# Patient Record
Sex: FEMALE | Race: WHITE | NOT HISPANIC OR LATINO | Employment: OTHER | URBAN - METROPOLITAN AREA
[De-identification: names, ages, dates, MRNs, and addresses within clinical notes are randomized per-mention and may not be internally consistent; named-entity substitution may affect disease eponyms.]

---

## 2017-02-02 DIAGNOSIS — J30.9 ALLERGIC RHINITIS, UNSPECIFIED ALLERGIC RHINITIS TRIGGER, UNSPECIFIED RHINITIS SEASONALITY: ICD-10-CM

## 2017-02-02 RX ORDER — FLUTICASONE PROPIONATE 50 MCG
SPRAY, SUSPENSION (ML) NASAL
Qty: 16 ML | Refills: 0 | Status: SHIPPED | OUTPATIENT
Start: 2017-02-02 | End: 2017-02-16 | Stop reason: SDUPTHER

## 2017-02-06 ENCOUNTER — OFFICE VISIT (OUTPATIENT)
Dept: INTERNAL MEDICINE | Facility: CLINIC | Age: 57
End: 2017-02-06

## 2017-02-06 VITALS
SYSTOLIC BLOOD PRESSURE: 120 MMHG | TEMPERATURE: 97.3 F | HEIGHT: 66 IN | WEIGHT: 150.8 LBS | DIASTOLIC BLOOD PRESSURE: 76 MMHG | BODY MASS INDEX: 24.23 KG/M2

## 2017-02-06 DIAGNOSIS — J30.9 ALLERGIC RHINITIS, UNSPECIFIED ALLERGIC RHINITIS TRIGGER, UNSPECIFIED RHINITIS SEASONALITY: ICD-10-CM

## 2017-02-06 DIAGNOSIS — F41.8 DEPRESSION WITH ANXIETY: Primary | Chronic | ICD-10-CM

## 2017-02-06 PROCEDURE — 99213 OFFICE O/P EST LOW 20 MIN: CPT | Performed by: FAMILY MEDICINE

## 2017-02-06 RX ORDER — MONTELUKAST SODIUM 10 MG/1
TABLET ORAL
Qty: 30 TABLET | Refills: 2 | Status: SHIPPED | OUTPATIENT
Start: 2017-02-06 | End: 2017-02-16 | Stop reason: SDUPTHER

## 2017-02-06 NOTE — PROGRESS NOTES
Subjective   Alisa Ames is a 56 y.o. female.     History of Present Illness   Here to discuss release to work. Last seen 10/14/16 for recheck mood. Was seen 7/1/16 for 2wk recheck thyroid, post thyroidectomy. Was seen 12/11/15 as a new patient. Just moved here from [place]. Had labs 1/11/16 with normal CBC (Hg 15.6 in smoker), CMP, TSH and B12. Had vit D 18 and was advised 5000 IU qd. Last TSH 7/1/16 was 0.488 with free T4 1.33 on synthroid 100. Lab 9/6/16 demo TSH 2.014 and free T4 1.3 on synthroid 88.     1. CV- syncope, undetermined cause. Pt saw Dr Evans 6/10/16. Had MRI/ MRA and EEG. Had 50 % stenosis of right internal carotid, others normal; normal MRA brain; EEG normal. B12 and A1C recheck were normal. Pt saw cardio with neg w/u including echo and Event Monitor. Discussed mood related issues.   2.ENDO- nontoxic diffuse goiter, post total thyroidectomy. Pt had a h/o right thyroidectomy in past. Had diffuse, enlarging goiter noted on neck MRI and then U/S. Was treated with thyroidectomy 4/23/16 due to size. Is at goal on synthroid 88 with last TSH done 9/6/16.  3. GI- IBS. Pt diagnosed 9/2016. Sister and daughter both has this as well. Pt’s issues were mild and she was advised daily fiber and did well.  4. ORTHO- fibromyalgia and DDD with h/o C spine surgery- initially on flexeril bid. Was seeing Pain Management in [place] and was on oxycodone. Was given tizanadine and gabapentin here. Did not respond to tramadol. Saw [facility] here 4/19/16 and was changed from gabapentin to lyrica. Was referred to PT at visit 12/13/16.      5. PSYCH- depression with anxiety- pt with longterm issues. Has had increased symptoms recently and reported a h/o being stalked but her  reported that this was a delusion. Pt has a h/o of diagnosis of paranoid Schizophrenia. Pt was on Cymbalta and Zyprexa at her last visit and seeing psych at [facility] for counseling. Was having 30 lb weight gain. Zyprexa was changed to abilify and pt did well. However,  at her last visit she had gotten confused and was taking the Zyprexa and abilify instead of Cymbalta and abilify. Was getting dizzy. Correction made and pt was given written instruction. Today she reports she wants to go back to work. However, she is having dizziness again. She does not need to work. 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    6. RESP- today pt reports she has been having allergies and has been using a nasal steroid. When she did this, it made her eye burn and since then she has had a little more sinus pressure. Feels it is still allergy.    The following portions of the patient's history were reviewed and updated as appropriate: current medications, past family history, past medical history, past social history, past surgical history and problem list.    Review of Systems   HENT: Positive for postnasal drip, rhinorrhea and sinus pressure.    Cardiovascular: Negative for chest pain.   Gastrointestinal: Negative for abdominal distention and abdominal pain.   Skin: Negative for color change.   Neurological: Negative for tremors, speech difficulty and headaches.   Psychiatric/Behavioral: Positive for sleep disturbance. Negative for agitation and confusion.   All other systems reviewed and are negative.        Current Outpatient Prescriptions:   •  ARIPiprazole (ABILIFY) 5 MG tablet, Take 1 tab po qhs, Disp: 90 tablet, Rfl: 1  •  DULoxetine (CYMBALTA) 60 MG capsule, Take 1 capsule by mouth Daily., Disp: 90 capsule, Rfl: 1  •  fluticasone (FLONASE) 50 MCG/ACT nasal spray, SHAKE LIQUID AND USE 1 SPRAY IN EACH NOSTRIL EVERY DAY AS NEEDED FOR ALLERGIES, Disp: 16 mL, Rfl: 0  •  levothyroxine (SYNTHROID) 88 MCG tablet, Take 1 tab po qd fasting and wait 1hr for food or other meds, Disp: 30 tablet, Rfl: 0  •  meloxicam (MOBIC) 7.5 MG tablet, Take 7.5 mg by mouth 2 (two) times a day., Disp: , Rfl:   •  montelukast (SINGULAIR) 10 MG  "tablet, Take 1 tab po qd prn allergies, Disp: 30 tablet, Rfl: 2  •  tiZANidine (ZANAFLEX) 4 MG tablet, TAKE 1 TABLET BY MOUTH TWICE A DAY, Disp: 60 tablet, Rfl: 0  •  traMADol (ULTRAM) 50 MG tablet, Take 1-2 tablets by mouth every 6 (six) hours as needed. For pain, Disp: , Rfl:     Objective     Visit Vitals   • /76   • Temp 97.3 °F (36.3 °C)   • Ht 66\" (167.6 cm)   • Wt 150 lb 12.8 oz (68.4 kg)   • BMI 24.34 kg/m2       Physical Exam   Constitutional: She is oriented to person, place, and time. She appears well-developed and well-nourished.   HENT:   Right Ear: Tympanic membrane and ear canal normal.   Left Ear: Tympanic membrane and ear canal normal.   Mouth/Throat: Oropharynx is clear and moist.   Eyes: Conjunctivae and EOM are normal. Pupils are equal, round, and reactive to light.   Neck: No thyromegaly present.   Cardiovascular: Normal rate and regular rhythm.    Pulmonary/Chest: Effort normal and breath sounds normal.   Neurological: She is alert and oriented to person, place, and time.   Skin: Skin is warm and dry.   Psychiatric: She has a normal mood and affect.   Vitals reviewed.      Assessment/Plan   Alisa was seen today for follow-up.    Diagnoses and all orders for this visit:    Depression with anxiety    Allergic rhinitis, unspecified allergic rhinitis trigger, unspecified rhinitis seasonality  -     montelukast (SINGULAIR) 10 MG tablet; Take 1 tab po qd prn allergies      1. 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   2. RESP- allergies. Will use singulair. Discussed how to use a nasal steroid and not over shoot the target as she does not want it to go anywhere except in her nose. Can also add an OTC antihistamine. Advised to avoid decongestants. Handout provided.  3. RECHECK- 2mo routine         "

## 2017-02-06 NOTE — PATIENT INSTRUCTIONS
"1. 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   2. RESP- allergies. Will use singulair. Discussed how to use a nasal steroid and not over shoot the target as she does not want it to go anywhere except in her nose. Can also add an OTC antihistamine. Advised to avoid decongestants. Handout provided.  3. RECHECK- 2mo routine    Patient education today that allergies are a reaction to an \"allergen\" (something the body is perceiving as a germ). The immune system is being triggered to try to \"kill\" the germ.  Allergies can affect the sinuses (hay fever), lungs (asthma) or skin (dermatitis / eczema). As there is no benefit in this reaction, the goal is to stop it. This can be accomplished using:    - an antihistamine (claritin/ loratadine, zyrtec/ cetirazine, allegra/ fexofenadine)  - a leukotriene blocker (singulair/ montelukast)  - a steroid. As steroids have multiple side effects, they are usually used topically (creams on the skin, nasal sprays for the sinuses or inhalers for the lungs).     Any combination of antihistamine, leukotriene blocker and steroid can be used. It is not helpful to use multiple of the same (ie- only one antihistamine at a time).  "

## 2017-02-13 ENCOUNTER — TELEPHONE (OUTPATIENT)
Dept: INTERNAL MEDICINE | Facility: CLINIC | Age: 57
End: 2017-02-13

## 2017-02-13 NOTE — TELEPHONE ENCOUNTER
I called pt to see what she was talking about regarding her infection as she was just seen in the office and did not mention anything. I looked for her  referral for physical therapy but did not see anything since October. New referral submitted to  today. Pt phone states that the [employer] call cannot be completed. There's no VM set up.

## 2017-02-13 NOTE — TELEPHONE ENCOUNTER
----- Message from Mohini Carrillo sent at 2/10/2017 10:54 AM EST -----  TALIB COSTA ABOUT HER INFECTION AND ALSO ABOUT [phone]

## 2017-02-16 ENCOUNTER — OFFICE VISIT (OUTPATIENT)
Dept: INTERNAL MEDICINE | Facility: CLINIC | Age: 57
End: 2017-02-16

## 2017-02-16 VITALS — WEIGHT: 151.4 LBS | BODY MASS INDEX: 24.33 KG/M2 | HEIGHT: 66 IN | TEMPERATURE: 97.8 F

## 2017-02-16 DIAGNOSIS — J30.9 ALLERGIC RHINITIS, UNSPECIFIED ALLERGIC RHINITIS TRIGGER, UNSPECIFIED RHINITIS SEASONALITY: ICD-10-CM

## 2017-02-16 DIAGNOSIS — J32.9 OTHER SINUSITIS, UNSPECIFIED CHRONICITY: Primary | ICD-10-CM

## 2017-02-16 PROCEDURE — 99213 OFFICE O/P EST LOW 20 MIN: CPT | Performed by: FAMILY MEDICINE

## 2017-02-16 RX ORDER — PREDNISONE 1 MG/1
TABLET ORAL
Qty: 18 TABLET | Refills: 0 | Status: SHIPPED | OUTPATIENT
Start: 2017-02-16 | End: 2017-04-13

## 2017-02-16 RX ORDER — FLUTICASONE PROPIONATE 50 MCG
1 SPRAY, SUSPENSION (ML) NASAL DAILY
Qty: 16 ML | Refills: 5 | Status: SHIPPED | OUTPATIENT
Start: 2017-02-16 | End: 2018-12-27

## 2017-02-16 RX ORDER — MONTELUKAST SODIUM 10 MG/1
TABLET ORAL
Qty: 30 TABLET | Refills: 2 | Status: SHIPPED | OUTPATIENT
Start: 2017-02-16 | End: 2017-09-06 | Stop reason: SDUPTHER

## 2017-02-16 NOTE — PROGRESS NOTES
Subjective   Alisa Ames is a 56 y.o. female.     History of Present Illness   Here today as a work in for cough, congestion and runny nose. Last seen 2/6/17 for routine. Was seen 12/2016 with allergies, started on flonase and singulair. Was given biaxin to add prn. Was seen 12/11/15 as a new patient.     RESP- today pt reports she is using the allergy meds and not getting relief. She did end up taking the abx back in December but feels like it did not finish the job. Is having frontal pressure and HA. Has head congestion, especially on the right. Phlegm is still clear.     The following portions of the patient's history were reviewed and updated as appropriate: current medications, past family history, past medical history, past social history, past surgical history and problem list.    Review of Systems   HENT: Positive for congestion, rhinorrhea and sinus pressure.    Respiratory: Positive for cough.    Cardiovascular: Negative for chest pain.   Gastrointestinal: Negative for abdominal distention and abdominal pain.   Skin: Negative for color change.   Neurological: Positive for headaches. Negative for tremors and speech difficulty.   Psychiatric/Behavioral: Negative for agitation and confusion.   All other systems reviewed and are negative.        Current Outpatient Prescriptions:   •  ARIPiprazole (ABILIFY) 5 MG tablet, Take 1 tab po qhs, Disp: 90 tablet, Rfl: 1  •  clarithromycin XL (BIAXIN XL) 500 MG 24 hr tablet, Take 2 tablets by mouth Daily., Disp: 14 tablet, Rfl: 0  •  DULoxetine (CYMBALTA) 60 MG capsule, Take 1 capsule by mouth Daily., Disp: 90 capsule, Rfl: 1  •  fluticasone (FLONASE) 50 MCG/ACT nasal spray, 1 spray into each nostril Daily., Disp: 16 mL, Rfl: 5  •  levothyroxine (SYNTHROID) 88 MCG tablet, Take 1 tab po qd fasting and wait 1hr for food or other meds, Disp: 30 tablet, Rfl: 0  •  meloxicam (MOBIC) 7.5 MG tablet, Take 7.5 mg by mouth 2 (two) times a day., Disp: , Rfl:   •  montelukast  "(SINGULAIR) 10 MG tablet, Take 1 tab po qd prn allergies, Disp: 30 tablet, Rfl: 2  •  predniSONE (DELTASONE) 5 MG tablet, Take 3 tabs po qd x3 days, then 2 tabs po qd x3 days, then 1 tab po qd x3 days, Disp: 18 tablet, Rfl: 0  •  tiZANidine (ZANAFLEX) 4 MG tablet, TAKE 1 TABLET BY MOUTH TWICE A DAY, Disp: 60 tablet, Rfl: 0  •  traMADol (ULTRAM) 50 MG tablet, Take 1-2 tablets by mouth every 6 (six) hours as needed. For pain, Disp: , Rfl:     Objective     Visit Vitals   • Temp 97.8 °F (36.6 °C)   • Ht 66\" (167.6 cm)   • Wt 151 lb 6.4 oz (68.7 kg)   • BMI 24.44 kg/m2       Physical Exam   Constitutional: She is oriented to person, place, and time. She appears well-developed and well-nourished.   HENT:   Right Ear: Tympanic membrane and ear canal normal.   Left Ear: Tympanic membrane and ear canal normal.   Mouth/Throat: Oropharynx is clear and moist.   Eyes: Conjunctivae and EOM are normal. Pupils are equal, round, and reactive to light.   Neck: No thyromegaly present.   Cardiovascular: Normal rate and regular rhythm.    Pulmonary/Chest: Effort normal and breath sounds normal.   Neurological: She is alert and oriented to person, place, and time.   Skin: Skin is warm and dry.   Psychiatric: She has a normal mood and affect.   Vitals reviewed.      Assessment/Plan   Alisa was seen today for illness.    Diagnoses and all orders for this visit:    Other sinusitis, unspecified chronicity  -     clarithromycin XL (BIAXIN XL) 500 MG 24 hr tablet; Take 2 tablets by mouth Daily.  -     predniSONE (DELTASONE) 5 MG tablet; Take 3 tabs po qd x3 days, then 2 tabs po qd x3 days, then 1 tab po qd x3 days    Allergic rhinitis, unspecified allergic rhinitis trigger, unspecified rhinitis seasonality  -     montelukast (SINGULAIR) 10 MG tablet; Take 1 tab po qd prn allergies  -     fluticasone (FLONASE) 50 MCG/ACT nasal spray; 1 spray into each nostril Daily.      1. RESP- allergies with sinus infection. Will treat with prednisone and " clarithromycin. Discussed staying on the allergy med routinely for now.  2. RECHECK- prn

## 2017-02-16 NOTE — PATIENT INSTRUCTIONS
"1. RESP- allergies with sinus infection. Will treat with prednisone and clarithromycin. Discussed staying on the allergy med routinely for now.  2. RECHECK- prn    Patient education today that allergies are a reaction to an \"allergen\" (something the body is perceiving as a germ). The immune system is being triggered to try to \"kill\" the germ.  Allergies can affect the sinuses (hay fever), lungs (asthma) or skin (dermatitis / eczema). As there is no benefit in this reaction, the goal is to stop it. This can be accomplished using:    - an antihistamine (claritin/ loratadine, zyrtec/ cetirazine, allegra/ fexofenadine)  - a leukotriene blocker (singulair/ montelukast)  - a steroid. As steroids have multiple side effects, they are usually used topically (creams on the skin, nasal sprays for the sinuses or inhalers for the lungs).     Any combination of antihistamine, leukotriene blocker and steroid can be used. It is not helpful to use multiple of the same (ie- only one antihistamine at a time).  "

## 2017-02-25 ENCOUNTER — APPOINTMENT (OUTPATIENT)
Dept: CT IMAGING | Facility: HOSPITAL | Age: 57
End: 2017-02-25

## 2017-02-25 ENCOUNTER — HOSPITAL ENCOUNTER (EMERGENCY)
Facility: HOSPITAL | Age: 57
Discharge: HOME | End: 2017-02-25
Attending: EMERGENCY MEDICINE | Admitting: EMERGENCY MEDICINE

## 2017-02-25 VITALS
SYSTOLIC BLOOD PRESSURE: 170 MMHG | DIASTOLIC BLOOD PRESSURE: 80 MMHG | BODY MASS INDEX: 24.59 KG/M2 | HEART RATE: 64 BPM | TEMPERATURE: 98.5 F | WEIGHT: 153 LBS | HEIGHT: 66 IN | OXYGEN SATURATION: 98 % | RESPIRATION RATE: 16 BRPM

## 2017-02-25 DIAGNOSIS — R55 NEAR SYNCOPE: Primary | ICD-10-CM
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
## 2017-02-25 DIAGNOSIS — S20.20XA MULTIPLE CONTUSIONS OF TRUNK, INITIAL ENCOUNTER: ICD-10-CM

## 2017-02-25 LAB
ANION GAP SERPL CALCULATED.3IONS-SCNC: 5 MMOL/L (ref 3–11)
BASOPHILS # BLD AUTO: 0.02 10*3/MM3 (ref 0–0.2)
BASOPHILS NFR BLD AUTO: 0.2 % (ref 0–1)
BUN BLD-MCNC: 9 MG/DL (ref 9–23)
BUN/CREAT SERPL: 11.3 (ref 7–25)
CALCIUM SPEC-SCNC: 9.4 MG/DL (ref 8.7–10.4)
CHLORIDE SERPL-SCNC: 102 MMOL/L (ref 99–109)
CO2 SERPL-SCNC: 33 MMOL/L (ref 20–31)
CREAT BLD-MCNC: 0.8 MG/DL (ref 0.6–1.3)
DEPRECATED RDW RBC AUTO: 47.4 FL (ref 37–54)
EOSINOPHIL # BLD AUTO: 0.12 10*3/MM3 (ref 0.1–0.3)
EOSINOPHIL NFR BLD AUTO: 1.4 % (ref 0–3)
ERYTHROCYTE [DISTWIDTH] IN BLOOD BY AUTOMATED COUNT: 13.6 % (ref 11.3–14.5)
GFR SERPL CREATININE-BSD FRML MDRD: 74 ML/MIN/1.73
GLUCOSE BLD-MCNC: 85 MG/DL (ref 70–100)
HCT VFR BLD AUTO: 37.7 % (ref 34.5–44)
HGB BLD-MCNC: 12.6 G/DL (ref 11.5–15.5)
IMM GRANULOCYTES # BLD: 0.01 10*3/MM3 (ref 0–0.03)
IMM GRANULOCYTES NFR BLD: 0.1 % (ref 0–0.6)
LYMPHOCYTES # BLD AUTO: 3.13 10*3/MM3 (ref 0.6–4.8)
LYMPHOCYTES NFR BLD AUTO: 37.8 % (ref 24–44)
MCH RBC QN AUTO: 31.7 PG (ref 27–31)
MCHC RBC AUTO-ENTMCNC: 33.4 G/DL (ref 32–36)
MCV RBC AUTO: 95 FL (ref 80–99)
MONOCYTES # BLD AUTO: 0.83 10*3/MM3 (ref 0–1)
MONOCYTES NFR BLD AUTO: 10 % (ref 0–12)
NEUTROPHILS # BLD AUTO: 4.17 10*3/MM3 (ref 1.5–8.3)
NEUTROPHILS NFR BLD AUTO: 50.5 % (ref 41–71)
PLATELET # BLD AUTO: 301 10*3/MM3 (ref 150–450)
PMV BLD AUTO: 9.6 FL (ref 6–12)
POTASSIUM BLD-SCNC: 3.1 MMOL/L (ref 3.5–5.5)
RBC # BLD AUTO: 3.97 10*6/MM3 (ref 3.89–5.14)
SODIUM BLD-SCNC: 140 MMOL/L (ref 132–146)
WBC NRBC COR # BLD: 8.28 10*3/MM3 (ref 3.5–10.8)

## 2017-02-25 PROCEDURE — 99284 EMERGENCY DEPT VISIT MOD MDM: CPT

## 2017-02-25 PROCEDURE — 96375 TX/PRO/DX INJ NEW DRUG ADDON: CPT

## 2017-02-25 PROCEDURE — 85025 COMPLETE CBC W/AUTO DIFF WBC: CPT | Performed by: EMERGENCY MEDICINE

## 2017-02-25 PROCEDURE — 80048 BASIC METABOLIC PNL TOTAL CA: CPT | Performed by: EMERGENCY MEDICINE

## 2017-02-25 PROCEDURE — 25010000002 HYDROMORPHONE PER 4 MG: Performed by: EMERGENCY MEDICINE

## 2017-02-25 PROCEDURE — 96374 THER/PROPH/DIAG INJ IV PUSH: CPT

## 2017-02-25 PROCEDURE — 71250 CT THORAX DX C-: CPT

## 2017-02-25 PROCEDURE — 93005 ELECTROCARDIOGRAM TRACING: CPT | Performed by: EMERGENCY MEDICINE

## 2017-02-25 PROCEDURE — 25010000002 KETOROLAC TROMETHAMINE PER 15 MG: Performed by: EMERGENCY MEDICINE

## 2017-02-25 RX ORDER — SODIUM CHLORIDE 0.9 % (FLUSH) 0.9 %
10 SYRINGE (ML) INJECTION AS NEEDED
Status: DISCONTINUED | OUTPATIENT
Start: 2017-02-25 | End: 2017-02-25 | Stop reason: HOSPADM

## 2017-02-25 RX ORDER — OXYCODONE AND ACETAMINOPHEN 7.5; 325 MG/1; MG/1
1 TABLET ORAL EVERY 6 HOURS PRN
Qty: 10 TABLET | Refills: 0 | Status: SHIPPED | OUTPATIENT
Start: 2017-02-25 | End: 2017-05-16

## 2017-02-25 RX ORDER — KETOROLAC TROMETHAMINE 15 MG/ML
15 INJECTION, SOLUTION INTRAMUSCULAR; INTRAVENOUS ONCE
Status: COMPLETED | OUTPATIENT
Start: 2017-02-25 | End: 2017-02-25

## 2017-02-25 RX ORDER — HYDROCODONE BITARTRATE AND ACETAMINOPHEN 5; 325 MG/1; MG/1
1 TABLET ORAL EVERY 6 HOURS PRN
COMMUNITY
End: 2017-05-16 | Stop reason: SDUPTHER

## 2017-02-25 RX ADMIN — KETOROLAC TROMETHAMINE 15 MG: 15 INJECTION, SOLUTION INTRAMUSCULAR; INTRAVENOUS at 08:13

## 2017-02-25 RX ADMIN — HYDROMORPHONE HYDROCHLORIDE 0.5 MG: 1 INJECTION, SOLUTION INTRAMUSCULAR; INTRAVENOUS; SUBCUTANEOUS at 08:19

## 2017-03-01 ENCOUNTER — OFFICE VISIT (OUTPATIENT)
Dept: INTERNAL MEDICINE | Facility: CLINIC | Age: 57
End: 2017-03-01

## 2017-03-01 VITALS
TEMPERATURE: 97.1 F | WEIGHT: 151.2 LBS | BODY MASS INDEX: 24.3 KG/M2 | SYSTOLIC BLOOD PRESSURE: 130 MMHG | HEIGHT: 66 IN | DIASTOLIC BLOOD PRESSURE: 78 MMHG

## 2017-03-01 DIAGNOSIS — J32.9 OTHER SINUSITIS, UNSPECIFIED CHRONICITY: ICD-10-CM

## 2017-03-01 PROCEDURE — 99213 OFFICE O/P EST LOW 20 MIN: CPT | Performed by: FAMILY MEDICINE

## 2017-03-01 NOTE — PROGRESS NOTES
Subjective   Alisa Ames is a 56 y.o. female.     History of Present Illness   Here for hospital recheck. Seen in ED 2/25/17 for fall; note reviewed. Pt on norco and was going to be out 10 days early due to extra use. Was given 2 days meds in ER. Lab demonomral CBC and CMP. EKG demo NSR with old septal Q waves, nonspecific T changes, ? atrial enlargement (compared to 8/23/16). CT chest neg other than emphysema changes.    ORTHO- today pt reports she had one of her dizzy spells and feel into a doorframe. No LOC but she cannot remember the incident.     RESP- allergies. Today pt reports no vertigo. However, she is not improving and would like to see an allergist. Is still not smoking.    The following portions of the patient's history were reviewed and updated as appropriate: current medications, past family history, past medical history, past social history, past surgical history and problem list.    Review of Systems   Cardiovascular: Negative for chest pain.   Gastrointestinal: Negative for abdominal distention and abdominal pain.   Musculoskeletal:        Rib pain   Skin: Negative for color change.   Neurological: Negative for tremors, speech difficulty and headaches.   Psychiatric/Behavioral: Negative for agitation and confusion.   All other systems reviewed and are negative.        Current Outpatient Prescriptions:   •  ARIPiprazole (ABILIFY) 5 MG tablet, Take 1 tab po qhs, Disp: 90 tablet, Rfl: 1  •  DULoxetine (CYMBALTA) 60 MG capsule, Take 1 capsule by mouth Daily., Disp: 90 capsule, Rfl: 1  •  fluticasone (FLONASE) 50 MCG/ACT nasal spray, 1 spray into each nostril Daily., Disp: 16 mL, Rfl: 5  •  HYDROcodone-acetaminophen (NORCO) 5-325 MG per tablet, Take 1 tablet by mouth Every 6 (Six) Hours As Needed., Disp: , Rfl:   •  levothyroxine (SYNTHROID) 88 MCG tablet, Take 1 tab po qd fasting and wait 1hr for food or other meds, Disp: 30 tablet, Rfl: 0  •  meloxicam (MOBIC) 7.5 MG tablet, Take 7.5 mg by mouth 2  "(two) times a day., Disp: , Rfl:   •  montelukast (SINGULAIR) 10 MG tablet, Take 1 tab po qd prn allergies, Disp: 30 tablet, Rfl: 2  •  oxyCODONE-acetaminophen (PERCOCET) 7.5-325 MG per tablet, Take 1 tablet by mouth Every 6 (Six) Hours As Needed for severe pain (7-10)., Disp: 10 tablet, Rfl: 0  •  predniSONE (DELTASONE) 5 MG tablet, Take 3 tabs po qd x3 days, then 2 tabs po qd x3 days, then 1 tab po qd x3 days, Disp: 18 tablet, Rfl: 0  •  tiZANidine (ZANAFLEX) 4 MG tablet, TAKE 1 TABLET BY MOUTH TWICE A DAY, Disp: 60 tablet, Rfl: 0  •  traMADol (ULTRAM) 50 MG tablet, Take 1-2 tablets by mouth every 6 (six) hours as needed. For pain, Disp: , Rfl:     Objective     Visit Vitals   • /78   • Temp 97.1 °F (36.2 °C)   • Ht 66\" (167.6 cm)   • Wt 151 lb 3.2 oz (68.6 kg)   • BMI 24.4 kg/m2       Physical Exam   Constitutional: She is oriented to person, place, and time. She appears well-developed and well-nourished.   HENT:   Right Ear: Tympanic membrane and ear canal normal.   Left Ear: Tympanic membrane and ear canal normal.   Mouth/Throat: Oropharynx is clear and moist.   Eyes: Conjunctivae and EOM are normal. Pupils are equal, round, and reactive to light.   Neck: No thyromegaly present.   Cardiovascular: Normal rate and regular rhythm.    Pulmonary/Chest: Effort normal and breath sounds normal.   Neurological: She is alert and oriented to person, place, and time.   Skin: Skin is warm and dry.   Bruising along her right torso with no lung changes. Bruise already resolving with yellowing   Psychiatric: She has a normal mood and affect.   Vitals reviewed.      Assessment/Plan   Alisa was seen today for follow-up.    Diagnoses and all orders for this visit:    Other sinusitis, unspecified chronicity  -     Ambulatory Referral to Allergy        1. ORTHO- arthritis- discussed with pt today that her pain med and muscle relaxer can be contributing to her falls. To make an appointment with pain management as she may " benefit from an extended release pain med and an alternate version of the muscle relaxer.  2. RESP- allergies- will refer to the allergist.  3. RECHECK- prn

## 2017-03-01 NOTE — PATIENT INSTRUCTIONS
1. ORTHO- arthritis- discussed with pt today that her pain med and muscle relaxer can be contributing to her falls. To make an appointment with pain management as she may benefit from an extended release pain med and an alternate version of the muscle relaxer.  2. RESP- allergies- will refer to the allergist.  3. RECHECK- prn

## 2017-04-13 ENCOUNTER — OFFICE VISIT (OUTPATIENT)
Dept: INTERNAL MEDICINE | Facility: CLINIC | Age: 57
End: 2017-04-13

## 2017-04-13 VITALS
HEIGHT: 66 IN | DIASTOLIC BLOOD PRESSURE: 84 MMHG | TEMPERATURE: 97.2 F | BODY MASS INDEX: 24.59 KG/M2 | SYSTOLIC BLOOD PRESSURE: 124 MMHG | WEIGHT: 153 LBS

## 2017-04-13 DIAGNOSIS — J30.9 ALLERGIC RHINITIS, UNSPECIFIED ALLERGIC RHINITIS TRIGGER, UNSPECIFIED RHINITIS SEASONALITY: ICD-10-CM

## 2017-04-13 DIAGNOSIS — F41.8 DEPRESSION WITH ANXIETY: Primary | Chronic | ICD-10-CM

## 2017-04-13 PROCEDURE — 99214 OFFICE O/P EST MOD 30 MIN: CPT | Performed by: FAMILY MEDICINE

## 2017-04-13 NOTE — PATIENT INSTRUCTIONS
1. PSYCH- depression with anxiety- discussed that she likely has multiple chemicals out of balance and needs treatment that covers multiple areas. It is not clear if she is on the cymbalta which treats serotonin and norepinephrine. In addition, she is only getting partial response to the abilify (zyprexa). As she is currently off meds. We will do a trial with trinellix. Will start low and gradually increase dose if indicated. Pt given samples for 5mg for the first week and then she is to increase to 10mg (once daily) with Rx sent in for the 10mg. Discussed that this does not interfere with allergy testing with no need to hold this.  2. RESP- allergies- pt is given a copy of her meds with notes on which meds to hold for allergy testing. Discussed that she CANNOT hold her thyroid medicine. Is advised to hold all her allergy meds and the mobic for the allergy testing. Keep all other meds.  3. RECHECK- 1mo
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
## 2017-05-16 ENCOUNTER — OFFICE VISIT (OUTPATIENT)
Dept: INTERNAL MEDICINE | Facility: CLINIC | Age: 57
End: 2017-05-16

## 2017-05-16 VITALS
SYSTOLIC BLOOD PRESSURE: 112 MMHG | TEMPERATURE: 97.4 F | HEIGHT: 66 IN | BODY MASS INDEX: 24.14 KG/M2 | WEIGHT: 150.2 LBS | DIASTOLIC BLOOD PRESSURE: 70 MMHG

## 2017-05-16 DIAGNOSIS — F41.8 DEPRESSION WITH ANXIETY: Primary | Chronic | ICD-10-CM

## 2017-05-16 DIAGNOSIS — K58.0 IRRITABLE BOWEL SYNDROME WITH DIARRHEA: ICD-10-CM

## 2017-05-16 DIAGNOSIS — M51.36 DDD (DEGENERATIVE DISC DISEASE), LUMBAR: Chronic | ICD-10-CM

## 2017-05-16 PROCEDURE — 99214 OFFICE O/P EST MOD 30 MIN: CPT | Performed by: FAMILY MEDICINE

## 2017-05-16 RX ORDER — DIPHENOXYLATE HYDROCHLORIDE AND ATROPINE SULFATE 2.5; .025 MG/1; MG/1
1 TABLET ORAL 4 TIMES DAILY PRN
Qty: 120 TABLET | Refills: 0 | Status: SHIPPED | OUTPATIENT
Start: 2017-05-16 | End: 2019-06-11 | Stop reason: SDUPTHER

## 2017-05-16 RX ORDER — HYDROCODONE BITARTRATE AND ACETAMINOPHEN 5; 325 MG/1; MG/1
TABLET ORAL
Qty: 90 TABLET | Refills: 0 | Status: SHIPPED | OUTPATIENT
Start: 2017-05-16 | End: 2017-10-02

## 2017-05-19 ENCOUNTER — TELEPHONE (OUTPATIENT)
Dept: INTERNAL MEDICINE | Facility: CLINIC | Age: 57
End: 2017-05-19

## 2017-05-19 DIAGNOSIS — F41.8 DEPRESSION WITH ANXIETY: Chronic | ICD-10-CM

## 2017-05-31 DIAGNOSIS — M25.111 FISTULA OF RIGHT SHOULDER: Primary | ICD-10-CM

## 2017-06-21 ENCOUNTER — TELEPHONE (OUTPATIENT)
Dept: INTERNAL MEDICINE | Facility: CLINIC | Age: 57
End: 2017-06-21

## 2017-06-21 DIAGNOSIS — F41.8 DEPRESSION WITH ANXIETY: Chronic | ICD-10-CM

## 2017-06-21 DIAGNOSIS — E03.9 HYPOTHYROIDISM (ACQUIRED): ICD-10-CM

## 2017-06-21 RX ORDER — LEVOTHYROXINE SODIUM 88 UG/1
TABLET ORAL
Qty: 90 TABLET | Refills: 0 | Status: SHIPPED | OUTPATIENT
Start: 2017-06-21 | End: 2017-09-06 | Stop reason: SDUPTHER

## 2017-06-21 NOTE — TELEPHONE ENCOUNTER
1.SYNTHROID 88MCG  2. TRINTELLIX 10MG CALL INTO [facility] ON [facility]

## 2017-07-07 DIAGNOSIS — M50.30 DDD (DEGENERATIVE DISC DISEASE), CERVICAL: Primary | ICD-10-CM

## 2017-07-14 ENCOUNTER — HOSPITAL ENCOUNTER (OUTPATIENT)
Dept: MRI IMAGING | Facility: HOSPITAL | Age: 57
Discharge: HOME | End: 2017-07-14
Attending: FAMILY MEDICINE | Admitting: FAMILY MEDICINE

## 2017-07-14 PROCEDURE — 72156 MRI NECK SPINE W/O & W/DYE: CPT

## 2017-07-14 PROCEDURE — 0 GADOBENATE DIMEGLUMINE 529 MG/ML SOLUTION: Performed by: FAMILY MEDICINE

## 2017-07-14 PROCEDURE — A9577 INJ MULTIHANCE: HCPCS | Performed by: FAMILY MEDICINE

## 2017-07-14 RX ADMIN — GADOBENATE DIMEGLUMINE 13 ML: 529 INJECTION, SOLUTION INTRAVENOUS at 14:30

## 2017-07-17 ENCOUNTER — OFFICE VISIT (OUTPATIENT)
Dept: INTERNAL MEDICINE | Facility: CLINIC | Age: 57
End: 2017-07-17

## 2017-07-17 VITALS
BODY MASS INDEX: 23.01 KG/M2 | DIASTOLIC BLOOD PRESSURE: 72 MMHG | WEIGHT: 143.2 LBS | SYSTOLIC BLOOD PRESSURE: 116 MMHG | TEMPERATURE: 97.4 F | HEIGHT: 66 IN

## 2017-07-17 DIAGNOSIS — F41.8 DEPRESSION WITH ANXIETY: Primary | Chronic | ICD-10-CM

## 2017-07-17 PROCEDURE — 99213 OFFICE O/P EST LOW 20 MIN: CPT | Performed by: FAMILY MEDICINE

## 2017-07-17 NOTE — PATIENT INSTRUCTIONS
1. PSYCH- depression with anxiety- mood close to goal. Will increase the trintellix to 20mg and recheck in 3mo to ensure she reaches goal. Discussed that if she feels over medicated she can cut the dose in half. However, I expect that with the continued stress with returning to work, that she will need the 20mg.   2. RECHECK- 3mo fasting routine

## 2017-07-17 NOTE — PROGRESS NOTES
Subjective   Alisa Ames is a 57 y.o. female.     History of Present Illness   Here for 2mo recheck mood. Last seen 5/16/17. Was seen 3/1/17 as a recheck from ED. Seen in ED 2/25/17 for fall. Last routine visit 2/6/17. Was seen 12/2016 with allergies, started on flonase and singulair. Was given biaxin to add prn. Was seen 12/11/15 as a new patient. Had labs 1/11/16 with normal CBC (Hg 15.6 in smoker), CMP, TSH and B12. Had vit D 18 and was advised 5000 IU qd. Last TSH 7/1/16 was 0.488 with free T4 1.33 on synthroid 100. Lab 9/6/16 demo TSH 2.014 and free T4 1.3 on synthroid 88.     1. GENERAL- syncope, undetermined cause. Pt saw Dr Evans 6/10/16. Had MRI/ MRA and EEG. Had 50 % stenosis of right internal carotid, others normal; normal MRA brain; EEG normal. B12 and A1C recheck were normal. Pt saw cardio with neg w/u including echo and Event Monitor. Discussed mood related issues. Pt was seen in ER 2/25/17 with fall and head trauma. Lab demo nomral CBC and CMP. EKG demo NSR with old septal Q waves, nonspecific T changes, ? atrial enlargement (compared to 8/23/16). CT chest neg other than emphysema changes.  2. ENDO- nontoxic diffuse goiter, post total thyroidectomy. Pt had a h/o right thyroidectomy in past. Had diffuse, enlarging goiter noted on neck MRI and then U/S. Was treated with thyroidectomy 4/23/16 due to size. Was at goal on synthroid 88 with last TSH done 9/6/16.  3. RESP-allergies. Pt on OTC and nasal steroid with addition of singulair 2/2017. Today she reports she got her allergy testing. Was advised that her nose is sensitive to climate not so much allergens with no shots indicated. Started [place] honey and this helping alot.   4. GI- IBS. Pt diagnosed 9/2016. Sister and daughter both have this as well. Pt’s issues were mild and she was advised daily fiber and did well. Today she reports she needs a RF on lomotil. Last filled 8/2016 for #120.    5.ORTHO- fibromyalgia and DDD with h/o C spine surgery-  initially on flexeril bid. Was seeing Pain Management in [place] and was on oxycodone. Was given tizanadine and gabapentin here. Did not respond to tramadol. Saw [facility] here 4/19/16 and was changed from gabapentin to lyrica. Was referred to PT at visit 12/13/16. Was referred to new [facility] at visit 5/16/17 as she had taken extra meds after a fall and was discharged. Was using tizanidine and tramadol in the interim. Was referred to Dr He. Updated MRI neck ordered; no changes. Today pt reports she has recheck with Dr He pending.  Is using an herbal tea and had not been working and was doing well. However, her  had an MI and she is going to have to go back to work. 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      6.PSYCH- depression with anxiety- pt with long term issues. Has had increased symptoms recently and reported a h/o being stalked but her  reported that this was a delusion. Pt has a h/o of diagnosis of paranoid Schizophrenia. Pt was on Cymbalta and Zyprexa but had 30 lb weight gain and was changed to abilify and Cymbalta. However,at her last visit, it was unclear if she was actually taking the Cymbalta. In addition, she had held the abilify as she thought she was supposed to do this for allergy testing. Had full relapse with increased delusions. Was changed to trintellix only. It cost $50 but was working so was continued on same. Today she reports she has been using [employer] to address her anxiety and this has helped. Has not had a lot of anxiety but has had some. Otherwise she feels she is doing well. Her  had an MI and she coped well.     The following portions of the patient's history were reviewed and updated as appropriate: current medications, past family history, past medical history, past social history, past surgical history and problem list.    Review of Systems   Cardiovascular: Negative for chest pain.   Gastrointestinal: Negative for abdominal distention and abdominal pain.   Skin:  "Negative for color change.   Neurological: Negative for tremors, speech difficulty and headaches.   Psychiatric/Behavioral: Negative for agitation and confusion.   All other systems reviewed and are negative.        Current Outpatient Prescriptions:   •  diphenoxylate-atropine (LOMOTIL) 2.5-0.025 MG per tablet, Take 1 tablet by mouth 4 (Four) Times a Day As Needed for Diarrhea., Disp: 120 tablet, Rfl: 0  •  fluticasone (FLONASE) 50 MCG/ACT nasal spray, 1 spray into each nostril Daily., Disp: 16 mL, Rfl: 5  •  HYDROcodone-acetaminophen (NORCO) 5-325 MG per tablet, Take 1 tab po tid, Disp: 90 tablet, Rfl: 0  •  levothyroxine (SYNTHROID) 88 MCG tablet, Take 1 tab po qd fasting and wait 1hr for food or other meds, Disp: 90 tablet, Rfl: 0  •  montelukast (SINGULAIR) 10 MG tablet, Take 1 tab po qd prn allergies, Disp: 30 tablet, Rfl: 2  •  tiZANidine (ZANAFLEX) 4 MG tablet, TAKE 1 TABLET BY MOUTH TWICE A DAY, Disp: 60 tablet, Rfl: 0  •  Vortioxetine HBr (TRINTELLIX) 20 MG tablet, Take 20 mg by mouth Daily., Disp: 90 tablet, Rfl: 0    Objective     /72  Temp 97.4 °F (36.3 °C)  Ht 66\" (167.6 cm)  Wt 143 lb 3.2 oz (65 kg)  BMI 23.11 kg/m2    Physical Exam   Constitutional: She is oriented to person, place, and time. She appears well-developed and well-nourished.   HENT:   Right Ear: Tympanic membrane and ear canal normal.   Left Ear: Tympanic membrane and ear canal normal.   Mouth/Throat: Oropharynx is clear and moist.   Eyes: Conjunctivae and EOM are normal. Pupils are equal, round, and reactive to light.   Neck: No thyromegaly present.   Cardiovascular: Normal rate and regular rhythm.    Pulmonary/Chest: Effort normal and breath sounds normal.   Neurological: She is alert and oriented to person, place, and time.   Skin: Skin is warm and dry.   Psychiatric: She has a normal mood and affect.   Vitals reviewed.      Assessment/Plan   Alisa was seen today for follow-up.    Diagnoses and all orders for this " visit:    Depression with anxiety  -     Vortioxetine HBr (TRINTELLIX) 20 MG tablet; Take 20 mg by mouth Daily.      1. PSYCH- depression with anxiety- mood close to goal. Will increase the trintellix to 20mg and recheck in 3mo to ensure she reaches goal. Discussed that if she feels over medicated she can cut the dose in half. However, I expect that with the continued stress with returning to work, that she will need the 20mg.   2. RECHECK- 3mo fasting routine

## 2017-08-03 ENCOUNTER — TELEPHONE (OUTPATIENT)
Dept: INTERNAL MEDICINE | Facility: CLINIC | Age: 57
End: 2017-08-03

## 2017-08-29 ENCOUNTER — OFFICE VISIT (OUTPATIENT)
Dept: INTERNAL MEDICINE | Facility: CLINIC | Age: 57
End: 2017-08-29

## 2017-08-29 VITALS
BODY MASS INDEX: 23.14 KG/M2 | DIASTOLIC BLOOD PRESSURE: 76 MMHG | SYSTOLIC BLOOD PRESSURE: 114 MMHG | TEMPERATURE: 97.8 F | HEIGHT: 66 IN | WEIGHT: 144 LBS

## 2017-08-29 DIAGNOSIS — H61.23 BILATERAL IMPACTED CERUMEN: Primary | ICD-10-CM

## 2017-08-29 PROCEDURE — 99213 OFFICE O/P EST LOW 20 MIN: CPT | Performed by: FAMILY MEDICINE

## 2017-08-29 RX ORDER — OXCARBAZEPINE 150 MG/1
TABLET, FILM COATED ORAL
COMMUNITY
Start: 2017-06-07 | End: 2017-10-02

## 2017-08-29 NOTE — PATIENT INSTRUCTIONS
1. ENT- cerumen impaction, bilat. Will irrigate today. Pt advised that this relates to the allergies. To use warm sweet oil or olive oil to bathe her ears every few days to keep the wax flowing.   2. RECHECK- CPE

## 2017-08-29 NOTE — PROGRESS NOTES
Subjective   Alisa Ames is a 57 y.o. female.     History of Present Illness   Here for ear c/o and to discuss cardio referral. Last seen 7/17/17 for 2mo recheck mood. Was seen 3/1/17 as a recheck from ED 2/25/17 for fall. Last routine visit 2/6/17. Was seen 12/2016 with allergies, started on flonase and singulair. Was given biaxin to add prn. Was seen 12/11/15 as a new patient. Had labs 1/11/16 with normal CBC (Hg 15.6 in smoker), CMP, TSH and B12. Had vit D 18 and was advised 5000 IU qd. Last TSH 7/1/16 was 0.488 with free T4 1.33 on synthroid 100. Lab 9/6/16 demo TSH 2.014 and free T4 1.3 on synthroid 88.     1.GENERAL- syncope, undetermined cause. Pt saw Dr Evans 6/10/16. Had MRI/ MRA and EEG. Had 50 % stenosis of right internal carotid, others normal; normal MRA brain; EEG normal. B12 and A1C recheck were normal. Pt saw cardio with neg w/u including echo and Event Monitor. Discussed mood related issues. Pt was seen in ER 2/25/17 with fall and head trauma. Lab demo nomral CBC and CMP. EKG demo NSR with old septal Q waves, nonspecific T changes, ? atrial enlargement (compared to 8/23/16). CT chest neg other than emphysema changes.Today pt reports she forgot that she already saw the cardio.    2. RESP-allergies. Pt on OTC and nasal steroid with addition of singulair 2/2017. Allergy testing demo sensitivity to climate not so much allergens with no shots indicated. Started [place] honey and this helping alot. Today pt reports she went for a hearing tested and was advised her ears were impacted.    3.ENDO- nontoxic diffuse goiter, post total thyroidectomy. Pt had a h/o right thyroidectomy in past. Had diffuse, enlarging goiter noted on neck MRI and then U/S. Was treated with thyroidectomy 4/23/16 due to size. Was at goal on synthroid 88 with last TSH done 9/6/16.  4. GI- IBS. Pt diagnosed 9/2016. Sister and daughter both have this as well. Pt’s issues were mild and she was advised daily fiber and did well. Today she  reports she needs a RF on lomotil. Last filled 8/2016 for #120.  5.ORTHO- fibromyalgia and DDD with h/o C spine surgery- initially on flexeril bid. Was seeing Pain Management in [place] and was on oxycodone. Was given tizanadine and gabapentin here. Did not respond to tramadol. Saw [facility] here 4/19/16 and was changed from gabapentin to lyrica. Was referred to PT at visit 12/13/16. Was referred to new [facility] at visit 5/16/17 as she had taken extra meds after a fall and was discharged. Was using tizanidine and tramadol in the interim. Was referred to Dr He. Updated MRI neck ordered; no changes. 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   6.PSYCH- depression with anxiety- pt with long term issues. Has had increased symptoms recently and reported a h/o being stalked but her  reported that this was a delusion. Pt has a h/o of diagnosis of paranoid Schizophrenia. Pt was on Cymbalta and Zyprexa but had 30 lb weight gain and was changed to abilify and Cymbalta. However,at her last visit, it was unclear if she was actually taking the Cymbalta. In addition, she had held the abilify as she thought she was supposed to do this for allergy testing. Had full relapse with increased delusions. Was changed to trintellix only. At last discussion pt was doing well with this and prayer.     The following portions of the patient's history were reviewed and updated as appropriate: current medications, past family history, past medical history, past social history, past surgical history and problem list.    Review of Systems   Cardiovascular: Negative for chest pain.   Gastrointestinal: Negative for abdominal distention and abdominal pain.   Skin: Negative for color change.   Neurological: Negative for tremors, speech difficulty and headaches.   Psychiatric/Behavioral: Negative for agitation and confusion.   All other systems reviewed and are negative.        Current Outpatient Prescriptions:   •  diphenoxylate-atropine (LOMOTIL) 2.5-0.025 MG  "per tablet, Take 1 tablet by mouth 4 (Four) Times a Day As Needed for Diarrhea., Disp: 120 tablet, Rfl: 0  •  fluticasone (FLONASE) 50 MCG/ACT nasal spray, 1 spray into each nostril Daily., Disp: 16 mL, Rfl: 5  •  HYDROcodone-acetaminophen (NORCO) 5-325 MG per tablet, Take 1 tab po tid, Disp: 90 tablet, Rfl: 0  •  levothyroxine (SYNTHROID) 88 MCG tablet, Take 1 tab po qd fasting and wait 1hr for food or other meds, Disp: 90 tablet, Rfl: 0  •  montelukast (SINGULAIR) 10 MG tablet, Take 1 tab po qd prn allergies, Disp: 30 tablet, Rfl: 2  •  OXcarbazepine (TRILEPTAL) 150 MG tablet, , Disp: , Rfl:   •  tiZANidine (ZANAFLEX) 4 MG tablet, TAKE 1 TABLET BY MOUTH TWICE A DAY, Disp: 60 tablet, Rfl: 0  •  Vortioxetine HBr (TRINTELLIX) 20 MG tablet, Take 20 mg by mouth Daily., Disp: 90 tablet, Rfl: 0    Objective     /76  Temp 97.8 °F (36.6 °C)  Ht 66\" (167.6 cm)  Wt 144 lb (65.3 kg)  BMI 23.24 kg/m2    Physical Exam   Constitutional: She is oriented to person, place, and time. She appears well-developed and well-nourished.   HENT:   Right Ear: Tympanic membrane and ear canal normal.   Left Ear: Tympanic membrane and ear canal normal.   Mouth/Throat: Oropharynx is clear and moist.   Cerumen impaction   Eyes: Conjunctivae and EOM are normal. Pupils are equal, round, and reactive to light.   Neck: No thyromegaly present.   Cardiovascular: Normal rate and regular rhythm.    Pulmonary/Chest: Effort normal and breath sounds normal.   Neurological: She is alert and oriented to person, place, and time.   Skin: Skin is warm and dry.   Psychiatric: She has a normal mood and affect.   Vitals reviewed.      Assessment/Plan   Alisa was seen today for follow-up.    Diagnoses and all orders for this visit:    Bilateral impacted cerumen      1. ENT- cerumen impaction, bilat. Will irrigate today. Pt advised that this relates to the allergies. To use warm sweet oil or olive oil to bathe her ears every few days to keep the wax " flowing.   2. RECHECK- CPE

## 2017-09-06 ENCOUNTER — OFFICE VISIT (OUTPATIENT)
Dept: INTERNAL MEDICINE | Facility: CLINIC | Age: 57
End: 2017-09-06

## 2017-09-06 VITALS
TEMPERATURE: 97 F | WEIGHT: 144 LBS | BODY MASS INDEX: 23.14 KG/M2 | DIASTOLIC BLOOD PRESSURE: 86 MMHG | HEIGHT: 66 IN | SYSTOLIC BLOOD PRESSURE: 170 MMHG

## 2017-09-06 DIAGNOSIS — J30.9 ALLERGIC RHINITIS, UNSPECIFIED ALLERGIC RHINITIS TRIGGER, UNSPECIFIED RHINITIS SEASONALITY: ICD-10-CM

## 2017-09-06 DIAGNOSIS — R42 DIZZINESS: Primary | ICD-10-CM

## 2017-09-06 DIAGNOSIS — E03.9 HYPOTHYROIDISM (ACQUIRED): ICD-10-CM

## 2017-09-06 DIAGNOSIS — F41.8 DEPRESSION WITH ANXIETY: Chronic | ICD-10-CM

## 2017-09-06 DIAGNOSIS — G43.909 MIGRAINE SYNDROME: ICD-10-CM

## 2017-09-06 PROCEDURE — 99214 OFFICE O/P EST MOD 30 MIN: CPT | Performed by: FAMILY MEDICINE

## 2017-09-06 RX ORDER — MONTELUKAST SODIUM 10 MG/1
TABLET ORAL
Qty: 90 TABLET | Refills: 1 | Status: SHIPPED | OUTPATIENT
Start: 2017-09-06 | End: 2018-03-29 | Stop reason: SDUPTHER

## 2017-09-06 RX ORDER — RIZATRIPTAN BENZOATE 10 MG/1
TABLET ORAL
Qty: 9 TABLET | Refills: 0 | Status: SHIPPED | OUTPATIENT
Start: 2017-09-06 | End: 2017-09-25 | Stop reason: SDUPTHER

## 2017-09-06 RX ORDER — LEVOTHYROXINE SODIUM 88 UG/1
TABLET ORAL
Qty: 90 TABLET | Refills: 1 | Status: SHIPPED | OUTPATIENT
Start: 2017-09-06 | End: 2018-03-29 | Stop reason: SDUPTHER

## 2017-09-06 NOTE — PROGRESS NOTES
Subjective   Alisa Ames is a 57 y.o. female.     History of Present Illness   Here today as a work in for HA and dizziness. Last seen 8/29/17 for cerumen removal. Pt was seen 7/17/17 for 2mo recheck mood. Was seen 3/1/17 as a recheck from ED 2/25/17 for fall. Last routine visit 2/6/17. Was seen 12/2016 with allergies, started on flonase and singulair. Was given biaxin to add prn. Was seen 12/11/15 as a new patient. Had labs 1/11/16 with normal CBC (Hg 15.6 in smoker), CMP, TSH and B12. Had vit D 18 and was advised 5000 IU qd. Last TSH 7/1/16 was 0.488 with free T4 1.33 on synthroid 100. Lab 9/6/16 demo TSH 2.014 and free T4 1.3 on synthroid 88.     Today pt reports she had a HA a couple of mos ago. Resolved with ASA but then a week later she got another. After that she started getting a HA qd. Thought it related to her neck so she returned to PT. Did not improve and then she got ringing in her ears. Went for a hearing test and was found to have the cerumen impaction. Got that cleared here a week ago. Has not been back for the hearing test yet. Then a week ago she got light headed and then dizzy and fell over. Was at PT and had BP check nd was 176/88 supine with recheck 158/86 seated. She has been irregular with her thyroid meds. On discussion, she has a h/o migraine and was treated with maxalt in past.    1.GENERAL- syncope, undetermined cause. Pt saw Dr Evans 6/10/16. Had MRI/ MRA and EEG. Had 50 % stenosis of right internal carotid, others normal; normal MRA brain; EEG normal. B12 and A1C recheck were normal. Pt saw cardio with neg w/u including echo and Event Monitor. Discussed mood related issues. Pt was seen in ER 2/25/17 with fall and head trauma. Lab demo nomral CBC and CMP. EKG demo NSR with old septal Q waves, nonspecific T changes, ? atrial enlargement (compared to 8/23/16). CT chest neg other than emphysema changes.   2. RESP-allergies. Pt on OTC and nasal steroid with addition of singulair 2/2017.  Allergy testing demo sensitivity to climate not so much allergens with no shots indicated. Started [place] honey and this helping alot. Had cerumen disimpaction 8/22/17.  3.ENDO- nontoxic diffuse goiter, post total thyroidectomy. Pt had a h/o right thyroidectomy in past. Had diffuse, enlarging goiter noted on neck MRI and then U/S. Was treated with thyroidectomy 4/23/16 due to size. Was at goal on synthroid 88 with last TSH done 9/6/16.  4. GI- IBS. Pt diagnosed 9/2016. Sister and daughter both have this as well. Pt’s issues were mild and she was advised daily fiber and did well. Today she reports she needs a RF on lomotil. Last filled 8/2016 for #120.  5.ORTHO- fibromyalgia and DDD with h/o C spine surgery- initially on flexeril bid. Was seeing Pain Management in [place] and was on oxycodone. Was given tizanadine and gabapentin here. Did not respond to tramadol. Saw PM here 4/19/16 and was changed from gabapentin to lyrica. Was referred to PT at visit 12/13/16. Was referred to new PM at visit 5/16/17 as she had taken extra meds after a fall and was discharged. Was using tizanidine and tramadol in the interim. Was referred to Dr He. Updated MRI neck ordered; no changes. 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   6.PSYCH- depression with anxiety- pt with long term issues. Has had increased symptoms recently and reported a h/o being stalked but her  reported that this was a delusion. Pt has a h/o of diagnosis of paranoid Schizophrenia. Pt was on Cymbalta and Zyprexa but had 30 lb weight gain and was changed to abilify and Cymbalta. However,at her last visit, it was unclear if she was actually taking the Cymbalta. In addition, she had held the abilify as she thought she was supposed to do this for allergy testing. Had full relapse with increased delusions. Was changed to trintellix only. At last discussion pt was doing well with this and prayer.    The following portions of the patient's history were reviewed and  "updated as appropriate: current medications, past family history, past medical history, past social history, past surgical history and problem list.    Review of Systems   Cardiovascular: Negative for chest pain.   Gastrointestinal: Negative for abdominal distention and abdominal pain.   Skin: Negative for color change.   Neurological: Positive for light-headedness and headaches. Negative for tremors and speech difficulty.   Psychiatric/Behavioral: Negative for agitation and confusion.   All other systems reviewed and are negative.        Current Outpatient Prescriptions:   •  diphenoxylate-atropine (LOMOTIL) 2.5-0.025 MG per tablet, Take 1 tablet by mouth 4 (Four) Times a Day As Needed for Diarrhea., Disp: 120 tablet, Rfl: 0  •  fluticasone (FLONASE) 50 MCG/ACT nasal spray, 1 spray into each nostril Daily., Disp: 16 mL, Rfl: 5  •  HYDROcodone-acetaminophen (NORCO) 5-325 MG per tablet, Take 1 tab po tid, Disp: 90 tablet, Rfl: 0  •  levothyroxine (SYNTHROID) 88 MCG tablet, Take 1 tab po qd fasting and wait 1hr for food or other meds, Disp: 90 tablet, Rfl: 1  •  montelukast (SINGULAIR) 10 MG tablet, Take 1 tab po qd prn allergies, Disp: 90 tablet, Rfl: 1  •  OXcarbazepine (TRILEPTAL) 150 MG tablet, , Disp: , Rfl:   •  rizatriptan (MAXALT) 10 MG tablet, Take 1 tab po prn migraine. May repeat in 2 hours if needed, max 3 in 24 hr, Disp: 9 tablet, Rfl: 0  •  tiZANidine (ZANAFLEX) 4 MG tablet, TAKE 1 TABLET BY MOUTH TWICE A DAY, Disp: 60 tablet, Rfl: 0  •  Vortioxetine HBr (TRINTELLIX) 20 MG tablet, Take 20 mg by mouth Daily., Disp: 90 tablet, Rfl: 1    Objective     /86  Temp 97 °F (36.1 °C)  Ht 66\" (167.6 cm)  Wt 144 lb (65.3 kg)  BMI 23.24 kg/m2    Physical Exam   Constitutional: She is oriented to person, place, and time. She appears well-developed and well-nourished.   HENT:   Right Ear: Tympanic membrane and ear canal normal.   Left Ear: Tympanic membrane and ear canal normal.   Mouth/Throat: Oropharynx is " clear and moist.   Eyes: Conjunctivae and EOM are normal. Pupils are equal, round, and reactive to light.   Neck: No thyromegaly present.   Cardiovascular: Normal rate and regular rhythm.    Pulmonary/Chest: Effort normal and breath sounds normal.   Neurological: She is alert and oriented to person, place, and time.   Skin: Skin is warm and dry.   Psychiatric: She has a normal mood and affect.   Vitals reviewed.      Assessment/Plan   Alisa was seen today for headache.    Diagnoses and all orders for this visit:    Dizziness    Migraine syndrome  -     rizatriptan (MAXALT) 10 MG tablet; Take 1 tab po prn migraine. May repeat in 2 hours if needed, max 3 in 24 hr    Hypothyroidism (acquired)  -     levothyroxine (SYNTHROID) 88 MCG tablet; Take 1 tab po qd fasting and wait 1hr for food or other meds    Allergic rhinitis, unspecified allergic rhinitis trigger, unspecified rhinitis seasonality  -     montelukast (SINGULAIR) 10 MG tablet; Take 1 tab po qd prn allergies    Depression with anxiety  -     Vortioxetine HBr (TRINTELLIX) 20 MG tablet; Take 20 mg by mouth Daily.      1. DIZZINESS- discussed that she likely had a mixture of vertigo (inner ear) and fluctuating BP. She does not have hypertension. She is advised to restart her allergy meds for now. Avoid too much aspirin and use ibu instead. To get the hearing test.   2. NEURO- migraine. Discussed that the headache is migraine, likely related to the weather. Trigger handout provided. Will do trial with maxalt  3. RECHECK- 2wk

## 2017-09-06 NOTE — PATIENT INSTRUCTIONS
1. DIZZINESS- discussed that she likely had a mixture of vertigo (inner ear) and fluctuating BP. She does not have hypertension. She is advised to restart her allergy meds for now. Avoid too much aspirin and use ibu instead. To get the hearing test.   2. NEURO- migraine. Discussed that the headache is migraine, likely related to the weather. Trigger handout provided. Will do trial with maxalt  3. RECHECK- 2wk

## 2017-09-08 ENCOUNTER — TELEPHONE (OUTPATIENT)
Dept: INTERNAL MEDICINE | Facility: CLINIC | Age: 57
End: 2017-09-08

## 2017-09-08 NOTE — TELEPHONE ENCOUNTER
Pt is calling to advise she is not happy with Dr. He. She was not happy with bedside manner, she said he had no adequate staff. She was there for 3 and half hours and got upset which triggered her IBS and then she got light headed and dizziness. Pt would like a referral to another pain management.

## 2017-09-08 NOTE — TELEPHONE ENCOUNTER
Per Dr. Gama okching to refer to a different pain management facility. However, the patient has been discharged from 2 already and it will all depend on who will take the  insurance. Let me know if she needs to place a new order. Thanks!

## 2017-09-11 NOTE — TELEPHONE ENCOUNTER
If patient has been discharged from 2 previously, this would be three. Per our policy, she is not eligible for any more pain referrals

## 2017-09-25 ENCOUNTER — TELEPHONE (OUTPATIENT)
Dept: INTERNAL MEDICINE | Facility: CLINIC | Age: 57
End: 2017-09-25

## 2017-09-25 DIAGNOSIS — G43.909 MIGRAINE SYNDROME: ICD-10-CM

## 2017-09-25 RX ORDER — RIZATRIPTAN BENZOATE 10 MG/1
TABLET ORAL
Qty: 9 TABLET | Refills: 0 | Status: SHIPPED | OUTPATIENT
Start: 2017-09-25 | End: 2017-09-25 | Stop reason: SDUPTHER

## 2017-09-25 RX ORDER — RIZATRIPTAN BENZOATE 10 MG/1
TABLET ORAL
Qty: 21 TABLET | Refills: 2 | Status: SHIPPED | OUTPATIENT
Start: 2017-09-25 | End: 2017-10-02 | Stop reason: SDUPTHER

## 2017-09-25 NOTE — TELEPHONE ENCOUNTER
Cory she is out of [facility]. She wants the ones that melt in her mouth. [facility] [place]. Also [facility]. She has had over 9 migraines last week. And has one now.

## 2017-09-26 ENCOUNTER — TELEPHONE (OUTPATIENT)
Dept: INTERNAL MEDICINE | Facility: CLINIC | Age: 57
End: 2017-09-26

## 2017-09-26 NOTE — TELEPHONE ENCOUNTER
Patient called she is out of her med and wanted to know if the quanity for her headache med could be more than nine pills a month. Please give patient a call

## 2017-09-27 NOTE — TELEPHONE ENCOUNTER
Pt needs a PA for quantity increase but insurance will likely not cover the increase she is asking for. I'll run this by Dr. Gama to see if she has any suggestions but will start the PA now.

## 2017-10-02 ENCOUNTER — OFFICE VISIT (OUTPATIENT)
Dept: INTERNAL MEDICINE | Facility: CLINIC | Age: 57
End: 2017-10-02

## 2017-10-02 VITALS
HEIGHT: 66 IN | BODY MASS INDEX: 22.73 KG/M2 | DIASTOLIC BLOOD PRESSURE: 74 MMHG | TEMPERATURE: 97.5 F | WEIGHT: 141.4 LBS | SYSTOLIC BLOOD PRESSURE: 126 MMHG

## 2017-10-02 DIAGNOSIS — M50.30 DDD (DEGENERATIVE DISC DISEASE), CERVICAL: Primary | ICD-10-CM

## 2017-10-02 DIAGNOSIS — G43.909 MIGRAINE SYNDROME: ICD-10-CM

## 2017-10-02 PROCEDURE — 99214 OFFICE O/P EST MOD 30 MIN: CPT | Performed by: FAMILY MEDICINE

## 2017-10-02 RX ORDER — RIZATRIPTAN BENZOATE 10 MG/1
TABLET ORAL
Qty: 36 TABLET | Refills: 1 | Status: SHIPPED | OUTPATIENT
Start: 2017-10-02 | End: 2018-03-15 | Stop reason: SDUPTHER

## 2017-10-02 RX ORDER — TIZANIDINE 4 MG/1
4 TABLET ORAL EVERY 8 HOURS PRN
Qty: 180 TABLET | Refills: 1 | Status: SHIPPED | OUTPATIENT
Start: 2017-10-02 | End: 2018-05-31 | Stop reason: SDUPTHER

## 2017-10-02 RX ORDER — TOPIRAMATE 50 MG/1
1 CAPSULE, EXTENDED RELEASE ORAL DAILY
Qty: 30 CAPSULE | Refills: 0 | Status: SHIPPED | OUTPATIENT
Start: 2017-10-02 | End: 2017-10-30 | Stop reason: SDUPTHER

## 2017-10-02 RX ORDER — RIZATRIPTAN BENZOATE 10 MG/1
TABLET ORAL
Qty: 36 TABLET | Refills: 1 | Status: SHIPPED | OUTPATIENT
Start: 2017-10-02 | End: 2017-10-02 | Stop reason: SDUPTHER

## 2017-10-02 RX ORDER — TOPIRAMATE 50 MG/1
1 CAPSULE, EXTENDED RELEASE ORAL DAILY
Qty: 30 CAPSULE | Refills: 0 | Status: SHIPPED | OUTPATIENT
Start: 2017-10-02 | End: 2017-10-02 | Stop reason: SDUPTHER

## 2017-10-02 NOTE — PROGRESS NOTES
Subjective   Alisa Ames is a 57 y.o. female.     History of Present Illness   Here for recheck migraine. Last seen 9/6/17 as a work in for HA and dizziness. Last seen 8/29/17 for cerumen removal. Pt was seen 7/17/17 for 2mo recheck mood. Was seen 3/1/17 as a recheck from ED 2/25/17 for fall. Last routine visit 2/6/17. Was seen 12/2016 with allergies, started on flonase and singulair. Was given biaxin to add prn. Was seen 12/11/15 as a new patient. Had labs 1/11/16 with normal CBC (Hg 15.6 in smoker), CMP, TSH and B12. Had vit D 18 and was advised 5000 IU qd. Last TSH 7/1/16 was 0.488 with free T4 1.33 on synthroid 100. Lab 9/6/16 demo TSH 2.014 and free T4 1.3 on synthroid 88.     1.NEURO- migraine. At last visit pt reported a HA several mos earlier and then daily HA for a week. Was also having tinnits with hearing test pending (needed cerumen disimpaction first). Pt has hx migraine and was diagnosed with same. Was given maxalt as she had done well with this in the past. 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 Is going to PT. She did use the maxalt and did well with it but has used the entire Rx.      2.ORTHO- fibromyalgia and DDD with h/o C spine surgery- initially on flexeril bid. Was seeing Pain Management in [place] and was on oxycodone. Was given tizanadine and gabapentin here. Did not respond to tramadol. Saw PM here 4/19/16 and was changed from gabapentin to lyrica. Was referred to PT at visit 12/13/16. Was referred to new PM at visit 5/16/17 as she had taken extra meds after a fall and was discharged. Was using tizanidine and tramadol in the interim. Was referred to Dr Vicente (5/11/17) and Dr He (5/24/17); kept the appt with Dr Vicente. Updated MRI neck ordered; no changes. 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 Was discharged from PM 9/8/17 due to repeated cold/ contaminated urines. Today pt reports that she felt she was treated very  badly by Dr He. She has not taken any extra meds, was actually cutting down on use. States her urine should not have been cold and then she got upset and got diarrhea and was not able to do an additional sample. Has gone back on zanaflex and is tolerating this well and would like to try to just take this for now. Is off the norco with no w/d.     3.GENERAL- syncope, undetermined cause. Pt saw Dr Evans 6/10/16. Had MRI/ MRA and EEG. Had 50 % stenosis of right internal carotid, others normal; normal MRA brain; EEG normal. B12 and A1C recheck were normal. Pt saw cardio with neg w/u including echo and Event Monitor. Discussed mood related issues. Pt was seen in ER 2/25/17 with fall and head trauma. Lab demo nomral CBC and CMP. EKG demo NSR with old septal Q waves, nonspecific T changes, ? atrial enlargement (compared to 8/23/16). CT chest neg other than emphysema changes.   4. RESP-allergies. Pt on OTC and nasal steroid with addition of singulair 2/2017. Allergy testing demo sensitivity to climate not so much allergens with no shots indicated. Started [place] honey and this helping alot. Had cerumen disimpaction 8/22/17.  5.ENDO- nontoxic diffuse goiter, post total thyroidectomy. Pt had a h/o right thyroidectomy in past. Had diffuse, enlarging goiter noted on neck MRI and then U/S. Was treated with thyroidectomy 4/23/16 due to size. Was at goal on synthroid 88 with last TSH done 9/6/16.  6. GI- IBS. Pt diagnosed 9/2016. Sister and daughter both have this as well. Pt’s issues were mild and she was advised daily fiber and did well. Today she reports she needs a RF on lomotil. Last filled 8/2016 for #120.  7.PSYCH- depression with anxiety- pt with long term issues. Has had increased symptoms recently and reported a h/o being stalked but her  reported that this was a delusion. Pt has a h/o of diagnosis of paranoid Schizophrenia. Pt was on Cymbalta and Zyprexa but had 30 lb weight gain and was changed to abilify and  Cymbalta. However,at her last visit, it was unclear if she was actually taking the Cymbalta. In addition, she had held the abilify as she thought she was supposed to do this for allergy testing. Had full relapse with increased delusions. Was changed to trintellix only. At last discussion pt was doing well with this and prayer.    The following portions of the patient's history were reviewed and updated as appropriate: current medications, past family history, past medical history, past social history, past surgical history and problem list.    Review of Systems   HENT: Positive for tinnitus.    Cardiovascular: Negative for chest pain.   Gastrointestinal: Negative for abdominal distention and abdominal pain.   Skin: Negative for color change.   Neurological: Positive for headaches. Negative for tremors and speech difficulty.   Psychiatric/Behavioral: Negative for agitation and confusion.   All other systems reviewed and are negative.        Current Outpatient Prescriptions:   •  diphenoxylate-atropine (LOMOTIL) 2.5-0.025 MG per tablet, Take 1 tablet by mouth 4 (Four) Times a Day As Needed for Diarrhea., Disp: 120 tablet, Rfl: 0  •  fluticasone (FLONASE) 50 MCG/ACT nasal spray, 1 spray into each nostril Daily., Disp: 16 mL, Rfl: 5  •  levothyroxine (SYNTHROID) 88 MCG tablet, Take 1 tab po qd fasting and wait 1hr for food or other meds, Disp: 90 tablet, Rfl: 1  •  montelukast (SINGULAIR) 10 MG tablet, Take 1 tab po qd prn allergies, Disp: 90 tablet, Rfl: 1  •  rizatriptan (MAXALT) 10 MG tablet, Take 1 tab po prn migraine. May repeat in 2 hours if needed, max 3 in 24 hr, Disp: 36 tablet, Rfl: 1  •  tiZANidine (ZANAFLEX) 4 MG tablet, Take 1 tablet by mouth Every 8 (Eight) Hours As Needed for Muscle Spasms., Disp: 180 tablet, Rfl: 1  •  Topiramate ER (TROKENDI XR) 50 MG capsule sustained-release 24 hr, Take 1 tablet by mouth Daily., Disp: 30 capsule, Rfl: 0  •  Vortioxetine HBr (TRINTELLIX) 20 MG tablet, Take 20 mg by  "mouth Daily., Disp: 90 tablet, Rfl: 1    Objective     /74  Temp 97.5 °F (36.4 °C)  Ht 66\" (167.6 cm)  Wt 141 lb 6.4 oz (64.1 kg)  BMI 22.82 kg/m2    Physical Exam   Constitutional: She is oriented to person, place, and time. She appears well-developed and well-nourished.   HENT:   Right Ear: Tympanic membrane and ear canal normal.   Left Ear: Tympanic membrane and ear canal normal.   Mouth/Throat: Oropharynx is clear and moist.   Eyes: Conjunctivae and EOM are normal. Pupils are equal, round, and reactive to light.   Neck: No thyromegaly present.   Cardiovascular: Normal rate and regular rhythm.    Pulmonary/Chest: Effort normal and breath sounds normal.   Neurological: She is alert and oriented to person, place, and time.   Skin: Skin is warm and dry.   Psychiatric: She has a normal mood and affect.   Vitals reviewed.      Assessment/Plan   Alisa was seen today for headache.    Diagnoses and all orders for this visit:    DDD (degenerative disc disease), cervical  -     tiZANidine (ZANAFLEX) 4 MG tablet; Take 1 tablet by mouth Every 8 (Eight) Hours As Needed for Muscle Spasms.    Migraine syndrome  -     Discontinue: Topiramate ER (TROKENDI XR) 50 MG capsule sustained-release 24 hr; Take 1 tablet by mouth Daily.  -     Discontinue: rizatriptan (MAXALT) 10 MG tablet; Take 1 tab po prn migraine. May repeat in 2 hours if needed, max 3 in 24 hr  -     Topiramate ER (TROKENDI XR) 50 MG capsule sustained-release 24 hr; Take 1 tablet by mouth Daily.  -     rizatriptan (MAXALT) 10 MG tablet; Take 1 tab po prn migraine. May repeat in 2 hours if needed, max 3 in 24 hr    1. ORTHO- arthritis with chronic neck pain. Will remain off the narcotics for now and will use zanaflex with Rx here. If pt has major relapse, we can do a bridge Rx and refer her back to pain management then. Discussed that this will be the last referral we can make to PM.  2. NEURO- migriane- will continue the maxalt. Discussed that she cannot " take this daily without risking rebound headaches. Discussed preventive medication. Will do trial with trokendi. Will start with 25mg (samples) and increase to 50mg (Rx) at 1wk. Pt can still add maxalt as needed. Pt brings FMLA forms today but these will not be filled out until next visit as we cannot yet predict her migraine pattern.  3. RECHECK- 1mo

## 2017-10-02 NOTE — PATIENT INSTRUCTIONS
1. ORTHO- arthritis with chronic neck pain. Will remain off the narcotics for now and will use zanaflex with Rx here. If pt has major relapse, we can do a bridge Rx and refer her back to pain management then. Discussed that this will be the last referral we can make to PM.  2. NEURO- migriane- will continue the maxalt. Discussed that she cannot take this daily without risking rebound headaches. Discussed preventive medication. Will do trial with trokendi. Will start with 25mg (samples) and increase to 50mg (Rx) at 1wk. Pt can still add maxalt as needed. Pt brings FMLA forms today but these will not be filled out until next visit as we cannot yet predict her migraine pattern.  3. RECHECK- 1mo

## 2017-10-03 ENCOUNTER — TELEPHONE (OUTPATIENT)
Dept: INTERNAL MEDICINE | Facility: CLINIC | Age: 57
End: 2017-10-03

## 2017-10-03 NOTE — TELEPHONE ENCOUNTER
I spoke with pt and advised her that per Dr. Gama, the BP should not be causing there ringing in her ears. The patient states that she will discuss this more at her pending appointment in 1 month. Pt expressed understanding and will call earlier if she thinks she needs to be seen sooner.

## 2017-10-03 NOTE — TELEPHONE ENCOUNTER
Pt was seen yesterday and forgot to ask if the BP could cause the ringing in the ears. She said they mentioned something about BP. She said if it can can she give her something for her BP. Pt said they are ringing pretty bad and disrupting her work.Please call pt and advise.

## 2017-10-04 ENCOUNTER — TELEPHONE (OUTPATIENT)
Dept: INTERNAL MEDICINE | Facility: CLINIC | Age: 57
End: 2017-10-04

## 2017-10-05 NOTE — TELEPHONE ENCOUNTER
PA has been rec'd from [employer]. I'll call the patient when I receive any information back on this.

## 2017-10-23 ENCOUNTER — TELEPHONE (OUTPATIENT)
Dept: INTERNAL MEDICINE | Facility: CLINIC | Age: 57
End: 2017-10-23

## 2017-10-23 NOTE — TELEPHONE ENCOUNTER
Pt did not get norco filled and it has  and wants to no if the script can be re written. Pt said that she still has the written script that she can show that it has not been filled. Pt though the script was good for a yea. She wants to no if this can happen when she comes in for her apt on the 30 or can she get it before then. Please call back at [phone] before 3pm it is the pt  phone

## 2017-10-24 NOTE — TELEPHONE ENCOUNTER
SANDRA asking pt to call back regarding rx. Per Dr. Gama, she will not give the patient Norco and has discussed this with her in detail. Pt has referral placed to see Pain Management as she was discharged from her previous PM

## 2017-10-25 ENCOUNTER — TELEPHONE (OUTPATIENT)
Dept: INTERNAL MEDICINE | Facility: CLINIC | Age: 57
End: 2017-10-25

## 2017-10-25 NOTE — TELEPHONE ENCOUNTER
PATIENT CALLED WANTING TO KNOW IF YOU COULD PRESCRIBE HER NORCO? PLEASE GIVE PATIENT A CALL [phone]
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
## 2017-10-30 ENCOUNTER — OFFICE VISIT (OUTPATIENT)
Dept: INTERNAL MEDICINE | Facility: CLINIC | Age: 57
End: 2017-10-30

## 2017-10-30 VITALS
HEIGHT: 66 IN | RESPIRATION RATE: 18 BRPM | SYSTOLIC BLOOD PRESSURE: 122 MMHG | OXYGEN SATURATION: 99 % | DIASTOLIC BLOOD PRESSURE: 76 MMHG | BODY MASS INDEX: 22.82 KG/M2 | WEIGHT: 142 LBS

## 2017-10-30 DIAGNOSIS — G43.909 MIGRAINE SYNDROME: ICD-10-CM

## 2017-10-30 DIAGNOSIS — M50.30 DDD (DEGENERATIVE DISC DISEASE), CERVICAL: Primary | ICD-10-CM

## 2017-10-30 PROCEDURE — 99213 OFFICE O/P EST LOW 20 MIN: CPT | Performed by: FAMILY MEDICINE

## 2017-10-30 RX ORDER — HYDROCODONE BITARTRATE AND ACETAMINOPHEN 5; 325 MG/1; MG/1
1 TABLET ORAL EVERY 6 HOURS PRN
Qty: 90 TABLET | Refills: 0 | Status: SHIPPED | OUTPATIENT
Start: 2017-10-30 | End: 2017-10-30

## 2017-10-30 RX ORDER — TRAMADOL HYDROCHLORIDE 50 MG/1
100 TABLET ORAL EVERY 8 HOURS PRN
Qty: 180 TABLET | Refills: 0 | Status: SHIPPED | OUTPATIENT
Start: 2017-10-30 | End: 2017-12-21 | Stop reason: SDUPTHER

## 2017-10-30 RX ORDER — TOPIRAMATE 50 MG/1
1 CAPSULE, EXTENDED RELEASE ORAL DAILY
Qty: 90 CAPSULE | Refills: 1 | Status: SHIPPED | OUTPATIENT
Start: 2017-10-30 | End: 2018-01-18

## 2017-10-30 NOTE — PROGRESS NOTES
Subjective   Alisa Ames is a 57 y.o. female.     History of Present Illness   Here for 1mo recheck migraine. Last seen 10/2/17 for same. Was seen 9/6/17 as a work in for HA and dizziness. Pt was seen 7/17/17 for 2mo recheck mood. Was seen 3/1/17 as a recheck from ED 2/25/17 for fall. Last routine visit 2/6/17. Was seen 12/2016 with allergies, started on flonase and singulair. Was given biaxin to add prn. Was seen 12/11/15 as a new patient. Had labs 1/11/16 with normal CBC (Hg 15.6 in smoker), CMP, TSH and B12. Had vit D 18 and was advised 5000 IU qd. Last TSH 7/1/16 was 0.488 with free T4 1.33 on synthroid 100. Lab 9/6/16 demo TSH 2.014 and free T4 1.3 on synthroid 88.     1.NEURO- migraine. Pt with h/o issues, diagnosis confirmed again 9/6/17 when seen for HA and dizziness for several mos that eventually resulted in daily HA. Was given maxalt as she had done well with this in the past. 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 Today she reports she did well with the trokendi. Did have to take a couple of the maxalt and it did work well. Stopped working 3wk ago and is feeling better.      2.ORTHO- fibromyalgia and DDD with h/o C spine surgery- initially on flexeril bid. Was seeing Pain Management in [place] and was on oxycodone. Was given tizanadine and gabapentin here. Did not respond to tramadol. Saw PM here 4/19/16 and was changed from gabapentin to lyrica. Was referred to PT at visit 12/13/16. Was referred to new PM at visit 5/16/17 as she had taken extra meds after a fall and was discharged. Was using tizanidine and tramadol in the interim. Was referred to Dr Vicente (5/11/17) and Dr He (5/24/17); kept the appt with Dr Vicente. Updated MRI neck ordered; no changes. 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 Was discharged from PM 9/8/17 due to repeated cold/ contaminated urines and  manic behavior. At her visit 10/2/17 pt reported that she felt she had been treated very badly by Dr He. Was off the norco with no w/d and preferred to try to go without narcotics. Was given zanaflex. Discussed that if she did not do well, I could see her and give her a single Rx norco with a final referral to PM but no additional meds or referrals after that. Today pt reports her neck and arm hurt really bad when the weather changed.        The following portions of the patient's history were reviewed and updated as appropriate: allergies, past family history, past medical history, past social history, past surgical history and problem list.    Review of Systems   Cardiovascular: Negative for chest pain.   Gastrointestinal: Negative for abdominal distention and abdominal pain.   Skin: Negative for color change.   Neurological: Negative for tremors, speech difficulty and headaches.   Psychiatric/Behavioral: Negative for agitation and confusion.   All other systems reviewed and are negative.        Current Outpatient Prescriptions:   •  diphenoxylate-atropine (LOMOTIL) 2.5-0.025 MG per tablet, Take 1 tablet by mouth 4 (Four) Times a Day As Needed for Diarrhea., Disp: 120 tablet, Rfl: 0  •  fluticasone (FLONASE) 50 MCG/ACT nasal spray, 1 spray into each nostril Daily., Disp: 16 mL, Rfl: 5  •  HYDROcodone-acetaminophen (NORCO) 5-325 MG per tablet, Take 1 tablet by mouth Every 6 (Six) Hours As Needed for Severe Pain ., Disp: 90 tablet, Rfl: 0  •  levothyroxine (SYNTHROID) 88 MCG tablet, Take 1 tab po qd fasting and wait 1hr for food or other meds, Disp: 90 tablet, Rfl: 1  •  montelukast (SINGULAIR) 10 MG tablet, Take 1 tab po qd prn allergies, Disp: 90 tablet, Rfl: 1  •  rizatriptan (MAXALT) 10 MG tablet, Take 1 tab po prn migraine. May repeat in 2 hours if needed, max 3 in 24 hr, Disp: 36 tablet, Rfl: 1  •  tiZANidine (ZANAFLEX) 4 MG tablet, Take 1 tablet by mouth Every 8 (Eight) Hours As Needed for Muscle  "Spasms., Disp: 180 tablet, Rfl: 1  •  Topiramate ER (TROKENDI XR) 50 MG capsule sustained-release 24 hr, Take 1 tablet by mouth Daily., Disp: 90 capsule, Rfl: 1  •  Vortioxetine HBr (TRINTELLIX) 20 MG tablet, Take 20 mg by mouth Daily., Disp: 90 tablet, Rfl: 1    Objective     /76  Resp 18  Ht 66\" (167.6 cm)  Wt 142 lb (64.4 kg)  SpO2 99%  BMI 22.92 kg/m2    Physical Exam   Constitutional: She is oriented to person, place, and time. She appears well-developed and well-nourished.   HENT:   Right Ear: Tympanic membrane and ear canal normal.   Left Ear: Tympanic membrane and ear canal normal.   Mouth/Throat: Oropharynx is clear and moist.   Eyes: Conjunctivae and EOM are normal. Pupils are equal, round, and reactive to light.   Neck: No thyromegaly present.   Cardiovascular: Normal rate and regular rhythm.    Pulmonary/Chest: Effort normal and breath sounds normal.   Neurological: She is alert and oriented to person, place, and time.   Skin: Skin is warm and dry.   Psychiatric: She has a normal mood and affect.   Vitals reviewed.      Assessment/Plan   Alisa was seen today for ddd.    Diagnoses and all orders for this visit:    DDD (degenerative disc disease), cervical  -     HYDROcodone-acetaminophen (NORCO) 5-325 MG per tablet; Take 1 tablet by mouth Every 6 (Six) Hours As Needed for Severe Pain .  -     Ambulatory Referral to Hospital Pain Management Department    Migraine syndrome  -     Topiramate ER (TROKENDI XR) 50 MG capsule sustained-release 24 hr; Take 1 tablet by mouth Daily.        1. ORTHO- DDD- will write for a single bridging Rx for hydrocodone and refer to pain management. Discussed in detail with the patient the importance of following the laws as outlined by the state, especially as this is the last time I can refer her to Pain Management.  2. NEURO- migraine- improved on trokendi. Will continue this with RF x6mo now.  3. RECHECK- 3mo routine    ADDEND- after discussion, pt decides she does " not want to go to Pain Management yet as her pain is not as bad today. She returns the hydrocodone and will be given Rx for tramadol instead. Is advised to try 1-2 up to 3x/ day. Will do recheck in 1mo with contract then.

## 2017-10-30 NOTE — PATIENT INSTRUCTIONS
1. ORTHO- DDD- will write for a single bridging Rx for hydrocodone and refer to pain management. Discussed in detail with the patient the importance of following the laws as outlined by the state, especially as this is the last time I can refer her to Pain Management.  2. NEURO- migraine- improved on trokendi. Will continue this with RF x6mo now.  3. RECHECK- 3mo routine    ADDEND- after discussion, pt decides she does not want to go to Pain Management yet as her pain is not as bad today. She returns the hydrocodone and will be given Rx for tramadol instead. Is advised to try 1-2 up to 3x/ day. Will do recheck in 1mo with contract then.

## 2017-11-08 DIAGNOSIS — F41.8 DEPRESSION WITH ANXIETY: Chronic | ICD-10-CM

## 2017-11-08 RX ORDER — ARIPIPRAZOLE 5 MG/1
TABLET ORAL
Qty: 90 TABLET | Refills: 1 | Status: SHIPPED | OUTPATIENT
Start: 2017-11-08 | End: 2017-11-28 | Stop reason: SDUPTHER

## 2017-11-09 ENCOUNTER — TELEPHONE (OUTPATIENT)
Dept: INTERNAL MEDICINE | Facility: CLINIC | Age: 57
End: 2017-11-09

## 2017-11-09 NOTE — TELEPHONE ENCOUNTER
PATIENT IS NEEDING A PA FOR TOPIRAMATE ER AND WAS TOLD THAT SHE WILL NEED A PA EVERY MONTH FOR THE PRESCRIPTION. PATIENT HAS FIVE PILLS LEFT AND SHE LEFT THE NUMBER FOR YOU TO CALL FOR THE PA [phone]

## 2017-11-13 ENCOUNTER — TELEPHONE (OUTPATIENT)
Dept: INTERNAL MEDICINE | Facility: CLINIC | Age: 57
End: 2017-11-13

## 2017-11-13 NOTE — TELEPHONE ENCOUNTER
Pt advised PA is pending that was submitted today with different paperwork. We will wait to see what the outcome is before scheduling any additional appointments.

## 2017-11-13 NOTE — TELEPHONE ENCOUNTER
Pa submitted once on 10/04/17 and I did not hear anything from the insurance. PA submitted again with different paperwork. Will await answer and let pt know ASAP.

## 2017-11-13 NOTE — TELEPHONE ENCOUNTER
PATIENT IS WANTING TO GET IN THIS WEEK OR NEXT WEEK TO DISCUSS CHANGING HER MIGRAINE MEDICINE. PLEASE GIVE PATIENT A CALL [phone]

## 2017-11-28 ENCOUNTER — OFFICE VISIT (OUTPATIENT)
Dept: INTERNAL MEDICINE | Facility: CLINIC | Age: 57
End: 2017-11-28

## 2017-11-28 VITALS
WEIGHT: 137.6 LBS | SYSTOLIC BLOOD PRESSURE: 122 MMHG | DIASTOLIC BLOOD PRESSURE: 74 MMHG | BODY MASS INDEX: 22.11 KG/M2 | HEIGHT: 66 IN | TEMPERATURE: 97.9 F

## 2017-11-28 DIAGNOSIS — M19.90 ARTHRITIS: Primary | ICD-10-CM

## 2017-11-28 DIAGNOSIS — F41.8 DEPRESSION WITH ANXIETY: Chronic | ICD-10-CM

## 2017-11-28 PROCEDURE — 99213 OFFICE O/P EST LOW 20 MIN: CPT | Performed by: FAMILY MEDICINE

## 2017-11-28 RX ORDER — ARIPIPRAZOLE 5 MG/1
5 TABLET ORAL DAILY
Qty: 90 TABLET | Refills: 0 | Status: SHIPPED | OUTPATIENT
Start: 2017-11-28 | End: 2017-12-21 | Stop reason: SDUPTHER

## 2017-11-28 RX ORDER — ARIPIPRAZOLE 10 MG/1
10 TABLET ORAL DAILY
Qty: 90 TABLET | Refills: 0 | Status: SHIPPED | OUTPATIENT
Start: 2017-11-28 | End: 2017-11-28 | Stop reason: SDUPTHER

## 2017-11-28 NOTE — PATIENT INSTRUCTIONS
1. ORTHO- arthritis- will continue the tramadol. No RF today as she is not using as much. OK to increase the dose as needed (as per instructions on bottle) as the weather turns cold. Will determine RF at next visit.   2. PSYCH- depression with anxiety- discussed that without the narcotic boosting her dopamine, she needs a higher dose of the dopamine specific medicine which is the abilify. Will restart the abilify at 5mg. No change in trintellix.  3. RECHECK- 1mo mood, arthritis

## 2017-11-28 NOTE — PROGRESS NOTES
Subjective   Alisa Ames is a 57 y.o. female.     History of Present Illness   Here for 1mo recheck arthritis. Last seen 10/30/17 for 1mo recheck migraine. Was seen 9/6/17 as a work in for HA and dizziness. Pt was seen 7/17/17 for 2mo recheck mood. Was seen 3/1/17 as a recheck from ED 2/25/17 for fall. Last routine visit 2/6/17. Was seen 12/2016 with allergies, started on flonase and singulair. Was given biaxin to add prn. Was seen 12/11/15 as a new patient. Had labs 1/11/16 with normal CBC (Hg 15.6 in smoker), CMP, TSH and B12. Had vit D 18 and was advised 5000 IU qd. Last TSH 7/1/16 was 0.488 with free T4 1.33 on synthroid 100. Lab 9/6/16 demo TSH 2.014 and free T4 1.3 on synthroid 88.     1.ORTHO- fibromyalgia and DDD with h/o C spine surgery- initially on flexeril bid. Was seeing Pain Management in [place] and was on oxycodone. Was given tizanadine and gabapentin here. Did not respond to tramadol. Saw PM here 4/19/16 and was changed from gabapentin to lyrica. Was referred to PT at visit 12/13/16. Was referred to new PM at visit 5/16/17 as she had taken extra meds after a fall and was discharged. Was using tizanidine and tramadol in the interim. Was referred to Dr Vicente (5/11/17) and Dr He (5/24/17); kept the appt with Dr Vicente. Updated MRI neck ordered; no changes. 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 At her visit 10/2/17 pt reported that she felt she had been treated very badly by Dr He. Was off the norco with no w/d and preferred to try to go without narcotics. Was given zanaflex. Discussed that if she did not do well, I could see her and give her a single Rx norco with a final referral to PM but no additional meds or referrals after that. At her last visit she initally asked for Rx and referrel to PM but then decided that her neck was not that bad and opted to try tramadol. Today pt reports that she feels  that she is doing fine currently. Feels that the milder weather has helped. Is not sure how she will do once the weather gets very cold. Is only needing 1 tramadol qd now.    2. 7.PSYCH- depression with anxiety- pt with long term issues. Has had increased symptoms recently and reported a h/o being stalked but her  reported that this was a delusion. Pt has a h/o of diagnosis of paranoid Schizophrenia. Pt was on Cymbalta and Zyprexa but had 30 lb weight gain and was changed to abilify and Cymbalta. However,at her last visit, it was unclear if she was actually taking the Cymbalta. In addition, she had held the abilify as she thought she was supposed to do this for allergy testing. Had full relapse with increased delusions. Was changed to trintellix only. At last discussion pt was doing well with this and prayer. Today pt reports that for the past month she has been more irritable and is concerned that this may be from being off the narcotics. Is still approaching her mood from a spiritual standpoint. On further discussion, she has not been taking the abilify, just the trintellix.    3. NEURO- migraine. Pt with h/o issues, diagnosis confirmed again 9/6/17 when seen for HA and dizziness for several mos that eventually resulted in daily HA. Was given maxalt as she had done well with this in the past. 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  Did well with this combo. Stopped working and felt better in general.   4.GENERAL- syncope, undetermined cause. Pt saw Dr Evans 6/10/16. Had MRI/ MRA and EEG. Had 50 % stenosis of right internal carotid, others normal; normal MRA brain; EEG normal. B12 and A1C recheck were normal. Pt saw cardio with neg w/u including echo and Event Monitor. Discussed mood related issues. Pt was seen in ER 2/25/17 with fall and head trauma. Lab demo nomral CBC and  CMP. EKG demo NSR with old septal Q waves, nonspecific T changes, ? atrial enlargement (compared to 8/23/16). CT chest neg other than emphysema changes.   5. RESP-allergies. Pt on OTC and nasal steroid with addition of singulair 2/2017. Allergy testing demo sensitivity to climate not so much allergens with no shots indicated. Started [place] honey and this helping alot. Had cerumen disimpaction 8/22/17.  6.ENDO- nontoxic diffuse goiter, post total thyroidectomy. Pt had a h/o right thyroidectomy in past. Had diffuse, enlarging goiter noted on neck MRI and then U/S. Was treated with thyroidectomy 4/23/16 due to size. Was at goal on synthroid 88 with last TSH done 9/6/16.  7. GI- IBS. Pt diagnosed 9/2016. Sister and daughter both have this as well. Pt’s issues were mild and she was advised daily fiber and did well. Today she reports she needs a RF on lomotil. Last filled 8/2016 for #120.    The following portions of the patient's history were reviewed and updated as appropriate: current medications, past family history, past medical history, past social history, past surgical history and problem list.    Review of Systems   Cardiovascular: Negative for chest pain.   Gastrointestinal: Negative for abdominal distention and abdominal pain.   Skin: Negative for color change.   Neurological: Negative for tremors, speech difficulty and headaches.   Psychiatric/Behavioral: Negative for agitation and confusion.   All other systems reviewed and are negative.        Current Outpatient Prescriptions:   •  ARIPiprazole (ABILIFY) 5 MG tablet, Take 1 tablet by mouth Daily., Disp: 90 tablet, Rfl: 0  •  diphenoxylate-atropine (LOMOTIL) 2.5-0.025 MG per tablet, Take 1 tablet by mouth 4 (Four) Times a Day As Needed for Diarrhea., Disp: 120 tablet, Rfl: 0  •  fluticasone (FLONASE) 50 MCG/ACT nasal spray, 1 spray into each nostril Daily., Disp: 16 mL, Rfl: 5  •  levothyroxine (SYNTHROID) 88 MCG tablet, Take 1 tab po qd fasting and wait 1hr for  "food or other meds, Disp: 90 tablet, Rfl: 1  •  montelukast (SINGULAIR) 10 MG tablet, Take 1 tab po qd prn allergies, Disp: 90 tablet, Rfl: 1  •  rizatriptan (MAXALT) 10 MG tablet, Take 1 tab po prn migraine. May repeat in 2 hours if needed, max 3 in 24 hr, Disp: 36 tablet, Rfl: 1  •  tiZANidine (ZANAFLEX) 4 MG tablet, Take 1 tablet by mouth Every 8 (Eight) Hours As Needed for Muscle Spasms., Disp: 180 tablet, Rfl: 1  •  Topiramate ER (TROKENDI XR) 50 MG capsule sustained-release 24 hr, Take 1 tablet by mouth Daily., Disp: 90 capsule, Rfl: 1  •  traMADol (ULTRAM) 50 MG tablet, Take 2 tablets by mouth Every 8 (Eight) Hours As Needed for Moderate Pain ., Disp: 180 tablet, Rfl: 0  •  Vortioxetine HBr (TRINTELLIX) 20 MG tablet, Take 20 mg by mouth Daily., Disp: 90 tablet, Rfl: 1    Objective     /74  Temp 97.9 °F (36.6 °C)  Ht 66\" (167.6 cm)  Wt 137 lb 9.6 oz (62.4 kg)  BMI 22.21 kg/m2    Physical Exam   Constitutional: She is oriented to person, place, and time. She appears well-developed and well-nourished.   HENT:   Right Ear: Tympanic membrane and ear canal normal.   Left Ear: Tympanic membrane and ear canal normal.   Mouth/Throat: Oropharynx is clear and moist.   Eyes: Conjunctivae and EOM are normal. Pupils are equal, round, and reactive to light.   Neck: No thyromegaly present.   Cardiovascular: Normal rate and regular rhythm.    Pulmonary/Chest: Effort normal and breath sounds normal.   Neurological: She is alert and oriented to person, place, and time.   Skin: Skin is warm and dry.   Psychiatric: She has a normal mood and affect.   Vitals reviewed.      Assessment/Plan   Alisa was seen today for follow-up.    Diagnoses and all orders for this visit:    Arthritis    Depression with anxiety  -     Discontinue: ARIPiprazole (ABILIFY) 10 MG tablet; Take 1 tablet by mouth Daily.  -     ARIPiprazole (ABILIFY) 5 MG tablet; Take 1 tablet by mouth Daily.    1. ORTHO- arthritis- will continue the tramadol. No " RF today as she is not using as much. OK to increase the dose as needed (as per instructions on bottle) as the weather turns cold. Will determine RF at next visit.   2. PSYCH- depression with anxiety- discussed that without the narcotic boosting her dopamine, she needs a higher dose of the dopamine specific medicine which is the abilify. Will restart the abilify at 5mg. No change in trintellix.  3. RECHECK- 1mo mood, arthritis

## 2017-12-21 ENCOUNTER — OFFICE VISIT (OUTPATIENT)
Dept: INTERNAL MEDICINE | Facility: CLINIC | Age: 57
End: 2017-12-21

## 2017-12-21 VITALS
WEIGHT: 140.8 LBS | HEIGHT: 66 IN | BODY MASS INDEX: 22.63 KG/M2 | TEMPERATURE: 97.5 F | SYSTOLIC BLOOD PRESSURE: 118 MMHG | DIASTOLIC BLOOD PRESSURE: 76 MMHG

## 2017-12-21 DIAGNOSIS — F41.8 DEPRESSION WITH ANXIETY: Chronic | ICD-10-CM

## 2017-12-21 DIAGNOSIS — M19.90 ARTHRITIS: Primary | ICD-10-CM

## 2017-12-21 PROCEDURE — 99213 OFFICE O/P EST LOW 20 MIN: CPT | Performed by: FAMILY MEDICINE

## 2017-12-21 RX ORDER — TRAMADOL HYDROCHLORIDE 50 MG/1
100 TABLET ORAL EVERY 8 HOURS PRN
Qty: 180 TABLET | Refills: 0 | Status: SHIPPED | OUTPATIENT
Start: 2017-12-21 | End: 2018-04-04 | Stop reason: SDUPTHER

## 2017-12-21 RX ORDER — DULOXETIN HYDROCHLORIDE 60 MG/1
CAPSULE, DELAYED RELEASE ORAL
COMMUNITY
Start: 2017-12-01 | End: 2017-12-21

## 2017-12-21 RX ORDER — ARIPIPRAZOLE 5 MG/1
5 TABLET ORAL DAILY
Qty: 90 TABLET | Refills: 0 | Status: SHIPPED | OUTPATIENT
Start: 2017-12-21 | End: 2018-06-20 | Stop reason: SDUPTHER

## 2017-12-21 NOTE — PATIENT INSTRUCTIONS
1. ORTHO- arthritis- advised to continue stretches but she likely had a nerve pinch and this should improve just with time. Still ok to increased the tramadol up to 3x/day in future if needed.  2. PSYCH- depression with anxiety- mood improved back on trintellix and abilify.  3. RECHECK- 3mo routine

## 2017-12-21 NOTE — PROGRESS NOTES
"Subjective   Alisa Ames is a 57 y.o. female.     History of Present Illness   Here for 1mo recheck arthritis and mood. Last seen 11/28/17 for same. Was seen 10/30/17 for 1mo recheck migraine. Was seen 3/1/17 as a recheck from ED 2/25/17 for fall. Last routine visit 2/6/17. Was seen 12/2016 with allergies, started on flonase and singulair. Was given biaxin to add prn. Was seen 12/11/15 as a new patient. Had labs 1/11/16 with normal CBC (Hg 15.6 in smoker), CMP, TSH and B12. Had vit D 18 and was advised 5000 IU qd. Last TSH 7/1/16 was 0.488 with free T4 1.33 on synthroid 100. Lab 9/6/16 demo TSH 2.014 and free T4 1.3 on synthroid 88.     1.ORTHO- fibromyalgia and DDD with h/o C spine surgery- initially on flexeril bid. Was seeing Pain Management in [place] and was on oxycodone. Was given tizanadine and gabapentin here. Saw PM here 4/19/16 and was changed from gabapentin to lyrica. Pt was discharged from initial PM as she took extra meds after a fall. Was referred to 2nd PM at 5/16/17. Used tizanidine and tramadol in the interim. Was referred to Dr Vicente (5/11/17) and Dr He (5/24/17); kept the appt with Dr He. Updated MRI neck demo no changes. Pt was discharged from 2nd PM 9/8/17 due to repeated cold/ contaminated urines and manic behavior. At her visit 10/2/17 pt reported that she felt she had been treated very badly by Dr He. Was off the norco with no w/d and preferred to try to go without narcotics. Was given zanaflex and tramadol. At 1mo she was doing well. Felt the milder weather was helping and she was only needing tramadol 1qd. Was advised she could increase the dose prn as the weather got colder. Today she reports she has been doing PT and woke up 10 days ago with a \"kink\" in her neck. Massage only helped a little. Is still taking zanaflex and tramadol hs. Had 5 days of migraine when neck bad but better now. Maxalt still worked.     2. PSYCH- depression with anxiety- pt with long term issues. Has " had increased symptoms recently and reported a h/o being stalked but her  reported that this was a delusion. Pt has a h/o of diagnosis of paranoid Schizophrenia. Pt was on Cymbalta and Zyprexa but had 30 lb weight gain and was changed to abilify and Cymbalta. Did well initially but eventually relapsed with delusions, etc. Was changed to abilify and trintellix. Has done best with this combo but has repeatedly stopped the abilify. At her last visit she was off of it again and feeling very irritable. Was reminded that she needed to remain on both meds. Today she reports she is now on abilify and trintellix and is feeling much better.     3. NEURO- migraine. Pt with h/o issues, diagnosis confirmed again 9/6/17 when seen for HA and dizziness for several mos that eventually resulted in daily HA. Was given maxalt as she had done well with this in the past. 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  Did well with this combo. Stopped working and felt better in general.   4.GENERAL- syncope, undetermined cause. Pt saw Dr Evans 6/10/16. Had MRI/ MRA and EEG. Had 50 % stenosis of right internal carotid, others normal; normal MRA brain; EEG normal. B12 and A1C recheck were normal. Pt saw cardio with neg w/u including echo and Event Monitor. Discussed mood related issues. Pt was seen in ER 2/25/17 with fall and head trauma. Lab demo nomral CBC and CMP. EKG demo NSR with old septal Q waves, nonspecific T changes, ? atrial enlargement (compared to 8/23/16). CT chest neg other than emphysema changes.   5. RESP-allergies. Pt on OTC and nasal steroid with addition of singulair 2/2017. Allergy testing demo sensitivity to climate not so much allergens with no shots indicated. Started [place] honey and this helping alot. Had cerumen disimpaction 8/22/17.  6.ENDO- nontoxic diffuse goiter, post total  thyroidectomy. Pt had a h/o right thyroidectomy in past. Had diffuse, enlarging goiter noted on neck MRI and then U/S. Was treated with thyroidectomy 4/23/16 due to size. Was at goal on synthroid 88 with last TSH done 9/6/16.  7. GI- IBS. Pt diagnosed 9/2016. Sister and daughter both have this as well. Pt’s issues were mild and she was advised daily fiber and did well. Today she reports she needs a RF on lomotil. Last filled 8/2016 for #120.    The following portions of the patient's history were reviewed and updated as appropriate: current medications, past family history, past medical history, past social history, past surgical history and problem list.    Review of Systems   Cardiovascular: Negative for chest pain.   Gastrointestinal: Negative for abdominal distention and abdominal pain.   Skin: Negative for color change.   Neurological: Negative for tremors, speech difficulty and headaches.   Psychiatric/Behavioral: Negative for agitation and confusion.   All other systems reviewed and are negative.        Current Outpatient Prescriptions:   •  ARIPiprazole (ABILIFY) 5 MG tablet, Take 1 tablet by mouth Daily., Disp: 90 tablet, Rfl: 0  •  diphenoxylate-atropine (LOMOTIL) 2.5-0.025 MG per tablet, Take 1 tablet by mouth 4 (Four) Times a Day As Needed for Diarrhea., Disp: 120 tablet, Rfl: 0  •  fluticasone (FLONASE) 50 MCG/ACT nasal spray, 1 spray into each nostril Daily., Disp: 16 mL, Rfl: 5  •  levothyroxine (SYNTHROID) 88 MCG tablet, Take 1 tab po qd fasting and wait 1hr for food or other meds, Disp: 90 tablet, Rfl: 1  •  montelukast (SINGULAIR) 10 MG tablet, Take 1 tab po qd prn allergies, Disp: 90 tablet, Rfl: 1  •  rizatriptan (MAXALT) 10 MG tablet, Take 1 tab po prn migraine. May repeat in 2 hours if needed, max 3 in 24 hr, Disp: 36 tablet, Rfl: 1  •  tiZANidine (ZANAFLEX) 4 MG tablet, Take 1 tablet by mouth Every 8 (Eight) Hours As Needed for Muscle Spasms., Disp: 180 tablet, Rfl: 1  •  Topiramate ER  "(TROKENDI XR) 50 MG capsule sustained-release 24 hr, Take 1 tablet by mouth Daily., Disp: 90 capsule, Rfl: 1  •  traMADol (ULTRAM) 50 MG tablet, Take 2 tablets by mouth Every 8 (Eight) Hours As Needed for Moderate Pain ., Disp: 180 tablet, Rfl: 0  •  Vortioxetine HBr (TRINTELLIX) 20 MG tablet, Take 20 mg by mouth Daily., Disp: 90 tablet, Rfl: 1    Objective     /76  Temp 97.5 °F (36.4 °C)  Ht 167.6 cm (66\")  Wt 63.9 kg (140 lb 12.8 oz)  BMI 22.73 kg/m2    Physical Exam   Constitutional: She is oriented to person, place, and time. She appears well-developed and well-nourished.   HENT:   Right Ear: Tympanic membrane and ear canal normal.   Left Ear: Tympanic membrane and ear canal normal.   Mouth/Throat: Oropharynx is clear and moist.   Eyes: Conjunctivae and EOM are normal. Pupils are equal, round, and reactive to light.   Neck: No thyromegaly present.   Cardiovascular: Normal rate and regular rhythm.    Pulmonary/Chest: Effort normal and breath sounds normal.   Neurological: She is alert and oriented to person, place, and time.   Skin: Skin is warm and dry.   Psychiatric: She has a normal mood and affect.   Vitals reviewed.      Assessment/Plan   Alisa was seen today for follow-up.    Diagnoses and all orders for this visit:    Arthritis  -     traMADol (ULTRAM) 50 MG tablet; Take 2 tablets by mouth Every 8 (Eight) Hours As Needed for Moderate Pain .    Depression with anxiety  -     Vortioxetine HBr (TRINTELLIX) 20 MG tablet; Take 20 mg by mouth Daily.  -     ARIPiprazole (ABILIFY) 5 MG tablet; Take 1 tablet by mouth Daily.    1. ORTHO- arthritis- advised to continue stretches but she likely had a nerve pinch and this should improve just with time. Still ok to increased the tramadol up to 3x/day in future if needed.  2. PSYCH- depression with anxiety- mood improved back on trintellix and abilify.  3. RECHECK- 3mo routine         "

## 2018-01-18 ENCOUNTER — OFFICE VISIT (OUTPATIENT)
Dept: INTERNAL MEDICINE | Facility: CLINIC | Age: 58
End: 2018-01-18

## 2018-01-18 VITALS
DIASTOLIC BLOOD PRESSURE: 76 MMHG | TEMPERATURE: 98 F | SYSTOLIC BLOOD PRESSURE: 144 MMHG | HEIGHT: 66 IN | BODY MASS INDEX: 22.31 KG/M2 | WEIGHT: 138.8 LBS

## 2018-01-18 DIAGNOSIS — F48.8 PSYCHOGENIC SYNCOPE: ICD-10-CM

## 2018-01-18 DIAGNOSIS — G43.909 MIGRAINE SYNDROME: Primary | ICD-10-CM

## 2018-01-18 DIAGNOSIS — E03.9 HYPOTHYROIDISM (ACQUIRED): ICD-10-CM

## 2018-01-18 LAB
25(OH)D3 SERPL-MCNC: 11.8 NG/ML
ALBUMIN SERPL-MCNC: 4.4 G/DL (ref 3.2–4.8)
ALBUMIN/GLOB SERPL: 1.5 G/DL (ref 1.5–2.5)
ALP SERPL-CCNC: 125 U/L (ref 25–100)
ALT SERPL W P-5'-P-CCNC: 20 U/L (ref 7–40)
ANION GAP SERPL CALCULATED.3IONS-SCNC: 7 MMOL/L (ref 3–11)
ARTICHOKE IGE QN: 160 MG/DL (ref 0–130)
AST SERPL-CCNC: 24 U/L (ref 0–33)
BASOPHILS # BLD AUTO: 0.05 10*3/MM3 (ref 0–0.2)
BASOPHILS NFR BLD AUTO: 0.8 % (ref 0–1)
BILIRUB SERPL-MCNC: 0.2 MG/DL (ref 0.3–1.2)
BUN BLD-MCNC: 16 MG/DL (ref 9–23)
BUN/CREAT SERPL: 13.3 (ref 7–25)
CALCIUM SPEC-SCNC: 9.3 MG/DL (ref 8.7–10.4)
CHLORIDE SERPL-SCNC: 109 MMOL/L (ref 99–109)
CHOLEST SERPL-MCNC: 243 MG/DL (ref 0–200)
CO2 SERPL-SCNC: 25 MMOL/L (ref 20–31)
CREAT BLD-MCNC: 1.2 MG/DL (ref 0.6–1.3)
DEPRECATED RDW RBC AUTO: 47.2 FL (ref 37–54)
EOSINOPHIL # BLD AUTO: 0.07 10*3/MM3 (ref 0–0.3)
EOSINOPHIL NFR BLD AUTO: 1.2 % (ref 0–3)
ERYTHROCYTE [DISTWIDTH] IN BLOOD BY AUTOMATED COUNT: 13.1 % (ref 11.3–14.5)
GFR SERPL CREATININE-BSD FRML MDRD: 46 ML/MIN/1.73
GLOBULIN UR ELPH-MCNC: 3 GM/DL
GLUCOSE BLD-MCNC: 83 MG/DL (ref 70–100)
HCT VFR BLD AUTO: 40.1 % (ref 34.5–44)
HDLC SERPL-MCNC: 83 MG/DL (ref 40–60)
HGB BLD-MCNC: 13 G/DL (ref 11.5–15.5)
IMM GRANULOCYTES # BLD: 0 10*3/MM3 (ref 0–0.03)
IMM GRANULOCYTES NFR BLD: 0 % (ref 0–0.6)
LYMPHOCYTES # BLD AUTO: 1.88 10*3/MM3 (ref 0.6–4.8)
LYMPHOCYTES NFR BLD AUTO: 31.6 % (ref 24–44)
MCH RBC QN AUTO: 31.9 PG (ref 27–31)
MCHC RBC AUTO-ENTMCNC: 32.4 G/DL (ref 32–36)
MCV RBC AUTO: 98.3 FL (ref 80–99)
MONOCYTES # BLD AUTO: 0.54 10*3/MM3 (ref 0–1)
MONOCYTES NFR BLD AUTO: 9.1 % (ref 0–12)
NEUTROPHILS # BLD AUTO: 3.4 10*3/MM3 (ref 1.5–8.3)
NEUTROPHILS NFR BLD AUTO: 57.3 % (ref 41–71)
PLATELET # BLD AUTO: 324 10*3/MM3 (ref 150–450)
PMV BLD AUTO: 10.6 FL (ref 6–12)
POTASSIUM BLD-SCNC: 4 MMOL/L (ref 3.5–5.5)
PROT SERPL-MCNC: 7.4 G/DL (ref 5.7–8.2)
RBC # BLD AUTO: 4.08 10*6/MM3 (ref 3.89–5.14)
SODIUM BLD-SCNC: 141 MMOL/L (ref 132–146)
TRIGL SERPL-MCNC: 156 MG/DL (ref 0–150)
TSH SERPL DL<=0.05 MIU/L-ACNC: 0.05 MIU/ML (ref 0.35–5.35)
VIT B12 BLD-MCNC: 405 PG/ML (ref 211–911)
WBC NRBC COR # BLD: 5.94 10*3/MM3 (ref 3.5–10.8)

## 2018-01-18 PROCEDURE — 80053 COMPREHEN METABOLIC PANEL: CPT | Performed by: FAMILY MEDICINE

## 2018-01-18 PROCEDURE — 82607 VITAMIN B-12: CPT | Performed by: FAMILY MEDICINE

## 2018-01-18 PROCEDURE — 80061 LIPID PANEL: CPT | Performed by: FAMILY MEDICINE

## 2018-01-18 PROCEDURE — 82306 VITAMIN D 25 HYDROXY: CPT | Performed by: FAMILY MEDICINE

## 2018-01-18 PROCEDURE — 99214 OFFICE O/P EST MOD 30 MIN: CPT | Performed by: FAMILY MEDICINE

## 2018-01-18 PROCEDURE — 84443 ASSAY THYROID STIM HORMONE: CPT | Performed by: FAMILY MEDICINE

## 2018-01-18 PROCEDURE — 85025 COMPLETE CBC W/AUTO DIFF WBC: CPT | Performed by: FAMILY MEDICINE

## 2018-01-18 RX ORDER — TOPIRAMATE 100 MG/1
1 CAPSULE, EXTENDED RELEASE ORAL DAILY
Qty: 30 CAPSULE | Refills: 0 | Status: SHIPPED | OUTPATIENT
Start: 2018-01-18 | End: 2018-02-15

## 2018-01-18 NOTE — PROGRESS NOTES
Subjective   Alisa Ames is a 57 y.o. female.     History of Present Illness   Here for sugar concerns. Last seen 12/21/17 for 1mo recheck arthritis and mood. Was seen 10/30/17 for 1mo recheck migraine. Was seen 3/1/17 as a recheck from ED 2/25/17 for fall. Last routine visit 2/6/17. Was seen 12/2016 with allergies, started on flonase and singulair. Was given biaxin to add prn. Was seen 12/11/15 as a new patient. Lab 2/25/17 demo normal CBC and CMP (glu 85). Had labs 1/11/16 with normal CBC (Hg 15.6 in smoker), CMP, TSH and B12. Had vit D 18 and was advised 5000 IU qd. Last TSH 7/1/16 was 0.488 with free T4 1.33 on synthroid 100. Lab 9/6/16 demo TSH 2.014 and free T4 1.3 on synthroid 88.     1.ORTHO- fibromyalgia and DDD with h/o C spine surgery- initially on flexeril bid. Was seeing Pain Management in [place] and was on oxycodone. Was given tizanadine and gabapentin here. Saw PM here 4/19/16 and was changed from gabapentin to lyrica. Pt was discharged from initial PM as she took extra meds after a fall. Was referred to 2nd PM at 5/16/17. Used tizanidine and tramadol in the interim. Was referred to Dr Vicente (5/11/17) and Dr He (5/24/17); kept the appt with Dr He. Updated MRI neck demo no changes. Pt was discharged from 2nd PM 9/8/17 due to repeated cold/ contaminated urines and manic behavior. Pt has been off norco since 10/2/17 and done well enough with zanaflex and tramadol (both hs), especially with the4 warmer weather. Was doing PT and got a kink in her neck/ migraine. Did well with maxalt   2. PSYCH- depression with anxiety- pt with long term issues. Has had increased symptoms recently and reported a h/o being stalked but her  reported that this was a delusion. Pt has a h/o of diagnosis of paranoid Schizophrenia. Pt was on Cymbalta and Zyprexa but had 30 lb weight gain and was changed to abilify and Cymbalta. Did well initially but eventually relapsed with delusions, etc. Was changed to abilify  and trintellix. Has done best with this combo but has repeatedly stopped the abilify. At her last visit she was off of it again and feeling very irritable. Was reminded that she needed to remain on both meds. Today she reports she is now on abilify and trintellix and is feeling much better.  3. NEURO- migraine. Pt with h/o issues, diagnosis confirmed again 9/6/17. Has done well with trokendi and prn maxalt.   4.GENERAL- syncope, undetermined cause. Pt saw Dr Evans 6/10/16. Had MRI/ MRA and EEG. Had 50 % stenosis of right internal carotid, others normal; normal MRA brain; EEG normal. B12 and A1C recheck were normal. Pt saw cardio with neg w/u including echo and Event Monitor. Discussed mood related issues. Pt was seen in ER 2/25/17 with fall and head trauma. Lab demo nomral CBC and CMP. EKG demo NSR with old septal Q waves, nonspecific T changes, ? atrial enlargement (compared to 8/23/16). CT chest neg other than emphysema changes.   5. RESP-allergies. Pt on OTC and nasal steroid with addition of singulair 2/2017. Allergy testing demo sensitivity to climate not so much allergens with no shots indicated. Started [place] honey and this helping alot. Had cerumen disimpaction 8/22/17.  6. GI- IBS. Pt diagnosed 9/2016. Sister and daughter both have this as well. Pt’s issues were mild and she was advised daily fiber and did well. Today she reports she needs a RF on lomotil. Last filled 8/2016 for #120.    7. ENDO- nontoxic diffuse goiter, post total thyroidectomy. Pt had a h/o right thyroidectomy in past. Had diffuse, enlarging goiter noted on neck MRI and then U/S. Was treated with left lobe thyroidectomy 4/23/16 due to size. Was at goal on synthroid 88 with last TSH done 9/6/16. Here today due to sugar concerns. Last glu 2/25/17 was 85. Today pt reports that 3mo ago she got light headed and fell out of her chair x1. Then the week of Christmas she flew to [place] and sedated and then was weaving when she got up to walk out of the  plane. This occurred a couple more times while in [place]. [place], got blurry vision in left eye followed by stabbing pain. No post ictal symptoms or loss of continence. Had BP up to 201/ 89. BP has been 120's/ 70's here for the last several visits. Has not missed any meds. No neck pain but has had arm pain.     The following portions of the patient's history were reviewed and updated as appropriate: current medications, past family history, past medical history, past social history, past surgical history and problem list.    Review of Systems   Cardiovascular: Negative for chest pain.   Gastrointestinal: Negative for abdominal distention and abdominal pain.   Skin: Negative for color change.   Neurological: Negative for tremors, speech difficulty and headaches.   Psychiatric/Behavioral: Negative for agitation and confusion.   All other systems reviewed and are negative.        Current Outpatient Prescriptions:   •  ARIPiprazole (ABILIFY) 5 MG tablet, Take 1 tablet by mouth Daily., Disp: 90 tablet, Rfl: 0  •  diphenoxylate-atropine (LOMOTIL) 2.5-0.025 MG per tablet, Take 1 tablet by mouth 4 (Four) Times a Day As Needed for Diarrhea., Disp: 120 tablet, Rfl: 0  •  fluticasone (FLONASE) 50 MCG/ACT nasal spray, 1 spray into each nostril Daily., Disp: 16 mL, Rfl: 5  •  levothyroxine (SYNTHROID) 88 MCG tablet, Take 1 tab po qd fasting and wait 1hr for food or other meds, Disp: 90 tablet, Rfl: 1  •  montelukast (SINGULAIR) 10 MG tablet, Take 1 tab po qd prn allergies, Disp: 90 tablet, Rfl: 1  •  rizatriptan (MAXALT) 10 MG tablet, Take 1 tab po prn migraine. May repeat in 2 hours if needed, max 3 in 24 hr, Disp: 36 tablet, Rfl: 1  •  tiZANidine (ZANAFLEX) 4 MG tablet, Take 1 tablet by mouth Every 8 (Eight) Hours As Needed for Muscle Spasms., Disp: 180 tablet, Rfl: 1  •  Topiramate  MG capsule sustained-release 24 hr, Take 1 tablet by mouth Daily., Disp: 30 capsule, Rfl: 0  •  traMADol (ULTRAM) 50 MG tablet, Take 2  "tablets by mouth Every 8 (Eight) Hours As Needed for Moderate Pain ., Disp: 180 tablet, Rfl: 0  •  Vortioxetine HBr (TRINTELLIX) 20 MG tablet, Take 20 mg by mouth Daily., Disp: 90 tablet, Rfl: 1    Objective     /76  Temp 98 °F (36.7 °C)  Ht 167.6 cm (66\")  Wt 63 kg (138 lb 12.8 oz)  BMI 22.4 kg/m2    Physical Exam   Constitutional: She is oriented to person, place, and time. She appears well-developed and well-nourished.   HENT:   Right Ear: Tympanic membrane and ear canal normal.   Left Ear: Tympanic membrane and ear canal normal.   Mouth/Throat: Oropharynx is clear and moist.   Eyes: Conjunctivae and EOM are normal. Pupils are equal, round, and reactive to light.   Neck: No thyromegaly present.   Cardiovascular: Normal rate and regular rhythm.    Pulmonary/Chest: Effort normal and breath sounds normal.   Neurological: She is alert and oriented to person, place, and time.   Skin: Skin is warm and dry.   Psychiatric: She has a normal mood and affect.   Vitals reviewed.      Assessment/Plan   Alisa was seen today for syncope.    Diagnoses and all orders for this visit:    Migraine syndrome  -     CBC & Differential  -     Comprehensive Metabolic Panel  -     Lipid Panel  -     Vitamin B12  -     Vitamin D 25 Hydroxy  -     Topiramate  MG capsule sustained-release 24 hr; Take 1 tablet by mouth Daily.  -     CBC Auto Differential    Hypothyroidism (acquired)  -     TSH    Psychogenic syncope      1. NEURO- migraine- discussed complex migraine. Will increase the topamax from 50 to 100. Will check her annual labs, including her thyroid with pt on synthroid 88.  2. RECHECK- 1mo migraine/ syncope       "

## 2018-01-18 NOTE — PATIENT INSTRUCTIONS
1. NEURO- migraine- discussed complex migraine. Will increase the topamax from 50 to 100. Will check her annual labs, including her thyroid with pt on synthroid 88.  2. RECHECK- 1mo migraine/ syncope

## 2018-01-19 LAB — T4 FREE SERPL-MCNC: 1.64 NG/DL (ref 0.89–1.76)

## 2018-01-19 PROCEDURE — 84439 ASSAY OF FREE THYROXINE: CPT | Performed by: FAMILY MEDICINE

## 2018-01-19 PROCEDURE — 36415 COLL VENOUS BLD VENIPUNCTURE: CPT | Performed by: FAMILY MEDICINE

## 2018-02-15 ENCOUNTER — OFFICE VISIT (OUTPATIENT)
Dept: INTERNAL MEDICINE | Facility: CLINIC | Age: 58
End: 2018-02-15

## 2018-02-15 ENCOUNTER — TELEPHONE (OUTPATIENT)
Dept: INTERNAL MEDICINE | Facility: CLINIC | Age: 58
End: 2018-02-15

## 2018-02-15 VITALS
TEMPERATURE: 97.5 F | BODY MASS INDEX: 22.63 KG/M2 | SYSTOLIC BLOOD PRESSURE: 126 MMHG | HEIGHT: 66 IN | WEIGHT: 140.8 LBS | DIASTOLIC BLOOD PRESSURE: 78 MMHG

## 2018-02-15 DIAGNOSIS — G43.909 MIGRAINE SYNDROME: ICD-10-CM

## 2018-02-15 DIAGNOSIS — G43.909 MIGRAINE SYNDROME: Primary | ICD-10-CM

## 2018-02-15 DIAGNOSIS — E78.00 PURE HYPERCHOLESTEROLEMIA: ICD-10-CM

## 2018-02-15 PROCEDURE — 99213 OFFICE O/P EST LOW 20 MIN: CPT | Performed by: FAMILY MEDICINE

## 2018-02-15 RX ORDER — ATORVASTATIN CALCIUM 10 MG/1
10 TABLET, FILM COATED ORAL DAILY
Qty: 30 TABLET | Refills: 0 | Status: SHIPPED | OUTPATIENT
Start: 2018-02-15 | End: 2018-03-15 | Stop reason: SDUPTHER

## 2018-02-15 NOTE — TELEPHONE ENCOUNTER
Pt wants to know if [facility] [place] She said she will just pay copay because she doesn't want to drive all the way back.

## 2018-02-15 NOTE — PATIENT INSTRUCTIONS
1. NEURO- complex migraine- back at goal on trokendi 200mg. RF x6mo today.   2. CV- hyperlipid- education re the pathophysiology provided. Pros and cons of lipitor discussed. Will do trial with 10mg. Pt to cut in half if needed for side effects (ie- muscle aches).  3. RECHECK- 1mo fasting recheck lipid, discuss mood

## 2018-02-15 NOTE — PROGRESS NOTES
Subjective   Alisa Ames is a 57 y.o. female.     History of Present Illness   Here for 1mo recheck migraine and discuss cholesterol. Last seen 1/18/18. Was seen 12/21/17 for 1mo recheck arthritis and mood. Was seen 10/30/17 for 1mo recheck migraine. Was seen 3/1/17 as a recheck from ED 2/25/17 for fall. Last routine visit 2/6/17. Was seen 12/2016 with allergies, started on flonase and singulair. Was given biaxin to add prn. Was seen 12/11/15 as a new patient. Lab 1/18/17 demo normal CBC, CMP and B12. Vit D was 11.8; pt advised 5k. Thyroid was at goal with suppressed TSH of 0.05 but normal T4 of 88 on synthroid 88. Lipid high with , tg 156, HDL 83, .      1.NEURO- migraine. Pt with h/o issues, diagnosis confirmed again 9/6/17. Has done well with trokendi and prn maxalt. Required a dose increase on trokendi from 50 to 100mg at last visit due to break thru complex migraine. Today pt reports no S/E. Did not get improvement. Increased to 175 and this did well.     2. CV- hyperlipid- new issue found on lab 1/18/18 with , . Today she reports she has been borderline in the past. Eats a low cholesterol diet already.    3.ENDO- nontoxic diffuse goiter, post total thyroidectomy. Pt had a h/o right thyroidectomy in past. Had diffuse, enlarging goiter noted on neck MRI and then U/S. Was treated with left lobe thyroidectomy 4/23/16 due to size. Has been at goal on synthroid 88 with last lab 1/18/18 demo suppressed TSH but free T4 at goal. Lab 2/25/17 and 9/6/16 also at goal.  3.ORTHO- fibromyalgia and DDD with h/o C spine surgery- initially on flexeril bid. Was seeing Pain Management in [place] and was on oxycodone. Was given tizanadine and gabapentin here. Saw PM here 4/19/16 and was changed from gabapentin to lyrica. Pt was discharged from initial PM as she took extra meds after a fall. Was referred to 2nd PM at 5/16/17. Used tizanidine and tramadol in the interim. Was referred to Dr Vicente (5/11/17)  and Dr He (5/24/17); kept the appt with Dr He. Updated MRI neck demo no changes. Pt was discharged from 2nd PM 9/8/17 due to repeated cold/ contaminated urines and manic behavior. Pt has been off norco since 10/2/17 and done well enough with zanaflex and tramadol (both hs), especially with the4 warmer weather. Was doing PT and got a kink in her neck/ migraine. Did well with maxalt   4. PSYCH- depression with anxiety- pt with long term issues. Had increased issue 2016-17. Pt having delusions of being stalked with h/o same with previous diagnosis of paranoid Schizophrenia. Pt was on Cymbalta and Zyprexa but had 30 lb weight gain and was changed to abilify and Cymbalta. Did well initially but eventually relapsed with delusions, etc. Was changed to abilify and trintellix. Has done well other than periodically stopping her meds. Doing well at discussion 1/18/18 on meds.    5.GENERAL- syncope, likely psychogenic. Pt saw Dr Evans 6/10/16. Had MRI/ MRA and EEG. Had 50 % stenosis of right internal carotid, others normal; normal MRA brain; EEG normal. B12 and A1C recheck were normal. Pt saw cardio with neg w/u including echo and Event Monitor. Discussed mood related issues. Pt was seen in ER 2/25/17 with fall and head trauma. Lab demo nomral CBC and CMP. EKG demo NSR with old septal Q waves, nonspecific T changes, ? atrial enlargement (compared to 8/23/16). CT chest neg other than emphysema changes.   5. RESP-allergies. Pt on OTC and nasal steroid with addition of singulair 2/2017. Allergy testing demo sensitivity to climate not so much allergens with no shots indicated. Started [place] honey and this helping alot. Had cerumen disimpaction 8/22/17.  6. GI- IBS. Pt diagnosed 9/2016. Sister and daughter both have this as well. Pt’s has taken lomotil prn. Had Rx 8/2016 for #120 with next RF done 1/18/18..    The following portions of the patient's history were reviewed and updated as appropriate: current medications, past  "family history, past medical history, past social history, past surgical history and problem list.    Review of Systems   Cardiovascular: Negative for chest pain.   Gastrointestinal: Negative for abdominal distention and abdominal pain.   Skin: Negative for color change.   Neurological: Negative for tremors, speech difficulty and headaches.   Psychiatric/Behavioral: Negative for agitation and confusion.   All other systems reviewed and are negative.        Current Outpatient Prescriptions:   •  ARIPiprazole (ABILIFY) 5 MG tablet, Take 1 tablet by mouth Daily., Disp: 90 tablet, Rfl: 0  •  atorvastatin (LIPITOR) 10 MG tablet, Take 1 tablet by mouth Daily., Disp: 30 tablet, Rfl: 0  •  diphenoxylate-atropine (LOMOTIL) 2.5-0.025 MG per tablet, Take 1 tablet by mouth 4 (Four) Times a Day As Needed for Diarrhea., Disp: 120 tablet, Rfl: 0  •  fluticasone (FLONASE) 50 MCG/ACT nasal spray, 1 spray into each nostril Daily., Disp: 16 mL, Rfl: 5  •  levothyroxine (SYNTHROID) 88 MCG tablet, Take 1 tab po qd fasting and wait 1hr for food or other meds, Disp: 90 tablet, Rfl: 1  •  montelukast (SINGULAIR) 10 MG tablet, Take 1 tab po qd prn allergies, Disp: 90 tablet, Rfl: 1  •  rizatriptan (MAXALT) 10 MG tablet, Take 1 tab po prn migraine. May repeat in 2 hours if needed, max 3 in 24 hr, Disp: 36 tablet, Rfl: 1  •  tiZANidine (ZANAFLEX) 4 MG tablet, Take 1 tablet by mouth Every 8 (Eight) Hours As Needed for Muscle Spasms., Disp: 180 tablet, Rfl: 1  •  Topiramate  MG capsule sustained-release 24 hr, Take 1 tablet by mouth Daily., Disp: 30 capsule, Rfl: 5  •  traMADol (ULTRAM) 50 MG tablet, Take 2 tablets by mouth Every 8 (Eight) Hours As Needed for Moderate Pain ., Disp: 180 tablet, Rfl: 0  •  Vortioxetine HBr (TRINTELLIX) 20 MG tablet, Take 20 mg by mouth Daily., Disp: 90 tablet, Rfl: 1    Objective     /78  Temp 97.5 °F (36.4 °C)  Ht 167.6 cm (66\")  Wt 63.9 kg (140 lb 12.8 oz)  BMI 22.73 kg/m2    Physical Exam "   Constitutional: She is oriented to person, place, and time. She appears well-developed and well-nourished.   HENT:   Right Ear: Tympanic membrane and ear canal normal.   Left Ear: Tympanic membrane and ear canal normal.   Mouth/Throat: Oropharynx is clear and moist.   Eyes: Conjunctivae and EOM are normal. Pupils are equal, round, and reactive to light.   Neck: No thyromegaly present.   Cardiovascular: Normal rate and regular rhythm.    Pulmonary/Chest: Effort normal and breath sounds normal.   Neurological: She is alert and oriented to person, place, and time.   Skin: Skin is warm and dry.   Psychiatric: She has a normal mood and affect.   Vitals reviewed.      Assessment/Plan   Alisa was seen today for follow-up.    Diagnoses and all orders for this visit:    Migraine syndrome  -     Topiramate  MG capsule sustained-release 24 hr; Take 1 tablet by mouth Daily.    Pure hypercholesterolemia  -     atorvastatin (LIPITOR) 10 MG tablet; Take 1 tablet by mouth Daily.      1. NEURO- complex migraine- back at goal on trokendi 200mg. RF x6mo today.   2. CV- hyperlipid- education re the pathophysiology provided. Pros and cons of lipitor discussed. Will do trial with 10mg. Pt to cut in half if needed for side effects (ie- muscle aches).  3. RECHECK- 1mo fasting recheck lipid, discuss mood

## 2018-03-15 ENCOUNTER — OFFICE VISIT (OUTPATIENT)
Dept: INTERNAL MEDICINE | Facility: CLINIC | Age: 58
End: 2018-03-15

## 2018-03-15 VITALS
SYSTOLIC BLOOD PRESSURE: 106 MMHG | WEIGHT: 144.4 LBS | TEMPERATURE: 97.5 F | HEIGHT: 66 IN | BODY MASS INDEX: 23.21 KG/M2 | DIASTOLIC BLOOD PRESSURE: 76 MMHG

## 2018-03-15 DIAGNOSIS — F41.8 DEPRESSION WITH ANXIETY: Chronic | ICD-10-CM

## 2018-03-15 DIAGNOSIS — E78.00 PURE HYPERCHOLESTEROLEMIA: Primary | ICD-10-CM

## 2018-03-15 DIAGNOSIS — G43.909 MIGRAINE SYNDROME: ICD-10-CM

## 2018-03-15 DIAGNOSIS — R30.0 DYSURIA: ICD-10-CM

## 2018-03-15 LAB
BILIRUB BLD-MCNC: NEGATIVE MG/DL
CHOLEST SERPL-MCNC: 211 MG/DL
CLARITY, POC: CLEAR
COLOR UR: ABNORMAL
EXPIRATION DATE: NORMAL
GLUCOSE UR STRIP-MCNC: NEGATIVE MG/DL
HDLC SERPL-MCNC: 96 MG/DL
KETONES UR QL: NEGATIVE
LDLC SERPL CALC-MCNC: 91 MG/DL
LEUKOCYTE EST, POC: ABNORMAL
Lab: NORMAL
NITRITE UR-MCNC: NEGATIVE MG/ML
PH UR: 6 [PH] (ref 5–8)
PROT UR STRIP-MCNC: NEGATIVE MG/DL
RBC # UR STRIP: NEGATIVE /UL
SP GR UR: 1.02 (ref 1–1.03)
TRIGL SERPL-MCNC: 119 MG/DL
UROBILINOGEN UR QL: NORMAL

## 2018-03-15 PROCEDURE — 80061 LIPID PANEL: CPT | Performed by: FAMILY MEDICINE

## 2018-03-15 PROCEDURE — 81003 URINALYSIS AUTO W/O SCOPE: CPT | Performed by: FAMILY MEDICINE

## 2018-03-15 PROCEDURE — 99214 OFFICE O/P EST MOD 30 MIN: CPT | Performed by: FAMILY MEDICINE

## 2018-03-15 RX ORDER — ATORVASTATIN CALCIUM 10 MG/1
10 TABLET, FILM COATED ORAL DAILY
Qty: 90 TABLET | Refills: 3 | Status: SHIPPED | OUTPATIENT
Start: 2018-03-15 | End: 2018-08-01 | Stop reason: SDUPTHER

## 2018-03-15 RX ORDER — RIZATRIPTAN BENZOATE 10 MG/1
TABLET ORAL
Qty: 9 TABLET | Refills: 1 | Status: SHIPPED | OUTPATIENT
Start: 2018-03-15 | End: 2018-07-06 | Stop reason: SDUPTHER

## 2018-03-15 NOTE — PATIENT INSTRUCTIONS
1. CV- hyperlipid- lipid today in house with pt on lipitor. , tg 119, HDL 96, LDL 91. Will RF lipitor x1yr now.  2. PSYCH- depression with anxiety- mood stable with pt getting her best response on abilify and trintellix since in [place]. Advised to take 5000 IU of vit D in a gel cap.   3. NEURO- will print Rx for maxalt for pt to use [facility] to fill in until her next RF is due.  4. - UA demo 25 LE only. Will not treat yet and pt will push fluids. Will recheck in 2wk at her routine  5. RECHECK- keep routine

## 2018-03-15 NOTE — PROGRESS NOTES
Subjective   Alisa Ames is a 57 y.o. female.     History of Present Illness   Here for 1mo recheck mood and cholesterol. Last seen 2/15/18 for migraine and discuss cholesterol. Last seen 1/18/18. Was seen 12/21/17 for 1mo recheck arthritis and mood. Was seen 10/30/17 for 1mo recheck migraine. Was seen 3/1/17 as a recheck from ED 2/25/17 for fall. Last routine visit 2/6/17. Was seen 12/2016 with allergies, started on flonase and singulair. Was given biaxin to add prn. Was seen 12/11/15 as a new patient. Lab 1/18/18 demo normal CBC, CMP and B12. Vit D was 11.8; pt advised 5k. Thyroid was at goal with suppressed TSH of 0.05 but normal T4 of 88 on synthroid 88. Lipid high with , tg 156, HDL 83, .      1.CV- hyperlipid- new issue found on lab 1/18/18 with , . Pt was seen and started on Lipitor. Today she reports no S/E. Has had a bowl of cereal only today.     2. PSYCH- depression with anxiety- pt with long term issues. Had increased issue 2016-17. Pt having delusions of being stalked with h/o same with previous diagnosis of paranoid Schizophrenia. Pt was on Cymbalta and Zyprexa but had 30 lb weight gain and was changed to abilify and Cymbalta. Did well initially but eventually relapsed with delusions, etc. Was changed to abilify and trintellix. Has done well other than periodically stopping her meds. Doing well at discussion 1/18/18 on meds. Today pt reports she has been routine with her meds and is feeling a little better. States she feels like she did better when living in [facility]. Cannot remember the name of the medicine she was on there. Review of records shows zyprexa and paxil. She has had much more stress in the past 6mo and feels this may be the issue.    3. NEURO- migraine. Pt with h/o issues, diagnosis confirmed again 9/6/17. Has done well with trokendi and prn maxalt. Required a dose increase on trokendi to 200mg and did well. Today pt reports that she ran out of the mail in  maxalt as she used a lot before we increased her trokendi dose.     4. - today pt reports she is having strong odor and more frequent urination.    5.ENDO- nontoxic diffuse goiter, post total thyroidectomy. Pt had a h/o right thyroidectomy in past. Had diffuse, enlarging goiter noted on neck MRI and then U/S. Was treated with left lobe thyroidectomy 4/23/16 due to size. Has been at goal on synthroid 88 with last lab 1/18/18 demo suppressed TSH but free T4 at goal. Lab 2/25/17 and 9/6/16 also at goal.  6.ORTHO- fibromyalgia and DDD with h/o C spine surgery- initially on flexeril bid. Was seeing Pain Management in [place] and was on oxycodone. Was given tizanadine and gabapentin here. Saw PM here 4/19/16 and was changed from gabapentin to lyrica. Pt was discharged from initial PM as she took extra meds after a fall. Was referred to 2nd PM at 5/16/17. Used tizanidine and tramadol in the interim. Was referred to Dr Vicente (5/11/17) and Dr He (5/24/17); kept the appt with Dr He. Updated MRI neck demo no changes. Pt was discharged from 2nd PM 9/8/17 due to repeated cold/ contaminated urines and manic behavior. Pt has been off norco since 10/2/17 and done well enough with zanaflex and tramadol (both hs), especially with the4 warmer weather. Was doing PT and got a kink in her neck/ migraine. 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 Pt saw Dr Evans 6/10/16. Had MRI/ MRA and EEG. Had 50 % stenosis of right internal carotid, others normal; normal MRA brain; EEG normal. B12 and A1C recheck were normal. Pt saw cardio with neg w/u including echo and Event Monitor. Discussed mood related issues. Pt was seen in ER 2/25/17 with fall and head trauma. Lab demo nomral CBC and CMP. EKG demo NSR with old septal Q waves, nonspecific T changes, ? atrial enlargement (compared to 8/23/16). CT chest neg other than emphysema changes.   8. RESP-allergies. Pt on OTC and nasal steroid with addition of singulair  2/2017. Allergy testing demo sensitivity to climate not so much allergens with no shots indicated. Started [place] honey and this helping alot. Had cerumen disimpaction 8/22/17.  9. GI- IBS. Pt diagnosed 9/2016. Sister and daughter both have this as well. Pt’s has taken lomotil prn. Had Rx 8/2016 for #120 with next RF done 1/18/18..    The following portions of the patient's history were reviewed and updated as appropriate: current medications, past family history, past medical history, past social history, past surgical history and problem list.    Review of Systems   Cardiovascular: Negative for chest pain.   Gastrointestinal: Negative for abdominal distention and abdominal pain.   Skin: Negative for color change.   Neurological: Negative for tremors, speech difficulty and headaches.   Psychiatric/Behavioral: Negative for agitation and confusion.   All other systems reviewed and are negative.        Current Outpatient Prescriptions:   •  ARIPiprazole (ABILIFY) 5 MG tablet, Take 1 tablet by mouth Daily., Disp: 90 tablet, Rfl: 0  •  atorvastatin (LIPITOR) 10 MG tablet, Take 1 tablet by mouth Daily., Disp: 90 tablet, Rfl: 3  •  diphenoxylate-atropine (LOMOTIL) 2.5-0.025 MG per tablet, Take 1 tablet by mouth 4 (Four) Times a Day As Needed for Diarrhea., Disp: 120 tablet, Rfl: 0  •  fluticasone (FLONASE) 50 MCG/ACT nasal spray, 1 spray into each nostril Daily., Disp: 16 mL, Rfl: 5  •  levothyroxine (SYNTHROID) 88 MCG tablet, Take 1 tab po qd fasting and wait 1hr for food or other meds, Disp: 90 tablet, Rfl: 1  •  montelukast (SINGULAIR) 10 MG tablet, Take 1 tab po qd prn allergies, Disp: 90 tablet, Rfl: 1  •  rizatriptan (MAXALT) 10 MG tablet, Take 1 tab po prn migraine. May repeat in 2 hours if needed, max 3 in 24 hr, Disp: 9 tablet, Rfl: 1  •  tiZANidine (ZANAFLEX) 4 MG tablet, Take 1 tablet by mouth Every 8 (Eight) Hours As Needed for Muscle Spasms., Disp: 180 tablet, Rfl: 1  •  Topiramate  MG capsule  "sustained-release 24 hr, Take 1 tablet by mouth Daily., Disp: 30 capsule, Rfl: 0  •  traMADol (ULTRAM) 50 MG tablet, Take 2 tablets by mouth Every 8 (Eight) Hours As Needed for Moderate Pain ., Disp: 180 tablet, Rfl: 0  •  Vortioxetine HBr (TRINTELLIX) 20 MG tablet, Take 20 mg by mouth Daily., Disp: 90 tablet, Rfl: 1    Objective     /76   Temp 97.5 °F (36.4 °C)   Ht 167.6 cm (66\")   Wt 65.5 kg (144 lb 6.4 oz)   BMI 23.31 kg/m²     Physical Exam   Constitutional: She is oriented to person, place, and time. She appears well-developed and well-nourished.   HENT:   Right Ear: Tympanic membrane and ear canal normal.   Left Ear: Tympanic membrane and ear canal normal.   Mouth/Throat: Oropharynx is clear and moist.   Eyes: Conjunctivae and EOM are normal. Pupils are equal, round, and reactive to light.   Neck: No thyromegaly present.   Cardiovascular: Normal rate and regular rhythm.    Pulmonary/Chest: Effort normal and breath sounds normal.   Neurological: She is alert and oriented to person, place, and time.   Skin: Skin is warm and dry.   Psychiatric: She has a normal mood and affect.   Vitals reviewed.      Assessment/Plan   Alisa was seen today for follow-up.    Diagnoses and all orders for this visit:    Pure hypercholesterolemia  -     POC Lipid Panel  -     atorvastatin (LIPITOR) 10 MG tablet; Take 1 tablet by mouth Daily.    Depression with anxiety    Migraine syndrome  -     rizatriptan (MAXALT) 10 MG tablet; Take 1 tab po prn migraine. May repeat in 2 hours if needed, max 3 in 24 hr    Dysuria  -     POC Urinalysis Dipstick, Automated      1. CV- hyperlipid- lipid today in house with pt on lipitor. , tg 119, HDL 96, LDL 91. Will RF lipitor x1yr now.  2. PSYCH- depression with anxiety- mood stable with pt getting her best response on abilify and trintellix since in [place]. Advised to take 5000 IU of vit D in a gel cap.   3. NEURO- will print Rx for maxalt for pt to use [facility] to fill in until her " next RF is due.  4. - UA demo 25 LE only. Will not treat yet and pt will push fluids. Will recheck in 2wk at her routine  5. RECHECK- keep routine

## 2018-03-29 DIAGNOSIS — E03.9 HYPOTHYROIDISM (ACQUIRED): ICD-10-CM

## 2018-03-29 DIAGNOSIS — J30.9 ALLERGIC RHINITIS: ICD-10-CM

## 2018-03-29 RX ORDER — LEVOTHYROXINE SODIUM 88 UG/1
TABLET ORAL
Qty: 90 TABLET | Refills: 1 | Status: SHIPPED | OUTPATIENT
Start: 2018-03-29 | End: 2018-08-01 | Stop reason: SDUPTHER

## 2018-03-29 RX ORDER — MONTELUKAST SODIUM 10 MG/1
TABLET ORAL
Qty: 90 TABLET | Refills: 1 | Status: SHIPPED | OUTPATIENT
Start: 2018-03-29 | End: 2018-09-02 | Stop reason: SDUPTHER

## 2018-03-29 RX ORDER — DULOXETIN HYDROCHLORIDE 60 MG/1
CAPSULE, DELAYED RELEASE ORAL
Qty: 90 CAPSULE | Refills: 1 | Status: SHIPPED | OUTPATIENT
Start: 2018-03-29 | End: 2018-08-01 | Stop reason: SDUPTHER

## 2018-04-04 DIAGNOSIS — M19.90 ARTHRITIS: ICD-10-CM

## 2018-04-04 RX ORDER — TRAMADOL HYDROCHLORIDE 50 MG/1
100 TABLET ORAL EVERY 8 HOURS PRN
Qty: 180 TABLET | Refills: 0 | Status: SHIPPED | OUTPATIENT
Start: 2018-04-04 | End: 2018-04-24 | Stop reason: SDUPTHER

## 2018-04-04 NOTE — TELEPHONE ENCOUNTER
Pt called asking for refill of tramadol. Per her Faisal report, this was last filled 10/30/17. Please advise.

## 2018-04-11 ENCOUNTER — TELEPHONE (OUTPATIENT)
Dept: INTERNAL MEDICINE | Facility: CLINIC | Age: 58
End: 2018-04-11

## 2018-04-11 NOTE — TELEPHONE ENCOUNTER
Pt needs a refill on tramdol. Pt wants to no if she has to come pick it up or can you order it to the pharmacy? Pt also said that she is having hip issues and she can hardly walk, sit and more. Pt said that she is going to need a referral for her hip. Pt has an apt set up for may 11.

## 2018-04-12 ENCOUNTER — TELEPHONE (OUTPATIENT)
Dept: INTERNAL MEDICINE | Facility: CLINIC | Age: 58
End: 2018-04-12

## 2018-04-12 DIAGNOSIS — M25.559 ARTHRALGIA OF HIP, UNSPECIFIED LATERALITY: Primary | ICD-10-CM

## 2018-04-12 DIAGNOSIS — G43.909 MIGRAINE SYNDROME: ICD-10-CM

## 2018-04-12 NOTE — TELEPHONE ENCOUNTER
Tramadol has already been sent to [facility].     Please place referral as pt is  and has to have approval via PCP. Thank you!

## 2018-04-12 NOTE — TELEPHONE ENCOUNTER
Pt needs a rx for Trokendi sent to [facility] in [place]. She said [facility] sent a request but she is out and needs some sent to local pharmacy.

## 2018-04-23 ENCOUNTER — TELEPHONE (OUTPATIENT)
Dept: INTERNAL MEDICINE | Facility: CLINIC | Age: 58
End: 2018-04-23

## 2018-04-23 NOTE — TELEPHONE ENCOUNTER
PT SAID SHE WAS INTERESTED IN BEING SEEN, LET ME KNOW SOME TIMES AND I CAN CALL HER BACK FOR YOU. SHE WAS ALSO WONDERING IF SHE COULD GET A REFILL OF TRAMADOL.

## 2018-04-23 NOTE — TELEPHONE ENCOUNTER
Pt seen in ER over the weekend at [facility]. Asking for additional pain medications but this is not something that we can do over the phone. Records requested from [facility]. And pt scheduled for tomorrow.

## 2018-04-24 ENCOUNTER — OFFICE VISIT (OUTPATIENT)
Dept: INTERNAL MEDICINE | Facility: CLINIC | Age: 58
End: 2018-04-24

## 2018-04-24 ENCOUNTER — TELEPHONE (OUTPATIENT)
Dept: INTERNAL MEDICINE | Facility: CLINIC | Age: 58
End: 2018-04-24

## 2018-04-24 VITALS
SYSTOLIC BLOOD PRESSURE: 114 MMHG | BODY MASS INDEX: 23.11 KG/M2 | WEIGHT: 143.8 LBS | HEIGHT: 66 IN | TEMPERATURE: 97.6 F | DIASTOLIC BLOOD PRESSURE: 76 MMHG

## 2018-04-24 DIAGNOSIS — M19.90 ARTHRITIS: ICD-10-CM

## 2018-04-24 DIAGNOSIS — S49.92XD INJURY OF LEFT UPPER ARM, SUBSEQUENT ENCOUNTER: Primary | ICD-10-CM

## 2018-04-24 PROCEDURE — 99213 OFFICE O/P EST LOW 20 MIN: CPT | Performed by: FAMILY MEDICINE

## 2018-04-24 RX ORDER — DICLOFENAC SODIUM 75 MG/1
TABLET, DELAYED RELEASE ORAL
COMMUNITY
Start: 2018-04-20 | End: 2018-04-24 | Stop reason: SDUPTHER

## 2018-04-24 RX ORDER — TRAMADOL HYDROCHLORIDE 50 MG/1
100 TABLET ORAL EVERY 8 HOURS PRN
Qty: 180 TABLET | Refills: 0 | Status: SHIPPED | OUTPATIENT
Start: 2018-04-24 | End: 2018-12-27

## 2018-04-24 NOTE — PATIENT INSTRUCTIONS
1. ORTHO- left arm injury- will RF diclofenac and tramadol. Advised to use an elbow sleeve for swelling and pain. Will refer to PT. FMLA for 10 days for light duty.  2. RECHECK- 10 days

## 2018-04-24 NOTE — PROGRESS NOTES
Subjective   Alisa Ames is a 58 y.o. female.     History of Present Illness   Here for ER recheck. Last seen 3/15/18. ER note from [facility] from 4/20/18 reviewed. Left shoulder and elbow xray neg.    ORTHO- fibromyalgia and DDD with h/o C spine surgery- initially on flexeril bid. Was seeing Pain Management in [place] and was on oxycodone. Was given tizanadine and gabapentin here. Saw PM here 4/19/16 and was changed from gabapentin to lyrica. Pt was discharged from initial PM as she took extra meds after a fall. Was referred to 2nd PM at 5/16/17. Used tizanidine and tramadol in the interim. Was referred to Dr Vicente (5/11/17) and Dr He (5/24/17); kept the appt with Dr He. Updated MRI neck demo no changes. Pt was discharged from 2nd PM 9/8/17 due to repeated cold/ contaminated urines and manic behavior. Pt has been off norco since 10/2/17 and done well enough with zanaflex and tramadol (both hs), especially with the warmer weather. Did well with PT for her neck. Today pt reports she was skating. Was turning around near a wall and her skate hit the wall and she fell and hit her left arm and tailbone. Went to ER. Xrays were neg; pt was diagnosed with elbow contusion and given diclofenac bid. She is taking the diclofenac and she is gradually getting increased ROM of her elbow. Has breakthru pain between the doses of diclofenac. Is out of tramadol. She is working at [employer] and needs FMLA    The following portions of the patient's history were reviewed and updated as appropriate: allergies, past family history, past medical history, past social history, past surgical history and problem list.    Review of Systems   Cardiovascular: Negative for chest pain.   Gastrointestinal: Negative for abdominal distention and abdominal pain.   Musculoskeletal:        Left arm pain   Skin: Negative for color change.   Neurological: Negative for tremors, speech difficulty and headaches.   Psychiatric/Behavioral: Negative  "for agitation and confusion.   All other systems reviewed and are negative.        Current Outpatient Prescriptions:   •  ARIPiprazole (ABILIFY) 5 MG tablet, Take 1 tablet by mouth Daily., Disp: 90 tablet, Rfl: 0  •  atorvastatin (LIPITOR) 10 MG tablet, Take 1 tablet by mouth Daily., Disp: 90 tablet, Rfl: 3  •  diclofenac (VOLTAREN) 50 MG EC tablet, Take 1 tablet by mouth 3 (Three) Times a Day., Disp: 90 tablet, Rfl: 0  •  diphenoxylate-atropine (LOMOTIL) 2.5-0.025 MG per tablet, Take 1 tablet by mouth 4 (Four) Times a Day As Needed for Diarrhea., Disp: 120 tablet, Rfl: 0  •  DULoxetine (CYMBALTA) 60 MG capsule, TAKE 1 CAPSULE DAILY, Disp: 90 capsule, Rfl: 1  •  fluticasone (FLONASE) 50 MCG/ACT nasal spray, 1 spray into each nostril Daily., Disp: 16 mL, Rfl: 5  •  levothyroxine (SYNTHROID, LEVOTHROID) 88 MCG tablet, TAKE 1 TABLET DAILY. FASTING AND WAIT 1 HOUR FOR FOOD OR OTHER MEDICATIONS, Disp: 90 tablet, Rfl: 1  •  montelukast (SINGULAIR) 10 MG tablet, TAKE 1 TABLET DAILY AS NEEDED FOR ALLERGIES, Disp: 90 tablet, Rfl: 1  •  rizatriptan (MAXALT) 10 MG tablet, Take 1 tab po prn migraine. May repeat in 2 hours if needed, max 3 in 24 hr, Disp: 9 tablet, Rfl: 1  •  tiZANidine (ZANAFLEX) 4 MG tablet, Take 1 tablet by mouth Every 8 (Eight) Hours As Needed for Muscle Spasms., Disp: 180 tablet, Rfl: 1  •  Topiramate  MG capsule sustained-release 24 hr, Take 1 tablet by mouth Daily., Disp: 30 capsule, Rfl: 0  •  traMADol (ULTRAM) 50 MG tablet, Take 2 tablets by mouth Every 8 (Eight) Hours As Needed for Moderate Pain ., Disp: 180 tablet, Rfl: 0  •  Vortioxetine HBr (TRINTELLIX) 20 MG tablet, Take 20 mg by mouth Daily., Disp: 90 tablet, Rfl: 1    Objective     /76   Temp 97.6 °F (36.4 °C)   Ht 167.6 cm (66\")   Wt 65.2 kg (143 lb 12.8 oz)   BMI 23.21 kg/m²     Physical Exam   Constitutional: She is oriented to person, place, and time. She appears well-developed and well-nourished.   HENT:   Right Ear: Tympanic " membrane and ear canal normal.   Left Ear: Tympanic membrane and ear canal normal.   Mouth/Throat: Oropharynx is clear and moist.   Eyes: Conjunctivae and EOM are normal. Pupils are equal, round, and reactive to light.   Neck: No thyromegaly present.   Cardiovascular: Normal rate and regular rhythm.    Pulmonary/Chest: Effort normal and breath sounds normal.   Neurological: She is alert and oriented to person, place, and time.   Skin: Skin is warm and dry.   Psychiatric: She has a normal mood and affect.   Vitals reviewed.      Assessment/Plan   Alisa was seen today for follow-up.    Diagnoses and all orders for this visit:    Injury of left upper arm, subsequent encounter  -     diclofenac (VOLTAREN) 50 MG EC tablet; Take 1 tablet by mouth 3 (Three) Times a Day.  -     Ambulatory Referral to Physical Therapy Evaluate and treat    Arthritis  -     traMADol (ULTRAM) 50 MG tablet; Take 2 tablets by mouth Every 8 (Eight) Hours As Needed for Moderate Pain .        1. ORTHO- left arm injury- will RF diclofenac and tramadol. Advised to use an elbow sleeve for swelling and pain. Will refer to PT. FMLA for 10 days for light duty.  2. RECHECK- 10 days

## 2018-04-26 ENCOUNTER — OFFICE VISIT (OUTPATIENT)
Dept: ORTHOPEDIC SURGERY | Facility: CLINIC | Age: 58
End: 2018-04-26

## 2018-04-26 VITALS
HEART RATE: 71 BPM | DIASTOLIC BLOOD PRESSURE: 64 MMHG | WEIGHT: 139.77 LBS | BODY MASS INDEX: 22.46 KG/M2 | HEIGHT: 66 IN | SYSTOLIC BLOOD PRESSURE: 121 MMHG

## 2018-04-26 DIAGNOSIS — M25.552 LEFT HIP PAIN: ICD-10-CM

## 2018-04-26 DIAGNOSIS — M16.12 PRIMARY OSTEOARTHRITIS OF LEFT HIP: ICD-10-CM

## 2018-04-26 DIAGNOSIS — S40.022A CONTUSION OF LEFT UPPER EXTREMITY, INITIAL ENCOUNTER: ICD-10-CM

## 2018-04-26 DIAGNOSIS — M70.62 TROCHANTERIC BURSITIS OF LEFT HIP: Primary | ICD-10-CM

## 2018-04-26 PROCEDURE — 99204 OFFICE O/P NEW MOD 45 MIN: CPT | Performed by: ORTHOPAEDIC SURGERY

## 2018-04-26 PROCEDURE — 20610 DRAIN/INJ JOINT/BURSA W/O US: CPT | Performed by: ORTHOPAEDIC SURGERY

## 2018-04-26 RX ORDER — TRIAMCINOLONE ACETONIDE 40 MG/ML
80 INJECTION, SUSPENSION INTRA-ARTICULAR; INTRAMUSCULAR
Status: COMPLETED | OUTPATIENT
Start: 2018-04-26 | End: 2018-04-26

## 2018-04-26 RX ORDER — LIDOCAINE HYDROCHLORIDE 10 MG/ML
3 INJECTION, SOLUTION INFILTRATION; PERINEURAL
Status: COMPLETED | OUTPATIENT
Start: 2018-04-26 | End: 2018-04-26

## 2018-04-26 RX ORDER — BUPIVACAINE HYDROCHLORIDE 2.5 MG/ML
3 INJECTION, SOLUTION INFILTRATION; PERINEURAL
Status: COMPLETED | OUTPATIENT
Start: 2018-04-26 | End: 2018-04-26

## 2018-04-26 RX ADMIN — BUPIVACAINE HYDROCHLORIDE 3 ML: 2.5 INJECTION, SOLUTION INFILTRATION; PERINEURAL at 11:27

## 2018-04-26 RX ADMIN — LIDOCAINE HYDROCHLORIDE 3 ML: 10 INJECTION, SOLUTION INFILTRATION; PERINEURAL at 11:27

## 2018-04-26 RX ADMIN — TRIAMCINOLONE ACETONIDE 80 MG: 40 INJECTION, SUSPENSION INTRA-ARTICULAR; INTRAMUSCULAR at 11:27

## 2018-04-26 NOTE — PROGRESS NOTES
Orthopaedic Clinic Note: Hip New Patient    Chief Complaint   Patient presents with   • Left Hip - Pain   • Left Elbow - Pain     DOI: 04/20/18        HPI    Alisa Ames is a 58 y.o. female who presents withLeft hip and left elbow pain status post fall on 4/20/18.  Patient has new onset left arm pain after the fall localized the elbow and shoulder region.  Her hip pain has been ongoing for a couple of years.  She localizes her hip pain primarily to the lateral aspect of the hip over the trochanteric bursa region.  She rates her overall pain a 7/10 on the pain scale.  Bending, sitting, rising from seated position increase her pain.  Sitting and resting improve her pain.  Previous treatments have included use of a chiropractor.  No other interventions have been tried to date.  She does take diclofenac on a daily basis for inflammation.  She is here today to discuss treatment options for her ongoing left hip and arm pain.       Past Medical History:   Diagnosis Date   • COPD (chronic obstructive pulmonary disease)    • Fibromyalgia    • Malignant neoplasm    • Syncope    • Thyroid nodule       Past Surgical History:   Procedure Laterality Date   • APPENDECTOMY     • CERVICAL SPINE SURGERY      Fibromyalgia and DDD with h/o C spine surgery- initially on flexeril bid.   • NECK SURGERY     • NOSE SURGERY      r/t Skin CA   • SKIN CANCER EXCISION     • THYROID LOBECTOMY Right     On discussion, pt reported a h/o right thyroid lobectomy for goiter.U/S demo surgical absence of right lobe and diffuse nodularity of the left lobe with a dominant nodule in the lower pole of 1.8 x 3.5 cm.   • TOTAL THYROIDECTOMY        Family History   Problem Relation Age of Onset   • Other Mother      malignant neoplasm   • Coronary artery disease Mother      MI (60's)   • Heart attack Mother    • Other Other      malignant neoplasm   • Valvular heart disease Father      Social History     Social History   • Marital status:      Spouse  name: N/A   • Number of children: N/A   • Years of education: N/A     Occupational History   • [employer]      Social History Main Topics   • Smoking status: Current Every Day Smoker     Packs/day: 0.50     Years: 40.00     Types: Cigarettes, Electronic Cigarette   • Smokeless tobacco: Never Used   • Alcohol use 0.6 oz/week     1 Cans of beer per week      Comment: 1 drink per week   • Drug use: No   • Sexual activity: Yes     Partners: Male     Other Topics Concern   • Not on file     Social History Narrative    Caffeine:  2-4 per day      Current Outpatient Prescriptions on File Prior to Visit   Medication Sig Dispense Refill   • diclofenac (VOLTAREN) 50 MG EC tablet Take 1 tablet by mouth 3 (Three) Times a Day. 90 tablet 0   • DULoxetine (CYMBALTA) 60 MG capsule TAKE 1 CAPSULE DAILY 90 capsule 1   • levothyroxine (SYNTHROID, LEVOTHROID) 88 MCG tablet TAKE 1 TABLET DAILY. FASTING AND WAIT 1 HOUR FOR FOOD OR OTHER MEDICATIONS 90 tablet 1   • traMADol (ULTRAM) 50 MG tablet Take 2 tablets by mouth Every 8 (Eight) Hours As Needed for Moderate Pain . 180 tablet 0   • Vortioxetine HBr (TRINTELLIX) 20 MG tablet Take 20 mg by mouth Daily. 90 tablet 1   • ARIPiprazole (ABILIFY) 5 MG tablet Take 1 tablet by mouth Daily. 90 tablet 0   • atorvastatin (LIPITOR) 10 MG tablet Take 1 tablet by mouth Daily. 90 tablet 3   • diphenoxylate-atropine (LOMOTIL) 2.5-0.025 MG per tablet Take 1 tablet by mouth 4 (Four) Times a Day As Needed for Diarrhea. 120 tablet 0   • fluticasone (FLONASE) 50 MCG/ACT nasal spray 1 spray into each nostril Daily. 16 mL 5   • montelukast (SINGULAIR) 10 MG tablet TAKE 1 TABLET DAILY AS NEEDED FOR ALLERGIES 90 tablet 1   • rizatriptan (MAXALT) 10 MG tablet Take 1 tab po prn migraine. May repeat in 2 hours if needed, max 3 in 24 hr 9 tablet 1   • tiZANidine (ZANAFLEX) 4 MG tablet Take 1 tablet by mouth Every 8 (Eight) Hours As Needed for Muscle Spasms. 180 tablet 1   • Topiramate  MG capsule  "sustained-release 24 hr Take 1 tablet by mouth Daily. 30 capsule 0     No current facility-administered medications on file prior to visit.       No Known Allergies     Review of Systems   Constitutional: Negative.    HENT: Positive for hearing loss.    Eyes: Negative.    Respiratory: Negative.    Cardiovascular: Negative.    Gastrointestinal: Negative.    Endocrine: Negative.    Genitourinary: Negative.    Musculoskeletal: Positive for arthralgias (Left hip pain).   Skin: Negative.    Allergic/Immunologic: Negative.    Neurological: Positive for numbness and headaches.   Hematological: Negative.    Psychiatric/Behavioral: Positive for sleep disturbance.        The following portions of the patient's history were reviewed and updated as appropriate: allergies, current medications, past family history, past medical history, past social history, past surgical history and problem list.    Physical Exam  Blood pressure 121/64, pulse 71, height 168 cm (66.14\"), weight 63.4 kg (139 lb 12.4 oz), not currently breastfeeding.    Body mass index is 22.46 kg/m².    GENERAL APPEARANCE: awake, alert & oriented x 3, in no acute distress and well developed, well nourished  PSYCH: normal affect  LUNGS:  breathing nonlabored  EYES: sclera anicteric  CARDIOVASCULAR: palpable dorsalis pedis, palpable posterior tibial bilaterally. Capillary refill less than 2 seconds  EXTREMITIES: no clubbing, cyanosis  GAIT:  Trendelenberg           Right Hip Exam:  RANGE OF MOTION:   FLEXION CONTRACTURE: None   FLEXION: 110 degrees   INTERNAL ROTATION: 10 degrees at 90 degrees of flexion   EXTERNAL ROTATION: 40 degrees at 90 degrees of flexion    PAIN WITH HIP MOTION: no  PAIN WITH LOGROLL: no  STINCHFIELD TEST: negative    KNEE EXAM: full knee ROM (0-130), stable to varus/valgus stress at terminal extension and 30 degrees     STRENGTH:  5/5 hip adduction, abduction, flexion. 5/5 strength knee flexion, extension. 5/5 strength ankle dorsiflexion and " plantarflexion.     GREATER TROCHANTER BURSAL PAIN:  no     REFLEXES:   PATELLAR 2+/4   ACHILLES 2+/4    CLONUS: negative  STRAIGHT LEG TEST:   negative    SENSATION TO LIGHT TOUCH:  DEEP PERONEAL/SUPERFICIAL PERONEAL/SURAL/SAPHENOUS/TIBIAL:  intact    EDEMA:   no  ERYTHEMA:  no  WOUNDS/INCISIONS: none, no overlying skin problems.      Left Hip Exam:   RANGE OF MOTION:   FLEXION CONTRACTURE: None   FLEXION: 110 degrees   INTERNAL ROTATION: Recurrent injections  neutral at 90 degrees of flexion   EXTERNAL ROTATION: 40 degrees at 90 degrees of flexion    PAIN WITH HIP MOTION: no  PAIN WITH LOGROLL: no  STINCHFIELD TEST: negative    KNEE EXAM: full knee ROM (0-130), stable to varus/valgus stress at terminal extension and 30 degrees     STRENGTH:  5/5 hip adduction, abduction, flexion. 5/5 strength knee flexion, extension. 5/5 strength ankle dorsiflexion and plantarflexion.     GREATER TROCHANTER BURSAL PAIN:  no     REFLEXES:   PATELLAR 2+/4   ACHILLES 2+/4    CLONUS: negative  STRAIGHT LEG TEST:   negative    SENSATION TO LIGHT TOUCH:  DEEP PERONEAL/SUPERFICIAL PERONEAL/SURAL/SAPHENOUS/TIBIAL:  intact    EDEMA:   no  ERYTHEMA:  no  WOUNDS/INCISIONS: none, no overlying skin problems.    ---------------------------  Left upper extremity.  Patient has full shoulder, elbow range of motion compared to contralateral side without limitations.  4+/5 rotator cuff strength.  Full elbow pronation supination.  5/5  strength.  She is focally tender palpation about the olecranon process with overlying skin contusion.  She does have some mild tenderness to palpation about the rotator cuff musculature.Intact EPL, FPL, EDC, FDP, FDS, interosseous, wrist flexion, wrist extension, biceps, triceps, deltoid. Sensation intact light touch to median, radial, ulnar, and axillary nerves. 2+ palpable radial pulse.        ------------------------------------------------------------------    LEG LENGTHS:   equal  _____________________________________________________  _____________________________________________________    RADIOGRAPHIC FINDINGS:   Indication:  left hip pain    Comparison: No prior xrays are available for comparison    AP pelvis, hip 2 views: Right: mild joint space narrowing, there are marginal osteophytes visualized at the femoral head-neck junction and the margins of the acetabulum; Left: mild to moderate joint space narrowing, there are marginal osteophytes visualized at the femoral head-neck junction and the margins of the acetabulum    Assessment/Plan:   Diagnosis Plan   1. Trochanteric bursitis of left hip  Ambulatory Referral to Physical Therapy   2. Left hip pain  XR Hip With or Without Pelvis 1 View Left   3. Primary osteoarthritis of left hip  Ambulatory Referral to Physical Therapy   4. Contusion of left upper extremity, initial encounter       I discussed with patient that she does have evidence of left hip arthritic changes as indicated on her x-rays as well as decreased hip range of motion compared to the contralateral side.  However, her hip range of motion appears to be minimally painful today.  Majority of her pain is localized laterally the trochanteric bursal region.  I recommended a cortisone injection today into the trochanteric bursa as well as referral to physical therapy for hip strengthening.  In regards to her left upper extremity pain, I believe this is simply soft tissue contusion and strain from the fall.  I recommended symptomatic treatment with oral anti-inflammatories.  She is agreeable to this plan.  If her symptoms fail to be adequately improved with these interventions, we may discuss total hip arthroplasty in the future.    Procedure Note:  I discussed with the patient the potential benefits of performing a therapeutic injection of the left hip trochanteric bursa as well as potential risks including but not limited to infection, swelling, pain, bleeding, bruising,  nerve/vessel damage, skin color changes, transient elevation in blood glucose levels, and fat atrophy. After informed consent and after the area was prepped with alcohol, ethyl chloride was used to numb the skin. Via the direct lateral approach, 3cc of 1% lidocaine, 3cc of 0.25% marcaine and 2 cc of 40mg/ml of Kenalog were injected into the left hip trochanteric bursa. The patient tolerated the procedure well. There were no complications. A sterile dressing was placed over the injection site.      Allen Madera MD  04/26/18  11:33 AM

## 2018-04-26 NOTE — PROGRESS NOTES
Procedure   Large Joint Arthrocentesis  Date/Time: 4/26/2018 11:27 AM  Consent given by: patient  Site marked: site marked  Timeout: Immediately prior to procedure a time out was called to verify the correct patient, procedure, equipment, support staff and site/side marked as required   Supporting Documentation  Indications: pain   Procedure Details  Location: hip - L greater trochanteric bursa  Preparation: Patient was prepped and draped in the usual sterile fashion  Needle size: 22 G  Approach: lateral  Medications administered: 3 mL lidocaine 1 %; 80 mg triamcinolone acetonide 40 MG/ML; 3 mL bupivacaine 0.25 %  Patient tolerance: patient tolerated the procedure well with no immediate complications

## 2018-05-09 ENCOUNTER — OFFICE VISIT (OUTPATIENT)
Dept: INTERNAL MEDICINE | Facility: CLINIC | Age: 58
End: 2018-05-09

## 2018-05-09 VITALS
DIASTOLIC BLOOD PRESSURE: 76 MMHG | HEIGHT: 66 IN | BODY MASS INDEX: 22.6 KG/M2 | SYSTOLIC BLOOD PRESSURE: 114 MMHG | TEMPERATURE: 97.2 F | WEIGHT: 140.6 LBS

## 2018-05-09 DIAGNOSIS — N39.0 URINARY TRACT INFECTION WITHOUT HEMATURIA, SITE UNSPECIFIED: Primary | ICD-10-CM

## 2018-05-09 DIAGNOSIS — S50.12XD CONTUSION OF ELBOW AND FOREARM, LEFT, SUBSEQUENT ENCOUNTER: ICD-10-CM

## 2018-05-09 PROCEDURE — 99213 OFFICE O/P EST LOW 20 MIN: CPT | Performed by: FAMILY MEDICINE

## 2018-05-09 RX ORDER — NITROFURANTOIN 25; 75 MG/1; MG/1
100 CAPSULE ORAL EVERY 12 HOURS SCHEDULED
Qty: 14 CAPSULE | Refills: 0 | Status: SHIPPED | OUTPATIENT
Start: 2018-05-09 | End: 2019-01-08 | Stop reason: HOSPADM

## 2018-05-09 NOTE — PROGRESS NOTES
Subjective   Alisa Ames is a 58 y.o. female.     History of Present Illness   Here for 10 day recheck shoulder. Pt was last seen 4/24/18 for ER recheck. Was seen 3/15/18 for routine.      ORTHO- fibromyalgia and DDD with h/o C spine surgery- initially on flexeril bid. Was seeing Pain Management in [place] and was on oxycodone. Has been discharged from PM x3 in [place] with no additional referrals. Has had pain c/o neck pain and improved with PT. Has since been treated with tramadol and flexeril. Pt was then seen for ER recheck after a fall while skating. Pt hit her left arm and tailbone. Xrays at were neg; pt was diagnosed with elbow contusion and given diclofenac bid. When seen here pt was gradually improving. Was getting some breakthrough pain between doses of diclofenac but was also out of tramadol. Was restarted on this and given FMLA for light duty at [employer]. Pt saw ortho 4/26/18 for left hip pain and was advised she had underlying arthritis with acute bursitis; was treated with a hip injection. Today pt reports the shot in her hip worked well. Will also be going to PT. This was not related to the fall; has been a long term issue and she had called here to get a referral separately from her neck issues. No recent neck issues. Her shoulder is still responding to the diclofenac but does still have breakthru pain at her elbow. This did not respond to the tramadol. No hand c/o.      - today pt reports she had symptoms previously and had UA 3/15/18. Had 25 LE only. Is still having some urgency but the odor is better. Has been drinking cranberry juice.    The following portions of the patient's history were reviewed and updated as appropriate: current medications, past family history, past medical history, past social history, past surgical history and problem list.    Review of Systems   Cardiovascular: Negative for chest pain.   Gastrointestinal: Negative for abdominal distention and abdominal pain.   Skin: Negative  for color change.   Neurological: Negative for tremors, speech difficulty and headaches.   Psychiatric/Behavioral: Negative for agitation and confusion.   All other systems reviewed and are negative.        Current Outpatient Prescriptions:   •  ARIPiprazole (ABILIFY) 5 MG tablet, Take 1 tablet by mouth Daily., Disp: 90 tablet, Rfl: 0  •  atorvastatin (LIPITOR) 10 MG tablet, Take 1 tablet by mouth Daily., Disp: 90 tablet, Rfl: 3  •  diclofenac (VOLTAREN) 50 MG EC tablet, Take 1 tablet by mouth 3 (Three) Times a Day., Disp: 90 tablet, Rfl: 0  •  diphenoxylate-atropine (LOMOTIL) 2.5-0.025 MG per tablet, Take 1 tablet by mouth 4 (Four) Times a Day As Needed for Diarrhea., Disp: 120 tablet, Rfl: 0  •  DULoxetine (CYMBALTA) 60 MG capsule, TAKE 1 CAPSULE DAILY, Disp: 90 capsule, Rfl: 1  •  fluticasone (FLONASE) 50 MCG/ACT nasal spray, 1 spray into each nostril Daily., Disp: 16 mL, Rfl: 5  •  levothyroxine (SYNTHROID, LEVOTHROID) 88 MCG tablet, TAKE 1 TABLET DAILY. FASTING AND WAIT 1 HOUR FOR FOOD OR OTHER MEDICATIONS, Disp: 90 tablet, Rfl: 1  •  montelukast (SINGULAIR) 10 MG tablet, TAKE 1 TABLET DAILY AS NEEDED FOR ALLERGIES, Disp: 90 tablet, Rfl: 1  •  nitrofurantoin, macrocrystal-monohydrate, (MACROBID) 100 MG capsule, Take 1 capsule by mouth Every 12 (Twelve) Hours., Disp: 14 capsule, Rfl: 0  •  rizatriptan (MAXALT) 10 MG tablet, Take 1 tab po prn migraine. May repeat in 2 hours if needed, max 3 in 24 hr, Disp: 9 tablet, Rfl: 1  •  tiZANidine (ZANAFLEX) 4 MG tablet, Take 1 tablet by mouth Every 8 (Eight) Hours As Needed for Muscle Spasms., Disp: 180 tablet, Rfl: 1  •  Topiramate  MG capsule sustained-release 24 hr, Take 1 tablet by mouth Daily., Disp: 30 capsule, Rfl: 0  •  traMADol (ULTRAM) 50 MG tablet, Take 2 tablets by mouth Every 8 (Eight) Hours As Needed for Moderate Pain ., Disp: 180 tablet, Rfl: 0  •  Vortioxetine HBr (TRINTELLIX) 20 MG tablet, Take 20 mg by mouth Daily., Disp: 90 tablet, Rfl:  "1    Objective     /76   Temp 97.2 °F (36.2 °C)   Ht 167.6 cm (66\")   Wt 63.8 kg (140 lb 9.6 oz)   BMI 22.69 kg/m²     Physical Exam   Constitutional: She is oriented to person, place, and time. She appears well-developed and well-nourished.   HENT:   Right Ear: Tympanic membrane and ear canal normal.   Left Ear: Tympanic membrane and ear canal normal.   Mouth/Throat: Oropharynx is clear and moist.   Eyes: Conjunctivae and EOM are normal. Pupils are equal, round, and reactive to light.   Neck: No thyromegaly present.   Cardiovascular: Normal rate and regular rhythm.    Pulmonary/Chest: Effort normal and breath sounds normal.   Musculoskeletal:   No wrist swelling, neg Tinels and Phallens. Pain is focal at left elbow.   Neurological: She is alert and oriented to person, place, and time.   Skin: Skin is warm and dry.   Psychiatric: She has a normal mood and affect.   Vitals reviewed.      Assessment/Plan   Alisa was seen today for follow-up.    Diagnoses and all orders for this visit:    Urinary tract infection without hematuria, site unspecified  -     diclofenac (VOLTAREN) 50 MG EC tablet; Take 1 tablet by mouth 3 (Three) Times a Day.    Contusion of elbow and forearm, left, subsequent encounter  -     nitrofurantoin, macrocrystal-monohydrate, (MACROBID) 100 MG capsule; Take 1 capsule by mouth Every 12 (Twelve) Hours.        1. ORTHO- contusion left arm. Likely has a bone bruise. Will continue the diclofenac 3x/day until pain resolved. RF today  2. - UTI- will treat with 7 days macrobid.  3. RECHECK- routine in 2mo       "

## 2018-05-09 NOTE — PATIENT INSTRUCTIONS
1. ORTHO- contusion left arm. Likely has a bone bruise. Will continue the diclofenac 3x/day until pain resolved. RF today  2. - UTI- will treat with 7 days macrobid.  3. RECHECK- routine 2mo

## 2018-05-31 DIAGNOSIS — M50.30 DDD (DEGENERATIVE DISC DISEASE), CERVICAL: ICD-10-CM

## 2018-05-31 RX ORDER — TIZANIDINE 4 MG/1
4 TABLET ORAL EVERY 8 HOURS PRN
Qty: 180 TABLET | Refills: 1 | Status: SHIPPED | OUTPATIENT
Start: 2018-05-31 | End: 2018-08-01 | Stop reason: SDUPTHER

## 2018-06-13 ENCOUNTER — TELEPHONE (OUTPATIENT)
Dept: INTERNAL MEDICINE | Facility: CLINIC | Age: 58
End: 2018-06-13

## 2018-06-13 NOTE — TELEPHONE ENCOUNTER
Pt wants to no if she can get release for work. 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 Pt said that it would not be stressful.

## 2018-06-20 DIAGNOSIS — F41.8 DEPRESSION WITH ANXIETY: Chronic | ICD-10-CM

## 2018-06-20 RX ORDER — ARIPIPRAZOLE 5 MG/1
TABLET ORAL
Qty: 90 TABLET | Refills: 1 | Status: SHIPPED | OUTPATIENT
Start: 2018-06-20 | End: 2018-08-01 | Stop reason: SDUPTHER

## 2018-06-27 ENCOUNTER — TELEPHONE (OUTPATIENT)
Dept: INTERNAL MEDICINE | Facility: CLINIC | Age: 58
End: 2018-06-27

## 2018-06-27 DIAGNOSIS — M19.90 ARTHRITIS: Primary | ICD-10-CM

## 2018-06-27 NOTE — TELEPHONE ENCOUNTER
Due to insurance, pt needs a new physical therapy referral placed. Stephanie said that you signed a piece of paper for the referral last week but they will not accept it that way. Please advise.

## 2018-07-06 ENCOUNTER — TELEPHONE (OUTPATIENT)
Dept: INTERNAL MEDICINE | Facility: CLINIC | Age: 58
End: 2018-07-06

## 2018-07-06 DIAGNOSIS — G43.909 MIGRAINE SYNDROME: ICD-10-CM

## 2018-07-06 RX ORDER — RIZATRIPTAN BENZOATE 10 MG/1
TABLET ORAL
Qty: 27 TABLET | Refills: 1 | Status: SHIPPED | OUTPATIENT
Start: 2018-07-06 | End: 2019-03-13 | Stop reason: SDUPTHER

## 2018-07-11 ENCOUNTER — TELEPHONE (OUTPATIENT)
Dept: INTERNAL MEDICINE | Facility: CLINIC | Age: 58
End: 2018-07-11

## 2018-07-11 NOTE — TELEPHONE ENCOUNTER
Pt wants you to fax her letter that she can work to her boss. Pt said that you already have the fax number

## 2018-07-11 NOTE — TELEPHONE ENCOUNTER
BRYM advising pt that I have faxed this both on 6/26/18 and on 7/9/18. I told her that I would fax this again but after that I'm not sure what to do as nothing has been working getting the letter through. Faxed again to [phone]

## 2018-07-26 ENCOUNTER — OFFICE VISIT (OUTPATIENT)
Dept: ORTHOPEDIC SURGERY | Facility: CLINIC | Age: 58
End: 2018-07-26

## 2018-07-26 VITALS — HEIGHT: 66 IN | OXYGEN SATURATION: 97 % | HEART RATE: 90 BPM | BODY MASS INDEX: 22 KG/M2 | WEIGHT: 136.91 LBS

## 2018-07-26 DIAGNOSIS — M16.12 PRIMARY OSTEOARTHRITIS OF LEFT HIP: Primary | ICD-10-CM

## 2018-07-26 DIAGNOSIS — M70.62 TROCHANTERIC BURSITIS OF LEFT HIP: ICD-10-CM

## 2018-07-26 PROCEDURE — 99213 OFFICE O/P EST LOW 20 MIN: CPT | Performed by: ORTHOPAEDIC SURGERY

## 2018-07-26 RX ORDER — MELOXICAM 15 MG/1
TABLET ORAL
Qty: 60 TABLET | Refills: 0 | Status: SHIPPED | OUTPATIENT
Start: 2018-07-26 | End: 2018-11-16 | Stop reason: SDUPTHER

## 2018-07-26 NOTE — PROGRESS NOTES
Orthopaedic Clinic Note: Hip Established Patient    Chief Complaint   Patient presents with   • Follow-up     3 month follow up -- Trochanteric bursitis of left hip        HPI    It has been 3  month(s) since Ms. Ames's last visit. She returns to clinic today for follow-up of left hip pain.  She received a trochanteric bursa injection last visit which she states provided excellent relief up until about 4 weeks ago.  She recently went horseback riding which really exacerbated her left hip.  For the 2-3 days after running or she had significant pain and difficulty ambulating.  Her pain has gradually improved since then however.  She now rates her pain a 4/10 on the pain scale.  She is continuing to complain of pain localized to the groin as well as lateral trochanter.  She is undergoing physical therapy.  She is not taking anti-inflammatory.  Overall she is doing better.  She is ambulate with no assistive device today.    Past Medical History:   Diagnosis Date   • COPD (chronic obstructive pulmonary disease) (CMS/HCC)    • Fibromyalgia    • Malignant neoplasm (CMS/HCC)    • Syncope    • Thyroid nodule       Past Surgical History:   Procedure Laterality Date   • APPENDECTOMY     • CERVICAL SPINE SURGERY      Fibromyalgia and DDD with h/o C spine surgery- initially on flexeril bid.   • NECK SURGERY     • NOSE SURGERY      r/t Skin CA   • SKIN CANCER EXCISION     • THYROID LOBECTOMY Right     On discussion, pt reported a h/o right thyroid lobectomy for goiter.U/S demo surgical absence of right lobe and diffuse nodularity of the left lobe with a dominant nodule in the lower pole of 1.8 x 3.5 cm.   • TOTAL THYROIDECTOMY        Family History   Problem Relation Age of Onset   • Other Mother         malignant neoplasm   • Coronary artery disease Mother         MI (60's)   • Heart attack Mother    • Other Other         malignant neoplasm   • Valvular heart disease Father      Social History     Social History   • Marital  status:      Spouse name: N/A   • Number of children: N/A   • Years of education: N/A     Occupational History   • [employer]      Social History Main Topics   • Smoking status: Current Every Day Smoker     Packs/day: 0.50     Years: 40.00     Types: Cigarettes, Electronic Cigarette   • Smokeless tobacco: Never Used   • Alcohol use 0.6 oz/week     1 Cans of beer per week      Comment: 1 drink per week   • Drug use: No   • Sexual activity: Yes     Partners: Male     Other Topics Concern   • Not on file     Social History Narrative    Caffeine:  2-4 per day      Current Outpatient Prescriptions on File Prior to Visit   Medication Sig Dispense Refill   • ARIPiprazole (ABILIFY) 5 MG tablet TAKE 1 TABLET AT BEDTIME 90 tablet 1   • atorvastatin (LIPITOR) 10 MG tablet Take 1 tablet by mouth Daily. 90 tablet 3   • diphenoxylate-atropine (LOMOTIL) 2.5-0.025 MG per tablet Take 1 tablet by mouth 4 (Four) Times a Day As Needed for Diarrhea. 120 tablet 0   • DULoxetine (CYMBALTA) 60 MG capsule TAKE 1 CAPSULE DAILY 90 capsule 1   • fluticasone (FLONASE) 50 MCG/ACT nasal spray 1 spray into each nostril Daily. 16 mL 5   • levothyroxine (SYNTHROID, LEVOTHROID) 88 MCG tablet TAKE 1 TABLET DAILY. FASTING AND WAIT 1 HOUR FOR FOOD OR OTHER MEDICATIONS 90 tablet 1   • montelukast (SINGULAIR) 10 MG tablet TAKE 1 TABLET DAILY AS NEEDED FOR ALLERGIES 90 tablet 1   • nitrofurantoin, macrocrystal-monohydrate, (MACROBID) 100 MG capsule Take 1 capsule by mouth Every 12 (Twelve) Hours. 14 capsule 0   • rizatriptan (MAXALT) 10 MG tablet Take 1 tab po prn migraine. May repeat in 2 hours if needed, max 3 in 24 hr 27 tablet 1   • tiZANidine (ZANAFLEX) 4 MG tablet Take 1 tablet by mouth Every 8 (Eight) Hours As Needed for Muscle Spasms. 180 tablet 1   • Topiramate  MG capsule sustained-release 24 hr Take 1 tablet by mouth Daily. 30 capsule 0   • traMADol (ULTRAM) 50 MG tablet Take 2 tablets by mouth Every 8 (Eight) Hours As Needed for  "Moderate Pain . 180 tablet 0   • Vortioxetine HBr (TRINTELLIX) 20 MG tablet Take 20 mg by mouth Daily. 90 tablet 1   • [DISCONTINUED] diclofenac (VOLTAREN) 50 MG EC tablet Take 1 tablet by mouth 3 (Three) Times a Day. 90 tablet 0     No current facility-administered medications on file prior to visit.       No Known Allergies     Review of Systems   Constitutional: Negative.    HENT: Negative.    Eyes: Negative.    Respiratory: Negative.    Cardiovascular: Negative.    Gastrointestinal: Negative.    Endocrine: Negative.    Genitourinary: Negative.    Musculoskeletal: Positive for arthralgias.   Skin: Negative.    Allergic/Immunologic: Negative.    Neurological: Negative.    Hematological: Negative.    Psychiatric/Behavioral: Negative.         Physical Exam  Pulse 90, height 167.6 cm (65.98\"), weight 62.1 kg (136 lb 14.5 oz), SpO2 97 %.     Body mass index is 22.11 kg/m².    GENERAL APPEARANCE: awake, alert, oriented, in no acute distress  LUNGS:  breathing nonlabored  EXTREMITIES: no clubbing, cyanosis  PERIPHERAL PULSES: palpable dorsalis pedis and posterior tibial pulses bilaterally.    GAIT:  Normal            Hip Exam:  Left    RANGE OF MOTION:  EXTENSION/FLEXION:  normal (0-110 degrees)  IR (at 90 degrees of flexion):  10  ER (at 90 degrees of flexion):  40  PAIN WITH HIP MOTION:  yes, Slight pain in groin and lateral trochanter  PAIN WITH LOGROLL:  no     STINCHFIELD TEST: positive    STRENGTH:  ABDUCTOR:  4/5  ADDUCTOR:  5/5  HIP FLEXION:  5/5    GREATER TROCHANTER BURSAL PAIN:  yes    SENSATION TO LIGHT TOUCH:  DEEP PERONEAL/SUPERFICIAL PERONEAL/SURAL/SAPHENOUS/TIBIAL:   intact    EDEMA:  no  ERYTHEMA:  no  WOUNDS/INCISIONS:  no  _________________________________________________________________  _________________________________________________________________    RADIOGRAPHIC FINDINGS:   No new imaging today    Assessment/Plan:   Diagnosis Plan   1. Primary osteoarthritis of left hip  meloxicam (MOBIC) 15 MG " tablet   2. Trochanteric bursitis of left hip  meloxicam (MOBIC) 15 MG tablet     Patient continues to have symptoms consistent with hip osteoarthritis.  She is not interested in surgical intervention at this time.  I do not recommend a repeat cortisone injection trochanteric bursa at this time as he can weaken abductor tendons.  I'll prescribe her oral anti-inflammatory which she is to take scheduled for the next 3 weeks.  I will see her back on an as-needed basis.    Allen Madera MD  07/26/18  11:08 AM

## 2018-08-01 ENCOUNTER — OFFICE VISIT (OUTPATIENT)
Dept: INTERNAL MEDICINE | Facility: CLINIC | Age: 58
End: 2018-08-01

## 2018-08-01 VITALS
HEIGHT: 66 IN | HEART RATE: 72 BPM | TEMPERATURE: 97.1 F | BODY MASS INDEX: 22.4 KG/M2 | WEIGHT: 139.38 LBS | DIASTOLIC BLOOD PRESSURE: 72 MMHG | RESPIRATION RATE: 20 BRPM | OXYGEN SATURATION: 99 % | SYSTOLIC BLOOD PRESSURE: 140 MMHG

## 2018-08-01 DIAGNOSIS — M19.90 ARTHRITIS: ICD-10-CM

## 2018-08-01 DIAGNOSIS — M50.30 DDD (DEGENERATIVE DISC DISEASE), CERVICAL: ICD-10-CM

## 2018-08-01 DIAGNOSIS — F41.8 DEPRESSION WITH ANXIETY: Chronic | ICD-10-CM

## 2018-08-01 DIAGNOSIS — E78.00 PURE HYPERCHOLESTEROLEMIA: ICD-10-CM

## 2018-08-01 DIAGNOSIS — G43.909 MIGRAINE SYNDROME: Primary | ICD-10-CM

## 2018-08-01 DIAGNOSIS — E03.9 HYPOTHYROIDISM (ACQUIRED): ICD-10-CM

## 2018-08-01 PROCEDURE — 99214 OFFICE O/P EST MOD 30 MIN: CPT | Performed by: FAMILY MEDICINE

## 2018-08-01 RX ORDER — ARIPIPRAZOLE 5 MG/1
5 TABLET ORAL
Qty: 90 TABLET | Refills: 1 | Status: SHIPPED | OUTPATIENT
Start: 2018-08-01 | End: 2019-03-13 | Stop reason: SDUPTHER

## 2018-08-01 RX ORDER — LEVOTHYROXINE SODIUM 88 UG/1
TABLET ORAL
Qty: 90 TABLET | Refills: 1 | Status: SHIPPED | OUTPATIENT
Start: 2018-08-01 | End: 2019-03-13 | Stop reason: SDUPTHER

## 2018-08-01 RX ORDER — DULOXETIN HYDROCHLORIDE 60 MG/1
60 CAPSULE, DELAYED RELEASE ORAL DAILY
Qty: 90 CAPSULE | Refills: 1 | Status: SHIPPED | OUTPATIENT
Start: 2018-08-01 | End: 2019-02-11 | Stop reason: SDUPTHER

## 2018-08-01 RX ORDER — TIZANIDINE 4 MG/1
4 TABLET ORAL EVERY 8 HOURS PRN
Qty: 180 TABLET | Refills: 1 | Status: SHIPPED | OUTPATIENT
Start: 2018-08-01 | End: 2019-01-16 | Stop reason: SDUPTHER

## 2018-08-01 RX ORDER — ATORVASTATIN CALCIUM 10 MG/1
10 TABLET, FILM COATED ORAL DAILY
Qty: 90 TABLET | Refills: 1 | Status: SHIPPED | OUTPATIENT
Start: 2018-08-01 | End: 2019-03-13 | Stop reason: SDUPTHER

## 2018-08-01 NOTE — PATIENT INSTRUCTIONS
1. NEURO- migraine- well controlled. RF trokendi and will RF maxalt when needed.  2. PSYCH- depression with anxiety- at goal. RF abilify and trintellix  3. ORTHO- arthritis- doing well on mobic with zanaflex with occasional addition of tramadol. RF today except for duloxetine. Pt will check her bottles to see if she is taking this or not and call to update her med list.  4. ENDO- hypothyroid- clinically at goal. RF meds today x6mo  5. RECHECK- 6mo CPE and routine with fasting labs

## 2018-08-01 NOTE — PROGRESS NOTES
Subjective   Alisa Ames is a 58 y.o. female.     History of Present Illness   Here for routine. Last seen 5/9/18 for shoulder recheck and UTI, treated with macrobid. Pt was seen 4/24/18 for ER recheck. Was seen 3/15/18 for routine.  Lab 3/15/18 demo lipid at goal on Lipitor with , tg 119, HDL 96, LDL 91. Lab 1/18/18 demo normal CBC, CMP and B12. Vit D was 11.8; pt advised 5k. Thyroid was at goal with suppressed TSH of 0.05 but normal T4 of 88 on synthroid 88. Lipid high with , tg 156, HDL 83, .      1.CV- hyperlipid- new issue found on lab 1/18/18. Pt was started on Lipitor and did well with lipid at goal on 3/15/18.      2. PSYCH- depression with anxiety- pt with long term issues. Had increased issue 2016-17. Pt having delusions of being stalked with h/o same with previous diagnosis of paranoid Schizophrenia. Pt was on Cymbalta and Zyprexa but gained 30 lb and was changed to abilify and Cymbalta. Did well initially but eventually relapsed with delusions, etc. Was changed to abilify and trintellix. Has done well other than periodically stopping her meds. Today she reports she is taking both meds currently. Feels her mood is at goal. 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 Handles it well when we had the wide spread power outage.     3. NEURO- migraine. Pt with h/o issues, diagnosis confirmed again 9/6/17. Has done well with trokendi 200 and prn maxalt. Today pt reports she is doing well on the trokendi with only rare HA; these do still respond to maxalt.      4. ORTHO- fibromyalgia and DDD with h/o C spine surgery- initially on flexeril bid. Was seeing Pain Management in [place] and was on oxycodone. Has been discharged from [facility] in [place] with no additional referrals. Has had intermittent neck pain and improved with PT and prn tramadol and flexeril, Rx’ed here. Pt was then seen for ER recheck after a fall while skating with left shoulder pain. Xrays at ER  were neg; pt was diagnosed with elbow contusion and given diclofenac bid. Was gradually improving but was still needing diclofenac. 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 Pt saw ortho 4/26/18 for left hip pain and was advised she had underlying arthritis with acute bursitis; was treated with good results with a hip injection. Today pt reports the shot only lasted 1.5 mo with next not available for another 1.5 mo so she was given mobic. She is doing well re her hip with this. She is taking zanaflex (not flexeril) but stopped the tramadol as ineffective. Will add it back if she gets bad pain. Is not sure if she is still taking the cymbalta but thinks she is.    5. ENDO- nontoxic diffuse goiter, post total thyroidectomy. Pt had a h/o right thyroidectomy in past. Had diffuse, enlarging goiter noted on neck MRI and then U/S. Was treated with left lobe thyroidectomy 4/23/16 due to size. Has been at goal on synthroid 88 with last lab 1/18/18 demo suppressed TSH but free T4 at goal. Lab 1/18/18 demo free T4 at goal, TSH slightly suppressed. Today pt reports she is still taking the synthroid correctly. Feels at goal.     6.GENERAL- syncope, likely psychogenic. Pt saw Dr Evans 6/10/16. Had MRI/ MRA and EEG. Had 50 % stenosis of right internal carotid, others normal; normal MRA brain; EEG normal. B12 and A1C recheck were normal. Pt saw cardio with neg w/u including echo and Event Monitor. Discussed mood related issues. Pt was seen in ER 2/25/17 with fall and head trauma. Lab demo nomral CBC and CMP. EKG demo NSR with old septal Q waves, nonspecific T changes, ? atrial enlargement (compared to 8/23/16). CT chest neg other than emphysema changes.   7. RESP-allergies. Pt on OTC and nasal steroid with addition of singulair 2/2017. Allergy testing demo sensitivity to climate not so much allergens with no shots indicated. Started [place] honey and this helping alot. Had bam  disimpaction 8/22/17.  8. GI- IBS. Pt diagnosed 9/2016. Sister and daughter both have this as well. Pt’s has taken lomotil prn. Had Rx 8/2016 for #120 with next RF done 1/18/18..    The following portions of the patient's history were reviewed and updated as appropriate: current medications, past family history, past medical history, past social history, past surgical history and problem list.    Review of Systems   Cardiovascular: Negative for chest pain.   Gastrointestinal: Negative for abdominal distention and abdominal pain.   Skin: Negative for color change.   Neurological: Negative for tremors, speech difficulty and headaches.   Psychiatric/Behavioral: Negative for agitation and confusion.   All other systems reviewed and are negative.        Current Outpatient Prescriptions:   •  ARIPiprazole (ABILIFY) 5 MG tablet, Take 1 tablet by mouth every night at bedtime., Disp: 90 tablet, Rfl: 1  •  atorvastatin (LIPITOR) 10 MG tablet, Take 1 tablet by mouth Daily., Disp: 90 tablet, Rfl: 1  •  diphenoxylate-atropine (LOMOTIL) 2.5-0.025 MG per tablet, Take 1 tablet by mouth 4 (Four) Times a Day As Needed for Diarrhea., Disp: 120 tablet, Rfl: 0  •  DULoxetine (CYMBALTA) 60 MG capsule, Take 1 capsule by mouth Daily., Disp: 90 capsule, Rfl: 1  •  fluticasone (FLONASE) 50 MCG/ACT nasal spray, 1 spray into each nostril Daily., Disp: 16 mL, Rfl: 5  •  levothyroxine (SYNTHROID, LEVOTHROID) 88 MCG tablet, TAKE 1 TABLET DAILY. FASTING AND WAIT 1 HOUR FOR FOOD OR OTHER MEDICATIONS, Disp: 90 tablet, Rfl: 1  •  meloxicam (MOBIC) 15 MG tablet, 1 PO Daily with food., Disp: 60 tablet, Rfl: 0  •  montelukast (SINGULAIR) 10 MG tablet, TAKE 1 TABLET DAILY AS NEEDED FOR ALLERGIES, Disp: 90 tablet, Rfl: 1  •  nitrofurantoin, macrocrystal-monohydrate, (MACROBID) 100 MG capsule, Take 1 capsule by mouth Every 12 (Twelve) Hours., Disp: 14 capsule, Rfl: 0  •  rizatriptan (MAXALT) 10 MG tablet, Take 1 tab po prn migraine. May repeat in 2 hours  "if needed, max 3 in 24 hr, Disp: 27 tablet, Rfl: 1  •  tiZANidine (ZANAFLEX) 4 MG tablet, Take 1 tablet by mouth Every 8 (Eight) Hours As Needed for Muscle Spasms., Disp: 180 tablet, Rfl: 1  •  Topiramate  MG capsule sustained-release 24 hr, Take 1 tablet by mouth Daily., Disp: 90 capsule, Rfl: 1  •  traMADol (ULTRAM) 50 MG tablet, Take 2 tablets by mouth Every 8 (Eight) Hours As Needed for Moderate Pain ., Disp: 180 tablet, Rfl: 0  •  Vortioxetine HBr (TRINTELLIX) 20 MG tablet, Take 20 mg by mouth Daily., Disp: 90 tablet, Rfl: 1    Objective     /72 (BP Location: Left arm, Patient Position: Sitting, Cuff Size: Adult)   Pulse 72   Temp 97.1 °F (36.2 °C) (Temporal Artery )   Resp 20   Ht 167.6 cm (66\")   Wt 63.2 kg (139 lb 6 oz)   SpO2 99%   BMI 22.50 kg/m²     Physical Exam   Constitutional: She is oriented to person, place, and time. She appears well-developed and well-nourished.   HENT:   Right Ear: Tympanic membrane and ear canal normal.   Left Ear: Tympanic membrane and ear canal normal.   Mouth/Throat: Oropharynx is clear and moist.   Eyes: Pupils are equal, round, and reactive to light. Conjunctivae and EOM are normal.   Neck:   Thyroid surgically absent- no current evidence of lumps   Cardiovascular: Normal rate and regular rhythm.    Pulmonary/Chest: Effort normal and breath sounds normal.   Neurological: She is alert and oriented to person, place, and time.   Skin: Skin is warm and dry.   Psychiatric: She has a normal mood and affect.   Vitals reviewed.      Assessment/Plan   Alisa was seen today for follow-up.    Diagnoses and all orders for this visit:    Migraine syndrome  -     Topiramate  MG capsule sustained-release 24 hr; Take 1 tablet by mouth Daily.    Hypothyroidism (acquired)  -     levothyroxine (SYNTHROID, LEVOTHROID) 88 MCG tablet; TAKE 1 TABLET DAILY. FASTING AND WAIT 1 HOUR FOR FOOD OR OTHER MEDICATIONS    Arthritis  -     DULoxetine (CYMBALTA) 60 MG capsule; Take 1 " capsule by mouth Daily.    Depression with anxiety  -     ARIPiprazole (ABILIFY) 5 MG tablet; Take 1 tablet by mouth every night at bedtime.  -     Vortioxetine HBr (TRINTELLIX) 20 MG tablet; Take 20 mg by mouth Daily.    Pure hypercholesterolemia  -     atorvastatin (LIPITOR) 10 MG tablet; Take 1 tablet by mouth Daily.    DDD (degenerative disc disease), cervical  -     tiZANidine (ZANAFLEX) 4 MG tablet; Take 1 tablet by mouth Every 8 (Eight) Hours As Needed for Muscle Spasms.        1. NEURO- migraine- well controlled. RF trokendi and will RF maxalt when needed.  2. PSYCH- depression with anxiety- at goal. RF abilify and trintellix  3. ORTHO- arthritis- doing well on mobic with zanaflex with occasional addition of tramadol. RF today except for duloxetine. Pt will check her bottles to see if she is taking this or not and call to update her med list.  4. ENDO- hypothyroid- clinically at goal. RF meds today x6mo  5. RECHECK- 6mo CPE and routine with fasting labs

## 2018-09-02 DIAGNOSIS — J30.9 ALLERGIC RHINITIS: ICD-10-CM

## 2018-09-04 RX ORDER — MONTELUKAST SODIUM 10 MG/1
TABLET ORAL
Qty: 90 TABLET | Refills: 1 | Status: SHIPPED | OUTPATIENT
Start: 2018-09-04 | End: 2018-12-27

## 2018-10-15 DIAGNOSIS — Z12.39 SCREENING FOR BREAST CANCER: Primary | ICD-10-CM

## 2018-11-16 ENCOUNTER — OFFICE VISIT (OUTPATIENT)
Dept: ORTHOPEDIC SURGERY | Facility: CLINIC | Age: 58
End: 2018-11-16

## 2018-11-16 VITALS — BODY MASS INDEX: 23.38 KG/M2 | HEIGHT: 66 IN | WEIGHT: 145.5 LBS | HEART RATE: 79 BPM | OXYGEN SATURATION: 97 %

## 2018-11-16 DIAGNOSIS — M70.62 TROCHANTERIC BURSITIS OF LEFT HIP: ICD-10-CM

## 2018-11-16 DIAGNOSIS — M16.12 PRIMARY OSTEOARTHRITIS OF LEFT HIP: Primary | ICD-10-CM

## 2018-11-16 DIAGNOSIS — Z72.0 TOBACCO ABUSE: ICD-10-CM

## 2018-11-16 PROCEDURE — 99406 BEHAV CHNG SMOKING 3-10 MIN: CPT | Performed by: ORTHOPAEDIC SURGERY

## 2018-11-16 PROCEDURE — 99214 OFFICE O/P EST MOD 30 MIN: CPT | Performed by: ORTHOPAEDIC SURGERY

## 2018-11-16 PROCEDURE — 20610 DRAIN/INJ JOINT/BURSA W/O US: CPT | Performed by: ORTHOPAEDIC SURGERY

## 2018-11-16 RX ORDER — LIDOCAINE HYDROCHLORIDE 10 MG/ML
3 INJECTION, SOLUTION INFILTRATION; PERINEURAL
Status: COMPLETED | OUTPATIENT
Start: 2018-11-16 | End: 2018-11-16

## 2018-11-16 RX ORDER — BUPIVACAINE HYDROCHLORIDE 2.5 MG/ML
3 INJECTION, SOLUTION INFILTRATION; PERINEURAL
Status: COMPLETED | OUTPATIENT
Start: 2018-11-16 | End: 2018-11-16

## 2018-11-16 RX ORDER — MELOXICAM 15 MG/1
TABLET ORAL
Qty: 60 TABLET | Refills: 0 | Status: SHIPPED | OUTPATIENT
Start: 2018-11-16 | End: 2018-12-27

## 2018-11-16 RX ORDER — TRIAMCINOLONE ACETONIDE 40 MG/ML
80 INJECTION, SUSPENSION INTRA-ARTICULAR; INTRAMUSCULAR
Status: COMPLETED | OUTPATIENT
Start: 2018-11-16 | End: 2018-11-16

## 2018-11-16 RX ADMIN — TRIAMCINOLONE ACETONIDE 80 MG: 40 INJECTION, SUSPENSION INTRA-ARTICULAR; INTRAMUSCULAR at 07:51

## 2018-11-16 RX ADMIN — LIDOCAINE HYDROCHLORIDE 3 ML: 10 INJECTION, SOLUTION INFILTRATION; PERINEURAL at 07:51

## 2018-11-16 RX ADMIN — BUPIVACAINE HYDROCHLORIDE 3 ML: 2.5 INJECTION, SOLUTION INFILTRATION; PERINEURAL at 07:51

## 2018-11-16 NOTE — PROGRESS NOTES
Procedure   Large Joint Arthrocentesis: L greater trochanteric bursa  Date/Time: 11/16/2018 7:51 AM  Consent given by: patient  Site marked: site marked  Timeout: Immediately prior to procedure a time out was called to verify the correct patient, procedure, equipment, support staff and site/side marked as required   Supporting Documentation  Indications: pain   Procedure Details  Location: hip - L greater trochanteric bursa  Preparation: Patient was prepped and draped in the usual sterile fashion  Needle size: 22 G  Approach: anterolateral  Medications administered: 3 mL bupivacaine 0.25 %; 3 mL lidocaine 1 %; 80 mg triamcinolone acetonide 40 MG/ML  Patient tolerance: patient tolerated the procedure well with no immediate complications

## 2018-11-16 NOTE — PROGRESS NOTES
Orthopaedic Clinic Note: Hip Established Patient    Chief Complaint   Patient presents with   • Follow-up     3 months - Primary osteoarthritis of left hip and Trochanteric bursitis of left hip         HPI    It has been 3  month(s) since Ms. Ames's last visit. She returns to clinic today for follow-up of left hip osteoarthritis and trochanteric bursitis.  It is been approximately 6 months since her last visit.  She returns to clinic today with worsening hip pain.  She rates as 7/10 on the pain scale.  She is having pain localized to the groin and is radiating to the lateral trochanter.  She is ambulating with no assistive device.  She denies fevers chills or constitutional symptoms.  Overall she is doing worse.    Past Medical History:   Diagnosis Date   • COPD (chronic obstructive pulmonary disease) (CMS/HCC)    • Fibromyalgia    • Malignant neoplasm (CMS/HCC)    • Syncope    • Thyroid nodule       Past Surgical History:   Procedure Laterality Date   • APPENDECTOMY     • CERVICAL SPINE SURGERY      Fibromyalgia and DDD with h/o C spine surgery- initially on flexeril bid.   • NECK SURGERY     • NOSE SURGERY      r/t Skin CA   • SKIN CANCER EXCISION     • THYROID LOBECTOMY Right     On discussion, pt reported a h/o right thyroid lobectomy for goiter.U/S demo surgical absence of right lobe and diffuse nodularity of the left lobe with a dominant nodule in the lower pole of 1.8 x 3.5 cm.   • TOTAL THYROIDECTOMY        Family History   Problem Relation Age of Onset   • Other Mother         malignant neoplasm   • Coronary artery disease Mother         MI (60's)   • Heart attack Mother    • Other Other         malignant neoplasm   • Valvular heart disease Father      Social History     Socioeconomic History   • Marital status:      Spouse name: Not on file   • Number of children: Not on file   • Years of education: Not on file   • Highest education level: Not on file   Social Needs   • Financial resource strain:  Not on file   • Food insecurity - worry: Not on file   • Food insecurity - inability: Not on file   • Transportation needs - medical: Not on file   • Transportation needs - non-medical: Not on file   Occupational History   • Occupation: [employer]   Tobacco Use   • Smoking status: Current Every Day Smoker     Packs/day: 0.50     Years: 40.00     Pack years: 20.00     Types: Cigarettes, Electronic Cigarette   • Smokeless tobacco: Never Used   Substance and Sexual Activity   • Alcohol use: Yes     Alcohol/week: 0.6 oz     Types: 1 Cans of beer per week     Comment: 1 drink per week   • Drug use: No   • Sexual activity: Yes     Partners: Male   Other Topics Concern   • Not on file   Social History Narrative    Caffeine:  2-4 per day      Current Outpatient Medications on File Prior to Visit   Medication Sig Dispense Refill   • ARIPiprazole (ABILIFY) 5 MG tablet Take 1 tablet by mouth every night at bedtime. 90 tablet 1   • atorvastatin (LIPITOR) 10 MG tablet Take 1 tablet by mouth Daily. 90 tablet 1   • diphenoxylate-atropine (LOMOTIL) 2.5-0.025 MG per tablet Take 1 tablet by mouth 4 (Four) Times a Day As Needed for Diarrhea. 120 tablet 0   • DULoxetine (CYMBALTA) 60 MG capsule Take 1 capsule by mouth Daily. 90 capsule 1   • fluticasone (FLONASE) 50 MCG/ACT nasal spray 1 spray into each nostril Daily. 16 mL 5   • levothyroxine (SYNTHROID, LEVOTHROID) 88 MCG tablet TAKE 1 TABLET DAILY. FASTING AND WAIT 1 HOUR FOR FOOD OR OTHER MEDICATIONS 90 tablet 1   • montelukast (SINGULAIR) 10 MG tablet TAKE 1 TABLET DAILY AS NEEDED FOR ALLERGIES 90 tablet 1   • nitrofurantoin, macrocrystal-monohydrate, (MACROBID) 100 MG capsule Take 1 capsule by mouth Every 12 (Twelve) Hours. 14 capsule 0   • rizatriptan (MAXALT) 10 MG tablet Take 1 tab po prn migraine. May repeat in 2 hours if needed, max 3 in 24 hr 27 tablet 1   • tiZANidine (ZANAFLEX) 4 MG tablet Take 1 tablet by mouth Every 8 (Eight) Hours As Needed for Muscle Spasms. 180  "tablet 1   • Topiramate  MG capsule sustained-release 24 hr Take 1 tablet by mouth Daily. 90 capsule 1   • traMADol (ULTRAM) 50 MG tablet Take 2 tablets by mouth Every 8 (Eight) Hours As Needed for Moderate Pain . 180 tablet 0   • Vortioxetine HBr (TRINTELLIX) 20 MG tablet Take 20 mg by mouth Daily. 90 tablet 1   • [DISCONTINUED] meloxicam (MOBIC) 15 MG tablet 1 PO Daily with food. 60 tablet 0     No current facility-administered medications on file prior to visit.       No Known Allergies     Review of Systems   Constitutional: Negative.    HENT: Positive for sneezing.    Eyes: Positive for itching and visual disturbance.   Respiratory: Negative.    Cardiovascular: Negative.    Gastrointestinal: Negative.    Endocrine: Negative.    Genitourinary: Positive for vaginal discharge.   Musculoskeletal: Positive for arthralgias.   Skin: Negative.    Allergic/Immunologic: Negative.    Neurological: Negative.    Hematological: Negative.    Psychiatric/Behavioral: Positive for sleep disturbance.        Physical Exam  Pulse 79, height 167.6 cm (65.98\"), weight 66 kg (145 lb 8.1 oz), SpO2 97 %, not currently breastfeeding.    Body mass index is 23.5 kg/m².    GENERAL APPEARANCE: awake, alert, oriented, in no acute distress  LUNGS:  breathing nonlabored  EXTREMITIES: no clubbing, cyanosis  PERIPHERAL PULSES: palpable dorsalis pedis and posterior tibial pulses bilaterally.    GAIT:  Antalgic            Hip Exam:  Left    RANGE OF MOTION:  EXTENSION/FLEXION:  normal (0-110 degrees)  IR (at 90 degrees of flexion):  neutral  ER (at 90 degrees of flexion):  35  PAIN WITH HIP MOTION:  yes, Localized to groin  PAIN WITH LOGROLL:  no     STINCHFIELD TEST: positive    STRENGTH:  ABDUCTOR:  5/5  ADDUCTOR:  5/5  HIP FLEXION:  5/5    GREATER TROCHANTER BURSAL PAIN:  yes    SENSATION TO LIGHT TOUCH:  DEEP PERONEAL/SUPERFICIAL PERONEAL/SURAL/SAPHENOUS/TIBIAL:   intact    EDEMA:  no  ERYTHEMA:  no  WOUNDS/INCISIONS:  " no  _________________________________________________________________  _________________________________________________________________    RADIOGRAPHIC FINDINGS:   Indication: left hip pain    Comparison: Todays xrays were compared to previous xrays from 4/26/18     AP pelvis: Right: moderate joint space narrowing,  there are marginal osteophytes visualized at the femoral head-neck junction and the margins of the acetabulum;Left: moderate to severe joint space narrowing,  there are marginal osteophytes visualized at the femoral head-neck junction and the margins of the acetabulum.  There has been progression of joint space narrowing compared to prior radiographs.      Assessment/Plan:   Diagnosis Plan   1. Primary osteoarthritis of left hip  XR Hip With or Without Pelvis 1 View Left    Large Joint Arthrocentesis: L greater trochanteric bursa    bupivacaine (MARCAINE) 0.25 % injection 3 mL    lidocaine (XYLOCAINE) 1 % injection 3 mL    triamcinolone acetonide (KENALOG-40) injection 80 mg    meloxicam (MOBIC) 15 MG tablet   2. Trochanteric bursitis of left hip  meloxicam (MOBIC) 15 MG tablet   3. Tobacco abuse       The patient has failed conservative treatment measures and is a candidate for total joint arthroplasty.  I discussed the total joint surgical process as well as the recovery and rehabilitation time frame.  The patient asked several questions regarding the total joint arthroplasty surgery, which were answered accordingly.  Ultimately, the patient declines surgical intervention at this time and wishes to continue with conservative treatment measures.  She states she is unable take off work at this time due to job restrictions.  We will proceed with trochanteric bursa injection today as well as a prescription for meloxicam for pain control.  She'll follow up as needed.    Also discussed the importance of tobacco cessation before being a candidate for surgical intervention.  I explained the nicotine decrease  his wound healing and increases the risk of complications including infection, implant failure, and wound healing issues.  She expressed understanding.I spent approximately 5-10 minutes counseling the patient regarding the adverse effects of tobacco use.  Included in this counseling session were treatment options for cessation including quitting cold turkey, medication, nicotine step-down programs, and smoking cessation programs.  Furthermore, I explained to the patient the importance of abstaining from nicotine usage as nicotine is known to increase the risk of complications associated with surgery including but not limited to infection, decreased wound healing, slowed soft tissue healing, and increased surgery associated pain.  As a result of this counseling session, the patient will weigh the options and determine how to proceed with achieving tobacco cessation in preparation for surgery.    Procedure Note:  I discussed with the patient the potential benefits of performing a therapeutic injection of the left hip trochanteric bursa as well as potential risks including but not limited to infection, swelling, pain, bleeding, bruising, nerve/vessel damage, skin color changes, transient elevation in blood glucose levels, and fat atrophy. After informed consent and after the area was prepped with alcohol, ethyl chloride was used to numb the skin. Via the direct lateral approach, 3cc of 1% lidocaine, 3cc of 0.25% marcaine and 2 cc of 40mg/ml of Kenalog were injected into the left hip trochanteric bursa. The patient tolerated the procedure well. There were no complications. A sterile dressing was placed over the injection site.    Allen Madera MD  11/16/18  7:56 AM

## 2018-11-29 ENCOUNTER — OFFICE VISIT (OUTPATIENT)
Dept: ORTHOPEDIC SURGERY | Facility: CLINIC | Age: 58
End: 2018-11-29

## 2018-11-29 VITALS — WEIGHT: 149.25 LBS | HEART RATE: 83 BPM | HEIGHT: 66 IN | BODY MASS INDEX: 23.99 KG/M2 | OXYGEN SATURATION: 97 %

## 2018-11-29 DIAGNOSIS — M16.12 PRIMARY OSTEOARTHRITIS OF LEFT HIP: Primary | ICD-10-CM

## 2018-11-29 PROCEDURE — 99213 OFFICE O/P EST LOW 20 MIN: CPT | Performed by: ORTHOPAEDIC SURGERY

## 2018-11-29 RX ORDER — MELOXICAM 7.5 MG/1
15 TABLET ORAL ONCE
Status: CANCELLED | OUTPATIENT
Start: 2018-11-29 | End: 2018-11-29

## 2018-11-29 RX ORDER — ACETAMINOPHEN 325 MG/1
1000 TABLET ORAL ONCE
Status: CANCELLED | OUTPATIENT
Start: 2018-11-29 | End: 2018-11-29

## 2018-11-29 RX ORDER — OXYCODONE HCL 10 MG/1
10 TABLET, FILM COATED, EXTENDED RELEASE ORAL ONCE
Status: CANCELLED | OUTPATIENT
Start: 2018-11-29 | End: 2018-11-29

## 2018-11-29 NOTE — PROGRESS NOTES
Orthopaedic Clinic Note: Hip Established Patient    Chief Complaint   Patient presents with   • Follow-up     2 weeks- Primary osteoarthritis of left hip and Trochanteric bursitis of left hip         HPI    It has been 2  week(s) since Ms. Ames's last visit. She returns to clinic today for follow-up of left hip pain.  She received cortisone injection trochanteric bursa at her last visit.  Injection only provided some slight relief.  She is continuing to have primarily pain in the groin which she rates a 4/10 on the pain scale.  She is having significant difficulties in daily activities.  She is continuing take anti-inflammatories and doing home exercises but these are providing minimal improvement in her symptoms.  She is here today to discuss further intervention.    Past Medical History:   Diagnosis Date   • COPD (chronic obstructive pulmonary disease) (CMS/HCC)    • Fibromyalgia    • Malignant neoplasm (CMS/HCC)    • Syncope    • Thyroid nodule       Past Surgical History:   Procedure Laterality Date   • APPENDECTOMY     • CERVICAL SPINE SURGERY      Fibromyalgia and DDD with h/o C spine surgery- initially on flexeril bid.   • NECK SURGERY     • NOSE SURGERY      r/t Skin CA   • SKIN CANCER EXCISION     • THYROID LOBECTOMY Right     On discussion, pt reported a h/o right thyroid lobectomy for goiter.U/S demo surgical absence of right lobe and diffuse nodularity of the left lobe with a dominant nodule in the lower pole of 1.8 x 3.5 cm.   • TOTAL THYROIDECTOMY        Family History   Problem Relation Age of Onset   • Other Mother         malignant neoplasm   • Coronary artery disease Mother         MI (60's)   • Heart attack Mother    • Other Other         malignant neoplasm   • Valvular heart disease Father      Social History     Socioeconomic History   • Marital status:      Spouse name: Not on file   • Number of children: Not on file   • Years of education: Not on file   • Highest education level: Not  on file   Social Needs   • Financial resource strain: Not on file   • Food insecurity - worry: Not on file   • Food insecurity - inability: Not on file   • Transportation needs - medical: Not on file   • Transportation needs - non-medical: Not on file   Occupational History   • Occupation: [employer]   Tobacco Use   • Smoking status: Current Some Day Smoker     Packs/day: 0.50     Years: 40.00     Pack years: 20.00     Types: Electronic Cigarette, Cigarettes   • Smokeless tobacco: Never Used   Substance and Sexual Activity   • Alcohol use: Yes     Alcohol/week: 0.6 oz     Types: 1 Cans of beer per week     Comment: 1 drink per month   • Drug use: No   • Sexual activity: Yes     Partners: Male   Other Topics Concern   • Not on file   Social History Narrative    Caffeine:  2-4 per day      Current Outpatient Medications on File Prior to Visit   Medication Sig Dispense Refill   • ARIPiprazole (ABILIFY) 5 MG tablet Take 1 tablet by mouth every night at bedtime. 90 tablet 1   • atorvastatin (LIPITOR) 10 MG tablet Take 1 tablet by mouth Daily. 90 tablet 1   • diphenoxylate-atropine (LOMOTIL) 2.5-0.025 MG per tablet Take 1 tablet by mouth 4 (Four) Times a Day As Needed for Diarrhea. 120 tablet 0   • DULoxetine (CYMBALTA) 60 MG capsule Take 1 capsule by mouth Daily. 90 capsule 1   • fluticasone (FLONASE) 50 MCG/ACT nasal spray 1 spray into each nostril Daily. 16 mL 5   • levothyroxine (SYNTHROID, LEVOTHROID) 88 MCG tablet TAKE 1 TABLET DAILY. FASTING AND WAIT 1 HOUR FOR FOOD OR OTHER MEDICATIONS 90 tablet 1   • meloxicam (MOBIC) 15 MG tablet 1 PO Daily with food. 60 tablet 0   • montelukast (SINGULAIR) 10 MG tablet TAKE 1 TABLET DAILY AS NEEDED FOR ALLERGIES 90 tablet 1   • nitrofurantoin, macrocrystal-monohydrate, (MACROBID) 100 MG capsule Take 1 capsule by mouth Every 12 (Twelve) Hours. 14 capsule 0   • rizatriptan (MAXALT) 10 MG tablet Take 1 tab po prn migraine. May repeat in 2 hours if needed, max 3 in 24 hr 27 tablet  "1   • tiZANidine (ZANAFLEX) 4 MG tablet Take 1 tablet by mouth Every 8 (Eight) Hours As Needed for Muscle Spasms. 180 tablet 1   • Topiramate  MG capsule sustained-release 24 hr Take 1 tablet by mouth Daily. 90 capsule 1   • traMADol (ULTRAM) 50 MG tablet Take 2 tablets by mouth Every 8 (Eight) Hours As Needed for Moderate Pain . 180 tablet 0   • Vortioxetine HBr (TRINTELLIX) 20 MG tablet Take 20 mg by mouth Daily. 90 tablet 1     No current facility-administered medications on file prior to visit.       No Known Allergies     Review of Systems   Constitutional: Negative.    Eyes: Negative.    Respiratory: Negative.    Cardiovascular: Negative.    Gastrointestinal: Positive for abdominal pain and diarrhea (IBS).   Endocrine: Negative.    Genitourinary: Positive for vaginal discharge.   Musculoskeletal: Positive for arthralgias, back pain (Lower) and gait problem.   Skin: Negative.    Allergic/Immunologic: Negative.    Neurological: Positive for headaches.   Hematological: Negative.    Psychiatric/Behavioral: Negative.         Physical Exam  Pulse 83, height 167.6 cm (65.98\"), weight 67.7 kg (149 lb 4 oz), SpO2 97 %, not currently breastfeeding.    Body mass index is 24.1 kg/m².    GENERAL APPEARANCE: awake, alert, oriented, in no acute distress  LUNGS:  breathing nonlabored  EXTREMITIES: no clubbing, cyanosis  PERIPHERAL PULSES: palpable dorsalis pedis and posterior tibial pulses bilaterally.    GAIT:  Antalgic            Hip Exam:  Left     RANGE OF MOTION:  EXTENSION/FLEXION:  normal (0-110 degrees)  IR (at 90 degrees of flexion):  neutral  ER (at 90 degrees of flexion):  35  PAIN WITH HIP MOTION:  yes, Localized to groin  PAIN WITH LOGROLL:  no      STINCHFIELD TEST: positive     STRENGTH:  ABDUCTOR:    5/5  ADDUCTOR:  5/5  HIP FLEXION:  5/5     GREATER TROCHANTER BURSAL PAIN:  yes    SENSATION TO LIGHT TOUCH:  DEEP PERONEAL/SUPERFICIAL PERONEAL/SURAL/SAPHENOUS/TIBIAL:   intact    EDEMA:  no  ERYTHEMA:  " no  WOUNDS/INCISIONS:  no  _________________________________________________________________  _________________________________________________________________    RADIOGRAPHIC FINDINGS:   No new imaging today.  Prior imaging demonstrates moderate to severe hip arthritis left hip with articular osteophytes localized the femoral head neck junction      Assessment/Plan:   Diagnosis Plan   1. Primary osteoarthritis of left hip  Case Request    Pregnancy, Urine - Urine, Clean Catch    CBC and Differential    Basic metabolic panel    Protime-INR    APTT    Hemoglobin A1c    Urinalysis With Culture If Indicated - Urine, Clean Catch    ECG 12 Lead    Nicotine & Metabolite, Quant    Tranexamic Acid 1,000 mg in sodium chloride 0.9 % 100 mL    Tranexamic Acid 1,000 mg in sodium chloride 0.9 % 100 mL    ceFAZolin (ANCEF) 2 g in sodium chloride 0.9 % 100 mL IVPB    acetaminophen (TYLENOL) tablet 975 mg    meloxicam (MOBIC) tablet 15 mg    mupirocin (BACTROBAN) 2 % nasal ointment 1 application    oxyCODONE (oxyCONTIN) 12 hr tablet 10 mg    Case Request     The patient has clinical and radiographic evidence of severe left hip joint degeneration. Conservative measures have been tried for 3 months or longer, but have failed to adequately treat or improve the patient's symptoms. Pain is restricting the patient's daily activities as well as quality of life. The recommendation at this time is to proceed with a left total hip arthroplasty with the goal to improve patient function and pain. The risks, benefits, potential complications, and alternatives were discussed with the patient in detail. Risks included but were not limited to bleeding, infection, anesthesia risks, damage to neurovascular structures, osteolysis, aseptic loosening, instability, dislocation, pain, continued pain, iatrogenic fracture, leg length discrepancy, possible need for future surgery including the potential for amputation, blood clots, myocardial infarction,  stroke, and death. Lali-operative blood management and the potential for blood transfusion were discussed with risks and options clearly outlined. Specific details of the surgical procedure, hospitalization, recovery, rehabilitation, and long-term precautions were also presented. Pre-operative teaching was provided. Implant/prosthesis selection was outlined, and the many options available were explained; the final choice will be made at the time of the procedure to match the anatomy and condition of the bone, ligaments, tendons, and muscles. Given this instruction, the patient elected to proceed with the left total hip arthroplasty. The patient will be seen by pre-admission testing for pre-operative optimization and risk assessment and will be scheduled for surgery once this is completed.    The patient is considered standard risk for DVT based on patient risk factors and will be placed on aspirin postoperatively for DVT prophylaxis.    Allen Madera MD  11/29/18  10:08 AM

## 2018-12-27 ENCOUNTER — APPOINTMENT (OUTPATIENT)
Dept: PREADMISSION TESTING | Facility: HOSPITAL | Age: 58
End: 2018-12-27

## 2018-12-27 VITALS — HEIGHT: 65 IN | WEIGHT: 148.15 LBS | BODY MASS INDEX: 24.68 KG/M2

## 2018-12-27 DIAGNOSIS — M16.12 PRIMARY OSTEOARTHRITIS OF LEFT HIP: ICD-10-CM

## 2018-12-27 LAB
ANION GAP SERPL CALCULATED.3IONS-SCNC: 7 MMOL/L (ref 3–11)
APTT PPP: 29.2 SECONDS (ref 24–31)
BASOPHILS # BLD AUTO: 0.04 10*3/MM3 (ref 0–0.2)
BASOPHILS NFR BLD AUTO: 0.9 % (ref 0–1)
BILIRUB UR QL STRIP: NEGATIVE
BUN BLD-MCNC: 13 MG/DL (ref 9–23)
BUN/CREAT SERPL: 13.3 (ref 7–25)
CALCIUM SPEC-SCNC: 9.1 MG/DL (ref 8.7–10.4)
CHLORIDE SERPL-SCNC: 107 MMOL/L (ref 99–109)
CLARITY UR: CLEAR
CO2 SERPL-SCNC: 26 MMOL/L (ref 20–31)
COLOR UR: YELLOW
CREAT BLD-MCNC: 0.98 MG/DL (ref 0.6–1.3)
DEPRECATED RDW RBC AUTO: 48.1 FL (ref 37–54)
EOSINOPHIL # BLD AUTO: 0.06 10*3/MM3 (ref 0–0.3)
EOSINOPHIL NFR BLD AUTO: 1.3 % (ref 0–3)
ERYTHROCYTE [DISTWIDTH] IN BLOOD BY AUTOMATED COUNT: 13.3 % (ref 11.3–14.5)
GFR SERPL CREATININE-BSD FRML MDRD: 58 ML/MIN/1.73
GLUCOSE BLD-MCNC: 92 MG/DL (ref 70–100)
GLUCOSE UR STRIP-MCNC: NEGATIVE MG/DL
HBA1C MFR BLD: 6.1 % (ref 4.8–5.6)
HCT VFR BLD AUTO: 39.3 % (ref 34.5–44)
HGB BLD-MCNC: 12.9 G/DL (ref 11.5–15.5)
HGB UR QL STRIP.AUTO: NEGATIVE
IMM GRANULOCYTES # BLD AUTO: 0 10*3/MM3 (ref 0–0.03)
IMM GRANULOCYTES NFR BLD AUTO: 0 % (ref 0–0.6)
INR PPP: 0.93 (ref 0.91–1.09)
KETONES UR QL STRIP: NEGATIVE
LEUKOCYTE ESTERASE UR QL STRIP.AUTO: NEGATIVE
LYMPHOCYTES # BLD AUTO: 1.63 10*3/MM3 (ref 0.6–4.8)
LYMPHOCYTES NFR BLD AUTO: 36.4 % (ref 24–44)
MCH RBC QN AUTO: 32.4 PG (ref 27–31)
MCHC RBC AUTO-ENTMCNC: 32.8 G/DL (ref 32–36)
MCV RBC AUTO: 98.7 FL (ref 80–99)
MONOCYTES # BLD AUTO: 0.32 10*3/MM3 (ref 0–1)
MONOCYTES NFR BLD AUTO: 7.1 % (ref 0–12)
NEUTROPHILS # BLD AUTO: 2.43 10*3/MM3 (ref 1.5–8.3)
NEUTROPHILS NFR BLD AUTO: 54.3 % (ref 41–71)
NITRITE UR QL STRIP: NEGATIVE
PH UR STRIP.AUTO: 8 [PH] (ref 5–8)
PLATELET # BLD AUTO: 306 10*3/MM3 (ref 150–450)
PMV BLD AUTO: 9.8 FL (ref 6–12)
POTASSIUM BLD-SCNC: 3.8 MMOL/L (ref 3.5–5.5)
PROT UR QL STRIP: NEGATIVE
PROTHROMBIN TIME: 9.8 SECONDS (ref 9.6–11.5)
RBC # BLD AUTO: 3.98 10*6/MM3 (ref 3.89–5.14)
SODIUM BLD-SCNC: 140 MMOL/L (ref 132–146)
SP GR UR STRIP: 1.01 (ref 1–1.03)
UROBILINOGEN UR QL STRIP: NORMAL
WBC NRBC COR # BLD: 4.48 10*3/MM3 (ref 3.5–10.8)

## 2018-12-27 PROCEDURE — 85025 COMPLETE CBC W/AUTO DIFF WBC: CPT | Performed by: ORTHOPAEDIC SURGERY

## 2018-12-27 PROCEDURE — 85730 THROMBOPLASTIN TIME PARTIAL: CPT | Performed by: ORTHOPAEDIC SURGERY

## 2018-12-27 PROCEDURE — 93005 ELECTROCARDIOGRAM TRACING: CPT

## 2018-12-27 PROCEDURE — 83036 HEMOGLOBIN GLYCOSYLATED A1C: CPT | Performed by: ORTHOPAEDIC SURGERY

## 2018-12-27 PROCEDURE — 85610 PROTHROMBIN TIME: CPT | Performed by: ORTHOPAEDIC SURGERY

## 2018-12-27 PROCEDURE — G0480 DRUG TEST DEF 1-7 CLASSES: HCPCS | Performed by: ORTHOPAEDIC SURGERY

## 2018-12-27 PROCEDURE — 80048 BASIC METABOLIC PNL TOTAL CA: CPT | Performed by: ORTHOPAEDIC SURGERY

## 2018-12-27 PROCEDURE — 93010 ELECTROCARDIOGRAM REPORT: CPT | Performed by: INTERNAL MEDICINE

## 2018-12-27 PROCEDURE — 81003 URINALYSIS AUTO W/O SCOPE: CPT | Performed by: ORTHOPAEDIC SURGERY

## 2018-12-27 PROCEDURE — 36415 COLL VENOUS BLD VENIPUNCTURE: CPT

## 2018-12-27 RX ORDER — MONTELUKAST SODIUM 10 MG/1
10 TABLET ORAL NIGHTLY
COMMUNITY
End: 2019-03-01 | Stop reason: SDUPTHER

## 2018-12-27 ASSESSMENT — HOOS JR
HOOS JR SCORE: 58.93
HOOS JR SCORE: 10

## 2018-12-27 NOTE — DISCHARGE INSTRUCTIONS
The following information and instructions were given:    NPO after MN except sips of water with routine prescribed medication (except blood thinner, diabetes, or weight reducing medication) unless otherwise instructed by your physician.  Do not eat, drink, smoke or chew gum after midnight the night before surgery. This also includes no mints.    DO NOT shave for two days before your procedure.  Do not wear makeup.      DO NOT wear fingernail polish (gel/regular) and/or acrylic/artificial nails on the day of surgery.   If a patient had recent manicure and would rather not remove polish or artificial nails, then the minimum requirement is that the polish/artificial nails must be removed from the middle finger on each hand.      If patient is having surgery/procedure on an upper extremity, then the patient was instructed that fingernail polish/artificial fingernails must be removed for surgery.  NO EXCEPTIONS.      If patient is having surgery/procedure on a lower extremity, then the patient was instructed that toenail polish on both extremities must be removed for surgery.  NO EXCEPTIONS.    Remove all jewelry (advised to go to jeweler if unable to remove).  Jewelry, especially rings, can no longer be taped for surgery.    Leave anything you consider valuable at home.    Leave your suitcase in the car until after your surgery.    Bring the following with you (if applicable)       -picture ID and insurance cards       -Co-pay/deductible required by insurance       -Medications in the original bottles (not a list) including all over-the-counter  medications if not brought to PAT       -Copy of advance directive, living will or power of  documents if not  brought to [facility]       -CPAP or BIPAP mask and tubing (do not bring machine)       -Skin prep instructions sheet       -PAT Pass    Education booklet, brochure, handout or binder given to patient.    Pain Control After Surgery handout given to  patient.    Respirex use (handout given to patient) and pneumonia prevention.    Signs and Symptoms of infection discussed.    DVT Prevention education given.  Stressing the importance of ambulation.    Patient to apply Chlorhexadine wipes to surgical area (as instructed) the night before procedure and the AM of procedure.    When applicable for ERAS patients (colon, orthropedic), patients were instructed to drink 20 ounces of Gatorade or G2 for diabetics (or until full) the morning of surgery.  The Gatorade or G2 must be consumed at least 3 hours before surgery start time.  No RED Gatorade or G2.  Appropriated ERAS handout given to patient during PAT visit.        Verified with patient that they received a prescription for Bactroban and Chlorhexidine shower from the office.  Reinforced with them to fill the prescription if they haven't already.  Verbal and written instructions given regarding proper use of the Bactroban and Chlorhexidine to patient and/or famlily during PAT visit. Patient/family also instructed to complete checklist and return it to Pre-op on the day of surgery.  Patient and/or family verbalized understanding.

## 2018-12-27 NOTE — PAT
PATIENT PROVIDED ERAS INSTRUCTIONS-GATORADE VERBALIZED UNDERSTANDING.    PATIENT GIVEN INSTRUCTIONS ON HIBICLENS AND BACTROBAN TO BEGIN USING 1/2/19-1/7/19

## 2019-01-02 LAB
COTININE UR-MCNC: NORMAL NG/ML
NICOTINE SERPL-MCNC: NORMAL NG/ML

## 2019-01-07 ENCOUNTER — APPOINTMENT (OUTPATIENT)
Dept: GENERAL RADIOLOGY | Facility: HOSPITAL | Age: 59
End: 2019-01-07

## 2019-01-07 ENCOUNTER — ANESTHESIA EVENT (OUTPATIENT)
Dept: PERIOP | Facility: HOSPITAL | Age: 59
End: 2019-01-07

## 2019-01-07 ENCOUNTER — ANESTHESIA (OUTPATIENT)
Dept: PERIOP | Facility: HOSPITAL | Age: 59
End: 2019-01-07

## 2019-01-07 ENCOUNTER — HOSPITAL ENCOUNTER (INPATIENT)
Facility: HOSPITAL | Age: 59
LOS: 1 days | Discharge: HOME | End: 2019-01-08
Attending: ORTHOPAEDIC SURGERY | Admitting: ORTHOPAEDIC SURGERY

## 2019-01-07 DIAGNOSIS — Z74.09 IMPAIRED FUNCTIONAL MOBILITY, BALANCE, GAIT, AND ENDURANCE: Primary | ICD-10-CM

## 2019-01-07 DIAGNOSIS — Z96.642 STATUS POST TOTAL REPLACEMENT OF LEFT HIP: ICD-10-CM

## 2019-01-07 DIAGNOSIS — Z74.09 IMPAIRED MOBILITY AND ADLS: ICD-10-CM

## 2019-01-07 DIAGNOSIS — M16.12 PRIMARY OSTEOARTHRITIS OF LEFT HIP: ICD-10-CM

## 2019-01-07 DIAGNOSIS — Z78.9 IMPAIRED MOBILITY AND ADLS: ICD-10-CM

## 2019-01-07 PROBLEM — R73.03 PREDIABETES: Status: ACTIVE | Noted: 2019-01-07

## 2019-01-07 LAB
GLUCOSE BLDC GLUCOMTR-MCNC: 111 MG/DL (ref 70–130)
GLUCOSE BLDC GLUCOMTR-MCNC: 116 MG/DL (ref 70–130)
GLUCOSE BLDC GLUCOMTR-MCNC: 141 MG/DL (ref 70–130)

## 2019-01-07 PROCEDURE — C1776 JOINT DEVICE (IMPLANTABLE): HCPCS | Performed by: ORTHOPAEDIC SURGERY

## 2019-01-07 PROCEDURE — 25010000002 ROPIVACAINE PER 1 MG: Performed by: ORTHOPAEDIC SURGERY

## 2019-01-07 PROCEDURE — 63710000001 INSULIN LISPRO (HUMAN) PER 5 UNITS: Performed by: NURSE PRACTITIONER

## 2019-01-07 PROCEDURE — 25010000002 KETOROLAC TROMETHAMINE PER 15 MG: Performed by: ORTHOPAEDIC SURGERY

## 2019-01-07 PROCEDURE — 25010000003 CEFAZOLIN IN DEXTROSE 2-4 GM/100ML-% SOLUTION: Performed by: ORTHOPAEDIC SURGERY

## 2019-01-07 PROCEDURE — 97116 GAIT TRAINING THERAPY: CPT

## 2019-01-07 PROCEDURE — 97161 PT EVAL LOW COMPLEX 20 MIN: CPT

## 2019-01-07 PROCEDURE — 72170 X-RAY EXAM OF PELVIS: CPT

## 2019-01-07 PROCEDURE — 0SRB04Z REPLACEMENT OF LEFT HIP JOINT WITH CERAMIC ON POLYETHYLENE SYNTHETIC SUBSTITUTE, OPEN APPROACH: ICD-10-PCS | Performed by: ORTHOPAEDIC SURGERY

## 2019-01-07 PROCEDURE — 25010000002 HYDROMORPHONE PER 4 MG: Performed by: NURSE ANESTHETIST, CERTIFIED REGISTERED

## 2019-01-07 PROCEDURE — 25010000002 FENTANYL CITRATE (PF) 100 MCG/2ML SOLUTION: Performed by: NURSE ANESTHETIST, CERTIFIED REGISTERED

## 2019-01-07 PROCEDURE — 82962 GLUCOSE BLOOD TEST: CPT

## 2019-01-07 PROCEDURE — 27130 TOTAL HIP ARTHROPLASTY: CPT | Performed by: PHYSICIAN ASSISTANT

## 2019-01-07 PROCEDURE — 25010000002 PHENYLEPHRINE PER 1 ML: Performed by: NURSE ANESTHETIST, CERTIFIED REGISTERED

## 2019-01-07 PROCEDURE — 25010000002 HYDROMORPHONE PER 4 MG: Performed by: ORTHOPAEDIC SURGERY

## 2019-01-07 PROCEDURE — 25010000002 PROPOFOL 1000 MG/ML EMULSION: Performed by: NURSE ANESTHETIST, CERTIFIED REGISTERED

## 2019-01-07 PROCEDURE — 27130 TOTAL HIP ARTHROPLASTY: CPT | Performed by: ORTHOPAEDIC SURGERY

## 2019-01-07 PROCEDURE — 25010000002 MORPHINE PER 10 MG: Performed by: ORTHOPAEDIC SURGERY

## 2019-01-07 DEVICE — HD FEM/HIP BIOLOX/DELTA V40 CERAM 36MM PLS2.5: Type: IMPLANTABLE DEVICE | Site: HIP | Status: FUNCTIONAL

## 2019-01-07 DEVICE — INSRT TRIDENT X3 0D 36MM SZE: Type: IMPLANTABLE DEVICE | Site: HIP | Status: FUNCTIONAL

## 2019-01-07 DEVICE — SCRW HEX LP TRIDENT2 6.5X30MM: Type: IMPLANTABLE DEVICE | Site: HIP | Status: FUNCTIONAL

## 2019-01-07 DEVICE — STEM FEM ACCOLADE2 V40 127D 35X111X45 SZ6: Type: IMPLANTABLE DEVICE | Site: HIP | Status: FUNCTIONAL

## 2019-01-07 DEVICE — SHLL TRIDENT2 TRITANIUM C/HL 52MM: Type: IMPLANTABLE DEVICE | Site: HIP | Status: FUNCTIONAL

## 2019-01-07 DEVICE — SCRW HEX LP TRIDENT2 6.5X40MM: Type: IMPLANTABLE DEVICE | Site: HIP | Status: FUNCTIONAL

## 2019-01-07 DEVICE — TOTL HIP HI DEMAND STRYKER: Type: IMPLANTABLE DEVICE | Site: HIP | Status: FUNCTIONAL

## 2019-01-07 RX ORDER — HYDROMORPHONE HYDROCHLORIDE 1 MG/ML
0.5 INJECTION, SOLUTION INTRAMUSCULAR; INTRAVENOUS; SUBCUTANEOUS
Status: DISCONTINUED | OUTPATIENT
Start: 2019-01-07 | End: 2019-01-08 | Stop reason: HOSPADM

## 2019-01-07 RX ORDER — DOCUSATE SODIUM 100 MG/1
100 CAPSULE, LIQUID FILLED ORAL 2 TIMES DAILY PRN
Status: DISCONTINUED | OUTPATIENT
Start: 2019-01-07 | End: 2019-01-08 | Stop reason: HOSPADM

## 2019-01-07 RX ORDER — CEFAZOLIN SODIUM 2 G/100ML
2 INJECTION, SOLUTION INTRAVENOUS ONCE
Status: COMPLETED | OUTPATIENT
Start: 2019-01-07 | End: 2019-01-07

## 2019-01-07 RX ORDER — ACETAMINOPHEN 500 MG
1000 TABLET ORAL ONCE
Status: COMPLETED | OUTPATIENT
Start: 2019-01-07 | End: 2019-01-07

## 2019-01-07 RX ORDER — LEVOTHYROXINE SODIUM 88 UG/1
88 TABLET ORAL NIGHTLY
Status: DISCONTINUED | OUTPATIENT
Start: 2019-01-07 | End: 2019-01-08 | Stop reason: HOSPADM

## 2019-01-07 RX ORDER — LABETALOL HYDROCHLORIDE 5 MG/ML
5 INJECTION, SOLUTION INTRAVENOUS
Status: DISCONTINUED | OUTPATIENT
Start: 2019-01-07 | End: 2019-01-07 | Stop reason: HOSPADM

## 2019-01-07 RX ORDER — MAGNESIUM HYDROXIDE 1200 MG/15ML
LIQUID ORAL AS NEEDED
Status: DISCONTINUED | OUTPATIENT
Start: 2019-01-07 | End: 2019-01-07 | Stop reason: HOSPADM

## 2019-01-07 RX ORDER — ACETAMINOPHEN 160 MG/5ML
650 SOLUTION ORAL EVERY 4 HOURS PRN
Status: DISCONTINUED | OUTPATIENT
Start: 2019-01-07 | End: 2019-01-08 | Stop reason: HOSPADM

## 2019-01-07 RX ORDER — ONDANSETRON 2 MG/ML
4 INJECTION INTRAMUSCULAR; INTRAVENOUS EVERY 6 HOURS PRN
Status: DISCONTINUED | OUTPATIENT
Start: 2019-01-07 | End: 2019-01-08 | Stop reason: HOSPADM

## 2019-01-07 RX ORDER — DEXTROSE MONOHYDRATE 25 G/50ML
25 INJECTION, SOLUTION INTRAVENOUS
Status: DISCONTINUED | OUTPATIENT
Start: 2019-01-07 | End: 2019-01-08 | Stop reason: HOSPADM

## 2019-01-07 RX ORDER — FAMOTIDINE 20 MG/1
20 TABLET, FILM COATED ORAL ONCE
Status: COMPLETED | OUTPATIENT
Start: 2019-01-07 | End: 2019-01-07

## 2019-01-07 RX ORDER — BISACODYL 5 MG/1
10 TABLET, DELAYED RELEASE ORAL DAILY PRN
Status: DISCONTINUED | OUTPATIENT
Start: 2019-01-07 | End: 2019-01-08 | Stop reason: HOSPADM

## 2019-01-07 RX ORDER — ACETAMINOPHEN 325 MG/1
650 TABLET ORAL EVERY 4 HOURS PRN
Status: DISCONTINUED | OUTPATIENT
Start: 2019-01-07 | End: 2019-01-08 | Stop reason: HOSPADM

## 2019-01-07 RX ORDER — HYDROMORPHONE HYDROCHLORIDE 1 MG/ML
0.5 INJECTION, SOLUTION INTRAMUSCULAR; INTRAVENOUS; SUBCUTANEOUS
Status: DISCONTINUED | OUTPATIENT
Start: 2019-01-07 | End: 2019-01-07 | Stop reason: HOSPADM

## 2019-01-07 RX ORDER — ARIPIPRAZOLE 10 MG/1
5 TABLET ORAL DAILY
Status: DISCONTINUED | OUTPATIENT
Start: 2019-01-08 | End: 2019-01-08 | Stop reason: HOSPADM

## 2019-01-07 RX ORDER — DULOXETIN HYDROCHLORIDE 60 MG/1
60 CAPSULE, DELAYED RELEASE ORAL DAILY
Status: DISCONTINUED | OUTPATIENT
Start: 2019-01-08 | End: 2019-01-08 | Stop reason: HOSPADM

## 2019-01-07 RX ORDER — LIDOCAINE HYDROCHLORIDE 10 MG/ML
1 INJECTION, SOLUTION EPIDURAL; INFILTRATION; INTRACAUDAL; PERINEURAL ONCE
Status: COMPLETED | OUTPATIENT
Start: 2019-01-07 | End: 2019-01-07

## 2019-01-07 RX ORDER — TIZANIDINE 4 MG/1
4 TABLET ORAL EVERY 8 HOURS PRN
Status: DISCONTINUED | OUTPATIENT
Start: 2019-01-07 | End: 2019-01-08 | Stop reason: HOSPADM

## 2019-01-07 RX ORDER — ONDANSETRON 4 MG/1
4 TABLET, FILM COATED ORAL EVERY 6 HOURS PRN
Status: DISCONTINUED | OUTPATIENT
Start: 2019-01-07 | End: 2019-01-08 | Stop reason: HOSPADM

## 2019-01-07 RX ORDER — NALOXONE HCL 0.4 MG/ML
0.1 VIAL (ML) INJECTION
Status: DISCONTINUED | OUTPATIENT
Start: 2019-01-07 | End: 2019-01-08 | Stop reason: HOSPADM

## 2019-01-07 RX ORDER — SODIUM CHLORIDE, SODIUM LACTATE, POTASSIUM CHLORIDE, CALCIUM CHLORIDE 600; 310; 30; 20 MG/100ML; MG/100ML; MG/100ML; MG/100ML
100 INJECTION, SOLUTION INTRAVENOUS CONTINUOUS
Status: DISCONTINUED | OUTPATIENT
Start: 2019-01-07 | End: 2019-01-08 | Stop reason: HOSPADM

## 2019-01-07 RX ORDER — NICOTINE POLACRILEX 4 MG
15 LOZENGE BUCCAL
Status: DISCONTINUED | OUTPATIENT
Start: 2019-01-07 | End: 2019-01-08 | Stop reason: HOSPADM

## 2019-01-07 RX ORDER — ONDANSETRON 2 MG/ML
4 INJECTION INTRAMUSCULAR; INTRAVENOUS ONCE AS NEEDED
Status: DISCONTINUED | OUTPATIENT
Start: 2019-01-07 | End: 2019-01-07 | Stop reason: HOSPADM

## 2019-01-07 RX ORDER — CEFAZOLIN SODIUM 2 G/100ML
2 INJECTION, SOLUTION INTRAVENOUS EVERY 8 HOURS
Status: COMPLETED | OUTPATIENT
Start: 2019-01-07 | End: 2019-01-08

## 2019-01-07 RX ORDER — BUPIVACAINE HYDROCHLORIDE 5 MG/ML
INJECTION, SOLUTION EPIDURAL; INTRACAUDAL
Status: COMPLETED | OUTPATIENT
Start: 2019-01-07 | End: 2019-01-07

## 2019-01-07 RX ORDER — ASPIRIN 325 MG
325 TABLET, DELAYED RELEASE (ENTERIC COATED) ORAL EVERY 12 HOURS SCHEDULED
Status: DISCONTINUED | OUTPATIENT
Start: 2019-01-08 | End: 2019-01-08 | Stop reason: HOSPADM

## 2019-01-07 RX ORDER — BISACODYL 10 MG
10 SUPPOSITORY, RECTAL RECTAL DAILY PRN
Status: DISCONTINUED | OUTPATIENT
Start: 2019-01-07 | End: 2019-01-08 | Stop reason: HOSPADM

## 2019-01-07 RX ORDER — PSEUDOEPHEDRINE HCL 30 MG
100 TABLET ORAL 2 TIMES DAILY PRN
Qty: 60 EACH | Refills: 0 | Status: SHIPPED | OUTPATIENT
Start: 2019-01-07 | End: 2020-09-01

## 2019-01-07 RX ORDER — MONTELUKAST SODIUM 10 MG/1
10 TABLET ORAL NIGHTLY
Status: DISCONTINUED | OUTPATIENT
Start: 2019-01-07 | End: 2019-01-08 | Stop reason: HOSPADM

## 2019-01-07 RX ORDER — MELOXICAM 7.5 MG/1
15 TABLET ORAL DAILY
Status: DISCONTINUED | OUTPATIENT
Start: 2019-01-07 | End: 2019-01-08 | Stop reason: HOSPADM

## 2019-01-07 RX ORDER — EPHEDRINE SULFATE 50 MG/ML
5 INJECTION, SOLUTION INTRAVENOUS ONCE AS NEEDED
Status: DISCONTINUED | OUTPATIENT
Start: 2019-01-07 | End: 2019-01-07 | Stop reason: HOSPADM

## 2019-01-07 RX ORDER — HYDROCODONE BITARTRATE AND ACETAMINOPHEN 7.5; 325 MG/1; MG/1
1 TABLET ORAL EVERY 4 HOURS PRN
Qty: 40 TABLET | Refills: 0 | Status: SHIPPED | OUTPATIENT
Start: 2019-01-07 | End: 2019-02-06

## 2019-01-07 RX ORDER — OXYCODONE HCL 10 MG/1
10 TABLET, FILM COATED, EXTENDED RELEASE ORAL ONCE
Status: COMPLETED | OUTPATIENT
Start: 2019-01-07 | End: 2019-01-07

## 2019-01-07 RX ORDER — MELOXICAM 7.5 MG/1
15 TABLET ORAL ONCE
Status: COMPLETED | OUTPATIENT
Start: 2019-01-07 | End: 2019-01-07

## 2019-01-07 RX ORDER — FENTANYL CITRATE 50 UG/ML
INJECTION, SOLUTION INTRAMUSCULAR; INTRAVENOUS AS NEEDED
Status: DISCONTINUED | OUTPATIENT
Start: 2019-01-07 | End: 2019-01-07 | Stop reason: SURG

## 2019-01-07 RX ORDER — ACETAMINOPHEN 650 MG/1
650 SUPPOSITORY RECTAL EVERY 4 HOURS PRN
Status: DISCONTINUED | OUTPATIENT
Start: 2019-01-07 | End: 2019-01-08 | Stop reason: HOSPADM

## 2019-01-07 RX ORDER — SODIUM CHLORIDE 9 MG/ML
100 INJECTION, SOLUTION INTRAVENOUS CONTINUOUS
Status: DISCONTINUED | OUTPATIENT
Start: 2019-01-07 | End: 2019-01-08 | Stop reason: HOSPADM

## 2019-01-07 RX ORDER — SODIUM CHLORIDE, SODIUM LACTATE, POTASSIUM CHLORIDE, CALCIUM CHLORIDE 600; 310; 30; 20 MG/100ML; MG/100ML; MG/100ML; MG/100ML
20 INJECTION, SOLUTION INTRAVENOUS CONTINUOUS
Status: DISCONTINUED | OUTPATIENT
Start: 2019-01-07 | End: 2019-01-08 | Stop reason: HOSPADM

## 2019-01-07 RX ORDER — HYDROCODONE BITARTRATE AND ACETAMINOPHEN 7.5; 325 MG/1; MG/1
1 TABLET ORAL EVERY 4 HOURS PRN
Status: DISCONTINUED | OUTPATIENT
Start: 2019-01-07 | End: 2019-01-08 | Stop reason: HOSPADM

## 2019-01-07 RX ORDER — MEPERIDINE HYDROCHLORIDE 25 MG/ML
12.5 INJECTION INTRAMUSCULAR; INTRAVENOUS; SUBCUTANEOUS
Status: DISCONTINUED | OUTPATIENT
Start: 2019-01-07 | End: 2019-01-07 | Stop reason: HOSPADM

## 2019-01-07 RX ORDER — ATORVASTATIN CALCIUM 10 MG/1
10 TABLET, FILM COATED ORAL DAILY
Status: DISCONTINUED | OUTPATIENT
Start: 2019-01-08 | End: 2019-01-08 | Stop reason: HOSPADM

## 2019-01-07 RX ORDER — LABETALOL HYDROCHLORIDE 5 MG/ML
10 INJECTION, SOLUTION INTRAVENOUS EVERY 4 HOURS PRN
Status: DISCONTINUED | OUTPATIENT
Start: 2019-01-07 | End: 2019-01-08 | Stop reason: HOSPADM

## 2019-01-07 RX ORDER — NALOXONE HCL 0.4 MG/ML
0.4 VIAL (ML) INJECTION AS NEEDED
Status: DISCONTINUED | OUTPATIENT
Start: 2019-01-07 | End: 2019-01-07 | Stop reason: HOSPADM

## 2019-01-07 RX ORDER — FENTANYL CITRATE 50 UG/ML
50 INJECTION, SOLUTION INTRAMUSCULAR; INTRAVENOUS
Status: DISCONTINUED | OUTPATIENT
Start: 2019-01-07 | End: 2019-01-07 | Stop reason: HOSPADM

## 2019-01-07 RX ADMIN — OXYCODONE HYDROCHLORIDE 10 MG: 10 TABLET, FILM COATED, EXTENDED RELEASE ORAL at 07:03

## 2019-01-07 RX ADMIN — ACETAMINOPHEN 1000 MG: 500 TABLET ORAL at 07:03

## 2019-01-07 RX ADMIN — SODIUM CHLORIDE 100 ML/HR: 9 INJECTION, SOLUTION INTRAVENOUS at 11:54

## 2019-01-07 RX ADMIN — MUPIROCIN 1 APPLICATION: 20 OINTMENT TOPICAL at 07:03

## 2019-01-07 RX ADMIN — DOCUSATE SODIUM 100 MG: 100 CAPSULE, LIQUID FILLED ORAL at 11:54

## 2019-01-07 RX ADMIN — TRANEXAMIC ACID 1000 MG: 100 INJECTION, SOLUTION INTRAVENOUS at 08:58

## 2019-01-07 RX ADMIN — HYDROMORPHONE HYDROCHLORIDE 0.5 MG: 1 INJECTION, SOLUTION INTRAMUSCULAR; INTRAVENOUS; SUBCUTANEOUS at 09:59

## 2019-01-07 RX ADMIN — CEFAZOLIN SODIUM 2 G: 2 INJECTION, SOLUTION INTRAVENOUS at 15:35

## 2019-01-07 RX ADMIN — EPHEDRINE SULFATE 10 MG: 50 INJECTION INTRAMUSCULAR; INTRAVENOUS; SUBCUTANEOUS at 07:59

## 2019-01-07 RX ADMIN — HYDROMORPHONE HYDROCHLORIDE 0.5 MG: 1 INJECTION, SOLUTION INTRAMUSCULAR; INTRAVENOUS; SUBCUTANEOUS at 10:05

## 2019-01-07 RX ADMIN — PHENYLEPHRINE HYDROCHLORIDE 80 MCG: 10 INJECTION INTRAVENOUS at 08:15

## 2019-01-07 RX ADMIN — SODIUM CHLORIDE, POTASSIUM CHLORIDE, SODIUM LACTATE AND CALCIUM CHLORIDE: 600; 310; 30; 20 INJECTION, SOLUTION INTRAVENOUS at 08:50

## 2019-01-07 RX ADMIN — HYDROMORPHONE HYDROCHLORIDE 0.5 MG: 1 INJECTION, SOLUTION INTRAMUSCULAR; INTRAVENOUS; SUBCUTANEOUS at 11:55

## 2019-01-07 RX ADMIN — FENTANYL CITRATE 100 MCG: 50 INJECTION, SOLUTION INTRAMUSCULAR; INTRAVENOUS at 07:31

## 2019-01-07 RX ADMIN — TIZANIDINE 4 MG: 4 TABLET ORAL at 22:57

## 2019-01-07 RX ADMIN — BUPIVACAINE HYDROCHLORIDE 2 ML: 5 INJECTION, SOLUTION EPIDURAL; INTRACAUDAL at 07:38

## 2019-01-07 RX ADMIN — SODIUM CHLORIDE, POTASSIUM CHLORIDE, SODIUM LACTATE AND CALCIUM CHLORIDE 20 ML/HR: 600; 310; 30; 20 INJECTION, SOLUTION INTRAVENOUS at 07:06

## 2019-01-07 RX ADMIN — PROPOFOL 75 MCG/KG/MIN: 10 INJECTION, EMULSION INTRAVENOUS at 07:39

## 2019-01-07 RX ADMIN — LEVOTHYROXINE SODIUM 88 MCG: 0.09 TABLET ORAL at 22:34

## 2019-01-07 RX ADMIN — FAMOTIDINE 20 MG: 20 TABLET, FILM COATED ORAL at 07:00

## 2019-01-07 RX ADMIN — TRANEXAMIC ACID 1000 MG: 100 INJECTION, SOLUTION INTRAVENOUS at 07:40

## 2019-01-07 RX ADMIN — HYDROCODONE BITARTRATE AND ACETAMINOPHEN 1 TABLET: 7.5; 325 TABLET ORAL at 18:12

## 2019-01-07 RX ADMIN — DOCUSATE SODIUM 100 MG: 100 CAPSULE, LIQUID FILLED ORAL at 22:34

## 2019-01-07 RX ADMIN — CEFAZOLIN SODIUM 2 G: 2 INJECTION, SOLUTION INTRAVENOUS at 07:27

## 2019-01-07 RX ADMIN — MELOXICAM 15 MG: 7.5 TABLET ORAL at 07:03

## 2019-01-07 RX ADMIN — MELOXICAM 15 MG: 7.5 TABLET ORAL at 11:54

## 2019-01-07 RX ADMIN — LIDOCAINE HYDROCHLORIDE 0.5 ML: 10 INJECTION, SOLUTION EPIDURAL; INFILTRATION; INTRACAUDAL; PERINEURAL at 07:06

## 2019-01-07 RX ADMIN — HYDROCODONE BITARTRATE AND ACETAMINOPHEN 1 TABLET: 7.5; 325 TABLET ORAL at 22:38

## 2019-01-07 RX ADMIN — EPHEDRINE SULFATE 10 MG: 50 INJECTION INTRAMUSCULAR; INTRAVENOUS; SUBCUTANEOUS at 08:12

## 2019-01-07 RX ADMIN — MONTELUKAST SODIUM 10 MG: 10 TABLET, FILM COATED ORAL at 22:34

## 2019-01-07 NOTE — PLAN OF CARE
Problem: Patient Care Overview  Goal: Plan of Care Review  Outcome: Ongoing (interventions implemented as appropriate)   01/07/19 6695   Plan of Care Review   Progress improving   OTHER   Outcome Summary Pt. A&Ox3, is sleepy from surgery. Vitals stable, moderate c/o pain controlled w/ PO pain meds. Pt. worked w/ PT today, up in chair. L hip dressing clean/dry/intact. Plans to d/c tomorrow.   Coping/Psychosocial   Plan of Care Reviewed With patient

## 2019-01-07 NOTE — OP NOTE
OPERATIVE REPORT     DATE OF PROCEDURE: 1/7/19     SURGEON: Allen Madera M.D.     ASSISTANT(S): Circulator: Huber Dooley RN  Scrub Person: Diaz Shepard  Vendor Representative: Tian Hagen  Nursing Assistant: Giovani Skinner  Assistant: Stefania Lundberg PA-C    Note-PA was utilized during the case to facilitate positioning the patient, exposure, retraction, placement of final components and definitive closure.      PREOPERATIVE DIAGNOSIS: Advanced degenerative joint disease of the left hip secondary to osteoarthritis     POSTOPERATIVE DIAGNOSIS: same     PROCEDURE: Left Total Hip Arthroplasty     SURGICAL DETAILS:     APPROACH: Posterior     ANESTHESIA: Spinal plus local periarticular block     PREOPERATIVE ANTIBIOTICS: Ancef 2 g IV     TRANEXAMIC ACID: IV     ESTIMATED BLOOD LOSS: 100 cc     SPECIMENS: None     IMPLANTS:   : Alfonso   Acetabular component: 52 mm Trident 2   Acetabular screws: 2   Acetabular liner: 0° X3   Femoral component: Accolade  degree size 6   Femoral head: 36+2.5 mm Biolox Ceramic     DRAINS: None     LOCAL INJECTION: 1 cc Toradol 30mg/ml, 4 cc duramorph 2mg/ml, 20 cc 0.5% ropivicaine, 20 cc 0.5% lidocaine with 1:200,000 epinephrine, 15 cc preservative free normal saline     MODIFIER(S): None     COMPLICATIONS: None apparent     INDICATIONS FOR PROCEDURE: This patient has a history of progressive left hip pain and arthritis. The hip pain is severe with activity and has progressed significantly. Non-operative treatment has been attempted, but has not improved or controlled symptoms during normal daily activities. Motion has become limited and rotation severely restricted. X-rays reveal moderate-to-severe eburnation of articular cartilage on the superior weight bearing surface of the hip with circumferential acetabular and femoral neck osteophytes consistent with advanced hip osteoarthritis. A total hip arthroplasty was recommended at this time. The  risks, benefits, alternatives, and potential complications of the arthroplasty surgery were discussed with the patient in detail to include but not limited to infection, bleeding, anesthesia risks, sciatic nerve palsy, instability/dislocation, limb length discrepancy, aseptic loosening, osteolysis, blood clots, continued pain, iatrogenic fracture, myocardial infarction, stroke, and death. Specific details of the procedure, hospitalization, recovery, rehabilitation, and long-term precautions were also provided. Pre-operative teaching was provided. Implant/prosthesis selection was outlined, and the many options available were explained; the final choice will be made at the time of the procedure to match the anatomy and condition of the bone, ligaments, tendons, and muscles. Understanding of all topics was conveyed to me by the patient, and consent was given to proceed with a left total hip arthroplasty. The patient completed preoperative medical optimization and risk assessment, joint arthroplasty education, and MRSA decolonization using a universal decolonization protocol. Perioperative blood management and the potential for blood transfusion were discussed with risks and options clearly outlined.     INTRAOPERATIVE FINDINGS: End-stage osteoarthritis left hip     PROCEDURE: The patient was identified in the preoperative holding area. The operative site was confirmed and marked. TAMIA hose and a sequential compression device were placed on the nonoperative leg. The risks, benefits, and alternatives to surgery were again confirmed with the patient and the patient wished to proceed. The patient was brought to the operating room and placed on the operating room table in the supine position. A huddle was performed with the patient and all vital surgical team members to confirm the correct operative site, procedure, anesthesia type, and operative plan with the patient. After anesthesia was performed, the patient was  positioned in the lateral decubitus position on the pegboard and secured with the operative side up. An axillary roll was placed in the axilla and all bony prominences and pressure points were checked and padded. A relative leg length assessment was carried out and markers were placed for intraoperative assessment. Intravenous antibiotic prophylaxis was given and confirmed with the anesthesia team.     The operative leg was prepped and draped in the usual sterile fashion. A surgical time out was performed immediately preceding the incision with all personnel in the operating room to again confirm patient identity, the correct operative site and extremity, correct radiographic studies, availability of appropriate surgical equipment and agreement on the planned procedure. A posterolateral approach to the hip was performed through an incision centered over the greater trochanter. The incision was carried through the subcutaneous tissue to the underlying fascia vee and gluteus lory fascia, which were incised and split posteriorly over the trochanter in the direction of the fibers. Hemostasis was obtained with electrocautery. The Charnley retractor was placed after carefully palpating the sciatic nerve which was protected throughout the case.     A standard posterior approach to the hip was performed by releasing the piriformis, short external rotators and posterior capsule and reflecting them posteriorly as a rectangular flap. The superior capsule was scarred down; it was released and excised. The labrum was split and the femoral head mobilized. The hip was then flexed, internally rotated and dislocated from the acetabulum without excessive force. Assessment of the femoral head revealed eburnation of the articular cartilage with complete loss of the weight bearing chondral surface. Osteophytes were present as well. Careful measurements were performed using the center of the femoral head and the lesser trochanter as  markers and a femoral neck cut was made according to the preoperative plan.     Attention was then turned to the acetabulum. Retractors were placed circumferentially for wide acetabular exposure. The labrum and osteophytes were debrided from the rim, and the medial wall was identified and the depth of the socket assessed by excising the pulvinar. Bleeders were controlled, especially the area of the obturator artery with the electrocautery. Acetabular reaming was then started with the hemispherical instrument matching the size of the excised femoral head. Sequential reaming of the acetabulum was then performed by increasing size in 2 mm increments.  Reaming was performed line to line. The reamers created an excellent hemispherical bed of bleeding cancellous bone. The cup was impacted into position, targeting 40-45 degrees of abduction and 20-25 degrees of anteversion, with an excellent press-fit. The press-fit was firm, stable, and apically seated. 2 screw(s) were used for additional support of the fixation. Further osteophyte debridement was done around the socket. All impinging soft tissue was removed from the edges of the socket. The polyethylene bearing/liner was then impacted into place and checked for stability.     Attention was then turned to the femur. The leg was positioned so access did not result in soft-tissue injury. The femoral preparation was started with a box osteotome. The medullary cavity of the femur was then entered and opened with hand reamers. Femoral stem broaches were then employed in an incremental fashion up to the final size, targeting 15-20 degrees of anteversion. The final broach was fully seated, had good rotational and axial stability, and was seated at the appropriate height in relation to the greater trochanter and the preoperative plan. Trial reduction was done. Excellent stability and range of motion was achieved without impingement at any position. The hip was stable in full  extension and external rotation as well as in flexion past 90 degrees, 20 degrees adduction and 60-70 degrees of internal rotation. Leg lengths were re-created within millimeters based on the markers and relative measurement. The hip was then dislocated and the trials removed. The wound was copiously irrigated, and the permanent femoral stem was then impacted down in approximately 15-20 degrees of anteversion. The press-fit was firm, and stable to axial and rotational force in all planes. The permanent femoral head was then impacted on the clean trunnion. The socket and wound were irrigated, suctioned, and inspected for debris. The final reduction was performed, and again leg length assessment and stability assessment of the hip were performed to confirm optimal component selection and stability in all planes without impingement when stressed to the extremes.     The wound was irrigated with dilute betadine solution as well as saline, and hemostasis obtained with electrocautery. A pain cocktail was injected into the pericapsular tissues. The posterior capsule, piriformis, and short external rotators were repaired utilizing #2 Ticron through drill holes in the greater trochanter. The sciatic nerve was palpated and found to be intact and the wound was irrigated. Instrument and sponge counts were completed and confirmed correct. The fascia vee and gluteal fascia were closed with interrupted #1 Vicryl suture and oversewn with #2 Stratafix. The deep subcutaneous tissue was closed with running #1 Stratafix suture and the superficial subcutaneous tissue with interrupted 2-0 Vicryl suture. A 3-0 monocryl running stitch was used to close skin followed by skin glue adhesive to seal the wound. A silver impregnated dressing was then placed, followed by TAMIA hose and a sequential compression device to the operative limb, followed by an abduction pillow. The patient was then returned to a supine position on the operating room  table. The patient was sufficiently recovered from anesthesia, transferred to a hospital bed and taken to the PACU in stable condition.     One gram (1000 mg) of intravenous tranexamic acid was administered prior to incision. A second one gram (1000 mg) intravenous dose was given prior to wound closure.     No apparent complications occurred during the procedure Instrument, sponge and needle counts were correct x 2.     The patient underwent risk stratification preoperatively and aspirin was chosen for DVT prophylaxis. Delay in starting chemical prophylaxis for 23 hours from surgical incision was over concerns for hematoma formation and wound related issues.     POST OPERATIVE PLAN:   Protected weight bearing as tolerated   Posterior hip precautions x 6 weeks   PT/OT for mobilization and medical equipment needs   23 hours perioperative antibiotic prophylaxis   Pain control with PO/IV meds   Keep silver dressing in place for 7 days post op. Change dressing only if saturated.   TAMIA hose and SCD's to bilateral lower extremities   Social work for discharge planning needs   Follow up in 3 weeks for post operative wound check with XR AP pelvis.

## 2019-01-07 NOTE — H&P
Patient Name: Alisa Ames  MRN: [id number]  : 1960  DOS: 2019    Attending: Allen Madera MD    Primary Care Provider: Tierney Gama MD      Chief complaint:  Left hip pain    Subjective   Patient is a 58 y.o. female presented for left total hip arthroplasty by Dr. Madera under spinal anesthesia. She tolerated surgery well and is admitted for further medical management. Her hip has been painful for about two and a half years. She denies use of assistive device for ambulation or recent falls.     When seen postop she is doing well. Her pain control is good. She denies nausea, shortness of breath or chest pain. No hx of DVT or PE.    ( Above is noted/ agree. Doing ok. Some pain, drowsy after IV pain medicine. Already walked with PT, 130 ft with RW and step through gait pattern. No n/vom/sob.) wy       Allergies:  No Known Allergies    Meds:  Medications Prior to Admission   Medication Sig Dispense Refill Last Dose   • ARIPiprazole (ABILIFY) 5 MG tablet Take 1 tablet by mouth every night at bedtime. 90 tablet 1 2019 at am   • atorvastatin (LIPITOR) 10 MG tablet Take 1 tablet by mouth Daily. 90 tablet 1 2019 at am   • diphenoxylate-atropine (LOMOTIL) 2.5-0.025 MG per tablet Take 1 tablet by mouth 4 (Four) Times a Day As Needed for Diarrhea. 120 tablet 0 2019 at am   • DULoxetine (CYMBALTA) 60 MG capsule Take 1 capsule by mouth Daily. 90 capsule 1 2019 at am   • levothyroxine (SYNTHROID, LEVOTHROID) 88 MCG tablet TAKE 1 TABLET DAILY. FASTING AND WAIT 1 HOUR FOR FOOD OR OTHER MEDICATIONS (Patient taking differently: Take 88 mcg by mouth Daily. TAKE 1 TABLET DAILY. FASTING AND WAIT 1 HOUR FOR FOOD OR OTHER MEDICATIONS) 90 tablet 1 2019 at pm   • montelukast (SINGULAIR) 10 MG tablet Take 10 mg by mouth Every Night.   2019 at am   • nitrofurantoin, macrocrystal-monohydrate, (MACROBID) 100 MG capsule Take 1 capsule by mouth Every 12 (Twelve) Hours. 14 capsule 0 2019 at  am   • rizatriptan (MAXALT) 10 MG tablet Take 1 tab po prn migraine. May repeat in 2 hours if needed, max 3 in 24 hr 27 tablet 1 Past Month at am   • tiZANidine (ZANAFLEX) 4 MG tablet Take 1 tablet by mouth Every 8 (Eight) Hours As Needed for Muscle Spasms. 180 tablet 1 1/7/2019 at am   • Topiramate ER (TROKENDI XR) 200 MG capsule sustained-release 24 hr Take  by mouth.   1/7/2019 at am   • Vortioxetine HBr (TRINTELLIX) 20 MG tablet Take 20 mg by mouth Daily. 90 tablet 1 1/7/2019 at am       History:   Past Medical History:   Diagnosis Date   • COPD (chronic obstructive pulmonary disease) (CMS/HCC)    • Fibromyalgia    • Hyperlipidemia    • Malignant neoplasm (CMS/HCC)    • Migraine    • Syncope    • Thyroid nodule    • Wears dentures     UPPER AND LOWER      Past Surgical History:   Procedure Laterality Date   • APPENDECTOMY     • CERVICAL SPINE SURGERY      Fibromyalgia and DDD with h/o C spine surgery- initially on flexeril bid.   • COLONOSCOPY  2015   • NECK SURGERY     • NOSE SURGERY      r/t Skin CA   • SKIN CANCER EXCISION     • THYROID LOBECTOMY Right     On discussion, pt reported a h/o right thyroid lobectomy for goiter.U/S demo surgical absence of right lobe and diffuse nodularity of the left lobe with a dominant nodule in the lower pole of 1.8 x 3.5 cm.   • TONSILLECTOMY     • TOTAL THYROIDECTOMY       Family History   Problem Relation Age of Onset   • Other Mother         malignant neoplasm   • Coronary artery disease Mother         MI (60's)   • Heart attack Mother    • Other Other         malignant neoplasm   • Valvular heart disease Father      Social History     Tobacco Use   • Smoking status: Former Smoker     Packs/day: 0.00     Years: 40.00     Pack years: 0.00     Types: Cigarettes   • Smokeless tobacco: Never Used   • Tobacco comment: QUIT SMOKING WITH E CIG-1 WEEK AGO    Substance Use Topics   • Alcohol use: Yes     Alcohol/week: 0.6 oz     Types: 1 Cans of beer per week     Comment: 1 drink  "per month   • Drug use: No   She is  with 1 child. 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    Review of Systems  All systems were reviewed and negative except for:  Gastrointestinal: positive for  diarrhea    Vital Signs  Visit Vitals  /66 (BP Location: Left arm, Patient Position: Lying)   Pulse 77   Temp 96.4 °F (35.8 °C) (Oral)   Resp 16   Ht 165.1 cm (65\")   Wt 67.1 kg (148 lb)   SpO2 91%   BMI 24.63 kg/m²       Physical Exam:    General Appearance:    Alert, cooperative, in no acute distress   Head:    Normocephalic, without obvious abnormality, atraumatic   Eyes:            Lids and lashes normal, conjunctivae and sclerae normal, no   icterus, no pallor, corneas clear    Ears:    Ears appear intact with no abnormalities noted   Neck:   No adenopathy, supple, trachea midline, no thyromegaly    Lungs:     Clear to auscultation,respirations regular, even and                   unlabored    Heart:    Regular rhythm and normal rate, normal S1 and S2, no            murmur, no gallop, no rub    Abdomen:     Normal bowel sounds, no masses, no organomegaly, soft        non-tender, non-distended, no guarding, no rebound                 tenderness   Genitalia:    Deferred   Extremities:   Left hip Aquacel CDI. Adductor pillow present   Pulses:   Pulses palpable and equal bilaterally   Skin:   No bleeding, bruising or rash   Neurologic:   Cranial nerves 2 - 12 grossly intact, sensation and movement BLE limited due to lingering effects of spinal block. Flexion and dorsiflexion intact bilateral feet.  ( f/u exam: Flexion and dorsiflexion intact bilateral feet.)wy        I reviewed the patient's new clinical results.     Results for NICOLÁS HURST (MRN [id number]) as of 1/7/2019 11:20   Ref. Range 12/27/2018 12:18   Glucose Latest Ref Range: 70 - 100 mg/dL 92   Sodium Latest Ref Range: 132 - 146 mmol/L 140   Potassium Latest Ref Range: 3.5 - 5.5 mmol/L 3.8   CO2 Latest Ref Range: 20.0 - 31.0 mmol/L 26.0   Chloride " Latest Ref Range: 99 - 109 mmol/L 107   Anion Gap Latest Ref Range: 3.0 - 11.0 mmol/L 7.0   Creatinine Latest Ref Range: 0.60 - 1.30 mg/dL 0.98   BUN Latest Ref Range: 9 - 23 mg/dL 13   BUN/Creatinine Ratio Latest Ref Range: 7.0 - 25.0  13.3   Calcium Latest Ref Range: 8.7 - 10.4 mg/dL 9.1   eGFR Non African Amer Latest Ref Range: >60 mL/min/1.73 58 (L)   Hemoglobin A1C Latest Ref Range: 4.80 - 5.60 % 6.10 (H)   Protime Latest Ref Range: 9.6 - 11.5 Seconds 9.8   INR Latest Ref Range: 0.91 - 1.09  0.93   aPTT Latest Ref Range: 24.0 - 31.0 seconds 29.2   WBC Latest Ref Range: 3.50 - 10.80 10*3/mm3 4.48   RBC Latest Ref Range: 3.89 - 5.14 10*6/mm3 3.98   Hemoglobin Latest Ref Range: 11.5 - 15.5 g/dL 12.9   Hematocrit Latest Ref Range: 34.5 - 44.0 % 39.3   RDW Latest Ref Range: 11.3 - 14.5 % 13.3   MCV Latest Ref Range: 80.0 - 99.0 fL 98.7   MCH Latest Ref Range: 27.0 - 31.0 pg 32.4 (H)   MCHC Latest Ref Range: 32.0 - 36.0 g/dL 32.8   MPV Latest Ref Range: 6.0 - 12.0 fL 9.8   Platelets Latest Ref Range: 150 - 450 10*3/mm3 306     Assessment and Plan:       Status post total replacement of left hip    Primary osteoarthritis of left hip    Hypothyroidism (acquired)    Depression with anxiety    Pure hypercholesterolemia    Prediabetes      Plan    1. PT/OT- early ambulation post op  2. Pain control-prns( I added Lortab)wy  3. IS-encourage  4. DVT proph- mechs/ASA  5. Bowel regimen  6. Resume home medications as appropriate  7. Monitor post-op labs  8. DC planning for home    HLD  - Continue home statin     Anxiety and depression  - continue home abilify and cymbalta  - may bring trokendi and trintellix from home    Hypothyroid  - Continue home synthroid    PreDM  - hgb A1c on 12/27/18 6.1  - Accuchecks ACHS with low dose SSI      GERSON Castro  01/07/19  11:22 AM     I have personally performed the evaluation on this patient. My history is consistent  with HPI obtained. My exam findings are listed above. I have  personally reviewed and discussed the above formulated treatment plan with pt , RN, and [email or web address]

## 2019-01-07 NOTE — ANESTHESIA PREPROCEDURE EVALUATION
Anesthesia Evaluation                  Airway   Mallampati: II  TM distance: >3 FB  Neck ROM: full  No difficulty expected  Dental      Pulmonary    (+) COPD,   Cardiovascular         Neuro/Psych  (+) headaches, syncope, psychiatric history Depression,     GI/Hepatic/Renal/Endo    (+)   hypothyroidism,     Musculoskeletal     Abdominal    Substance History      OB/GYN          Other   (+) arthritis                     Anesthesia Plan    ASA 2     spinal     intravenous induction   Anesthetic plan, all risks, benefits, and alternatives have been provided, discussed and informed consent has been obtained with: patient.    Plan discussed with CRNA.

## 2019-01-07 NOTE — H&P
[facility] Pre-OP H&P      Chief complaint:  Left Hip Pain    Subjective:  Patient is a 58 y.o.female presents with a history of clinical and radiographic evidence of severe left hip joint degeneration.  Here today for a left total hip arthroplasty.       Review of Systems:  General ROS: negative for fever, chills, weakness, dizziness, headache, fatigue, weight changes  Cardiovascular ROS: no chest pain or dyspnea on exertion  Respiratory ROS: no cough, shortness of breath, or wheezing  GI ROS: no abdominal pain/discomfort, nausea, vomiting or diarrhea   ROS: no dysuria, hematuria or complaints  Skin ROS: no itching, rash or open wounds.      Allergies: No Known Allergies  Latex: no known allergy  Contrast Dye:  no known allergy      Home Meds    Medications Prior to Admission   Medication Sig Dispense Refill Last Dose   • ARIPiprazole (ABILIFY) 5 MG tablet Take 1 tablet by mouth every night at bedtime. 90 tablet 1 1/7/2019 at am   • atorvastatin (LIPITOR) 10 MG tablet Take 1 tablet by mouth Daily. 90 tablet 1 Taking   • diphenoxylate-atropine (LOMOTIL) 2.5-0.025 MG per tablet Take 1 tablet by mouth 4 (Four) Times a Day As Needed for Diarrhea. 120 tablet 0 Taking   • DULoxetine (CYMBALTA) 60 MG capsule Take 1 capsule by mouth Daily. 90 capsule 1 Taking   • levothyroxine (SYNTHROID, LEVOTHROID) 88 MCG tablet TAKE 1 TABLET DAILY. FASTING AND WAIT 1 HOUR FOR FOOD OR OTHER MEDICATIONS (Patient taking differently: Take 88 mcg by mouth Daily. TAKE 1 TABLET DAILY. FASTING AND WAIT 1 HOUR FOR FOOD OR OTHER MEDICATIONS) 90 tablet 1 Taking   • montelukast (SINGULAIR) 10 MG tablet Take 10 mg by mouth Every Night.      • nitrofurantoin, macrocrystal-monohydrate, (MACROBID) 100 MG capsule Take 1 capsule by mouth Every 12 (Twelve) Hours. 14 capsule 0 Taking   • rizatriptan (MAXALT) 10 MG tablet Take 1 tab po prn migraine. May repeat in 2 hours if needed, max 3 in 24 hr 27 tablet 1 Taking   • tiZANidine (ZANAFLEX)  4 MG tablet Take 1 tablet by mouth Every 8 (Eight) Hours As Needed for Muscle Spasms. 180 tablet 1 Taking   • Topiramate ER (TROKENDI XR) 200 MG capsule sustained-release 24 hr Take  by mouth.      • Vortioxetine HBr (TRINTELLIX) 20 MG tablet Take 20 mg by mouth Daily. 90 tablet 1 Taking     PMH:   Past Medical History:   Diagnosis Date   • COPD (chronic obstructive pulmonary disease) (CMS/HCC)    • Fibromyalgia    • Hyperlipidemia    • Malignant neoplasm (CMS/HCC)    • Migraine    • Syncope    • Thyroid nodule    • Wears dentures     UPPER AND LOWER      PSH:    Past Surgical History:   Procedure Laterality Date   • APPENDECTOMY     • CERVICAL SPINE SURGERY      Fibromyalgia and DDD with h/o C spine surgery- initially on flexeril bid.   • COLONOSCOPY  2015   • NECK SURGERY     • NOSE SURGERY      r/t Skin CA   • SKIN CANCER EXCISION     • THYROID LOBECTOMY Right     On discussion, pt reported a h/o right thyroid lobectomy for goiter.U/S demo surgical absence of right lobe and diffuse nodularity of the left lobe with a dominant nodule in the lower pole of 1.8 x 3.5 cm.   • TONSILLECTOMY     • TOTAL THYROIDECTOMY       Immunization History: pneumonia=no        Flu=no       Tetanus=unknown     Social History:  Social History     Tobacco Use   • Smoking status: Former Smoker     Packs/day: 0.00     Years: 40.00     Pack years: 0.00     Types: Cigarettes   • Smokeless tobacco: Never Used   • Tobacco comment: QUIT SMOKING WITH E CIG-1 WEEK AGO    Substance Use Topics   • Alcohol use: Yes     Alcohol/week: 0.6 oz     Types: 1 Cans of beer per week     Comment: 1 drink per month         Physical Exam:    Vitals:   RF=918/77  HR=63   FpA1=168% room air   Temp=97.5 °F    General Appearance:    Alert, cooperative, no distress, appears stated age   Head:    Normocephalic, without obvious abnormality, atraumatic   Lungs:     Clear to auscultation bilaterally, respirations unlabored    Heart:   Regular rate and rhythm, S1 and S2  normal, no murmur, rub    or gallop    Abdomen:    Soft without tenderness, non-distended, normal bowel sounds, no mass palpated.   Breast Exam:    deferred   Genitalia:    deferred   Extremities:   Extremities normal, atraumatic, no cyanosis or edema   Skin:   Skin color, texture, turgor normal, no rashes or lesions   Neurologic:   Grossly intact     Results Review:  I have reviewed the patient's current lab results.     Cancer Staging:   Cancer Patient: __ yes  X no __unknown; If yes, clinical stage T:__ N:__M:__, stage group    Impression:  Primary osteoarthritis of left hip    Plan:  Left Total Hip Arthroplasty     MANAS Ba[place]a 1/7/2019 7:07 AM

## 2019-01-07 NOTE — PLAN OF CARE
Problem: Patient Care Overview  Goal: Plan of Care Review  Outcome: Ongoing (interventions implemented as appropriate)   01/07/19 3735   Plan of Care Review   Progress improving   OTHER   Outcome Summary Patient ambulated 130 feet with RW and step through gait pattern, limited by pain and lethargy. CGA for all OOB mobility. Plan is for d/c home with family and HHPT. Encouraged patient to ambulate again with nursing tonight. Needs RW/BSC. Will continue to progress mobility training, strengthening, and ROM as able. PADD score of 9.    Coping/Psychosocial   Plan of Care Reviewed With patient

## 2019-01-07 NOTE — THERAPY EVALUATION
Acute Care - Physical Therapy Initial Evaluation  [facility]     Patient Name: Alisa Ames  : 1960  MRN: [id number]  Today's Date: 2019   Onset of Illness/Injury or Date of Surgery: 19  Date of Referral to PT: 19  Referring Physician: MD Cassy      Admit Date: 2019    Visit Dx:     ICD-10-CM ICD-9-CM   1. Impaired functional mobility, balance, gait, and endurance Z74.09 V49.89   2. Primary osteoarthritis of left hip M16.12 715.15     Patient Active Problem List   Diagnosis   • DDD (degenerative disc disease), cervical   • Migraine syndrome   • Goiter   • Hypothyroidism (acquired)   • DDD (degenerative disc disease), lumbar   • Depression with anxiety   • Arthritis   • Syncope   • Allergic rhinitis   • Pure hypercholesterolemia   • Primary osteoarthritis of left hip   • Status post total replacement of left hip   • Prediabetes     Past Medical History:   Diagnosis Date   • COPD (chronic obstructive pulmonary disease) (CMS/HCC)    • Fibromyalgia    • Hyperlipidemia    • Malignant neoplasm (CMS/HCC)    • Migraine    • Syncope    • Thyroid nodule    • Wears dentures     UPPER AND LOWER      Past Surgical History:   Procedure Laterality Date   • APPENDECTOMY     • CERVICAL SPINE SURGERY      Fibromyalgia and DDD with h/o C spine surgery- initially on flexeril bid.   • COLONOSCOPY     • NECK SURGERY     • NOSE SURGERY      r/t Skin CA   • SKIN CANCER EXCISION     • THYROID LOBECTOMY Right     On discussion, pt reported a h/o right thyroid lobectomy for goiter.U/S demo surgical absence of right lobe and diffuse nodularity of the left lobe with a dominant nodule in the lower pole of 1.8 x 3.5 cm.   • TONSILLECTOMY     • TOTAL THYROIDECTOMY          PT ASSESSMENT (last 12 hours)      Physical Therapy Evaluation     Row Name 19 1315          PT Evaluation Time/Intention    Subjective Information  no complaints denies pain at rest  -LR     Document Type  evaluation  -LR     Mode of  Treatment  physical therapy;individual therapy  -LR     Patient Effort  excellent  -LR     Symptoms Noted During/After Treatment  increased pain  -LR     Comment  Increased pain with movement, denied pain once at rest again.   -LR     Row Name 01/07/19 1315          General Information    Patient Profile Reviewed?  yes  -LR     Onset of Illness/Injury or Date of Surgery  01/07/19  -LR     Referring Physician  MD Cassy  -LR     Patient Observations  alert;cooperative;agree to therapy  -LR     General Observations of Patient  Patient supine in bed upon arrival. IV, SCDs, hip abduction.   -LR     Prior Level of Function  min assist:;all household mobility;community mobility;gait;transfer;bed mobility;ADL's;home management;cooking;cleaning;shopping;using stairs;independent:;driving;work all mobility limited by pain  -LR     Equipment Currently Used at Home  none  -LR     Pertinent History of Current Functional Problem  Patient presents for surgical management of persistent and progressive L hip pain and dysfunction that has failed to improve with conservative management.   -LR     Existing Precautions/Restrictions  fall;left;hip, posterior  -LR     Limitations/Impairments  -- none  -LR     Equipment Issued to Patient  -- none  -LR     Equipment Ordered for Patient  -- none  -LR     Risks Reviewed  patient:;LOB;nausea/vomiting;dizziness;increased discomfort;lines disloged  -LR     Benefits Reviewed  patient:;improve function;increase independence;increase strength;increase balance;decrease pain;decrease risk of DVT;increase knowledge  -LR     Barriers to Rehab  previous functional deficit  -LR     Row Name 01/07/19 1318          Relationship/Environment    Primary Source of Support/Comfort  spouse  works during the afternoon  -LR     Lives With  spouse  -LR     Primary Roles/Responsibilities  wage earner, full time  -LR     Row Name 01/07/19 1310          Resource/Environmental Concerns    Current Living  Arrangements  home/apartment/condo  -LR     Resource/Environmental Concerns  none  -LR     Row Name 01/07/19 1315          Home Main Entrance    Number of Stairs, Main Entrance  three  -LR     Stair Railings, Main Entrance  railing on left side (ascending)  -LR     Row Name 01/07/19 1315          Cognitive Assessment/Intervention- PT/OT    Orientation Status (Cognition)  oriented x 4  -LR     Follows Commands (Cognition)  WFL;follows one step commands;over 90% accuracy;verbal cues/prompting required;repetition of directions required  -LR     Safety Deficit (Cognitive)  mild deficit;safety precautions awareness;safety precautions follow-through/compliance  -LR     Row Name 01/07/19 1315          Safety Issues, Functional Mobility    Safety Issues Affecting Function (Mobility)  safety precaution awareness;safety precautions follow-through/compliance;sequencing abilities  -LR     Row Name 01/07/19 1315          Mobility Assessment/Treatment    Extremity Weight-bearing Status  left lower extremity  -LR     Left Lower Extremity (Weight-bearing Status)  weight-bearing as tolerated (WBAT)  -LR     Row Name 01/07/19 1315          Bed Mobility Assessment/Treatment    Bed Mobility Assessment/Treatment  supine-sit  -LR     Supine-Sit [place] (Bed Mobility)  verbal cues;supervision  -LR     Bed Mobility, Safety Issues  decreased use of legs for bridging/pushing  -LR     Assistive Device (Bed Mobility)  head of bed elevated;bed rails  -LR     Comment (Bed Mobility)  Verbal cues to move LEs towards EOB and to push up from bed from behind to raise trunk into sitting and to scoot hips out to get feet on floor. Denied dizziness upon sitting up. Required increased time to perform.   -LR     Row Name 01/07/19 1315          Transfer Assessment/Treatment    Transfer Assessment/Treatment  sit-stand transfer;stand-sit transfer  -LR     Comment (Transfers)  Verbal cues to push up from bed to stand and to reach back for chair to lower  into sitting. Verbal cues to step L LE out before t/f for comfort.   -LR     Sit-Stand [place] (Transfers)  verbal cues;contact guard;1 person to manage equipment  -LR     Stand-Sit [place] (Transfers)  verbal cues;contact guard  -LR     Row Name 01/07/19 1315          Sit-Stand Transfer    Assistive Device (Sit-Stand Transfers)  walker, front-wheeled  -LR     Row Name 01/07/19 1315          Stand-Sit Transfer    Assistive Device (Stand-Sit Transfers)  walker, front-wheeled  -LR     Row Name 01/07/19 1315          Gait/Stairs Assessment/Training    21313 - Gait Training Minutes   6  -LR     [place] Level (Gait)  verbal cues;contact guard;2 person assist  -LR     Assistive Device (Gait)  walker, front-wheeled  -LR     Distance in Feet (Gait)  130  -LR     Pattern (Gait)  step-through  -LR     Deviations/Abnormal Patterns (Gait)  left sided deviations;antalgic;bilateral deviations;mejia decreased;gait speed decreased;stride length decreased;base of support, narrow  -LR     Bilateral Gait Deviations  forward flexed posture;heel strike decreased  -LR     Left Sided Gait Deviations  weight shift ability decreased  -LR     Comment (Gait/Stairs)  Patient ambulated with step through gait pattern at slow pace. Verbal cues for correct sequencing of steps, increased L LE weight bearing/stance phase, decreased UE weight bearing, and wider base.Slight improvement with cues for correction. Gait limited by pain.   -LR     Row Name 01/07/19 1315          General ROM    RT Lower Ext  Comment  -LR     LT Lower Ext  Comment  -LR     Row Name 01/07/19 1315          Right Lower Ext    RT Lower Extremity Comments  R LE AROM WFL  -LR     Row Name 01/07/19 1315          Left Lower Ext    LT Lower Extremity Comments  L hip AROM impaired 25%  -LR     Row Name 01/07/19 1315          MMT (Manual Muscle Testing)    Rt Lower Ext  Rt Hip WFL;Rt Knee WFL;Rt Ankle WFL  -LR     Lt Lower Ext  Lt Knee WFL;Lt Ankle WFL;Comments  -LR      Row Name 01/07/19 1315          MMT Left Lower Ext    Lt Lower Extremity Comments   L hip functionally 4-/5, independent with SLR, no knee buckling with gait.   -LR     Row Name 01/07/19 1315          Motor Assessment/Intervention    Additional Documentation  Therapeutic Exercise (Group);Therapeutic Exercise Interventions (Group)  -LR     Row Name 01/07/19 1315          Therapeutic Exercise    40634 - PT Therapeutic Exercise Minutes  2  -LR     Row Name 01/07/19 1315          Therapeutic Exercise    Lower Extremity (Therapeutic Exercise)  gluteal sets;quad sets, left  -LR     Lower Extremity Range of Motion (Therapeutic Exercise)  ankle dorsiflexion/plantar flexion, left  -LR     Exercise Type (Therapeutic Exercise)  AROM (active range of motion);isometric contraction, static;isotonic contraction, concentric  -LR     Position (Therapeutic Exercise)  supine  -LR     Sets/Reps (Therapeutic Exercise)  x10 reps each  -LR     Comment (Therapeutic Exercise)  cues for technique; able to actively DF bilaterally  -LR     Row Name 01/07/19 1315          Sensory Assessment/Intervention    Sensory General Assessment  no sensation deficits identified;other (see comments) denies numbness/tingling;light touch equal and intact  -LR     Row Name 01/07/19 1315          Vision Assessment/Intervention    Visual Impairment/Limitations  WFL  -LR     Row Name 01/07/19 1315          Pain Assessment    Additional Documentation  Pain Scale: Numbers Pre/Post-Treatment (Group)  -     Row Name 01/07/19 1315          Pain Scale: Numbers Pre/Post-Treatment    Pain Scale: Numbers, Pretreatment  0/10 - no pain  -LR     Pain Scale: Numbers, Post-Treatment  0/10 - no pain  -LR     Pain Location - Side  Left  -LR     Pain Location  hip  -LR     Pre/Post Treatment Pain Comment  denies pain at rest, reports 6/10 with movement.   -LR     Pain Intervention(s)  Repositioned;Ambulation/increased activity  -LR     Row Name             Wound 01/07/19  0818 Left hip incision    Wound - Properties Group Date first assessed: 01/07/19  -JA Time first assessed: 0818  -JA Side: Left  -JA Location: hip  -JA Type: incision  -JA    Row Name 01/07/19 1315          Coping    Observed Emotional State  accepting;cooperative  -LR     Row Name 01/07/19 1315          Plan of Care Review    Plan of Care Reviewed With  patient  -LR     Row Name 01/07/19 1315          Physical Therapy Clinical Impression    Date of Referral to PT  01/07/19  -LR     PT Diagnosis (PT Clinical Impression)  s/p elective L KAREN via posterior approach  -LR     Prognosis (PT Clinical Impression)  good  -LR     Patient/Family Goals Statement (PT Clinical Impression)  go home, decrease pain  -LR     Criteria for Skilled Interventions Met (PT Clinical Impression)  yes;treatment indicated  -LR     Rehab Potential (PT Clinical Summary)  good, to achieve stated therapy goals  -LR     Care Plan Review (PT)  evaluation/treatment results reviewed;care plan/treatment goals reviewed;risks/benefits reviewed;current/potential barriers reviewed;patient/other agree to care plan  -LR     Care Plan Review, Other Participant (PT Clinical Impression)  spouse  -LR     Row Name 01/07/19 1315          Physical Therapy Goals    Bed Mobility Goal Selection (PT)  bed mobility, PT goal 1  -LR     Transfer Goal Selection (PT)  transfer, PT goal 1  -LR     Gait Training Goal Selection (PT)  gait training, PT goal 1  -LR     Stairs Goal Selection (PT)  stairs, PT goal 1  -LR     Additional Documentation  Stairs Goal Selection (PT) (Row)  -LR     Row Name 01/07/19 1315          Bed Mobility Goal 1 (PT)    Activity/Assistive Device (Bed Mobility Goal 1, PT)  sit to supine/supine to sit  -LR     [place] Level/Cues Needed (Bed Mobility Goal 1, PT)  conditional independence  -LR     Time Frame (Bed Mobility Goal 1, PT)  long term goal (LTG);3 days  -LR     Progress/Outcomes (Bed Mobility Goal 1, PT)  goal ongoing  -LR     Row Name  01/07/19 1315          Transfer Goal 1 (PT)    Activity/Assistive Device (Transfer Goal 1, PT)  sit-to-stand/stand-to-sit;walker, rolling  -LR     [place] Level/Cues Needed (Transfer Goal 1, PT)  conditional independence  -LR     Time Frame (Transfer Goal 1, PT)  long term goal (LTG);3 days  -LR     Progress/Outcome (Transfer Goal 1, PT)  goal ongoing  -LR     Row Name 01/07/19 1315          Gait Training Goal 1 (PT)    Activity/Assistive Device (Gait Training Goal 1, PT)  gait (walking locomotion);walker, rolling  -LR     [place] Level (Gait Training Goal 1, PT)  conditional independence  -LR     Distance (Gait Goal 1, PT)  500 feet  -LR     Time Frame (Gait Training Goal 1, PT)  long term goal (LTG);3 days  -LR     Progress/Outcome (Gait Training Goal 1, PT)  goal ongoing  -LR     Row Name 01/07/19 1315          Stairs Goal 1 (PT)    Activity/Assistive Device (Stairs Goal 1, PT)  ascending stairs;descending stairs;using handrail, left;step-to-step;cane, straight  -LR     [place] Level/Cues Needed (Stairs Goal 1, PT)  contact guard assist  -LR     Number of Stairs (Stairs Goal 1, PT)  3  -LR     Time Frame (Stairs Goal 1, PT)  long term goal (LTG);3 days  -LR     Progress/Outcome (Stairs Goal 1, PT)  goal ongoing  -LR     Row Name 01/07/19 1315          Positioning and Restraints    Pre-Treatment Position  in bed  -LR     Post Treatment Position  chair  -LR     In Chair  notified nsg;reclined;sitting;call light within reach;encouraged to call for assist;exit alarm on;compression device;legs elevated;ABD pillow  -LR     Row Name 01/07/19 1315          Living Environment    Home Accessibility  stairs to enter home;not wheelchair accessible;tub/shower is not walk in  -LR       User Key  (r) = Recorded By, (t) = Taken By, (c) = Cosigned By    Initials Name Provider Type    LR Tarah Dior, PT Physical Therapist    Huber Whitt, RN Registered Nurse        Physical Therapy Education      Title: PT OT SLP Therapies (Done)     Topic: Physical Therapy (Done)     Point: Mobility training (Done)     Learning Progress Summary           Patient Acceptance, E,D, VU,NR by LR at 1/7/2019  1:50 PM    Comment:  Educated on posterior hip precautions, weight bearing status, correct supine to sit t/f technique, correct sit<->stand t/f technique, correct gait mechanics, and progression of POC.                   Point: Home exercise program (Done)     Learning Progress Summary           Patient Acceptance, E,D, VU,NR by LR at 1/7/2019  1:50 PM    Comment:  Educated on posterior hip precautions, weight bearing status, correct supine to sit t/f technique, correct sit<->stand t/f technique, correct gait mechanics, and progression of POC.                   Point: Body mechanics (Done)     Learning Progress Summary           Patient Acceptance, E,D, VU,NR by LR at 1/7/2019  1:50 PM    Comment:  Educated on posterior hip precautions, weight bearing status, correct supine to sit t/f technique, correct sit<->stand t/f technique, correct gait mechanics, and progression of POC.                   Point: Precautions (Done)     Learning Progress Summary           Patient Acceptance, E,D, VU,NR by LR at 1/7/2019  1:50 PM    Comment:  Educated on posterior hip precautions, weight bearing status, correct supine to sit t/f technique, correct sit<->stand t/f technique, correct gait mechanics, and progression of POC.                               User Key     Initials Effective Dates Name Provider Type Discipline     06/19/15 -  Tarah Dior, PT Physical Therapist PT              PT Recommendation and Plan  Anticipated Discharge Disposition (PT): home with assist, home with home health  Planned Therapy Interventions (PT Eval): balance training, bed mobility training, gait training, home exercise program, patient/family education, ROM (range of motion), stair training, strengthening, transfer training  Therapy Frequency (PT  Clinical Impression): 2 times/day  Outcome Summary/Treatment Plan (PT)  Anticipated Equipment Needs at Discharge (PT): front wheeled walker, bedside commode, standard cane  Anticipated Discharge Disposition (PT): home with assist, home with home health  Plan of Care Reviewed With: patient  Progress: improving  Outcome Summary: Patient ambulated 130 feet with RW and step through gait pattern, limited by pain and lethargy. CGA for all OOB mobility. Plan is for d/c home with family and HHPT. Encouraged patient to ambulate again with nursing tonight. Needs RW/BSC. Will continue to progress mobility training, strengthening, and ROM as able. PADD score of 9.   Outcome Measures     Row Name 01/07/19 1315             How much help from another person do you currently need...    Turning from your back to your side while in flat bed without using bedrails?  4  -LR      Moving from lying on back to sitting on the side of a flat bed without bedrails?  3  -LR      Moving to and from a bed to a chair (including a wheelchair)?  3  -LR      Standing up from a chair using your arms (e.g., wheelchair, bedside chair)?  3  -LR      Climbing 3-5 steps with a railing?  3  -LR      To walk in hospital room?  3  -LR      AM-PAC 6 Clicks Score  19  -LR         Functional Assessment    Outcome Measure Options  AM-PAC 6 Clicks Basic Mobility (PT)  -LR        User Key  (r) = Recorded By, (t) = Taken By, (c) = Cosigned By    Initials Name Provider Type    LR Tarah Dior, PT Physical Therapist         Time Calculation:   PT Charges     Row Name 01/07/19 1315             Time Calculation    Start Time  1315  -LR      PT Received On  01/07/19  -LR      PT Goal Re-Cert Due Date  01/17/19  -LR         Time Calculation- PT    Total Timed Code Minutes- PT  8 minute(s)  -LR         Timed Charges    28266 - PT Therapeutic Exercise Minutes  2  -LR      12731 - Gait Training Minutes   6  -LR        User Key  (r) = Recorded By, (t) = Taken By,  (c) = Cosigned By    Initials Name Provider Type    LR Tarah Dior, PT Physical Therapist        Therapy Suggested Charges     Code   Minutes Charges    15936 (CPT®) Hc Pt Neuromusc Re Education Ea 15 Min      38623 (CPT®) Hc Pt Ther Proc Ea 15 Min 2     24681 (CPT®) Hc Gait Training Ea 15 Min 6 1    80255 (CPT®) Hc Pt Therapeutic Act Ea 15 Min      55577 (CPT®) Hc Pt Manual Therapy Ea 15 Min      85778 (CPT®) Hc Pt Iontophoresis Ea 15 Min      79790 (CPT®) Hc Pt Elec Stim Ea-Per 15 Min      46729 (CPT®) Hc Pt Ultrasound Ea 15 Min      76452 (CPT®) Hc Pt Self Care/Mgmt/Train Ea 15 Min      27654 (CPT®) Hc Pt Prosthetic (S) Train Initial Encounter, Each 15 Min      83275 (CPT®) Hc Pt Orthotic(S)/Prosthetic(S) Encounter, Each 15 Min      08227 (CPT®) Hc Orthotic(S) Mgmt/Train Initial Encounter, Each 15min      Total  8 1        Therapy Charges for Today     Code Description Service Date Service Provider Modifiers Qty    57874658244 HC GAIT TRAINING EA 15 MIN 1/7/2019 Tarah Dior, PT GP 1    55873932291 HC PT THER SUPP EA 15 MIN 1/7/2019 Tarah Dior, PT GP 2    59127607902 HC PT EVAL LOW COMPLEXITY 3 1/7/2019 Tarah Dior, PT GP 1          PT G-Codes  Outcome Measure Options: AM-PAC 6 Clicks Basic Mobility (PT)  AM-PAC 6 Clicks Score: 19      Tarah Dior, PT  1/7/2019

## 2019-01-07 NOTE — ANESTHESIA POSTPROCEDURE EVALUATION
Patient: Alisa Ames    Procedure Summary     Date:  01/07/19 Room / Location:   [facility]  [facility]    Anesthesia Start:  0728 Anesthesia Stop:      Procedure:  LEFT TOTAL HIP ARTHROPLASTY (Left Hip) Diagnosis:       Primary osteoarthritis of left hip      (Primary osteoarthritis of left hip [M16.12])    Surgeon:  Allen Madera MD Provider:  Papi Mcgill MD    Anesthesia Type:  spinal ASA Status:  2          Anesthesia Type: spinal  Last vitals  /62  171/77 (01/07/19 0705)   Temp 97.3  97.5 °F (36.4 °C) (01/07/19 0705)   Pulse 75  68 (01/07/19 0705)   Resp 18  16 (01/07/19 0705)     SpO2 93  100 % (01/07/19 0705)     Post Anesthesia Care and Evaluation    Patient location during evaluation: PACU  Patient participation: complete - patient participated  Level of consciousness: awake and alert  Pain score: 0  Pain management: adequate  Airway patency: patent  Anesthetic complications: No anesthetic complications  PONV Status: none  Cardiovascular status: hemodynamically stable and acceptable  Respiratory status: nonlabored ventilation, acceptable and nasal cannula  Hydration status: acceptable

## 2019-01-07 NOTE — BRIEF OP NOTE
TOTAL HIP ARTHROPLASTY  Progress Note    Alisa Ames  1/7/2019    Pre-op Diagnosis:   Primary osteoarthritis of left hip [M16.12]       Post-Op Diagnosis Codes:     * Primary osteoarthritis of left hip [M16.12]    Procedure/CPT® Codes:  TX TOTAL HIP ARTHROPLASTY [12859]    Procedure(s):  LEFT TOTAL HIP ARTHROPLASTY    Surgeon(s):  Allen Madera MD    Anesthesia: Spinal    Staff:   Circulator: Huber Dooley RN  Scrub Person: Diaz Shepard  Vendor Representative: Tian Hagen  Nursing Assistant: Giovani Skinner  Assistant: Stefania Lundberg PA-C    Estimated Blood Loss: 100 mL    Urine Voided: * No values recorded between 1/7/2019  7:27 AM and 1/7/2019  9:32 AM *    Specimens:                None      Drains:      Findings: End-stage osteoarthritis left hip with full-thickness cartilage loss seen on femoral head    Complications: None apparent      Allen Madera MD     Date: 1/7/2019  Time: 9:32 AM

## 2019-01-07 NOTE — ANESTHESIA PROCEDURE NOTES
Spinal Block      Patient reassessed immediately prior to procedure    Patient location during procedure: OR  Indication:at surgeon's request  Performed By  CRNA: Crow Ramirez CRNA  Preanesthetic Checklist  Completed: patient identified, site marked, surgical consent, pre-op evaluation, timeout performed, IV checked, risks and benefits discussed and monitors and equipment checked  Spinal Block Prep:  Patient Position:sitting  Sterile Tech:cap, gloves, sterile barriers and mask  Prep:Chloraprep  Patient Monitoring:blood pressure monitoring, continuous pulse oximetry and EKG  Spinal Block Procedure  Approach:midline  Guidance:landmark technique and palpation technique  Location:L4-L5  Needle Type:Julien  Needle Gauge:25 G  Placement of Spinal needle event:cerebrospinal fluid aspirated  Paresthesia: no  Fluid Appearance:clear  Medications: bupivacaine (PF) (MARCAINE) 0.5 % injection Isobaric, 2 mL  Med Administered at 1/7/2019 7:38 AM   Post Assessment  Patient Tolerance:patient tolerated the procedure well with no apparent complications  Complications no  Additional Notes  Procedure:  Pt assisted to sitting position, with legs in position of comfort over side of bed.  Pt. instructed in optimal spine presentation, the spine was prepped/ Draped and the skin at insertion site was anesthetized with 1% Lidocaine 2 ml.  The spinal needle was then advanced until CSF flow was obtained and LA was injected:

## 2019-01-08 VITALS
SYSTOLIC BLOOD PRESSURE: 128 MMHG | DIASTOLIC BLOOD PRESSURE: 61 MMHG | HEART RATE: 71 BPM | HEIGHT: 65 IN | BODY MASS INDEX: 24.66 KG/M2 | OXYGEN SATURATION: 94 % | TEMPERATURE: 97.8 F | WEIGHT: 148 LBS | RESPIRATION RATE: 18 BRPM

## 2019-01-08 LAB
ANION GAP SERPL CALCULATED.3IONS-SCNC: 9 MMOL/L (ref 3–11)
BUN BLD-MCNC: 19 MG/DL (ref 9–23)
BUN/CREAT SERPL: 18.8 (ref 7–25)
CALCIUM SPEC-SCNC: 8.2 MG/DL (ref 8.7–10.4)
CHLORIDE SERPL-SCNC: 106 MMOL/L (ref 99–109)
CO2 SERPL-SCNC: 21 MMOL/L (ref 20–31)
CREAT BLD-MCNC: 1.01 MG/DL (ref 0.6–1.3)
DEPRECATED RDW RBC AUTO: 48.8 FL (ref 37–54)
ERYTHROCYTE [DISTWIDTH] IN BLOOD BY AUTOMATED COUNT: 13.5 % (ref 11.3–14.5)
GFR SERPL CREATININE-BSD FRML MDRD: 56 ML/MIN/1.73
GLUCOSE BLD-MCNC: 159 MG/DL (ref 70–100)
GLUCOSE BLDC GLUCOMTR-MCNC: 112 MG/DL (ref 70–130)
GLUCOSE BLDC GLUCOMTR-MCNC: 92 MG/DL (ref 70–130)
HCT VFR BLD AUTO: 33.3 % (ref 34.5–44)
HGB BLD-MCNC: 10.7 G/DL (ref 11.5–15.5)
MCH RBC QN AUTO: 32 PG (ref 27–31)
MCHC RBC AUTO-ENTMCNC: 32.1 G/DL (ref 32–36)
MCV RBC AUTO: 99.7 FL (ref 80–99)
PLATELET # BLD AUTO: 234 10*3/MM3 (ref 150–450)
PMV BLD AUTO: 10.4 FL (ref 6–12)
POTASSIUM BLD-SCNC: 3.5 MMOL/L (ref 3.5–5.5)
RBC # BLD AUTO: 3.34 10*6/MM3 (ref 3.89–5.14)
SODIUM BLD-SCNC: 136 MMOL/L (ref 132–146)
WBC NRBC COR # BLD: 7.59 10*3/MM3 (ref 3.5–10.8)

## 2019-01-08 PROCEDURE — 82962 GLUCOSE BLOOD TEST: CPT

## 2019-01-08 PROCEDURE — 25010000003 CEFAZOLIN IN DEXTROSE 2-4 GM/100ML-% SOLUTION: Performed by: ORTHOPAEDIC SURGERY

## 2019-01-08 PROCEDURE — 97116 GAIT TRAINING THERAPY: CPT

## 2019-01-08 PROCEDURE — 97110 THERAPEUTIC EXERCISES: CPT

## 2019-01-08 PROCEDURE — 99024 POSTOP FOLLOW-UP VISIT: CPT | Performed by: ORTHOPAEDIC SURGERY

## 2019-01-08 PROCEDURE — 97530 THERAPEUTIC ACTIVITIES: CPT

## 2019-01-08 PROCEDURE — 80048 BASIC METABOLIC PNL TOTAL CA: CPT | Performed by: ORTHOPAEDIC SURGERY

## 2019-01-08 PROCEDURE — 97165 OT EVAL LOW COMPLEX 30 MIN: CPT

## 2019-01-08 PROCEDURE — 85027 COMPLETE CBC AUTOMATED: CPT | Performed by: NURSE PRACTITIONER

## 2019-01-08 RX ADMIN — DOCUSATE SODIUM 100 MG: 100 CAPSULE, LIQUID FILLED ORAL at 04:29

## 2019-01-08 RX ADMIN — MELOXICAM 15 MG: 7.5 TABLET ORAL at 08:43

## 2019-01-08 RX ADMIN — HYDROCODONE BITARTRATE AND ACETAMINOPHEN 1 TABLET: 7.5; 325 TABLET ORAL at 04:29

## 2019-01-08 RX ADMIN — CEFAZOLIN SODIUM 2 G: 2 INJECTION, SOLUTION INTRAVENOUS at 00:15

## 2019-01-08 RX ADMIN — HYDROCODONE BITARTRATE AND ACETAMINOPHEN 1 TABLET: 7.5; 325 TABLET ORAL at 08:43

## 2019-01-08 RX ADMIN — ATORVASTATIN CALCIUM 10 MG: 10 TABLET, FILM COATED ORAL at 08:43

## 2019-01-08 RX ADMIN — ASPIRIN 325 MG: 325 TABLET, DELAYED RELEASE ORAL at 08:43

## 2019-01-08 RX ADMIN — HYDROCODONE BITARTRATE AND ACETAMINOPHEN 1 TABLET: 7.5; 325 TABLET ORAL at 12:39

## 2019-01-08 RX ADMIN — DULOXETINE HYDROCHLORIDE 60 MG: 60 CAPSULE, DELAYED RELEASE ORAL at 08:43

## 2019-01-08 NOTE — DISCHARGE PLACEMENT REQUEST
Kwaku Alisa SIDNEY (58 y.o. Female)     HARISH Fatima Case Manager, [phone]      [place] [address] [facility] [address]  Phone:  [phone]  Fax:   Date: 2019      Ambulatory Referral to Physical Therapy Evaluate and treat     Patient:  Alisa Ames MRN:  [id number]   [address] [place] [address] :  1960  SSN:    Phone: [phone] Sex:  F      INSURANCE PAYOR PLAN GROUP # SUBSCRIBER ID   Primary:    [id number]   [id number]      Referring Provider Information:  ANNABELLA BARLOW Phone: [phone] Fax:       Referral Information:   # Visits:  1 Referral Type: Therapy [AE1]   Urgency:  Routine Referral Reason: Specialty Services Required   Start Date: 2019 End Date:  To be determined by Insurer   Diagnosis: Impaired functional mobility, balance, gait, and endurance (Z74.09 [ICD-10-CM] V49.89 [ICD-9-CM])  Status post total replacement of left hip (Z96.642 [ICD-10-CM] V43.64 [ICD-9-CM])      Refer to Dept:   Refer to Provider:   Refer to Facility:  [facility]  Specialty needed: Evaluate and treat     This document serves as a request of services and does not constitute Insurance authorization or approval of services.  To determine eligibility, please contact the members Insurance carrier to verify and review coverage.     If you have medical questions regarding this request for services. Please contact [address] at [phone] between the hours of 8:00am - 5:00pm (Mon-Fri).       Verbal Order Mode: Per protocol: cosign required  Authorizing Provider: Annabella Barlow MD  Authorizing Provider's NPI: [id number]     Order Entered By: Akua Vicente RN 2019  9:48 AM                  Date of Birth Social Security Number Address Home Phone MRN    1960  [address] [place] [address] [phone] [id number]    [employer] Marital Status          None    "     Admission Date Admission Type Admitting Provider Attending Provider Department, Room/Bed    19 Urgent Allen Madera MD Luckett, Matthew R, MD [facility] 3H, S372/1    Discharge Date Discharge Disposition Discharge Destination                       Attending Provider:  Allen Madera MD    Allergies:  No Known Allergies    Isolation:  None   Infection:  None   Code Status:  CPR    Ht:  165.1 cm (65\")   Wt:  67.1 kg (148 lb)    Admission Cmt:  None   Principal Problem:  Status post total replacement of left hip [Z96.642]                 Active Insurance as of 2019     Primary Coverage     Payor Plan Insurance Group Employer/Plan Group    [facility]      Payor Plan Address Payor Plan Phone Number Payor Plan Fax Number Effective Dates    [address]   2018 - None Entered    [facility] [address]       Subscriber Name Subscriber Birth Date Member ID       ANTONIO AMES 1966 [id number]                 Emergency Contacts      (Rel.) Home Phone Work Phone Mobile Phone    Nic Ames (Spouse) [phone] -- [phone]            Insurance Information                [facility] Phone:     Subscriber: Antonio Ames Subscriber#: [id number]    Group#:  Precert#:              History & Physical      Darryl Mendosa MD at 2019 11:17 AM          Patient Name: Alisa Ames  MRN: [id number]  : 1960  DOS: 2019    Attending: Allen Madera MD    Primary Care Provider: Tierney Gama MD      Chief complaint:  Left hip pain    Subjective   Patient is a 58 y.o. female presented for left total hip arthroplasty by Dr. Madear under spinal anesthesia. She tolerated surgery well and is admitted for further medical management. Her hip has been painful for about two and a half years. She denies use of assistive device for ambulation or recent falls.     When seen postop she is doing well. Her pain control is good. " She denies nausea, shortness of breath or chest pain. No hx of DVT or PE.    ( Above is noted/ agree. Doing ok. Some pain, drowsy after IV pain medicine. Already walked with PT, 130 ft with RW and step through gait pattern. No n/vom/sob.) wy       Allergies:  No Known Allergies    Meds:  Medications Prior to Admission   Medication Sig Dispense Refill Last Dose   • ARIPiprazole (ABILIFY) 5 MG tablet Take 1 tablet by mouth every night at bedtime. 90 tablet 1 1/7/2019 at am   • atorvastatin (LIPITOR) 10 MG tablet Take 1 tablet by mouth Daily. 90 tablet 1 1/7/2019 at am   • diphenoxylate-atropine (LOMOTIL) 2.5-0.025 MG per tablet Take 1 tablet by mouth 4 (Four) Times a Day As Needed for Diarrhea. 120 tablet 0 1/7/2019 at am   • DULoxetine (CYMBALTA) 60 MG capsule Take 1 capsule by mouth Daily. 90 capsule 1 1/7/2019 at am   • levothyroxine (SYNTHROID, LEVOTHROID) 88 MCG tablet TAKE 1 TABLET DAILY. FASTING AND WAIT 1 HOUR FOR FOOD OR OTHER MEDICATIONS (Patient taking differently: Take 88 mcg by mouth Daily. TAKE 1 TABLET DAILY. FASTING AND WAIT 1 HOUR FOR FOOD OR OTHER MEDICATIONS) 90 tablet 1 1/6/2019 at pm   • montelukast (SINGULAIR) 10 MG tablet Take 10 mg by mouth Every Night.   1/7/2019 at am   • nitrofurantoin, macrocrystal-monohydrate, (MACROBID) 100 MG capsule Take 1 capsule by mouth Every 12 (Twelve) Hours. 14 capsule 0 1/7/2019 at am   • rizatriptan (MAXALT) 10 MG tablet Take 1 tab po prn migraine. May repeat in 2 hours if needed, max 3 in 24 hr 27 tablet 1 Past Month at am   • tiZANidine (ZANAFLEX) 4 MG tablet Take 1 tablet by mouth Every 8 (Eight) Hours As Needed for Muscle Spasms. 180 tablet 1 1/7/2019 at am   • Topiramate ER (TROKENDI XR) 200 MG capsule sustained-release 24 hr Take  by mouth.   1/7/2019 at am   • Vortioxetine HBr (TRINTELLIX) 20 MG tablet Take 20 mg by mouth Daily. 90 tablet 1 1/7/2019 at am       History:   Past Medical History:   Diagnosis Date   • COPD (chronic obstructive pulmonary  "disease) (CMS/HCC)    • Fibromyalgia    • Hyperlipidemia    • Malignant neoplasm (CMS/HCC)    • Migraine    • Syncope    • Thyroid nodule    • Wears dentures     UPPER AND LOWER      Past Surgical History:   Procedure Laterality Date   • APPENDECTOMY     • CERVICAL SPINE SURGERY      Fibromyalgia and DDD with h/o C spine surgery- initially on flexeril bid.   • COLONOSCOPY  2015   • NECK SURGERY     • NOSE SURGERY      r/t Skin CA   • SKIN CANCER EXCISION     • THYROID LOBECTOMY Right     On discussion, pt reported a h/o right thyroid lobectomy for goiter.U/S demo surgical absence of right lobe and diffuse nodularity of the left lobe with a dominant nodule in the lower pole of 1.8 x 3.5 cm.   • TONSILLECTOMY     • TOTAL THYROIDECTOMY       Family History   Problem Relation Age of Onset   • Other Mother         malignant neoplasm   • Coronary artery disease Mother         MI (60's)   • Heart attack Mother    • Other Other         malignant neoplasm   • Valvular heart disease Father      Social History     Tobacco Use   • Smoking status: Former Smoker     Packs/day: 0.00     Years: 40.00     Pack years: 0.00     Types: Cigarettes   • Smokeless tobacco: Never Used   • Tobacco comment: QUIT SMOKING WITH E CIG-1 WEEK AGO    Substance Use Topics   • Alcohol use: Yes     Alcohol/week: 0.6 oz     Types: 1 Cans of beer per week     Comment: 1 drink per month   • Drug use: No   She is  with 1 child. 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    Review of Systems  All systems were reviewed and negative except for:  Gastrointestinal: positive for  diarrhea    Vital Signs  Visit Vitals  /66 (BP Location: Left arm, Patient Position: Lying)   Pulse 77   Temp 96.4 °F (35.8 °C) (Oral)   Resp 16   Ht 165.1 cm (65\")   Wt 67.1 kg (148 lb)   SpO2 91%   BMI 24.63 kg/m²       Physical Exam:    General Appearance:    Alert, cooperative, in no acute distress   Head:    Normocephalic, without obvious abnormality, atraumatic   Eyes:  "           Lids and lashes normal, conjunctivae and sclerae normal, no   icterus, no pallor, corneas clear    Ears:    Ears appear intact with no abnormalities noted   Neck:   No adenopathy, supple, trachea midline, no thyromegaly    Lungs:     Clear to auscultation,respirations regular, even and                   unlabored    Heart:    Regular rhythm and normal rate, normal S1 and S2, no            murmur, no gallop, no rub    Abdomen:     Normal bowel sounds, no masses, no organomegaly, soft        non-tender, non-distended, no guarding, no rebound                 tenderness   Genitalia:    Deferred   Extremities:   Left hip Aquacel CDI. Adductor pillow present   Pulses:   Pulses palpable and equal bilaterally   Skin:   No bleeding, bruising or rash   Neurologic:   Cranial nerves 2 - 12 grossly intact, sensation and movement BLE limited due to lingering effects of spinal block. Flexion and dorsiflexion intact bilateral feet.  ( f/u exam: Flexion and dorsiflexion intact bilateral feet.)wy        I reviewed the patient's new clinical results.     Results for NICOLÁS HURST (MRN [id number]) as of 1/7/2019 11:20   Ref. Range 12/27/2018 12:18   Glucose Latest Ref Range: 70 - 100 mg/dL 92   Sodium Latest Ref Range: 132 - 146 mmol/L 140   Potassium Latest Ref Range: 3.5 - 5.5 mmol/L 3.8   CO2 Latest Ref Range: 20.0 - 31.0 mmol/L 26.0   Chloride Latest Ref Range: 99 - 109 mmol/L 107   Anion Gap Latest Ref Range: 3.0 - 11.0 mmol/L 7.0   Creatinine Latest Ref Range: 0.60 - 1.30 mg/dL 0.98   BUN Latest Ref Range: 9 - 23 mg/dL 13   BUN/Creatinine Ratio Latest Ref Range: 7.0 - 25.0  13.3   Calcium Latest Ref Range: 8.7 - 10.4 mg/dL 9.1   eGFR Non African Amer Latest Ref Range: >60 mL/min/1.73 58 (L)   Hemoglobin A1C Latest Ref Range: 4.80 - 5.60 % 6.10 (H)   Protime Latest Ref Range: 9.6 - 11.5 Seconds 9.8   INR Latest Ref Range: 0.91 - 1.09  0.93   aPTT Latest Ref Range: 24.0 - 31.0 seconds 29.2   WBC Latest Ref Range: 3.50 -  10.80 10*3/mm3 4.48   RBC Latest Ref Range: 3.89 - 5.14 10*6/mm3 3.98   Hemoglobin Latest Ref Range: 11.5 - 15.5 g/dL 12.9   Hematocrit Latest Ref Range: 34.5 - 44.0 % 39.3   RDW Latest Ref Range: 11.3 - 14.5 % 13.3   MCV Latest Ref Range: 80.0 - 99.0 fL 98.7   MCH Latest Ref Range: 27.0 - 31.0 pg 32.4 (H)   MCHC Latest Ref Range: 32.0 - 36.0 g/dL 32.8   MPV Latest Ref Range: 6.0 - 12.0 fL 9.8   Platelets Latest Ref Range: 150 - 450 10*3/mm3 306     Assessment and Plan:       Status post total replacement of left hip    Primary osteoarthritis of left hip    Hypothyroidism (acquired)    Depression with anxiety    Pure hypercholesterolemia    Prediabetes      Plan    1. PT/OT- early ambulation post op  2. Pain control-prns( I added Lortab)wy  3. IS-encourage  4. DVT proph- mechs/ASA  5. Bowel regimen  6. Resume home medications as appropriate  7. Monitor post-op labs  8. DC planning for home    HLD  - Continue home statin     Anxiety and depression  - continue home abilify and cymbalta  - may bring trokendi and trintellix from home    Hypothyroid  - Continue home synthroid    PreDM  - hgb A1c on 12/27/18 6.1  - Accuchecks ACHS with low dose SSI      GERSON Castro  01/07/19  11:22 AM     I have personally performed the evaluation on this patient. My history is consistent  with HPI obtained. My exam findings are listed above. I have personally reviewed and discussed the above formulated treatment plan with pt , RN, and .APRN.wy.      Electronically signed by Darryl Mendosa MD at 1/7/2019  4:35 PM     Collette Wren PA at 1/7/2019  6:52 AM     Attestation signed by Allen Madera MD at 1/7/2019  7:13 AM    Agree.  Proceed with left total hip arthroplasty.                  [facility] Pre-OP H&P      Chief complaint:  Left Hip Pain    Subjective:  Patient is a 58 y.o.female presents with a history of clinical and radiographic evidence of severe left hip joint degeneration.  Here today  for a left total hip arthroplasty.       Review of Systems:  General ROS: negative for fever, chills, weakness, dizziness, headache, fatigue, weight changes  Cardiovascular ROS: no chest pain or dyspnea on exertion  Respiratory ROS: no cough, shortness of breath, or wheezing  GI ROS: no abdominal pain/discomfort, nausea, vomiting or diarrhea   ROS: no dysuria, hematuria or complaints  Skin ROS: no itching, rash or open wounds.      Allergies: No Known Allergies  Latex: no known allergy  Contrast Dye:  no known allergy      Home Meds    Medications Prior to Admission   Medication Sig Dispense Refill Last Dose   • ARIPiprazole (ABILIFY) 5 MG tablet Take 1 tablet by mouth every night at bedtime. 90 tablet 1 1/7/2019 at am   • atorvastatin (LIPITOR) 10 MG tablet Take 1 tablet by mouth Daily. 90 tablet 1 Taking   • diphenoxylate-atropine (LOMOTIL) 2.5-0.025 MG per tablet Take 1 tablet by mouth 4 (Four) Times a Day As Needed for Diarrhea. 120 tablet 0 Taking   • DULoxetine (CYMBALTA) 60 MG capsule Take 1 capsule by mouth Daily. 90 capsule 1 Taking   • levothyroxine (SYNTHROID, LEVOTHROID) 88 MCG tablet TAKE 1 TABLET DAILY. FASTING AND WAIT 1 HOUR FOR FOOD OR OTHER MEDICATIONS (Patient taking differently: Take 88 mcg by mouth Daily. TAKE 1 TABLET DAILY. FASTING AND WAIT 1 HOUR FOR FOOD OR OTHER MEDICATIONS) 90 tablet 1 Taking   • montelukast (SINGULAIR) 10 MG tablet Take 10 mg by mouth Every Night.      • nitrofurantoin, macrocrystal-monohydrate, (MACROBID) 100 MG capsule Take 1 capsule by mouth Every 12 (Twelve) Hours. 14 capsule 0 Taking   • rizatriptan (MAXALT) 10 MG tablet Take 1 tab po prn migraine. May repeat in 2 hours if needed, max 3 in 24 hr 27 tablet 1 Taking   • tiZANidine (ZANAFLEX) 4 MG tablet Take 1 tablet by mouth Every 8 (Eight) Hours As Needed for Muscle Spasms. 180 tablet 1 Taking   • Topiramate ER (TROKENDI XR) 200 MG capsule sustained-release 24 hr Take  by mouth.      • Vortioxetine HBr (TRINTELLIX)  20 MG tablet Take 20 mg by mouth Daily. 90 tablet 1 Taking     PMH:   Past Medical History:   Diagnosis Date   • COPD (chronic obstructive pulmonary disease) (CMS/HCC)    • Fibromyalgia    • Hyperlipidemia    • Malignant neoplasm (CMS/HCC)    • Migraine    • Syncope    • Thyroid nodule    • Wears dentures     UPPER AND LOWER      PSH:    Past Surgical History:   Procedure Laterality Date   • APPENDECTOMY     • CERVICAL SPINE SURGERY      Fibromyalgia and DDD with h/o C spine surgery- initially on flexeril bid.   • COLONOSCOPY  2015   • NECK SURGERY     • NOSE SURGERY      r/t Skin CA   • SKIN CANCER EXCISION     • THYROID LOBECTOMY Right     On discussion, pt reported a h/o right thyroid lobectomy for goiter.U/S demo surgical absence of right lobe and diffuse nodularity of the left lobe with a dominant nodule in the lower pole of 1.8 x 3.5 cm.   • TONSILLECTOMY     • TOTAL THYROIDECTOMY       Immunization History: pneumonia=no        Flu=no       Tetanus=unknown     Social History:  Social History     Tobacco Use   • Smoking status: Former Smoker     Packs/day: 0.00     Years: 40.00     Pack years: 0.00     Types: Cigarettes   • Smokeless tobacco: Never Used   • Tobacco comment: QUIT SMOKING WITH E CIG-1 WEEK AGO    Substance Use Topics   • Alcohol use: Yes     Alcohol/week: 0.6 oz     Types: 1 Cans of beer per week     Comment: 1 drink per month         Physical Exam:    Vitals:   CA=605/77  HR=63   PhX7=718% room air   Temp=97.5 °F    General Appearance:    Alert, cooperative, no distress, appears stated age   Head:    Normocephalic, without obvious abnormality, atraumatic   Lungs:     Clear to auscultation bilaterally, respirations unlabored    Heart:   Regular rate and rhythm, S1 and S2 normal, no murmur, rub    or gallop    Abdomen:    Soft without tenderness, non-distended, normal bowel sounds, no mass palpated.   Breast Exam:    deferred   Genitalia:    deferred   Extremities:   Extremities normal,  atraumatic, no cyanosis or edema   Skin:   Skin color, texture, turgor normal, no rashes or lesions   Neurologic:   Grossly intact     Results Review:  I have reviewed the patient's current lab results.     Cancer Staging:   Cancer Patient: __ yes  X no __unknown; If yes, clinical stage T:__ N:__M:__, stage group    Impression:  Primary osteoarthritis of left hip    Plan:  Left Total Hip Arthroplasty     MANAS Bahena 2019 7:07 AM          Electronically signed by Allen Madera MD at 2019  7:13 AM          Physical Therapy Notes (most recent note)      Tarah Dior, PT at 2019  1:56 PM  Version 1 of 1         Acute Care - Physical Therapy Initial Evaluation  [facility]     Patient Name: Alisa Ames  : 1960  MRN: [id number]  Today's Date: 2019   Onset of Illness/Injury or Date of Surgery: 19  Date of Referral to PT: 19  Referring Physician: MD Cassy      Admit Date: 2019    Visit Dx:     ICD-10-CM ICD-9-CM   1. Impaired functional mobility, balance, gait, and endurance Z74.09 V49.89   2. Primary osteoarthritis of left hip M16.12 715.15     Patient Active Problem List   Diagnosis   • DDD (degenerative disc disease), cervical   • Migraine syndrome   • Goiter   • Hypothyroidism (acquired)   • DDD (degenerative disc disease), lumbar   • Depression with anxiety   • Arthritis   • Syncope   • Allergic rhinitis   • Pure hypercholesterolemia   • Primary osteoarthritis of left hip   • Status post total replacement of left hip   • Prediabetes     Past Medical History:   Diagnosis Date   • COPD (chronic obstructive pulmonary disease) (CMS/HCC)    • Fibromyalgia    • Hyperlipidemia    • Malignant neoplasm (CMS/HCC)    • Migraine    • Syncope    • Thyroid nodule    • Wears dentures     UPPER AND LOWER      Past Surgical History:   Procedure Laterality Date   • APPENDECTOMY     • CERVICAL SPINE SURGERY      Fibromyalgia and DDD with h/o C spine surgery- initially on  flexeril bid.   • COLONOSCOPY  2015   • NECK SURGERY     • NOSE SURGERY      r/t Skin CA   • SKIN CANCER EXCISION     • THYROID LOBECTOMY Right     On discussion, pt reported a h/o right thyroid lobectomy for goiter.U/S demo surgical absence of right lobe and diffuse nodularity of the left lobe with a dominant nodule in the lower pole of 1.8 x 3.5 cm.   • TONSILLECTOMY     • TOTAL THYROIDECTOMY          PT ASSESSMENT (last 12 hours)      Physical Therapy Evaluation     Row Name 01/07/19 1315          PT Evaluation Time/Intention    Subjective Information  no complaints denies pain at rest  -LR     Document Type  evaluation  -LR     Mode of Treatment  physical therapy;individual therapy  -LR     Patient Effort  excellent  -LR     Symptoms Noted During/After Treatment  increased pain  -LR     Comment  Increased pain with movement, denied pain once at rest again.   -LR     Row Name 01/07/19 1310          General Information    Patient Profile Reviewed?  yes  -LR     Onset of Illness/Injury or Date of Surgery  01/07/19  -LR     Referring Physician  MD Cassy  -LR     Patient Observations  alert;cooperative;agree to therapy  -LR     General Observations of Patient  Patient supine in bed upon arrival. IV, SCDs, hip abduction.   -LR     Prior Level of Function  min assist:;all household mobility;community mobility;gait;transfer;bed mobility;ADL's;home management;cooking;cleaning;shopping;using stairs;independent:;driving;work all mobility limited by pain  -LR     Equipment Currently Used at Home  none  -LR     Pertinent History of Current Functional Problem  Patient presents for surgical management of persistent and progressive L hip pain and dysfunction that has failed to improve with conservative management.   -LR     Existing Precautions/Restrictions  fall;left;hip, posterior  -LR     Limitations/Impairments  -- none  -LR     Equipment Issued to Patient  -- none  -LR     Equipment Ordered for Patient  -- none  -LR      Risks Reviewed  patient:;LOB;nausea/vomiting;dizziness;increased discomfort;lines disloged  -LR     Benefits Reviewed  patient:;improve function;increase independence;increase strength;increase balance;decrease pain;decrease risk of DVT;increase knowledge  -LR     Barriers to Rehab  previous functional deficit  -LR     Row Name 01/07/19 1315          Relationship/Environment    Primary Source of Support/Comfort  spouse  works during the afternoon  -LR     Lives With  spouse  -LR     Primary Roles/Responsibilities  wage earner, full time  -LR     Row Name 01/07/19 1315          Resource/Environmental Concerns    Current Living Arrangements  home/apartment/condo  -LR     Resource/Environmental Concerns  none  -LR     Row Name 01/07/19 1315          Home Main Entrance    Number of Stairs, Main Entrance  three  -LR     Stair Railings, Main Entrance  railing on left side (ascending)  -LR     Row Name 01/07/19 1315          Cognitive Assessment/Intervention- PT/OT    Orientation Status (Cognition)  oriented x 4  -LR     Follows Commands (Cognition)  WFL;follows one step commands;over 90% accuracy;verbal cues/prompting required;repetition of directions required  -LR     Safety Deficit (Cognitive)  mild deficit;safety precautions awareness;safety precautions follow-through/compliance  -LR     Row Name 01/07/19 1315          Safety Issues, Functional Mobility    Safety Issues Affecting Function (Mobility)  safety precaution awareness;safety precautions follow-through/compliance;sequencing abilities  -LR     Row Name 01/07/19 1315          Mobility Assessment/Treatment    Extremity Weight-bearing Status  left lower extremity  -LR     Left Lower Extremity (Weight-bearing Status)  weight-bearing as tolerated (WBAT)  -LR     Row Name 01/07/19 1315          Bed Mobility Assessment/Treatment    Bed Mobility Assessment/Treatment  supine-sit  -LR     Supine-Sit [place] (Bed Mobility)  verbal cues;supervision  -LR     Bed  Mobility, Safety Issues  decreased use of legs for bridging/pushing  -LR     Assistive Device (Bed Mobility)  head of bed elevated;bed rails  -LR     Comment (Bed Mobility)  Verbal cues to move LEs towards EOB and to push up from bed from behind to raise trunk into sitting and to scoot hips out to get feet on floor. Denied dizziness upon sitting up. Required increased time to perform.   -LR     Row Name 01/07/19 1315          Transfer Assessment/Treatment    Transfer Assessment/Treatment  sit-stand transfer;stand-sit transfer  -LR     Comment (Transfers)  Verbal cues to push up from bed to stand and to reach back for chair to lower into sitting. Verbal cues to step L LE out before t/f for comfort.   -LR     Sit-Stand [place] (Transfers)  verbal cues;contact guard;1 person to manage equipment  -LR     Stand-Sit [place] (Transfers)  verbal cues;contact guard  -LR     Row Name 01/07/19 1315          Sit-Stand Transfer    Assistive Device (Sit-Stand Transfers)  walker, front-wheeled  -LR     Row Name 01/07/19 1315          Stand-Sit Transfer    Assistive Device (Stand-Sit Transfers)  walker, front-wheeled  -LR     Row Name 01/07/19 1315          Gait/Stairs Assessment/Training    95475 - Gait Training Minutes   6  -LR     [place] Level (Gait)  verbal cues;contact guard;2 person assist  -LR     Assistive Device (Gait)  walker, front-wheeled  -LR     Distance in Feet (Gait)  130  -LR     Pattern (Gait)  step-through  -LR     Deviations/Abnormal Patterns (Gait)  left sided deviations;antalgic;bilateral deviations;mejia decreased;gait speed decreased;stride length decreased;base of support, narrow  -LR     Bilateral Gait Deviations  forward flexed posture;heel strike decreased  -LR     Left Sided Gait Deviations  weight shift ability decreased  -LR     Comment (Gait/Stairs)  Patient ambulated with step through gait pattern at slow pace. Verbal cues for correct sequencing of steps, increased L LE weight  bearing/stance phase, decreased UE weight bearing, and wider base.Slight improvement with cues for correction. Gait limited by pain.   -LR     Row Name 01/07/19 1315          General ROM    RT Lower Ext  Comment  -LR     LT Lower Ext  Comment  -LR     Row Name 01/07/19 1315          Right Lower Ext    RT Lower Extremity Comments  R LE AROM WFL  -LR     Row Name 01/07/19 1315          Left Lower Ext    LT Lower Extremity Comments  L hip AROM impaired 25%  -     Row Name 01/07/19 1315          MMT (Manual Muscle Testing)    Rt Lower Ext  Rt Hip WFL;Rt Knee WFL;Rt Ankle WFL  -LR     Lt Lower Ext  Lt Knee WFL;Lt Ankle WFL;Comments  -LR     Row Name 01/07/19 1315          MMT Left Lower Ext    Lt Lower Extremity Comments   L hip functionally 4-/5, independent with SLR, no knee buckling with gait.   -     Row Name 01/07/19 1315          Motor Assessment/Intervention    Additional Documentation  Therapeutic Exercise (Group);Therapeutic Exercise Interventions (Group)  -     Row Name 01/07/19 1315          Therapeutic Exercise    54649 - PT Therapeutic Exercise Minutes  2  -     Row Name 01/07/19 1315          Therapeutic Exercise    Lower Extremity (Therapeutic Exercise)  gluteal sets;quad sets, left  -LR     Lower Extremity Range of Motion (Therapeutic Exercise)  ankle dorsiflexion/plantar flexion, left  -LR     Exercise Type (Therapeutic Exercise)  AROM (active range of motion);isometric contraction, static;isotonic contraction, concentric  -LR     Position (Therapeutic Exercise)  supine  -LR     Sets/Reps (Therapeutic Exercise)  x10 reps each  -LR     Comment (Therapeutic Exercise)  cues for technique; able to actively DF bilaterally  -     Row Name 01/07/19 1315          Sensory Assessment/Intervention    Sensory General Assessment  no sensation deficits identified;other (see comments) denies numbness/tingling;light touch equal and intact  -LR     Row Name 01/07/19 1315          Vision Assessment/Intervention     Visual Impairment/Limitations  WFL  -LR     Row Name 01/07/19 1315          Pain Assessment    Additional Documentation  Pain Scale: Numbers Pre/Post-Treatment (Group)  -LR     Row Name 01/07/19 1315          Pain Scale: Numbers Pre/Post-Treatment    Pain Scale: Numbers, Pretreatment  0/10 - no pain  -LR     Pain Scale: Numbers, Post-Treatment  0/10 - no pain  -LR     Pain Location - Side  Left  -LR     Pain Location  hip  -LR     Pre/Post Treatment Pain Comment  denies pain at rest, reports 6/10 with movement.   -LR     Pain Intervention(s)  Repositioned;Ambulation/increased activity  -LR     Row Name             Wound 01/07/19 0818 Left hip incision    Wound - Properties Group Date first assessed: 01/07/19  -JA Time first assessed: 0818  -JA Side: Left  -JA Location: hip  -JA Type: incision  -JA    Row Name 01/07/19 1315          Coping    Observed Emotional State  accepting;cooperative  -LR     Row Name 01/07/19 1315          Plan of Care Review    Plan of Care Reviewed With  patient  -LR     Row Name 01/07/19 1315          Physical Therapy Clinical Impression    Date of Referral to PT  01/07/19  -LR     PT Diagnosis (PT Clinical Impression)  s/p elective L KAREN via posterior approach  -LR     Prognosis (PT Clinical Impression)  good  -LR     Patient/Family Goals Statement (PT Clinical Impression)  go home, decrease pain  -LR     Criteria for Skilled Interventions Met (PT Clinical Impression)  yes;treatment indicated  -LR     Rehab Potential (PT Clinical Summary)  good, to achieve stated therapy goals  -LR     Care Plan Review (PT)  evaluation/treatment results reviewed;care plan/treatment goals reviewed;risks/benefits reviewed;current/potential barriers reviewed;patient/other agree to care plan  -LR     Care Plan Review, Other Participant (PT Clinical Impression)  spouse  -LR     Row Name 01/07/19 1315          Physical Therapy Goals    Bed Mobility Goal Selection (PT)  bed mobility, PT goal 1  -LR      Transfer Goal Selection (PT)  transfer, PT goal 1  -LR     Gait Training Goal Selection (PT)  gait training, PT goal 1  -LR     Stairs Goal Selection (PT)  stairs, PT goal 1  -LR     Additional Documentation  Stairs Goal Selection (PT) (Row)  -LR     Row Name 01/07/19 1315          Bed Mobility Goal 1 (PT)    Activity/Assistive Device (Bed Mobility Goal 1, PT)  sit to supine/supine to sit  -LR     [place] Level/Cues Needed (Bed Mobility Goal 1, PT)  conditional independence  -LR     Time Frame (Bed Mobility Goal 1, PT)  long term goal (LTG);3 days  -LR     Progress/Outcomes (Bed Mobility Goal 1, PT)  goal ongoing  -LR     Row Name 01/07/19 1315          Transfer Goal 1 (PT)    Activity/Assistive Device (Transfer Goal 1, PT)  sit-to-stand/stand-to-sit;walker, rolling  -LR     [place] Level/Cues Needed (Transfer Goal 1, PT)  conditional independence  -LR     Time Frame (Transfer Goal 1, PT)  long term goal (LTG);3 days  -LR     Progress/Outcome (Transfer Goal 1, PT)  goal ongoing  -LR     Row Name 01/07/19 1315          Gait Training Goal 1 (PT)    Activity/Assistive Device (Gait Training Goal 1, PT)  gait (walking locomotion);walker, rolling  -LR     [place] Level (Gait Training Goal 1, PT)  conditional independence  -LR     Distance (Gait Goal 1, PT)  500 feet  -LR     Time Frame (Gait Training Goal 1, PT)  long term goal (LTG);3 days  -LR     Progress/Outcome (Gait Training Goal 1, PT)  goal ongoing  -LR     Row Name 01/07/19 1315          Stairs Goal 1 (PT)    Activity/Assistive Device (Stairs Goal 1, PT)  ascending stairs;descending stairs;using handrail, left;step-to-step;cane, straight  -LR     [place] Level/Cues Needed (Stairs Goal 1, PT)  contact guard assist  -LR     Number of Stairs (Stairs Goal 1, PT)  3  -LR     Time Frame (Stairs Goal 1, PT)  long term goal (LTG);3 days  -LR     Progress/Outcome (Stairs Goal 1, PT)  goal ongoing  -LR     Row Name 01/07/19 1315          Positioning and  Restraints    Pre-Treatment Position  in bed  -LR     Post Treatment Position  chair  -LR     In Chair  notified nsg;reclined;sitting;call light within reach;encouraged to call for assist;exit alarm on;compression device;legs elevated;ABD pillow  -LR     Row Name 01/07/19 1315          Living Environment    Home Accessibility  stairs to enter home;not wheelchair accessible;tub/shower is not walk in  -LR       User Key  (r) = Recorded By, (t) = Taken By, (c) = Cosigned By    Initials Name Provider Type    Tarah Bro, PT Physical Therapist    Huber Whitt RN Registered Nurse        Physical Therapy Education     Title: PT OT SLP Therapies (Done)     Topic: Physical Therapy (Done)     Point: Mobility training (Done)     Learning Progress Summary           Patient Acceptance, E,D, VU,NR by LR at 1/7/2019  1:50 PM    Comment:  Educated on posterior hip precautions, weight bearing status, correct supine to sit t/f technique, correct sit<->stand t/f technique, correct gait mechanics, and progression of POC.                   Point: Home exercise program (Done)     Learning Progress Summary           Patient Acceptance, E,D, VU,NR by LR at 1/7/2019  1:50 PM    Comment:  Educated on posterior hip precautions, weight bearing status, correct supine to sit t/f technique, correct sit<->stand t/f technique, correct gait mechanics, and progression of POC.                   Point: Body mechanics (Done)     Learning Progress Summary           Patient Acceptance, E,D, VU,NR by LR at 1/7/2019  1:50 PM    Comment:  Educated on posterior hip precautions, weight bearing status, correct supine to sit t/f technique, correct sit<->stand t/f technique, correct gait mechanics, and progression of POC.                   Point: Precautions (Done)     Learning Progress Summary           Patient Acceptance, E,D, VU,NR by LR at 1/7/2019  1:50 PM    Comment:  Educated on posterior hip precautions, weight bearing status,  correct supine to sit t/f technique, correct sit<->stand t/f technique, correct gait mechanics, and progression of POC.                               User Key     Initials Effective Dates Name Provider Type Discipline    LR 06/19/15 -  Tarah Dior, PT Physical Therapist PT              PT Recommendation and Plan  Anticipated Discharge Disposition (PT): home with assist, home with home health  Planned Therapy Interventions (PT Eval): balance training, bed mobility training, gait training, home exercise program, patient/family education, ROM (range of motion), stair training, strengthening, transfer training  Therapy Frequency (PT Clinical Impression): 2 times/day  Outcome Summary/Treatment Plan (PT)  Anticipated Equipment Needs at Discharge (PT): front wheeled walker, bedside commode, standard cane  Anticipated Discharge Disposition (PT): home with assist, home with home health  Plan of Care Reviewed With: patient  Progress: improving  Outcome Summary: Patient ambulated 130 feet with RW and step through gait pattern, limited by pain and lethargy. CGA for all OOB mobility. Plan is for d/c home with family and HHPT. Encouraged patient to ambulate again with nursing tonight. Needs RW/BSC. Will continue to progress mobility training, strengthening, and ROM as able. PADD score of 9.   Outcome Measures     Row Name 01/07/19 1315             How much help from another person do you currently need...    Turning from your back to your side while in flat bed without using bedrails?  4  -LR      Moving from lying on back to sitting on the side of a flat bed without bedrails?  3  -LR      Moving to and from a bed to a chair (including a wheelchair)?  3  -LR      Standing up from a chair using your arms (e.g., wheelchair, bedside chair)?  3  -LR      Climbing 3-5 steps with a railing?  3  -LR      To walk in hospital room?  3  -LR      AM-PAC 6 Clicks Score  19  -LR         Functional Assessment    Outcome Measure  Options  AM-PAC 6 Clicks Basic Mobility (PT)  -LR        User Key  (r) = Recorded By, (t) = Taken By, (c) = Cosigned By    Initials Name Provider Type    Tarah Bro, PT Physical Therapist         Time Calculation:   PT Charges     Row Name 01/07/19 1315             Time Calculation    Start Time  1315  -LR      PT Received On  01/07/19  -LR      PT Goal Re-Cert Due Date  01/17/19  -LR         Time Calculation- PT    Total Timed Code Minutes- PT  8 minute(s)  -LR         Timed Charges    14605 - PT Therapeutic Exercise Minutes  2  -LR      01046 - Gait Training Minutes   6  -LR        User Key  (r) = Recorded By, (t) = Taken By, (c) = Cosigned By    Initials Name Provider Type    Tarah Bro, PT Physical Therapist        Therapy Suggested Charges     Code   Minutes Charges    61700 (CPT®) Hc Pt Neuromusc Re Education Ea 15 Min      71286 (CPT®) Hc Pt Ther Proc Ea 15 Min 2     15217 (CPT®) Hc Gait Training Ea 15 Min 6 1    96625 (CPT®) Hc Pt Therapeutic Act Ea 15 Min      51892 (CPT®) Hc Pt Manual Therapy Ea 15 Min      61534 (CPT®) Hc Pt Iontophoresis Ea 15 Min      03680 (CPT®) Hc Pt Elec Stim Ea-Per 15 Min      06996 (CPT®) Hc Pt Ultrasound Ea 15 Min      16996 (CPT®) Hc Pt Self Care/Mgmt/Train Ea 15 Min      28003 (CPT®) Hc Pt Prosthetic (S) Train Initial Encounter, Each 15 Min      50467 (CPT®) Hc Pt Orthotic(S)/Prosthetic(S) Encounter, Each 15 Min      92375 (CPT®) Hc Orthotic(S) Mgmt/Train Initial Encounter, Each 15min      Total  8 1        Therapy Charges for Today     Code Description Service Date Service Provider Modifiers Qty    13224863482 HC GAIT TRAINING EA 15 MIN 1/7/2019 Tarah Dior, PT GP 1    14738313739 HC PT THER SUPP EA 15 MIN 1/7/2019 Tarah Dior, PT GP 2    63616178672 HC PT EVAL LOW COMPLEXITY 3 1/7/2019 Tarah Dior, PT GP 1          PT G-Codes  Outcome Measure Options: AM-PAC 6 Clicks Basic Mobility (PT)  AM-PAC 6 Clicks Score:  19      Tarah Dior, PT  1/7/2019         Electronically signed by Tarah Dior, PT at 1/7/2019  1:56 PM

## 2019-01-08 NOTE — PLAN OF CARE
Problem: Patient Care Overview  Goal: Plan of Care Review  Outcome: Outcome(s) achieved Date Met: 01/08/19 01/08/19 0989   Plan of Care Review   Progress improving   OTHER   Outcome Summary Pt increased gait distance to 350 feet with CGA and RW, limited by pain. Pt progressed to stair training x1 HR and cane to mimic home environment. Pt is discharging home today with HHPT, made good progress toward goals during inpatient stay   Coping/Psychosocial   Plan of Care Reviewed With patient;spouse       Problem: Hip Arthroplasty (Total, Partial) (Adult)  Goal: Signs and Symptoms of Listed Potential Problems Will be Absent, Minimized or Managed (Hip Arthroplasty)  Outcome: Ongoing (interventions implemented as appropriate)   01/08/19 0952   Goal/Outcome Evaluation   Problems Assessed (Hip Arthroplasty) functional deficit;pain   Problems Present (Hip Arthroplasty) functional deficit;pain

## 2019-01-08 NOTE — THERAPY DISCHARGE NOTE
Acute Care - Physical Therapy Treatment Note/Discharge  [facility]     Patient Name: Alisa Ames  : 1960  MRN: [id number]  Today's Date: 2019  Onset of Illness/Injury or Date of Surgery: 19  Date of Referral to PT: 19  Referring Physician: MD Cassy    Admit Date: 2019    Visit Dx:    ICD-10-CM ICD-9-CM   1. Impaired functional mobility, balance, gait, and endurance Z74.09 V49.89   2. Primary osteoarthritis of left hip M16.12 715.15   3. Status post total replacement of left hip Z96.642 V43.64     Patient Active Problem List   Diagnosis   • DDD (degenerative disc disease), cervical   • Migraine syndrome   • Goiter   • Hypothyroidism (acquired)   • DDD (degenerative disc disease), lumbar   • Depression with anxiety   • Arthritis   • Syncope   • Allergic rhinitis   • Pure hypercholesterolemia   • Primary osteoarthritis of left hip   • Status post total replacement of left hip   • Prediabetes       Physical Therapy Education     Title: PT OT SLP Therapies (Done)     Topic: Physical Therapy (Done)     Point: Mobility training (Done)     Learning Progress Summary           Patient Acceptance, E,D,H, VU by MAYNOR at 2019  9:52 AM    Comment:  Reviewed hip precautions, HEP, gait mechanics, car transfer, stairs sequencing, benefits of mobility. Issued HEP handout    Acceptance, E,D, VU,NR by KELBY at 2019  1:50 PM    Comment:  Educated on posterior hip precautions, weight bearing status, correct supine to sit t/f technique, correct sit<->stand t/f technique, correct gait mechanics, and progression of POC.   Significant Other Acceptance, E,D,H, VU by MAYNOR at 2019  9:52 AM    Comment:  Reviewed hip precautions, HEP, gait mechanics, car transfer, stairs sequencing, benefits of mobility. Issued HEP handout                   Point: Home exercise program (Done)     Learning Progress Summary           Patient Acceptance, E,D,H, VU by MAYNOR at 2019  9:52 AM    Comment:  Reviewed hip  precautions, HEP, gait mechanics, car transfer, stairs sequencing, benefits of mobility. Issued HEP handout    Acceptance, E,D, VU,NR by LR at 1/7/2019  1:50 PM    Comment:  Educated on posterior hip precautions, weight bearing status, correct supine to sit t/f technique, correct sit<->stand t/f technique, correct gait mechanics, and progression of POC.   Significant Other Acceptance, E,D,H, VU by MAYNOR at 1/8/2019  9:52 AM    Comment:  Reviewed hip precautions, HEP, gait mechanics, car transfer, stairs sequencing, benefits of mobility. Issued HEP handout                   Point: Body mechanics (Done)     Learning Progress Summary           Patient Acceptance, E,D,H, VU by MAYNOR at 1/8/2019  9:52 AM    Comment:  Reviewed hip precautions, HEP, gait mechanics, car transfer, stairs sequencing, benefits of mobility. Issued HEP handout    Acceptance, E,D, VU,NR by LR at 1/7/2019  1:50 PM    Comment:  Educated on posterior hip precautions, weight bearing status, correct supine to sit t/f technique, correct sit<->stand t/f technique, correct gait mechanics, and progression of POC.   Significant Other Acceptance, E,D,H, VU by MAYNOR at 1/8/2019  9:52 AM    Comment:  Reviewed hip precautions, HEP, gait mechanics, car transfer, stairs sequencing, benefits of mobility. Issued HEP handout                   Point: Precautions (Done)     Learning Progress Summary           Patient Acceptance, E,D,H, VU by MAYNOR at 1/8/2019  9:52 AM    Comment:  Reviewed hip precautions, HEP, gait mechanics, car transfer, stairs sequencing, benefits of mobility. Issued HEP handout    Acceptance, E,D, VU,NR by LR at 1/7/2019  1:50 PM    Comment:  Educated on posterior hip precautions, weight bearing status, correct supine to sit t/f technique, correct sit<->stand t/f technique, correct gait mechanics, and progression of POC.   Significant Other Acceptance, E,D,H, VU by MJ at 1/8/2019  9:52 AM    Comment:  Reviewed hip precautions, HEP, gait mechanics, car  transfer, stairs sequencing, benefits of mobility. Issued HEP handout                               User Key     Initials Effective Dates Name Provider Type Discipline    LR 06/19/15 -  Tarah Dior, PT Physical Therapist PT     04/03/18 -  Jules Felder, PT Physical Therapist PT              Rehab Goal Summary     Row Name 01/08/19 0952             Physical Therapy Goals    Bed Mobility Goal Selection (PT)  bed mobility, PT goal 1  -MJ      Transfer Goal Selection (PT)  transfer, PT goal 1  -MJ      Gait Training Goal Selection (PT)  gait training, PT goal 1  -MJ      Stairs Goal Selection (PT)  stairs, PT goal 1  -MJ         Bed Mobility Goal 1 (PT)    Activity/Assistive Device (Bed Mobility Goal 1, PT)  sit to supine/supine to sit  -MJ      [place] Level/Cues Needed (Bed Mobility Goal 1, PT)  conditional independence  -MJ      Time Frame (Bed Mobility Goal 1, PT)  long term goal (LTG);3 days  -MJ      Progress/Outcomes (Bed Mobility Goal 1, PT)  goal partially met achieved supine to sit  -MJ         Transfer Goal 1 (PT)    Activity/Assistive Device (Transfer Goal 1, PT)  sit-to-stand/stand-to-sit;walker, rolling  -MJ      [place] Level/Cues Needed (Transfer Goal 1, PT)  conditional independence  -MJ      Time Frame (Transfer Goal 1, PT)  long term goal (LTG);3 days  -MJ      Progress/Outcome (Transfer Goal 1, PT)  goal not met;discharged from facility  -MJ         Gait Training Goal 1 (PT)    Activity/Assistive Device (Gait Training Goal 1, PT)  gait (walking locomotion);walker, rolling  -MJ      [place] Level (Gait Training Goal 1, PT)  conditional independence  -MJ      Distance (Gait Goal 1, PT)  500 feet  -MJ      Time Frame (Gait Training Goal 1, PT)  long term goal (LTG);3 days  -MJ      Progress/Outcome (Gait Training Goal 1, PT)  goal not met;discharged from facility  -MJ         Stairs Goal 1 (PT)    Activity/Assistive Device (Stairs Goal 1, PT)  ascending stairs;descending  stairs;using handrail, left;step-to-step;cane, straight  -MJ      [place] Level/Cues Needed (Stairs Goal 1, PT)  contact guard assist  -MJ      Number of Stairs (Stairs Goal 1, PT)  3  -MJ      Time Frame (Stairs Goal 1, PT)  long term goal (LTG);3 days  -MJ      Progress/Outcome (Stairs Goal 1, PT)  goal met  -MJ        User Key  (r) = Recorded By, (t) = Taken By, (c) = Cosigned By    Initials Name Provider Type Discipline    Jules Hurtado, PT Physical Therapist PT        Therapy Treatment  Rehabilitation Treatment Summary     Row Name 01/08/19 0952             Treatment Time/Intention    Discipline  physical therapist  -MJ      Document Type  therapy note (daily note);discharge treatment  -MJ      Subjective Information  no complaints  -MJ      Mode of Treatment  physical therapy  -MJ      Patient/Family Observations  Pt supine in bed  -MJ      Care Plan Review  patient/other agree to care plan  -MJ      Care Plan Review, Other Participant(s)  spouse  -MJ      Patient Effort  excellent  -MJ      Existing Precautions/Restrictions  fall;left;hip, posterior  -MJ      Equipment Issued to Patient  cane, standard  -MJ2      Recorded by [MJ] Jules Felder, PT 01/08/19 1028  [MJ2] Jules Felder, PT 01/08/19 1033      Row Name 01/08/19 0952             Cognitive Assessment/Intervention- PT/OT    Orientation Status (Cognition)  oriented x 4  -MJ      Follows Commands (Cognition)  WFL  -MJ      Recorded by [MJ] Jules Felder, PT 01/08/19 1028      Row Name 01/08/19 0952             Safety Issues, Functional Mobility    Impairments Affecting Function (Mobility)  pain;range of motion (ROM);strength  -MJ      Recorded by [MJ] Jules Felder, PT 01/08/19 1028      Row Name 01/08/19 0952             Mobility Assessment/Intervention    Extremity Weight-bearing Status  left lower extremity  -MJ      Left Lower Extremity (Weight-bearing Status)  weight-bearing as tolerated (WBAT)  -MJ      Recorded by [MJ] Jules Felder, PT  01/08/19 1033      Row Name 01/08/19 0952             Bed Mobility Assessment/Treatment    Bed Mobility Assessment/Treatment  supine-sit  -MJ      Supine-Sit [place] (Bed Mobility)  conditional independence;verbal cues  -MJ      Bed Mobility, Safety Issues  decreased use of legs for bridging/pushing  -MJ      Assistive Device (Bed Mobility)  bed rails;head of bed elevated  -MJ      Comment (Bed Mobility)  Verbal cues for sequencing, increased time and effort to perform  -MJ      Recorded by [MJ] Jules Felder, PT 01/08/19 1033      Row Name 01/08/19 0952             Transfer Assessment/Treatment    Transfer Assessment/Treatment  sit-stand transfer;stand-sit transfer  -MJ      Comment (Transfers)  Verbal cues for correct hand placement and to step L LE out prior to t/f. Performed x2 reps  -MJ      Recorded by [MJ] Jules Felder, PT 01/08/19 1033      Row Name 01/08/19 0952             Sit-Stand Transfer    Sit-Stand [place] (Transfers)  contact guard;verbal cues  -MJ      Assistive Device (Sit-Stand Transfers)  walker, front-wheeled  -MJ      Recorded by [MJ] Jules Felder, PT 01/08/19 1033      Row Name 01/08/19 0952             Stand-Sit Transfer    Stand-Sit [place] (Transfers)  contact guard;verbal cues  -MJ      Assistive Device (Stand-Sit Transfers)  walker, front-wheeled  -MJ      Recorded by [MJ] Jules Felder, PT 01/08/19 1033      Row Name 01/08/19 0952             Gait/Stairs Assessment/Training    64107 - Gait Training Minutes   16  -MJ      [place] Level (Gait)  contact guard;verbal cues  -MJ      Assistive Device (Gait)  walker, front-wheeled  -MJ      Distance in Feet (Gait)  350  -MJ      Pattern (Gait)  step-through  -MJ      Deviations/Abnormal Patterns (Gait)  left sided deviations;antalgic;mejia decreased;stride length decreased  -MJ      Bilateral Gait Deviations  weight shift ability decreased  -MJ      [place] Level (Stairs)  contact guard;verbal cues  -MJ      Assistive  Device (Stairs)  cane, straight  -MJ      Handrail Location (Stairs)  left side (ascending)  -MJ      Number of Steps (Stairs)  5  -MJ      Ascending Technique (Stairs)  step-to-step  -MJ      Descending Technique (Stairs)  step-to-step  -MJ      Stairs, Safety Issues  sequencing ability decreased;weight-shifting ability decreased  -MJ      Stairs, Impairments  ROM decreased;strength decreased;pain  -MJ      Comment (Gait/Stairs)  Pt demo step through gait pattern at slow pace. Verbal cues to allow heel to strike first at initial contact and to increase LE weight bearing, improvement noted. Pt required 1 sitting rest break before performing stairs. Gait limited by pain. Pt performed stairs non-reciprocally. Cues to ascend with R LE and to descend with L LE. Cues for sequencing with cane  -MJ      Recorded by [MJ] Jules Felder, PT 01/08/19 1033      Row Name 01/08/19 0952             Motor Skills Assessment/Interventions    Additional Documentation  Therapeutic Exercise (Group)  -MJ      Recorded by [MJ] Jules Felder, PT 01/08/19 1033      Row Name 01/08/19 0952             Therapeutic Exercise    54620 - PT Therapeutic Exercise Minutes  10  -MJ      02328 - PT Therapeutic Activity Minutes  3  -MJ      Recorded by [MJ] Jules Felder, PT 01/08/19 1033      Row Name 01/08/19 0952             Therapeutic Exercise    Lower Extremity (Therapeutic Exercise)  gluteal sets;heel slides, left;LAQ (long arc quad), left;quad sets, left;SLR (straight leg raise), left  -MJ      Lower Extremity Range of Motion (Therapeutic Exercise)  hip flexion/extension, left;hip abduction/adduction, left;ankle dorsiflexion/plantar flexion, left  -MJ      Exercise Type (Therapeutic Exercise)  AAROM (active assistive range of motion)  -MJ      Position (Therapeutic Exercise)  seated  -MJ      Sets/Reps (Therapeutic Exercise)  15x each  -MJ      Comment (Therapeutic Exercise)  Cues for technique. Clara w/ SLR  -MJ      Recorded by [MJ] Jules Felder,  PT 01/08/19 1033      Row Name 01/08/19 0952             Positioning and Restraints    Pre-Treatment Position  in bed  -MJ      Post Treatment Position  chair  -MJ      In Chair  notified nsg;reclined;call light within reach;encouraged to call for assist;with family/caregiver  -MJ      Recorded by [MAYNOR] Jules Felder, PT 01/08/19 1033      Row Name 01/08/19 0952             Pain Scale: Numbers Pre/Post-Treatment    Pain Scale: Numbers, Pretreatment  0/10 - no pain  -MJ      Pain Scale: Numbers, Post-Treatment  3/10  -MJ      Pain Location - Side  Left  -MJ      Pain Location  hip  -MJ      Pain Intervention(s)  Repositioned;Ambulation/increased activity;Cold pack  -MJ      Recorded by [MJ] Jules Felder, PT 01/08/19 1033      Row Name                Wound 01/07/19 0818 Left hip incision    Wound - Properties Group Date first assessed: 01/07/19 [JA] Time first assessed: 0818 [JA] Side: Left [JA] Location: hip [JA] Type: incision [JA] Recorded by:  [JA] Huber Dooley, RN 01/07/19 0818    Row Name 01/08/19 0952             Plan of Care Review    Plan of Care Reviewed With  patient;spouse  -MJ      Recorded by [MAYNOR] Jules Felder, PT 01/08/19 1033      Row Name 01/08/19 0952             Outcome Summary/Treatment Plan (PT)    Daily Summary of Progress (PT)  progress toward functional goals is good  -MJ      Recorded by [MAYNOR] Jules Felder, PT 01/08/19 1033        User Key  (r) = Recorded By, (t) = Taken By, (c) = Cosigned By    Initials Name Effective Dates Discipline    Huber Whitt RN 06/16/16 -  Nurse    Jules Hurtado, PT 04/03/18 -  PT        Wound 01/07/19 0818 Left hip incision (Active)   Dressing Appearance dry;intact;no drainage 1/7/2019 10:00 PM   Periwound swelling 1/7/2019 10:00 PM   Periwound Temperature warm 1/7/2019 10:00 PM   Periwound Skin Turgor soft 1/7/2019 10:00 PM   Drainage Amount scant 1/7/2019 10:00 PM       PT Recommendation and Plan     Outcome Summary/Treatment Plan (PT)  Daily  Summary of Progress (PT): progress toward functional goals is good  Plan of Care Reviewed With: patient, spouse  Progress: improving  Outcome Summary: Pt increased gait distance to 350 feet with CGA and RW, limited by pain. Pt progressed to stair training x1 HR and cane to mimic home environment. Pt is discharging home today with HHPT, made good progress toward goals during inpatient stay    Outcome Measures     Row Name 01/08/19 0952 01/07/19 1315          How much help from another person do you currently need...    Turning from your back to your side while in flat bed without using bedrails?  4  -MJ  4  -LR     Moving from lying on back to sitting on the side of a flat bed without bedrails?  4  -MJ  3  -LR     Moving to and from a bed to a chair (including a wheelchair)?  3  -MJ  3  -LR     Standing up from a chair using your arms (e.g., wheelchair, bedside chair)?  3  -MJ  3  -LR     Climbing 3-5 steps with a railing?  3  -MJ  3  -LR     To walk in hospital room?  3  -MJ  3  -LR     AM-PAC 6 Clicks Score  20  -MJ  19  -LR        Functional Assessment    Outcome Measure Options  AM-PAC 6 Clicks Basic Mobility (PT)  -MJ  AM-PAC 6 Clicks Basic Mobility (PT)  -LR       User Key  (r) = Recorded By, (t) = Taken By, (c) = Cosigned By    Initials Name Provider Type    Tarah Bro, PT Physical Therapist    Jules Hurtado, PT Physical Therapist           Time Calculation:   PT Charges     Row Name 01/08/19 0952             Time Calculation    Start Time  0952  -MJ      PT Received On  01/08/19  -      PT Goal Re-Cert Due Date  01/17/19  -MJ         Time Calculation- PT    Total Timed Code Minutes- PT  29 minute(s)  -MJ         Timed Charges    10944 - PT Therapeutic Exercise Minutes  10  -MJ      60609 - Gait Training Minutes   16  -MJ      84619 - PT Therapeutic Activity Minutes  3  -MJ        User Key  (r) = Recorded By, (t) = Taken By, (c) = Cosigned By    Initials Name Provider Type    MAYNOR Felder  Jules, PT Physical Therapist        Therapy Suggested Charges     Code   Minutes Charges    65804 (CPT®) Hc Pt Neuromusc Re Education Ea 15 Min      03890 (CPT®) Hc Pt Ther Proc Ea 15 Min 10 1    14360 (CPT®) Hc Gait Training Ea 15 Min 16 1    21221 (CPT®) Hc Pt Therapeutic Act Ea 15 Min 3     75313 (CPT®) Hc Pt Manual Therapy Ea 15 Min      25477 (CPT®) Hc Pt Iontophoresis Ea 15 Min      29623 (CPT®) Hc Pt Elec Stim Ea-Per 15 Min      75604 (CPT®) Hc Pt Ultrasound Ea 15 Min      53838 (CPT®) Hc Pt Self Care/Mgmt/Train Ea 15 Min      06110 (CPT®) Hc Pt Prosthetic (S) Train Initial Encounter, Each 15 Min      23391 (CPT®) Hc Pt Orthotic(S)/Prosthetic(S) Encounter, Each 15 Min      53401 (CPT®) Hc Orthotic(S) Mgmt/Train Initial Encounter, Each 15min      Total  29 2          Therapy Charges for Today     Code Description Service Date Service Provider Modifiers Qty    61888013408 HC PT THER PROC EA 15 MIN 1/8/2019 Jules Felder, PT GP 1    99313417315 HC GAIT TRAINING EA 15 MIN 1/8/2019 Jules Felder, PT GP 1          PT G-Codes  Outcome Measure Options: AM-PAC 6 Clicks Basic Mobility (PT)  AM-PAC 6 Clicks Score: 20    PT Discharge Summary  Reason for Discharge: Discharge from facility  Outcomes Achieved: Patient able to partially acheive established goals  Discharge Destination: Home with assist, Home with home health    Jules Felder, STEVE  1/8/2019

## 2019-01-08 NOTE — PROGRESS NOTES
Continued Stay Note  [facility]     Patient Name: Alisa Ames  MRN: [id number]  Today's Date: 1/8/2019    Admit Date: 1/7/2019    Discharge Plan     Row Name 01/08/19 1057       Plan    Plan Comments  Per Lary gibbons/ [facility], they are not in network with patient's insurance, although the outpatient clinic is in network. Called the  options in [facility] and none accept patient's ins as a primary. D/w patient at bedside and she is agreeable to going directly to outpatient [facility] in [facility]. A printed copy of the order has been provided to patient. CM following.     Final Discharge Disposition Code  01 - home or self-care    Row Name 01/08/19 0954       Plan    Plan  Home w/ [facility]    Patient/Family in Agreement with Plan  yes    Plan Comments  Spoke with patient and  at bedside. They live in a one story house in [facility]. PTA she was independent with ADL's and mobility. Per therapy recs patient would benefit from continued PT at [place], a RW, and a BSC. Patient agreeable and would like referrals to [facility], and [facility] for the DME. Referral made to [facility] and she is following. Jeannine simmons/ [facility] will deliver DME to patient at bedside today. No other needs noted at this time. CM following.     Final Discharge Disposition Code  01 - home or self-care        Discharge Codes    No documentation.             Akua Vicente RN

## 2019-01-08 NOTE — PROGRESS NOTES
Discharge Planning Assessment  [facility]     Patient Name: Alisa Ames  MRN: [id number]  Today's Date: 1/8/2019    Admit Date: 1/7/2019    Discharge Needs Assessment     Row Name 01/08/19 0954       Living Environment    Lives With  spouse    Current Living Arrangements  home/apartment/condo    Primary Care Provided by  self    Provides Primary Care For  no one    Family Caregiver if Needed  spouse       Transition Planning    Patient/Family Anticipates Transition to  home    Transportation Anticipated  family or friend will provide        Discharge Plan     Row Name 01/08/19 0954       Plan    Plan  Home w/ [facility]    Patient/Family in Agreement with Plan  yes    Plan Comments  Spoke with patient and  at bedside. They live in a one story house in [facility]. PTA she was independent with ADL's and mobility. Per therapy recs patient would benefit from continued PT at [place], a RW, and a BSC. Patient agreeable and would like referrals to [facility], and [facility] for the DME. Referral made to Lary gibbons/ [facility] and she is following. Jeannine simmons/ [facility] will deliver DME to patient at bedside today. No other needs noted at this time. CM following.     Final Discharge Disposition Code  01 - home or self-care        Destination      No service coordination in this encounter.      Durable Medical Equipment      No service coordination in this encounter.      Dialysis/Infusion      No service coordination in this encounter.      Home Medical Care      No service coordination in this encounter.      Community Resources      No service coordination in this encounter.          Demographic Summary     Row Name 01/08/19 0954       General Information    Arrived From  home    Reason for Consult  discharge planning        Functional Status     Row Name 01/08/19 0954       Functional Status    Usual Activity Tolerance  good    Current Activity Tolerance  moderate        Psychosocial    No documentation.       Abuse/Neglect     No documentation.       Legal    No documentation.       Substance Abuse    No documentation.       Patient Forms    No documentation.           Akua Vicente RN

## 2019-01-08 NOTE — PROGRESS NOTES
"  SUBJECTIVE  Patient resting comfortably.  Complaining of mild pain this morning over incisional area.  Ambulated 130 feet with therapy yesterday.    OBJECTIVE  Temp (24hrs), Av.6 °F (36.4 °C), Min:96.4 °F (35.8 °C), Max:98.6 °F (37 °C)    Blood pressure 132/60, pulse 77, temperature 98.6 °F (37 °C), temperature source Oral, resp. rate 16, height 165.1 cm (65\"), weight 67.1 kg (148 lb), SpO2 94 %, not currently breastfeeding.    Lab Results (last 24 hours)     Procedure Component Value Units Date/Time    Basic Metabolic Panel [[id number]]  (Abnormal) Collected:  19    Specimen:  Blood Updated:  19 0614     Glucose 159 mg/dL      BUN 19 mg/dL      Creatinine 1.01 mg/dL      Sodium 136 mmol/L      Potassium 3.5 mmol/L      Chloride 106 mmol/L      CO2 21.0 mmol/L      Calcium 8.2 mg/dL      eGFR Non African Amer 56 mL/min/1.73      BUN/Creatinine Ratio 18.8     Anion Gap 9.0 mmol/L     Narrative:       [employer] Guidelines    Stage     Description        GFR  1         Normal or High     90+  2         Mild decrease      60-89  3         Moderate decrease  30-59  4         Severe decrease    15-29  5         Kidney failure     <15    The MDRD GFR formula is only valid for adults with stable renal function between ages 18 and 70.    CBC (No Diff) [[id number]]  (Abnormal) Collected:  19    Specimen:  Blood Updated:  19 0555     WBC 7.59 10*3/mm3      RBC 3.34 10*6/mm3      Hemoglobin 10.7 g/dL      Hematocrit 33.3 %      MCV 99.7 fL      MCH 32.0 pg      MCHC 32.1 g/dL      RDW 13.5 %      RDW-SD 48.8 fl      MPV 10.4 fL      Platelets 234 10*3/mm3     POC Glucose Once [[id number]]  (Abnormal) Collected:  19    Specimen:  Blood Updated:  19 210     Glucose 141 mg/dL     POC Glucose Once [[id number]]  (Normal) Collected:  19 1603    Specimen:  Blood Updated:  19 1636     Glucose 111 mg/dL     POC Glucose Once [[id number]]  (Normal) " Collected:  01/07/19 1214    Specimen:  Blood Updated:  01/07/19 1242     Glucose 116 mg/dL             PHYSICAL EXAM  Left lower extremity: Dressing clean, dry and intact.  Leg lengths symmetric.  Intact EHL, FHL, tibialis anterior, and gastrocsoleus. Sensation intact to light touch to deep peroneal, superficial peroneal, sural, saphenous, tibial nerves. 2+ palpable DP and PT pulses.         Status post total replacement of left hip    Hypothyroidism (acquired)    Depression with anxiety    Pure hypercholesterolemia    Primary osteoarthritis of left hip    Prediabetes      PLAN / DISPOSITION:  1 Day Post-Op status post left total hip arthroplasty    Protected weight bearing as tolerated left lower extremity, posterior hip precautions ×6 weeks   Pain control  PT/OT for post op mobilization and medical equipment needs   23 hours perioperative antibiotic prophylaxis   SCD's bilateral lower extremities   Aspirin for DVT prophylaxis   Social work for discharge planning.  Anticipate discharge home today.  Dressing to remain in place for 7 days. May remove on POD#7. If no drainage, may shower on POD#10. No submerging wound in water. If drainage is noted, sterile dressing should be placed and wound checked daily. No showering until wound has remained dry for 72 consecutive hours.   Follow up in 3 weeks for re-assessment.      Allen Madera MD  01/08/19  7:01 AM

## 2019-01-08 NOTE — PLAN OF CARE
Problem: Patient Care Overview  Goal: Plan of Care Review  Outcome: Outcome(s) achieved Date Met: 01/08/19 01/08/19 1050   OTHER   Outcome Summary Pt CGA with mobility and txfrs. Educated on AD/DME and AE for home assist. Pt will have family for 1 week to assist and has referral to PT.    Coping/Psychosocial   Plan of Care Reviewed With patient

## 2019-01-08 NOTE — THERAPY DISCHARGE NOTE
Acute Care - Occupational Therapy Initial Eval/Discharge  [facility]     Patient Name: Alisa Ames  : 1960  MRN: [id number]  Today's Date: 2019  Onset of Illness/Injury or Date of Surgery: 19  Date of Referral to OT: 19  Referring Physician: MD Cassy      Admit Date: 2019       ICD-10-CM ICD-9-CM   1. Impaired functional mobility, balance, gait, and endurance Z74.09 V49.89   2. Primary osteoarthritis of left hip M16.12 715.15   3. Status post total replacement of left hip Z96.642 V43.64   4. Impaired mobility and ADLs Z74.09 799.89     Patient Active Problem List   Diagnosis   • DDD (degenerative disc disease), cervical   • Migraine syndrome   • Goiter   • Hypothyroidism (acquired)   • DDD (degenerative disc disease), lumbar   • Depression with anxiety   • Arthritis   • Syncope   • Allergic rhinitis   • Pure hypercholesterolemia   • Primary osteoarthritis of left hip   • Status post total replacement of left hip   • Prediabetes     Past Medical History:   Diagnosis Date   • COPD (chronic obstructive pulmonary disease) (CMS/HCC)    • Fibromyalgia    • Hyperlipidemia    • Malignant neoplasm (CMS/HCC)    • Migraine    • Syncope    • Thyroid nodule    • Wears dentures     UPPER AND LOWER      Past Surgical History:   Procedure Laterality Date   • APPENDECTOMY     • CERVICAL SPINE SURGERY      Fibromyalgia and DDD with h/o C spine surgery- initially on flexeril bid.   • COLONOSCOPY     • NECK SURGERY     • NOSE SURGERY      r/t Skin CA   • SKIN CANCER EXCISION     • THYROID LOBECTOMY Right     On discussion, pt reported a h/o right thyroid lobectomy for goiter.U/S demo surgical absence of right lobe and diffuse nodularity of the left lobe with a dominant nodule in the lower pole of 1.8 x 3.5 cm.   • TONSILLECTOMY     • TOTAL HIP ARTHROPLASTY Left 2019    Procedure: TOTAL HIP ARTHROPLASTY LEFT;  Surgeon: Allen Madera MD;  Location: [facility];  Service: Orthopedics   •  TOTAL THYROIDECTOMY            OT ASSESSMENT FLOWSHEET (last 72 hours)      Occupational Therapy Evaluation     Row Name 01/08/19 1050                   OT Evaluation Time/Intention    Subjective Information  no complaints  -AN        Document Type  discharge evaluation/summary  -AN        Mode of Treatment  occupational therapy  -AN        Patient Effort  good  -AN        Symptoms Noted During/After Treatment  increased pain  -AN           General Information    Patient Profile Reviewed?  yes  -AN        Onset of Illness/Injury or Date of Surgery  01/07/19  -AN        Referring Physician  MD Cassy  -AN        Patient Observations  alert;cooperative;agree to therapy  -AN        Patient/Family Observations  Pt reclined in chair, family present  -AN        Prior Level of Function  min assist:;gait;transfer;ADL's;home management;using stairs;work  -AN        Equipment Currently Used at Home  other (see comments) reacher, long sponge  -AN        Pertinent History of Current Functional Problem  Pt with presistent and progressive L hip pain. Pt s/p L KAREN  -AN        Existing Precautions/Restrictions  fall;left;hip, posterior  -AN        Equipment Issued to Patient  leg   -AN        Equipment Ordered for Patient  -- see CM note  -AN        Risks Reviewed  patient:;LOB;dizziness;increased discomfort;change in vital signs  -AN        Benefits Reviewed  patient:;improve function;increase independence;increase strength;increase balance  -AN        Barriers to Rehab  previous functional deficit  -AN           Relationship/Environment    Primary Source of Support/Comfort  spouse  -AN        Lives With  spouse  -AN        Family Caregiver if Needed  spouse  -AN           Resource/Environmental Concerns    Current Living Arrangements  home/apartment/condo  -AN        Resource/Environmental Concerns  none  -AN        Home Accessibility Concerns  stairs to enter home  -AN           Home Main Entrance    Number of Stairs,  Main Entrance  three  -AN        Stair Railings, Main Entrance  railing on left side (ascending)  -AN           Cognitive Assessment/Interventions    Additional Documentation  Cognitive Assessment/Intervention (Group)  -AN           Cognitive Assessment/Intervention- PT/OT    Affect/Mental Status (Cognitive)  flat/blunted affect  -AN        Orientation Status (Cognition)  oriented x 4  -AN        Follows Commands (Cognition)  WFL  -AN        Personal Safety Interventions  fall prevention program maintained  -AN           Safety Issues, Functional Mobility    Safety Issues Affecting Function (Mobility)  safety precautions follow-through/compliance  -AN           Bed Mobility Assessment/Treatment    Comment (Bed Mobility)  pt up in chair  -AN           Functional Mobility    Functional Mobility- Ind. Level  contact guard assist  -AN        Functional Mobility- Device  rolling walker  -AN        Functional Mobility-Distance (Feet)  20  -AN           Transfer Assessment/Treatment    Transfer Assessment/Treatment  toilet transfer  -AN           Sit-Stand Transfer    Sit-Stand [place] (Transfers)  contact guard;verbal cues  -AN        Assistive Device (Sit-Stand Transfers)  walker, front-wheeled  -AN           Stand-Sit Transfer    Stand-Sit [place] (Transfers)  verbal cues;contact guard  -AN        Assistive Device (Stand-Sit Transfers)  walker, front-wheeled  -AN           Toilet Transfer    Type (Toilet Transfer)  sit-stand;stand-sit  -AN        [place] Level (Toilet Transfer)  contact guard;verbal cues  -AN        Assistive Device (Toilet Transfer)  grab bars/safety frame;walker, front-wheeled  -AN           ADL Assessment/Intervention    BADL Assessment/Intervention  lower body dressing;bathing;toileting  -AN           Bathing Assessment/Intervention    Comment (Bathing)  demonstrate use of long sponge to bathe MARGARITO's; MD ordered # days prior to shower  -AN           Lower Body Dressing  Assessment/Training    Lower Body Dressing [place] Level  don;doff;pants/bottoms simulated min assist  -AN        Comment (Lower Body Dressing)  OT demonstrated use of reacher, sockaid, pt declined to don pants, waiting on bathing later this pm  -AN           Toileting Assessment/Training    [place] Level (Toileting)  toileting skills;perform perineal hygiene;supervision;verbal cues  -AN        Assistive Devices (Toileting)  raised toilet seat;grab bar/safety frame  -AN        Toileting Position  unsupported sitting  -AN        Comment (Toileting)  educated on use of BSC at homoe  -AN           BADL Safety/Performance    Impairments, BADL Safety/Performance  balance;pain  -AN        Skilled BADL Treatment/Intervention  BADL process/adaptation training;environmental modifications;adaptive equipment training  -AN           General ROM    GENERAL ROM COMMENTS  Wil UE WFL  -AN           MMT (Manual Muscle Testing)    General MMT Comments  Wil UE WFL  -AN           Motor Assessment/Interventions    Additional Documentation  Balance (Group);Gross Motor Coordination (Group);Therapeutic Exercise (Group)  -AN           Therapeutic Exercise    95975 - OT Therapeutic Activity Minutes  8  -AN           Gross Motor Coordination    Gross Motor Skill, Impairments Detail  WFL  -AN           Balance    Balance  static sitting balance;dynamic standing balance;static standing balance  -AN           Static Sitting Balance    Level of [place] (Unsupported Sitting, Static Balance)  independent  -AN        Sitting Position (Unsupported Sitting, Static Balance)  sitting in chair  -AN           Static Standing Balance    Level of [place] (Supported Standing, Static Balance)  supervision  -AN        Time Able to Maintain Position (Supported Standing, Static Balance)  30 to 45 seconds  -AN        Assistive Device Utilized (Supported Standing, Static Balance)  rolling walker  -AN        Comment (Supported Standing, Static  Balance)  use of RW  -AN           Dynamic Standing Balance    Level of [place], Reaches Outside Midline (Standing, Dynamic Balance)  contact guard assist  -AN        Time Able to Maintain Position, Reaches Outside Midline (Standing, Dynamic Balance)  30 to 45 seconds  -AN        Comment, Reaches Outside Midline (Standing, Dynamic Balance)  RW  -AN           Sensory Assessment/Intervention    Sensory General Assessment  no sensation deficits identified  -AN        Additional Documentation  Vision Assessment/Intervention (Group)  -AN           Vision Assessment/Intervention    Visual Impairment/Limitations  WFL  -AN           Positioning and Restraints    Pre-Treatment Position  sitting in chair/recliner  -AN        Post Treatment Position  chair  -AN           Pain Scale: Numbers Pre/Post-Treatment    Pain Scale: Numbers, Pretreatment  0/10 - no pain  -AN        Pain Scale: Numbers, Post-Treatment  3/10  -AN        Pain Location - Side  Left  -AN        Pain Location  hip  -AN           Wound 01/07/19 0818 Left hip incision    Wound - Properties Group Date first assessed: 01/07/19  -JA Time first assessed: 0818  -JA Side: Left  -JA Location: hip  -JA Type: incision  -JA       Plan of Care Review    Plan of Care Reviewed With  patient  -AN           Clinical Impression (OT)    Date of Referral to OT  01/07/19  -AN        OT Diagnosis  Impaired ADL I  -AN        Patient/Family Goals Statement (OT Eval)  agreeable to OT  -AN        Criteria for Skilled Therapeutic Interventions Met (OT Eval)  other (see comments) pt is discharging home this date  -AN        Rehab Potential (OT Eval)  good, to achieve stated therapy goals  -AN        Therapy Frequency (OT Eval)  daily  -AN        Care Plan Review (OT)  evaluation/treatment results reviewed;care plan/treatment goals reviewed;risks/benefits reviewed  -AN        Anticipated Discharge Disposition (OT)  home with OP services;home with assist spouse will be home for 1  week  -AN           Living Environment    Home Accessibility  stairs to enter home;tub/shower is not walk in  -AN          User Key  (r) = Recorded By, (t) = Taken By, (c) = Cosigned By    Initials Name Effective Dates    AN Re Deluna OT 06/22/15 -     Huber Whitt RN 06/16/16 -           Occupational Therapy Education     Title: PT OT SLP Therapies (Done)     Topic: Occupational Therapy (Done)     Point: ADL training (Done)     Description: Instruct learner(s) on proper safety adaptation and remediation techniques during self care or transfers.   Instruct in proper use of assistive devices.    Learning Progress Summary           Patient Acceptance, E, VU by AN at 1/8/2019 10:50 AM    Comment:  Educated on hip precautions, AE for ADL's and home safety.                               User Key     Initials Effective Dates Name Provider Type Discipline    JOE 06/22/15 -  Re Deluna OT Occupational Therapist OT                OT Recommendation and Plan  Outcome Summary/Treatment Plan (OT)  Anticipated Discharge Disposition (OT): home  Therapy Frequency (OT Eval): daily  Plan of Care Review  Plan of Care Reviewed With: patient  Plan of Care Reviewed With: patient  Outcome Summary: Pt CGA with mobility and txfrs. Educated on AD/DME and AE for home assist. Pt will have family for 1 week to assist and has referral to PT.      Rehab Goal Summary     Row Name 01/08/19 0952             Physical Therapy Goals    Bed Mobility Goal Selection (PT)  bed mobility, PT goal 1  -MJ      Transfer Goal Selection (PT)  transfer, PT goal 1  -MJ      Gait Training Goal Selection (PT)  gait training, PT goal 1  -MJ      Stairs Goal Selection (PT)  stairs, PT goal 1  -MJ         Bed Mobility Goal 1 (PT)    Activity/Assistive Device (Bed Mobility Goal 1, PT)  sit to supine/supine to sit  -MJ      [place] Level/Cues Needed (Bed Mobility Goal 1, PT)  conditional independence  -MJ      Time Frame (Bed Mobility Goal 1, PT)   long term goal (LTG);3 days  -MJ      Progress/Outcomes (Bed Mobility Goal 1, PT)  goal partially met achieved supine to sit  -MJ         Transfer Goal 1 (PT)    Activity/Assistive Device (Transfer Goal 1, PT)  sit-to-stand/stand-to-sit;walker, rolling  -MJ      [place] Level/Cues Needed (Transfer Goal 1, PT)  conditional independence  -MJ      Time Frame (Transfer Goal 1, PT)  long term goal (LTG);3 days  -MJ      Progress/Outcome (Transfer Goal 1, PT)  goal not met;discharged from facility  -MJ         Gait Training Goal 1 (PT)    Activity/Assistive Device (Gait Training Goal 1, PT)  gait (walking locomotion);walker, rolling  -MJ      [place] Level (Gait Training Goal 1, PT)  conditional independence  -MJ      Distance (Gait Goal 1, PT)  500 feet  -MJ      Time Frame (Gait Training Goal 1, PT)  long term goal (LTG);3 days  -MJ      Progress/Outcome (Gait Training Goal 1, PT)  goal not met;discharged from facility  -MJ         Stairs Goal 1 (PT)    Activity/Assistive Device (Stairs Goal 1, PT)  ascending stairs;descending stairs;using handrail, left;step-to-step;cane, straight  -MJ      [place] Level/Cues Needed (Stairs Goal 1, PT)  contact guard assist  -MJ      Number of Stairs (Stairs Goal 1, PT)  3  -MJ      Time Frame (Stairs Goal 1, PT)  long term goal (LTG);3 days  -MJ      Progress/Outcome (Stairs Goal 1, PT)  goal met  -MJ        User Key  (r) = Recorded By, (t) = Taken By, (c) = Cosigned By    Initials Name Provider Type Discipline    Jules Hurtado, PT Physical Therapist PT          Outcome Measures     Row Name 01/08/19 1050 01/08/19 0952 01/07/19 1315       How much help from another person do you currently need...    Turning from your back to your side while in flat bed without using bedrails?  --  4  -MJ  4  -LR    Moving from lying on back to sitting on the side of a flat bed without bedrails?  --  4  -MJ  3  -LR    Moving to and from a bed to a chair (including a wheelchair)?  --  3   -MJ  3  -LR    Standing up from a chair using your arms (e.g., wheelchair, bedside chair)?  --  3  -MJ  3  -LR    Climbing 3-5 steps with a railing?  --  3  -MJ  3  -LR    To walk in hospital room?  --  3  -MJ  3  -LR    AM-PAC 6 Clicks Score  --  20  -MJ  19  -LR       How much help from another is currently needed...    Putting on and taking off regular lower body clothing?  2  -AN  --  --    Bathing (including washing, rinsing, and drying)  2  -AN  --  --    Toileting (which includes using toilet bed pan or urinal)  3  -AN  --  --    Putting on and taking off regular upper body clothing  4  -AN  --  --    Taking care of personal grooming (such as brushing teeth)  4  -AN  --  --    Eating meals  4  -AN  --  --    Score  19  -AN  --  --       Functional Assessment    Outcome Measure Options  AM-PAC 6 Clicks Daily Activity (OT)  -AN  AM-PAC 6 Clicks Basic Mobility (PT)  -MJ  AM-[facility] 6 Clicks Basic Mobility (PT)  -LR      User Key  (r) = Recorded By, (t) = Taken By, (c) = Cosigned By    Initials Name Provider Type    Re Hirsch OT Occupational Therapist    Tarah Bro, PT Physical Therapist    Jules Hurtado, PT Physical Therapist          Time Calculation:   Time Calculation- OT     Row Name 01/08/19 1307 01/08/19 1050 01/08/19 0952       Time Calculation- OT    OT Start Time  1050  -AN  --  --    Total Timed Code Minutes- OT  8 minute(s)  -AN  --  --    OT Received On  01/08/19  -AN  --  --       Timed Charges    69753 - Gait Training Minutes   --  --  16  -MJ    61144 - OT Therapeutic Activity Minutes  --  8  -AN  --      User Key  (r) = Recorded By, (t) = Taken By, (c) = Cosigned By    Initials Name Provider Type    Re Hirsch OT Occupational Therapist    Jules Hurtado, PT Physical Therapist        Therapy Suggested Charges     Code   Minutes Charges    18884 (CPT®)  Ot Neuromusc Re Education Ea 15 Min      79583 (CPT®) Hc Ot Ther Proc Ea 15 Min      27170 (CPT®) Hc Ot  Therapeutic Act Ea 15 Min 8 1    86953 (CPT®) Hc Ot Manual Therapy Ea 15 Min      51143 (CPT®) Hc Ot Iontophoresis Ea 15 Min      03349 (CPT®) Hc Ot Elec Stim Ea-Per 15 Min      86778 (CPT®) Hc Ot Ultrasound Ea 15 Min      00623 (CPT®) Hc Ot Self Care/Mgmt/Train Ea 15 Min      Total  8 1        Therapy Charges for Today     Code Description Service Date Service Provider Modifiers Qty    32425704451 HC OT THERAPEUTIC ACT EA 15 MIN 1/8/2019 Re Deluna OT GO 1    [id number]  OT EVAL LOW COMPLEXITY 4 1/8/2019 Re Deluna OT GO 1               OT Discharge Summary  Anticipated Discharge Disposition (OT): home  Reason for Discharge: Discharge from facility  Outcomes Achieved: Refer to plan of care for updates on goals achieved  Discharge Destination: Home with home health    Re Deluna OT  1/8/2019

## 2019-01-08 NOTE — PLAN OF CARE
Problem: Patient Care Overview  Goal: Plan of Care Review   01/08/19 5120   Plan of Care Review   Progress improving   OTHER   Outcome Summary Pt remained sleepy most of the shift, pallor noted, VSS, denies numbness/tingling, voiding on own, ambulated approx 150ft assist x1 gait belt and walker, PRN norco used q4-6hrs, ice pack kept on hit, +2 edema noted. Aquacel hip dressing CDI. Dr. Madera and Dr NOLA reeves. Plan to d/c home with HHPT 1-8-18. Labs in AM pending.    Coping/Psychosocial   Plan of Care Reviewed With patient

## 2019-01-08 NOTE — DISCHARGE SUMMARY
Patient Name: Alisa Ames  MRN: [id number]  : 1960  DOS: 2019    Attending: Allen Madera MD    Primary Care Provider: Tierney Gama MD    Date of Admission:.2019  5:25 AM    Date of Discharge:  2019    Discharge Diagnosis:      Status post total replacement of left hip    Hypothyroidism (acquired)    Depression with anxiety    Pure hypercholesterolemia    Primary osteoarthritis of left hip    Prediabetes    ABLA, mild, asymptomatic    Acute postop pain    Hospital Course  Patient is a 58 y.o. female presented for left total hip arthroplasty by Dr. Madera under spinal anesthesia. She tolerated surgery well and was admitted for further medical management. Her hip has been painful for about two and a half years. She denies use of assistive device for ambulation or recent falls.    The patient has done well postop. The patient has been able to ambulate 350 feet with PT.    The patient has had good pain control with PO pain medications.  Adjustments were made to pain medications to optimize postop pain management. Risks and benefits of opiate medications discussed with patient.    The patient was placed on DVT prophylaxis including aspirin. The patient was encouraged to use IS for atelectasis prophylaxis.     The patient was placed on a bowel regimen to prevent constipation while on pain medication.   The patient's H/H was monitored with a slight decrease that remained asymptomatic.    It is felt by all involved that the patient can discharge home at this time, and the patient has no further questions      Procedures Performed  DATE OF PROCEDURE: 19      SURGEON: Allen Madera M.D.      PREOPERATIVE DIAGNOSIS: Advanced degenerative joint disease of the left hip secondary to osteoarthritis      POSTOPERATIVE DIAGNOSIS: same      PROCEDURE: Left Total Hip Arthroplasty       Pertinent Test Results:    I reviewed the patient's new clinical results.   Results from last 7 days  "  Lab Units  19   0450   WBC 10*3/mm3  7.59   HEMOGLOBIN g/dL  10.7*   HEMATOCRIT %  33.3*   PLATELETS 10*3/mm3  234     Results for NICOLÁS HURST (MRN [id number]) as of 2019 16:25   Ref. Range 2018 12:18   Hemoglobin Latest Ref Range: 11.5 - 15.5 g/dL 12.9   Hematocrit Latest Ref Range: 34.5 - 44.0 % 39.3     Results from last 7 days   Lab Units  19   0450   SODIUM mmol/L  136   POTASSIUM mmol/L  3.5   CHLORIDE mmol/L  106   CO2 mmol/L  21.0   BUN mg/dL  19   CREATININE mg/dL  1.01   CALCIUM mg/dL  8.2*   GLUCOSE mg/dL  159*     Results for NICOLÁS HURST (MRN [id number]) as of 2019 16:25   Ref. Range 2019 21:07 2019 04:50 2019 07:17 2019 12:20   Glucose Latest Ref Range: 70 - 130 mg/dL 141 (H) 159 (H) 112 92     I reviewed the patient's new imaging including images and reports.      Physical therapy: Pt increased gait distance to 350 feet with CGA and RW, limited by pain. Pt progressed to stair training x1 HR and cane to mimic home environment. Pt is discharging home today with HHPT, made good progress toward goals during inpatient stay      Discharge Assessment:    Vital Signs  Visit Vitals  /51 (BP Location: Right arm, Patient Position: Lying)   Pulse 60   Temp 97.6 °F (36.4 °C) (Oral)   Resp 16   Ht 165.1 cm (65\")   Wt 67.1 kg (148 lb)   SpO2 94%   BMI 24.63 kg/m²     Temp (24hrs), Av.1 °F (36.7 °C), Min:97.6 °F (36.4 °C), Max:98.6 °F (37 °C)      General Appearance:    Alert, cooperative, in no acute distress   Lungs:     Clear to auscultation,respirations regular, even and                   unlabored    Heart:    Regular rhythm and normal rate, normal S1 and S2, no            murmur, no gallop, no rub, no click   Abdomen:     Normal bowel sounds, no masses, no organomegaly, soft        non-tender, non-distended, no guarding, no rebound                 tenderness   Extremities:   Moves all extremities well, no edema, no cyanosis, no              Redness. " Left hip Aquacel CDI   Pulses:   Pulses palpable and equal bilaterally   Skin:   No bleeding, bruising or rash   Neurologic:   Cranial nerves 2 - 12 grossly intact, sensation intact, DTR        present and equal bilaterally. Flexion and dorsiflexion intact bilateral feet.       Discharge Disposition: Home    Discharge Medications     Discharge Medications      New Medications      Instructions Start Date   aspirin 325 MG EC tablet   325 mg, Oral, Every 12 Hours Scheduled      docusate sodium 100 MG capsule   100 mg, Oral, 2 Times Daily PRN      HYDROcodone-acetaminophen 7.5-325 MG per tablet  Commonly known as:  NORCO   1 tablet, Oral, Every 4 Hours PRN         Changes to Medications      Instructions Start Date   levothyroxine 88 MCG tablet  Commonly known as:  SYNTHROID, LEVOTHROID  What changed:    · how much to take  · how to take this  · when to take this  · additional instructions   TAKE 1 TABLET DAILY. FASTING AND WAIT 1 HOUR FOR FOOD OR OTHER MEDICATIONS         Continue These Medications      Instructions Start Date   ARIPiprazole 5 MG tablet  Commonly known as:  ABILIFY   5 mg, Oral, Every Night at Bedtime      atorvastatin 10 MG tablet  Commonly known as:  LIPITOR   10 mg, Oral, Daily      diphenoxylate-atropine 2.5-0.025 MG per tablet  Commonly known as:  LOMOTIL   1 tablet, Oral, 4 Times Daily PRN      DULoxetine 60 MG capsule  Commonly known as:  CYMBALTA   60 mg, Oral, Daily      montelukast 10 MG tablet  Commonly known as:  SINGULAIR   10 mg, Oral, Nightly      rizatriptan 10 MG tablet  Commonly known as:  MAXALT   Take 1 tab po prn migraine. May repeat in 2 hours if needed, max 3 in 24 hr      tiZANidine 4 MG tablet  Commonly known as:  ZANAFLEX   4 mg, Oral, Every 8 Hours PRN      TROKENDI  MG capsule sustained-release 24 hr  Generic drug:  Topiramate ER   Oral      Vortioxetine HBr 20 MG tablet  Commonly known as:  TRINTELLIX   1 tablet, Oral, Daily         Stop These Medications     nitrofurantoin (macrocrystal-monohydrate) 100 MG capsule  Commonly known as:  MACROBID            Discharge Diet: Consistent carb diet    Activity at Discharge: WBAT LLE    Follow-up Appointments  Dr. Madera per his orders    Discharge took over 30 min     Darryl Mendosa MD  01/08/19  11:33 AM

## 2019-01-14 ENCOUNTER — TELEPHONE (OUTPATIENT)
Dept: INTERNAL MEDICINE | Facility: CLINIC | Age: 59
End: 2019-01-14

## 2019-01-14 DIAGNOSIS — M50.30 DDD (DEGENERATIVE DISC DISEASE), CERVICAL: ICD-10-CM

## 2019-01-14 NOTE — TELEPHONE ENCOUNTER
Pt stated that she is needing Tizanidine refilled. Pt also stated that some medications were not getting refilled, wanted to speak to you about that. Please advise, thank you.

## 2019-01-16 ENCOUNTER — TELEPHONE (OUTPATIENT)
Dept: INTERNAL MEDICINE | Facility: CLINIC | Age: 59
End: 2019-01-16

## 2019-01-16 RX ORDER — TIZANIDINE 4 MG/1
4 TABLET ORAL EVERY 8 HOURS PRN
Qty: 180 TABLET | Refills: 1 | Status: SHIPPED | OUTPATIENT
Start: 2019-01-16 | End: 2019-03-13 | Stop reason: SDUPTHER

## 2019-01-16 NOTE — TELEPHONE ENCOUNTER
PT STATES THE PHARMACY SAID THE PRESCRIPTIONS NEED A PHARMACY REVIEW. THE NUMBER FOR IT IS [id number]

## 2019-01-16 NOTE — TELEPHONE ENCOUNTER
LMOVM advising pt I will send in refill of tizanadine. I asked her to call back regarding other medications that need refilled and I can send those in too.

## 2019-01-21 NOTE — TELEPHONE ENCOUNTER
I cannot get in touch with anyone at the pharmacy who can tell me what the pt is talking about. Pt does need an appt for February if you'd like to schedule that. Thanks!

## 2019-01-22 ENCOUNTER — TELEPHONE (OUTPATIENT)
Dept: INTERNAL MEDICINE | Facility: CLINIC | Age: 59
End: 2019-01-22

## 2019-01-22 NOTE — TELEPHONE ENCOUNTER
Pt wants to speak to you about prescriptions, having trouble with [facility]. Please advise. Thank you.

## 2019-01-29 ENCOUNTER — OFFICE VISIT (OUTPATIENT)
Dept: ORTHOPEDIC SURGERY | Facility: CLINIC | Age: 59
End: 2019-01-29

## 2019-01-29 DIAGNOSIS — Z96.642 STATUS POST TOTAL REPLACEMENT OF LEFT HIP: Primary | ICD-10-CM

## 2019-01-29 PROCEDURE — 99024 POSTOP FOLLOW-UP VISIT: CPT | Performed by: PHYSICIAN ASSISTANT

## 2019-01-29 RX ORDER — HYDROCODONE BITARTRATE AND ACETAMINOPHEN 5; 325 MG/1; MG/1
1 TABLET ORAL EVERY 4 HOURS PRN
Qty: 30 TABLET | Refills: 0 | Status: SHIPPED | OUTPATIENT
Start: 2019-01-29 | End: 2019-04-18

## 2019-01-29 RX ORDER — HYDROCODONE BITARTRATE AND ACETAMINOPHEN 5; 325 MG/1; MG/1
1 TABLET ORAL EVERY 4 HOURS PRN
Qty: 30 TABLET | Refills: 0 | Status: SHIPPED | OUTPATIENT
Start: 2019-01-29 | End: 2019-01-29 | Stop reason: SDUPTHER

## 2019-01-29 NOTE — PROGRESS NOTES
I have reviewed the notes, assessments, and/or procedures performed by Stefania Lundberg PA-C, I concur with her documentation of Alisa Ames.

## 2019-01-29 NOTE — PROGRESS NOTES
[facility] Note    Subjective     Patient: Alisa Ames  : 1960    Primary Care Provider: Tierney Gama MD    Requesting Provider: As above    Post-op (3 week status post Left Total Hip Arthroplasty )      History    History of Present Illness: Patient comes in today 3 weeks status post left total hip rest past with Dr. Madera and 19.  Patient reports her pain is slowly improving.  She reports she has some groin pain that is different than her preoperative pain.  She is using a cane for ambulation and doing outpatient physical therapy at [facility].  She reports some good days and some bad days with the pain.    Current Outpatient Medications on File Prior to Visit   Medication Sig Dispense Refill   • ARIPiprazole (ABILIFY) 5 MG tablet Take 1 tablet by mouth every night at bedtime. 90 tablet 1   • aspirin  MG EC tablet Take 1 tablet by mouth Every 12 (Twelve) Hours. 60 tablet 0   • atorvastatin (LIPITOR) 10 MG tablet Take 1 tablet by mouth Daily. 90 tablet 1   • diphenoxylate-atropine (LOMOTIL) 2.5-0.025 MG per tablet Take 1 tablet by mouth 4 (Four) Times a Day As Needed for Diarrhea. 120 tablet 0   • docusate sodium 100 MG capsule Take 100 mg by mouth 2 (Two) Times a Day As Needed for Constipation. 60 each 0   • DULoxetine (CYMBALTA) 60 MG capsule Take 1 capsule by mouth Daily. 90 capsule 1   • HYDROcodone-acetaminophen (NORCO) 7.5-325 MG per tablet Take 1 tablet by mouth Every 4 (Four) Hours As Needed for Moderate Pain  for up to 30 days. 40 tablet 0   • levothyroxine (SYNTHROID, LEVOTHROID) 88 MCG tablet TAKE 1 TABLET DAILY. FASTING AND WAIT 1 HOUR FOR FOOD OR OTHER MEDICATIONS (Patient taking differently: Take 88 mcg by mouth Daily. TAKE 1 TABLET DAILY. FASTING AND WAIT 1 HOUR FOR FOOD OR OTHER MEDICATIONS) 90 tablet 1   • montelukast (SINGULAIR) 10 MG tablet Take 10 mg by mouth Every Night.     • mupirocin (BACTROBAN) 2 % ointment      • rizatriptan  (MAXALT) 10 MG tablet Take 1 tab po prn migraine. May repeat in 2 hours if needed, max 3 in 24 hr 27 tablet 1   • tiZANidine (ZANAFLEX) 4 MG tablet Take 1 tablet by mouth Every 8 (Eight) Hours As Needed for Muscle Spasms. 180 tablet 1   • Topiramate ER (TROKENDI XR) 200 MG capsule sustained-release 24 hr Take  by mouth.     • Vortioxetine HBr (TRINTELLIX) 20 MG tablet Take 20 mg by mouth Daily. 90 tablet 1     No current facility-administered medications on file prior to visit.       No Known Allergies   Past Medical History:   Diagnosis Date   • COPD (chronic obstructive pulmonary disease) (CMS/HCC)    • Fibromyalgia    • Hyperlipidemia    • Malignant neoplasm (CMS/HCC)    • Migraine    • Syncope    • Thyroid nodule    • Wears dentures     UPPER AND LOWER      Past Surgical History:   Procedure Laterality Date   • APPENDECTOMY     • CERVICAL SPINE SURGERY      Fibromyalgia and DDD with h/o C spine surgery- initially on flexeril bid.   • COLONOSCOPY  2015   • NECK SURGERY     • NOSE SURGERY      r/t Skin CA   • SKIN CANCER EXCISION     • THYROID LOBECTOMY Right     On discussion, pt reported a h/o right thyroid lobectomy for goiter.U/S demo surgical absence of right lobe and diffuse nodularity of the left lobe with a dominant nodule in the lower pole of 1.8 x 3.5 cm.   • TONSILLECTOMY     • TOTAL HIP ARTHROPLASTY Left 1/7/2019    Procedure: TOTAL HIP ARTHROPLASTY LEFT;  Surgeon: Allen Madera MD;  Location: [facility];  Service: Orthopedics   • TOTAL THYROIDECTOMY       Family History   Problem Relation Age of Onset   • Other Mother         malignant neoplasm   • Coronary artery disease Mother         MI (60's)   • Heart attack Mother    • Other Other         malignant neoplasm   • Valvular heart disease Father       Social History     Socioeconomic History   • Marital status:      Spouse name: Not on file   • Number of children: Not on file   • Years of education: Not on file   • Highest education  level: Not on file   Social Needs   • Financial resource strain: Not on file   • Food insecurity - worry: Not on file   • Food insecurity - inability: Not on file   • Transportation needs - medical: Not on file   • Transportation needs - non-medical: Not on file   Occupational History   • Occupation: [employer]   Tobacco Use   • Smoking status: Former Smoker     Packs/day: 0.00     Years: 40.00     Pack years: 0.00     Types: Cigarettes   • Smokeless tobacco: Never Used   • Tobacco comment: QUIT SMOKING WITH E CIG-1 WEEK AGO    Substance and Sexual Activity   • Alcohol use: Yes     Alcohol/week: 0.6 oz     Types: 1 Cans of beer per week     Comment: 1 drink per month   • Drug use: No   • Sexual activity: Yes     Partners: Male   Other Topics Concern   • Not on file   Social History Narrative    Caffeine:  2-4 per day        Review of Systems    The following portions of the patient's history were reviewed and updated as appropriate: allergies, current medications, past family history, past medical history, past social history, past surgical history and problem list.      Objective      Physical Exam  There were no vitals taken for this visit.    There is no height or weight on file to calculate BMI.    Ortho Exam  Left hip exam:  Incision is healing well  No pain with hip motion  5/5 hip flexor, abduction, adduction  NVI distally  Ambulating with a cane    Medical Decision Making    Data Review:   ordered and reviewed x-rays today    Assessment:  No diagnosis found.    Plan:  Doing well s/p L KAREN 1/7/19.  X-rays today show well positioned total hip arthroplasty with no evidence of fracture, loosening, osteolysis.  Patient reports improving pain.  She is participating in outpatient PT.  We discussed her gait and we encouraged her to work on walking heel to toe.  She is still needing narcotics intermittently for physical therapy and sleeping.  We have given her refill.  Plan is she continue physical therapy and  return to see Dr. Madera in 3 weeks or sooner if needed      Kenan Mckeon MA  01/29/19  9:59 AM

## 2019-02-11 ENCOUNTER — OFFICE VISIT (OUTPATIENT)
Dept: INTERNAL MEDICINE | Facility: CLINIC | Age: 59
End: 2019-02-11

## 2019-02-11 VITALS
WEIGHT: 147 LBS | BODY MASS INDEX: 24.46 KG/M2 | SYSTOLIC BLOOD PRESSURE: 128 MMHG | HEART RATE: 77 BPM | DIASTOLIC BLOOD PRESSURE: 82 MMHG | OXYGEN SATURATION: 98 % | RESPIRATION RATE: 18 BRPM

## 2019-02-11 DIAGNOSIS — J01.90 ACUTE BACTERIAL SINUSITIS: Primary | ICD-10-CM

## 2019-02-11 DIAGNOSIS — M19.90 ARTHRITIS: ICD-10-CM

## 2019-02-11 DIAGNOSIS — B96.89 ACUTE BACTERIAL SINUSITIS: Primary | ICD-10-CM

## 2019-02-11 PROCEDURE — 99213 OFFICE O/P EST LOW 20 MIN: CPT | Performed by: FAMILY MEDICINE

## 2019-02-11 RX ORDER — CEFDINIR 300 MG/1
300 CAPSULE ORAL 2 TIMES DAILY
Qty: 20 CAPSULE | Refills: 0 | Status: SHIPPED | OUTPATIENT
Start: 2019-02-11 | End: 2019-03-13

## 2019-02-11 RX ORDER — DULOXETIN HYDROCHLORIDE 60 MG/1
CAPSULE, DELAYED RELEASE ORAL
Qty: 90 CAPSULE | Refills: 1 | Status: SHIPPED | OUTPATIENT
Start: 2019-02-11 | End: 2019-07-23 | Stop reason: ALTCHOICE

## 2019-02-11 NOTE — PROGRESS NOTES
Subjective   Alisa Ames is a 58 y.o. female.     History of Present Illness   Here as a work in for allergies. Due for routine and CPE. Last seen 8/1/18 for routine. Last seen 5/9/18 for shoulder recheck and UTI, treated with macrobid. Pt was seen 4/24/18 for ER recheck. Was seen 3/15/18 for routine.  Lab 3/15/18 demo lipid at goal on Lipitor with , tg 119, HDL 96, LDL 91. Lab 1/18/18 demo normal CBC, CMP and B12. Vit D was 11.8; pt advised 5k. Thyroid was at goal with suppressed TSH of 0.05 but normal T4 of 88 on synthroid 88. Lipid high with , tg 156, HDL 83, .      1. RESP-allergies. Pt on OTC and nasal steroid with addition of singulair 2/2017. Allergy testing demo sensitivity to climate not so much allergens with no shots indicated. Started [place] honey and this helping alot. Had cerumen disimpaction 8/22/17. Today pt reports that issues started about 10 days ago. Has hard and blood discharge from her right nostril. Is now sore. Is using a moisturizing nasal gel. No nasal discharge. No sinus pain. Just had a left total hip done.      2. PSYCH- depression with anxiety- pt with long term issues. Had increased issue 2016-17. Pt having delusions of being stalked with h/o same with previous diagnosis of paranoid Schizophrenia. Pt was on Cymbalta and Zyprexa but gained 30 lb and was changed to abilify and Cymbalta. Did well initially but eventually relapsed with delusions, etc. Was changed to abilify and trintellix. Has done well other than periodically stopping her meds. Was stable taking both at last discussion 8/1/18.   3. NEURO- migraine. Pt with h/o issues, diagnosis confirmed again 9/6/17. Has done well with trokendi 200 and prn maxalt. Was only have rare breakthrough HA at last discussion 8/1/18.    4. ENDO- nontoxic diffuse goiter, post total thyroidectomy. Pt had a h/o right thyroidectomy in past. Had diffuse, enlarging goiter noted on neck MRI and then U/S. Was treated with left lobe  thyroidectomy 4/23/16 due to size. Has been at goal on synthroid 88 with last lab 1/18/18 demo suppressed TSH but free T4 at goal. Lab 1/18/18 demo free T4 at goal, TSH slightly suppressed. Today pt reports she is still taking the synthroid correctly. Feels at goal.   5. CV- hyperlipid- new issue found on lab 1/18/18. Pt was started on Lipitor and did well with lipid at goal on 3/15/18.   6. ORTHO- fibromyalgia and DDD with h/o C spine surgery- initially on flexeril bid. Was seeing Pain Management in [place] and was on oxycodone. Has been discharged from PM x3 in [place] with no additional referrals. Has had intermittent neck pain and improved with PT and prn tramadol and flexeril, Rx’ed here. Pt was then seen for ER recheck after a fall while skating with left shoulder pain. Xrays at ER were neg; pt was diagnosed with elbow contusion and given diclofenac bid. Was gradually improving but was still needing diclofenac. 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 Pt saw ortho 4/26/18 for left hip pain and was advised she had underlying arthritis with acute bursitis; was treated with good results with a hip injection initially; is now post total hip. Was still taking zanaflex and Mobic, possibly cymbalta at last discussion 8/1/18.   7. GI- IBS. Pt diagnosed 9/2016. Sister and daughter both have this as well. Pt’s has taken lomotil prn. Had Rx 8/2016 for #120 with next RF done 1/18/18..  8. GENERAL- syncope, likely psychogenic. Pt saw Dr Evans 6/10/16. Had MRI/ MRA and EEG. Had 50 % stenosis of right internal carotid, others normal; normal MRA brain; EEG normal. B12 and A1C recheck were normal. Pt saw cardio with neg w/u including echo and Event Monitor. Discussed mood related issues. Pt was seen in ER 2/25/17 with fall and head trauma. Lab demo nomral CBC and CMP. EKG demo NSR with old septal Q waves, nonspecific T changes, ? atrial enlargement (compared to 8/23/16). CT chest neg  other than emphysema changes.     The following portions of the patient's history were reviewed and updated as appropriate: allergies, past family history, past medical history, past social history, past surgical history and problem list.    Review of Systems   Constitutional: Negative.    HENT: Positive for congestion and sinus pain.    Eyes: Negative.    Respiratory: Negative.    Cardiovascular: Negative.    Gastrointestinal: Negative.    Endocrine: Negative.    Genitourinary: Negative.    Musculoskeletal: Negative.    Skin: Negative.    Allergic/Immunologic: Positive for environmental allergies.   Neurological: Negative.    Hematological: Negative.    Psychiatric/Behavioral: Negative.          Current Outpatient Medications:   •  ARIPiprazole (ABILIFY) 5 MG tablet, Take 1 tablet by mouth every night at bedtime., Disp: 90 tablet, Rfl: 1  •  atorvastatin (LIPITOR) 10 MG tablet, Take 1 tablet by mouth Daily., Disp: 90 tablet, Rfl: 1  •  diphenoxylate-atropine (LOMOTIL) 2.5-0.025 MG per tablet, Take 1 tablet by mouth 4 (Four) Times a Day As Needed for Diarrhea., Disp: 120 tablet, Rfl: 0  •  DULoxetine (CYMBALTA) 60 MG capsule, TAKE 1 CAPSULE DAILY, Disp: 90 capsule, Rfl: 1  •  levothyroxine (SYNTHROID, LEVOTHROID) 88 MCG tablet, TAKE 1 TABLET DAILY. FASTING AND WAIT 1 HOUR FOR FOOD OR OTHER MEDICATIONS (Patient taking differently: Take 88 mcg by mouth Daily. TAKE 1 TABLET DAILY. FASTING AND WAIT 1 HOUR FOR FOOD OR OTHER MEDICATIONS), Disp: 90 tablet, Rfl: 1  •  montelukast (SINGULAIR) 10 MG tablet, Take 10 mg by mouth Every Night., Disp: , Rfl:   •  rizatriptan (MAXALT) 10 MG tablet, Take 1 tab po prn migraine. May repeat in 2 hours if needed, max 3 in 24 hr, Disp: 27 tablet, Rfl: 1  •  tiZANidine (ZANAFLEX) 4 MG tablet, Take 1 tablet by mouth Every 8 (Eight) Hours As Needed for Muscle Spasms., Disp: 180 tablet, Rfl: 1  •  Topiramate ER (TROKENDI XR) 200 MG capsule sustained-release 24 hr, Take  by mouth., Disp: ,  Rfl:   •  Vortioxetine HBr (TRINTELLIX) 20 MG tablet, Take 20 mg by mouth Daily., Disp: 90 tablet, Rfl: 1  •  aspirin  MG EC tablet, Take 1 tablet by mouth Every 12 (Twelve) Hours., Disp: 60 tablet, Rfl: 0  •  cefdinir (OMNICEF) 300 MG capsule, Take 1 capsule by mouth 2 (Two) Times a Day., Disp: 20 capsule, Rfl: 0  •  docusate sodium 100 MG capsule, Take 100 mg by mouth 2 (Two) Times a Day As Needed for Constipation., Disp: 60 each, Rfl: 0  •  HYDROcodone-acetaminophen (NORCO) 5-325 MG per tablet, Take 1 tablet by mouth Every 4 (Four) Hours As Needed (pain)., Disp: 30 tablet, Rfl: 0  •  mupirocin (BACTROBAN) 2 % ointment, , Disp: , Rfl:     Objective     /82   Pulse 77   Resp 18   Wt 66.7 kg (147 lb)   SpO2 98%   BMI 24.46 kg/m²     Physical Exam   Constitutional: She is oriented to person, place, and time. She appears well-developed and well-nourished.   HENT:   Right Ear: Tympanic membrane and ear canal normal.   Left Ear: Tympanic membrane and ear canal normal.   Mouth/Throat: Oropharynx is clear and moist.   Eyes: Conjunctivae and EOM are normal. Pupils are equal, round, and reactive to light.   Neck: No thyromegaly present.   Cardiovascular: Normal rate and regular rhythm.   Pulmonary/Chest: Effort normal and breath sounds normal.   Neurological: She is alert and oriented to person, place, and time.   Skin: Skin is warm and dry.   Psychiatric: She has a normal mood and affect.   Vitals reviewed.      Assessment/Plan   Alisa was seen today for nasal congestion and allergies.    Diagnoses and all orders for this visit:    Acute bacterial sinusitis    Other orders  -     cefdinir (OMNICEF) 300 MG capsule; Take 1 capsule by mouth 2 (Two) Times a Day.        1. RESP- sinusitis- will treat with omnicef.  2. RECHECK- routine with fasting labs.

## 2019-02-19 ENCOUNTER — OFFICE VISIT (OUTPATIENT)
Dept: ORTHOPEDIC SURGERY | Facility: CLINIC | Age: 59
End: 2019-02-19

## 2019-02-19 DIAGNOSIS — Z96.642 STATUS POST TOTAL REPLACEMENT OF LEFT HIP: Primary | ICD-10-CM

## 2019-02-19 PROCEDURE — 99024 POSTOP FOLLOW-UP VISIT: CPT | Performed by: PHYSICIAN ASSISTANT

## 2019-02-19 RX ORDER — MELOXICAM 15 MG/1
15 TABLET ORAL DAILY
Qty: 30 TABLET | Refills: 2 | Status: SHIPPED | OUTPATIENT
Start: 2019-02-19 | End: 2020-02-26

## 2019-02-19 NOTE — PROGRESS NOTES
[facility] [place] [facility] Note    Subjective     Patient: Alisa Ames  : 1960    Primary Care Provider: Tierney Gama MD    Requesting Provider: As above    Post-op (3 weeks - status post Left Total Hip Arthroplasty )      History    History of Present Illness: Patient presents today 6 weeks status post left total hip arthroplasty with Dr. Madera on 19.  Patient reports 50% improvement of her pain.  She has mainly lateral based pain at this time that can be excruciating based on the weather change.  She does have occasional groin pain as well.  She is doing physical therapy and has greatly improved her gait.  She is still taking Norco occasionally for the pain. No new symptoms      Current Outpatient Medications on File Prior to Visit   Medication Sig Dispense Refill   • ARIPiprazole (ABILIFY) 5 MG tablet Take 1 tablet by mouth every night at bedtime. 90 tablet 1   • aspirin  MG EC tablet Take 1 tablet by mouth Every 12 (Twelve) Hours. 60 tablet 0   • atorvastatin (LIPITOR) 10 MG tablet Take 1 tablet by mouth Daily. 90 tablet 1   • cefdinir (OMNICEF) 300 MG capsule Take 1 capsule by mouth 2 (Two) Times a Day. 20 capsule 0   • diphenoxylate-atropine (LOMOTIL) 2.5-0.025 MG per tablet Take 1 tablet by mouth 4 (Four) Times a Day As Needed for Diarrhea. 120 tablet 0   • docusate sodium 100 MG capsule Take 100 mg by mouth 2 (Two) Times a Day As Needed for Constipation. 60 each 0   • DULoxetine (CYMBALTA) 60 MG capsule TAKE 1 CAPSULE DAILY 90 capsule 1   • HYDROcodone-acetaminophen (NORCO) 5-325 MG per tablet Take 1 tablet by mouth Every 4 (Four) Hours As Needed (pain). 30 tablet 0   • levothyroxine (SYNTHROID, LEVOTHROID) 88 MCG tablet TAKE 1 TABLET DAILY. FASTING AND WAIT 1 HOUR FOR FOOD OR OTHER MEDICATIONS (Patient taking differently: Take 88 mcg by mouth Daily. TAKE 1 TABLET DAILY. FASTING AND WAIT 1 HOUR FOR FOOD OR OTHER MEDICATIONS) 90 tablet 1   • montelukast  (SINGULAIR) 10 MG tablet Take 10 mg by mouth Every Night.     • mupirocin (BACTROBAN) 2 % ointment      • rizatriptan (MAXALT) 10 MG tablet Take 1 tab po prn migraine. May repeat in 2 hours if needed, max 3 in 24 hr 27 tablet 1   • tiZANidine (ZANAFLEX) 4 MG tablet Take 1 tablet by mouth Every 8 (Eight) Hours As Needed for Muscle Spasms. 180 tablet 1   • Topiramate ER (TROKENDI XR) 200 MG capsule sustained-release 24 hr Take  by mouth.     • Vortioxetine HBr (TRINTELLIX) 20 MG tablet Take 20 mg by mouth Daily. 90 tablet 1     No current facility-administered medications on file prior to visit.       No Known Allergies   Past Medical History:   Diagnosis Date   • COPD (chronic obstructive pulmonary disease) (CMS/HCC)    • Fibromyalgia    • Hyperlipidemia    • Malignant neoplasm (CMS/HCC)    • Migraine    • Syncope    • Thyroid nodule    • Wears dentures     UPPER AND LOWER      Past Surgical History:   Procedure Laterality Date   • APPENDECTOMY     • CERVICAL SPINE SURGERY      Fibromyalgia and DDD with h/o C spine surgery- initially on flexeril bid.   • COLONOSCOPY  2015   • NECK SURGERY     • NOSE SURGERY      r/t Skin CA   • SKIN CANCER EXCISION     • THYROID LOBECTOMY Right     On discussion, pt reported a h/o right thyroid lobectomy for goiter.U/S demo surgical absence of right lobe and diffuse nodularity of the left lobe with a dominant nodule in the lower pole of 1.8 x 3.5 cm.   • TONSILLECTOMY     • TOTAL HIP ARTHROPLASTY Left 1/7/2019    Procedure: TOTAL HIP ARTHROPLASTY LEFT;  Surgeon: Allen Madera MD;  Location: [facility];  Service: Orthopedics   • TOTAL THYROIDECTOMY       Family History   Problem Relation Age of Onset   • Other Mother         malignant neoplasm   • Coronary artery disease Mother         MI (60's)   • Heart attack Mother    • Other Other         malignant neoplasm   • Valvular heart disease Father    • Birth defects Son       Social History     Socioeconomic History   • Marital  status:      Spouse name: Not on file   • Number of children: Not on file   • Years of education: Not on file   • Highest education level: Not on file   Social Needs   • Financial resource strain: Not on file   • Food insecurity - worry: Not on file   • Food insecurity - inability: Not on file   • Transportation needs - medical: Not on file   • Transportation needs - non-medical: Not on file   Occupational History   • Occupation: [employer]   Tobacco Use   • Smoking status: Former Smoker     Packs/day: 0.00     Years: 40.00     Pack years: 0.00     Types: Cigarettes   • Smokeless tobacco: Never Used   • Tobacco comment: QUIT SMOKING WITH E CIG-1 WEEK AGO    Substance and Sexual Activity   • Alcohol use: Yes     Alcohol/week: 0.6 oz     Types: 1 Cans of beer per week     Comment: 1 drink per month   • Drug use: No   • Sexual activity: Yes     Partners: Male   Other Topics Concern   • Not on file   Social History Narrative    Caffeine:  2-4 per day        Review of Systems   Constitutional: Negative.    HENT: Negative.    Eyes: Negative.    Respiratory: Negative.    Cardiovascular: Negative.    Gastrointestinal: Negative.    Endocrine: Negative.    Genitourinary: Negative.    Musculoskeletal: Positive for arthralgias and joint swelling.   Skin: Negative.    Allergic/Immunologic: Negative.    Neurological: Negative.    Hematological: Negative.    Psychiatric/Behavioral: Negative.        The following portions of the patient's history were reviewed and updated as appropriate: allergies, current medications, past family history, past medical history, past social history, past surgical history and problem list.      Objective      Physical Exam  There were no vitals taken for this visit.    There is no height or weight on file to calculate BMI.    Ortho Exam  Left hip exam:  No pain with hip motion  Patient is tender over the greater trochanter and iliotibial band  Patient has mild pain with resisted hip  flexion  Neurovascular intact distally  Ambulating with no assistive device      Medical Decision Making    Data Review:   ordered and reviewed x-rays today    Assessment:  1. Status post total replacement of left hip        Plan:  Doing well 6 weeks status post left total hip arthroplasty with Dr. Madera.  X-rays today show well-positioned total hip arthroplasty with no evidence of osteolysis, subsidence or fracture.  Patient has some pain with resisted hip flexion and explained to her I think this is iliopsoas tendinitis.  She also has some trochanteric bursitis and pain over the IT band.  I explained to her that this is outside of the hip joint.  I do not think her pain is coming from the hip joint.  Her current pain is more related to inflammation.  Recommendation is that she continue her strengthening exercises and gait training.  I have given her prescription to begin Mobic daily.  She will return to see Dr. Madera in 2 months with repeat x-rays or sooner if needed.      Stefania Lundberg PA-C  02/19/19  2:14 PM

## 2019-02-25 ENCOUNTER — TELEPHONE (OUTPATIENT)
Dept: ORTHOPEDIC SURGERY | Facility: CLINIC | Age: 59
End: 2019-02-25

## 2019-02-25 RX ORDER — TOPIRAMATE 200 MG/1
CAPSULE, EXTENDED RELEASE ORAL
Qty: 90 CAPSULE | Refills: 1 | Status: SHIPPED | OUTPATIENT
Start: 2019-02-25 | End: 2019-03-13 | Stop reason: SDUPTHER

## 2019-02-25 NOTE — TELEPHONE ENCOUNTER
Is it okay to release this pt back to work? If so, I will send over a statement. Pt will come pick it up.

## 2019-03-01 RX ORDER — MONTELUKAST SODIUM 10 MG/1
TABLET ORAL
Qty: 90 TABLET | Refills: 1 | Status: SHIPPED | OUTPATIENT
Start: 2019-03-01 | End: 2019-03-13 | Stop reason: SDUPTHER

## 2019-03-13 ENCOUNTER — OFFICE VISIT (OUTPATIENT)
Dept: INTERNAL MEDICINE | Facility: CLINIC | Age: 59
End: 2019-03-13

## 2019-03-13 ENCOUNTER — TELEPHONE (OUTPATIENT)
Dept: ORTHOPEDIC SURGERY | Facility: CLINIC | Age: 59
End: 2019-03-13

## 2019-03-13 VITALS
BODY MASS INDEX: 24.76 KG/M2 | DIASTOLIC BLOOD PRESSURE: 76 MMHG | WEIGHT: 148.6 LBS | TEMPERATURE: 97.5 F | HEIGHT: 65 IN | SYSTOLIC BLOOD PRESSURE: 110 MMHG

## 2019-03-13 DIAGNOSIS — M50.30 DDD (DEGENERATIVE DISC DISEASE), CERVICAL: ICD-10-CM

## 2019-03-13 DIAGNOSIS — E03.9 HYPOTHYROIDISM (ACQUIRED): Primary | ICD-10-CM

## 2019-03-13 DIAGNOSIS — Z11.59 NEED FOR HEPATITIS C SCREENING TEST: ICD-10-CM

## 2019-03-13 DIAGNOSIS — G43.909 MIGRAINE SYNDROME: ICD-10-CM

## 2019-03-13 DIAGNOSIS — F41.8 DEPRESSION WITH ANXIETY: Chronic | ICD-10-CM

## 2019-03-13 DIAGNOSIS — E78.00 PURE HYPERCHOLESTEROLEMIA: ICD-10-CM

## 2019-03-13 PROBLEM — M16.12 PRIMARY OSTEOARTHRITIS OF LEFT HIP: Status: RESOLVED | Noted: 2018-11-29 | Resolved: 2019-03-13

## 2019-03-13 LAB
ALBUMIN SERPL-MCNC: 4.82 G/DL (ref 3.2–4.8)
ALBUMIN/GLOB SERPL: 1.9 G/DL (ref 1.5–2.5)
ALP SERPL-CCNC: 136 U/L (ref 25–100)
ALT SERPL W P-5'-P-CCNC: 18 U/L (ref 7–40)
ANION GAP SERPL CALCULATED.3IONS-SCNC: 8 MMOL/L (ref 3–11)
ARTICHOKE IGE QN: 119 MG/DL (ref 0–130)
AST SERPL-CCNC: 21 U/L (ref 0–33)
BASOPHILS # BLD AUTO: 0.07 10*3/MM3 (ref 0–0.2)
BASOPHILS NFR BLD AUTO: 1.2 % (ref 0–1)
BILIRUB SERPL-MCNC: 0.2 MG/DL (ref 0.3–1.2)
BUN BLD-MCNC: 26 MG/DL (ref 9–23)
BUN/CREAT SERPL: 21.1 (ref 7–25)
CALCIUM SPEC-SCNC: 9.6 MG/DL (ref 8.7–10.4)
CHLORIDE SERPL-SCNC: 111 MMOL/L (ref 99–109)
CHOLEST SERPL-MCNC: 205 MG/DL (ref 0–200)
CO2 SERPL-SCNC: 24 MMOL/L (ref 20–31)
CREAT BLD-MCNC: 1.23 MG/DL (ref 0.6–1.3)
DEPRECATED RDW RBC AUTO: 49.8 FL (ref 37–54)
EOSINOPHIL # BLD AUTO: 0.19 10*3/MM3 (ref 0–0.3)
EOSINOPHIL NFR BLD AUTO: 3.4 % (ref 0–3)
ERYTHROCYTE [DISTWIDTH] IN BLOOD BY AUTOMATED COUNT: 13.8 % (ref 11.3–14.5)
GFR SERPL CREATININE-BSD FRML MDRD: 45 ML/MIN/1.73
GLOBULIN UR ELPH-MCNC: 2.6 GM/DL
GLUCOSE BLD-MCNC: 83 MG/DL (ref 70–100)
HCT VFR BLD AUTO: 38.4 % (ref 34.5–44)
HDLC SERPL-MCNC: 75 MG/DL (ref 40–60)
HGB BLD-MCNC: 11.8 G/DL (ref 11.5–15.5)
IMM GRANULOCYTES # BLD AUTO: 0 10*3/MM3 (ref 0–0.05)
IMM GRANULOCYTES NFR BLD AUTO: 0 % (ref 0–0.6)
LYMPHOCYTES # BLD AUTO: 2.32 10*3/MM3 (ref 0.6–4.8)
LYMPHOCYTES NFR BLD AUTO: 41.1 % (ref 24–44)
MCH RBC QN AUTO: 30.8 PG (ref 27–31)
MCHC RBC AUTO-ENTMCNC: 30.7 G/DL (ref 32–36)
MCV RBC AUTO: 100.3 FL (ref 80–99)
MONOCYTES # BLD AUTO: 0.34 10*3/MM3 (ref 0–1)
MONOCYTES NFR BLD AUTO: 6 % (ref 0–12)
NEUTROPHILS # BLD AUTO: 2.73 10*3/MM3 (ref 1.5–8.3)
NEUTROPHILS NFR BLD AUTO: 48.3 % (ref 41–71)
PLATELET # BLD AUTO: 292 10*3/MM3 (ref 150–450)
PMV BLD AUTO: 10.7 FL (ref 6–12)
POTASSIUM BLD-SCNC: 4.2 MMOL/L (ref 3.5–5.5)
PROT SERPL-MCNC: 7.4 G/DL (ref 5.7–8.2)
RBC # BLD AUTO: 3.83 10*6/MM3 (ref 3.89–5.14)
SODIUM BLD-SCNC: 143 MMOL/L (ref 132–146)
T4 FREE SERPL-MCNC: 1.44 NG/DL (ref 0.89–1.76)
TRIGL SERPL-MCNC: 81 MG/DL (ref 0–150)
TSH SERPL DL<=0.05 MIU/L-ACNC: 0.35 MIU/ML (ref 0.35–5.35)
VIT B12 BLD-MCNC: 357 PG/ML (ref 211–911)
WBC NRBC COR # BLD: 5.65 10*3/MM3 (ref 3.5–10.8)

## 2019-03-13 PROCEDURE — 84439 ASSAY OF FREE THYROXINE: CPT | Performed by: FAMILY MEDICINE

## 2019-03-13 PROCEDURE — 85025 COMPLETE CBC W/AUTO DIFF WBC: CPT | Performed by: FAMILY MEDICINE

## 2019-03-13 PROCEDURE — 86708 HEPATITIS A ANTIBODY: CPT | Performed by: FAMILY MEDICINE

## 2019-03-13 PROCEDURE — 80061 LIPID PANEL: CPT | Performed by: FAMILY MEDICINE

## 2019-03-13 PROCEDURE — 80053 COMPREHEN METABOLIC PANEL: CPT | Performed by: FAMILY MEDICINE

## 2019-03-13 PROCEDURE — 82607 VITAMIN B-12: CPT | Performed by: FAMILY MEDICINE

## 2019-03-13 PROCEDURE — 99214 OFFICE O/P EST MOD 30 MIN: CPT | Performed by: FAMILY MEDICINE

## 2019-03-13 PROCEDURE — 84443 ASSAY THYROID STIM HORMONE: CPT | Performed by: FAMILY MEDICINE

## 2019-03-13 RX ORDER — MONTELUKAST SODIUM 10 MG/1
10 TABLET ORAL NIGHTLY
Qty: 90 TABLET | Refills: 1 | Status: SHIPPED | OUTPATIENT
Start: 2019-03-13 | End: 2019-11-04 | Stop reason: SDUPTHER

## 2019-03-13 RX ORDER — RIZATRIPTAN BENZOATE 10 MG/1
TABLET ORAL
Qty: 27 TABLET | Refills: 1 | Status: SHIPPED | OUTPATIENT
Start: 2019-03-13 | End: 2021-11-05

## 2019-03-13 RX ORDER — TIZANIDINE 4 MG/1
4 TABLET ORAL EVERY 8 HOURS PRN
Qty: 180 TABLET | Refills: 1 | Status: SHIPPED | OUTPATIENT
Start: 2019-03-13 | End: 2019-09-27 | Stop reason: SDUPTHER

## 2019-03-13 RX ORDER — ATORVASTATIN CALCIUM 10 MG/1
10 TABLET, FILM COATED ORAL DAILY
Qty: 90 TABLET | Refills: 1 | Status: SHIPPED | OUTPATIENT
Start: 2019-03-13 | End: 2020-01-16 | Stop reason: SDUPTHER

## 2019-03-13 RX ORDER — ARIPIPRAZOLE 5 MG/1
5 TABLET ORAL
Qty: 90 TABLET | Refills: 1 | Status: SHIPPED | OUTPATIENT
Start: 2019-03-13 | End: 2019-10-03 | Stop reason: SDUPTHER

## 2019-03-13 RX ORDER — LEVOTHYROXINE SODIUM 88 UG/1
TABLET ORAL
Qty: 90 TABLET | Refills: 1 | Status: SHIPPED | OUTPATIENT
Start: 2019-03-13 | End: 2019-10-03 | Stop reason: SDUPTHER

## 2019-03-13 NOTE — TELEPHONE ENCOUNTER
PT. HAD LEFT HIP REPLACEMENT SURGERY IN January.SHE STATES THAT WHEN SHE GOT UP OUT OF BED HER HIP WENT OUT OF SOCKET AND THE WENT BACK IN. SHE STATES THAT HER  HAD TO HELP HER BACK TO BED & IT WAS VERY PAINFUL. SHE ALSO STATES THAT IS IS VERY TENDER AND IS STILL NOT FEELING RIGHT. SHE CAN BE REACHED [phone].SHE WOULD LIKE TO HAVE AN X-RAY TO CHECK IT.

## 2019-03-13 NOTE — PATIENT INSTRUCTIONS
1. CV- hyperlipid- labs today. RF lipitor x1yr today.   2. ENDO- hypothyroid- clinically at goal. RF synthroid today. Labs today with TSH and free T4. Will continue to accept a slightly suppressed TSH as long as her free T4 stays in range.   3. PSYCH- depression with anxiety- mood stable. RF trintellix and abilify today.   4. NEURO- migraine- stable on trokendi. RF today.  5. ORTHO- pt advised to call ortho immediately due to the hip injury. Discussed possible pain management but pt does not want to take narcotics routinely. Will address with new provider once she is released by Ortho  6. RECHECK- 6mo routine

## 2019-03-13 NOTE — PROGRESS NOTES
Subjective   Alisa Ames is a 58 y.o. female.     History of Present Illness   Here for routine. Last seen 2/11/19 for sinusitis, treated with omnicef. Was seen 8/1/18 for routine. Last seen 5/9/18 for shoulder recheck and UTI, treated with macrobid. Pt was seen 4/24/18 for ER recheck. Was seen 3/15/18 for routine.  Lab 3/15/18 demo lipid at goal on Lipitor with , tg 119, HDL 96, LDL 91. Lab 1/18/18 demo normal CBC, CMP and B12. Vit D was 11.8; pt advised 5k. Thyroid was at goal with suppressed TSH of 0.05 but normal T4 of 88 on synthroid 88. Lipid high with , tg 156, HDL 83, .      1. PSYCH- depression with anxiety- pt with long term issues. Had increased issue 2016-17. Pt having delusions of being stalked with h/o same with previous diagnosis of paranoid Schizophrenia. Pt was on Cymbalta and Zyprexa but gained 30 lb and was changed to abilify and Cymbalta. Did well initially but eventually relapsed with delusions, etc. Was changed to abilify and trintellix. Has done well other than periodically stopping her meds. Was stable taking both at last discussion 8/1/18. Today pt reports she is still taking abilify and trintellix. Feels she has been doing well overall but has been down lately due to her recent hip surgery interfering with her ability to get to counseling.     2. NEURO- migraine. Pt with h/o issues, diagnosis confirmed again 9/6/17. Has done well with trokendi 200 and prn maxalt. Was only have rare breakthrough HA at last discussion 8/1/18.  Today pt reports she is still doing well. Is getting some neck pain again but her HA has been stable.     3. ENDO- nontoxic diffuse goiter, post total thyroidectomy. Pt had a h/o right thyroidectomy in past. Had diffuse, enlarging goiter noted on neck MRI and then U/S. Was treated with left lobe thyroidectomy 4/23/16 due to size. Has been at goal on synthroid 88 with last lab 1/18/18 demo suppressed TSH but free T4 at goal. Lab 1/18/18 demo free T4  at goal, TSH slightly suppressed. Today pt reports she still feels at goal.  No symptoms of being too high.     4. CV- hyperlipid- new issue found on lab 1/18/18. Pt was started on Lipitor and did well with lipid at goal on 3/15/18. Today pt reports no cardio issues with hip surgery. No current cardio symptoms.    5. ORTHO- fibromyalgia and DDD with h/o C spine surgery- initially on flexeril bid. Was seeing Pain Management in [place] and was on oxycodone. Has been discharged from [facility] in [place] with no additional referrals. Has had intermittent neck pain and improved with PT and prn tramadol and flexeril, Rx’ed here. Pt was then seen for ER recheck after a fall while skating with left shoulder pain. Xrays at ER were neg; pt was diagnosed with elbow contusion and given diclofenac bid. Was gradually improving but was still needing diclofenac. 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 Pt saw ortho 4/26/18 for left hip pain and was advised she had underlying arthritis with acute bursitis; was treated with good results with a hip injection initially; is now post total hip. Was still taking zanaflex and Mobic, possibly cymbalta at last discussion 8/1/18. Today pt reports she was doing well until she hit her left hip and felt like it popped out and back into place. She is still taking the cymbalta, mobic and zanaflex. Is out of pain meds. Has not been able to go to massage. States that she feels that a single Rx of pain meds would last her a year.     6. GI- IBS. Pt diagnosed 9/2016. Sister and daughter both have this as well. Pt’s has taken lomotil prn. Had Rx 8/2016 for #120 with next RF done 1/18/18..  7. GENERAL- syncope, likely psychogenic. Pt saw Dr Evans 6/10/16. Had MRI/ MRA and EEG. Had 50 % stenosis of right internal carotid, others normal; normal MRA brain; EEG normal. B12 and A1C recheck were normal. Pt saw cardio with neg w/u including echo and Event Monitor. Discussed  mood related issues. Pt was seen in ER 2/25/17 with fall and head trauma. Lab demo nomral CBC and CMP. EKG demo NSR with old septal Q waves, nonspecific T changes, ? atrial enlargement (compared to 8/23/16). CT chest neg other than emphysema changes.   8.RESP-allergies. Pt on OTC and nasal steroid with addition of singulair 2/2017. Allergy testing demo sensitivity to climate not so much allergens with no shots indicated. Started [place] honey and this helping alot. Had cerumen disimpaction 8/22/17. Was treated 2/11/19 for sinusitis with omnicef.     The following portions of the patient's history were reviewed and updated as appropriate: current medications, past family history, past medical history, past social history, past surgical history and problem list.    Review of Systems   Cardiovascular: Negative for chest pain.   Gastrointestinal: Negative for abdominal distention and abdominal pain.   Skin: Negative for color change.   Neurological: Negative for tremors, speech difficulty and headaches.   Psychiatric/Behavioral: Negative for agitation and confusion.   All other systems reviewed and are negative.        Current Outpatient Medications:   •  ARIPiprazole (ABILIFY) 5 MG tablet, Take 1 tablet by mouth every night at bedtime., Disp: 90 tablet, Rfl: 1  •  aspirin  MG EC tablet, Take 1 tablet by mouth Every 12 (Twelve) Hours., Disp: 60 tablet, Rfl: 0  •  atorvastatin (LIPITOR) 10 MG tablet, Take 1 tablet by mouth Daily., Disp: 90 tablet, Rfl: 1  •  diphenoxylate-atropine (LOMOTIL) 2.5-0.025 MG per tablet, Take 1 tablet by mouth 4 (Four) Times a Day As Needed for Diarrhea., Disp: 120 tablet, Rfl: 0  •  docusate sodium 100 MG capsule, Take 100 mg by mouth 2 (Two) Times a Day As Needed for Constipation., Disp: 60 each, Rfl: 0  •  DULoxetine (CYMBALTA) 60 MG capsule, TAKE 1 CAPSULE DAILY, Disp: 90 capsule, Rfl: 1  •  HYDROcodone-acetaminophen (NORCO) 5-325 MG per tablet, Take 1 tablet by mouth Every 4 (Four) Hours  "As Needed (pain)., Disp: 30 tablet, Rfl: 0  •  levothyroxine (SYNTHROID, LEVOTHROID) 88 MCG tablet, TAKE 1 TABLET DAILY. FASTING AND WAIT 1 HOUR FOR FOOD OR OTHER MEDICATIONS, Disp: 90 tablet, Rfl: 1  •  meloxicam (MOBIC) 15 MG tablet, Take 1 tablet by mouth Daily., Disp: 30 tablet, Rfl: 2  •  montelukast (SINGULAIR) 10 MG tablet, Take 1 tablet by mouth Every Night., Disp: 90 tablet, Rfl: 1  •  mupirocin (BACTROBAN) 2 % ointment, , Disp: , Rfl:   •  rizatriptan (MAXALT) 10 MG tablet, Take 1 tab po prn migraine. May repeat in 2 hours if needed, max 3 in 24 hr, Disp: 27 tablet, Rfl: 1  •  tiZANidine (ZANAFLEX) 4 MG tablet, Take 1 tablet by mouth Every 8 (Eight) Hours As Needed for Muscle Spasms., Disp: 180 tablet, Rfl: 1  •  Topiramate ER (TROKENDI XR) 200 MG capsule sustained-release 24 hr, Take 1 capsule by mouth Daily., Disp: 90 capsule, Rfl: 1  •  Vortioxetine HBr (TRINTELLIX) 20 MG tablet, Take 20 mg by mouth Daily., Disp: 90 tablet, Rfl: 1    Objective     /76   Temp 97.5 °F (36.4 °C)   Ht 165.1 cm (65\")   Wt 67.4 kg (148 lb 9.6 oz)   BMI 24.73 kg/m²     Physical Exam   Constitutional: She is oriented to person, place, and time. She appears well-developed and well-nourished.   HENT:   Right Ear: Tympanic membrane and ear canal normal.   Left Ear: Tympanic membrane and ear canal normal.   Mouth/Throat: Oropharynx is clear and moist.   Eyes: Conjunctivae and EOM are normal. Pupils are equal, round, and reactive to light.   Neck: No thyromegaly present.   Cardiovascular: Normal rate and regular rhythm.   Pulmonary/Chest: Effort normal and breath sounds normal.   Neurological: She is alert and oriented to person, place, and time.   Skin: Skin is warm and dry.   Psychiatric: She has a normal mood and affect.   Vitals reviewed.      Assessment/Plan   Alisa was seen today for follow-up.    Diagnoses and all orders for this visit:    Hypothyroidism (acquired)  -     Vitamin B12  -     TSH  -     T4, Free  -    "  levothyroxine (SYNTHROID, LEVOTHROID) 88 MCG tablet; TAKE 1 TABLET DAILY. FASTING AND WAIT 1 HOUR FOR FOOD OR OTHER MEDICATIONS    Pure hypercholesterolemia  -     CBC & Differential  -     Comprehensive Metabolic Panel  -     Lipid Panel  -     atorvastatin (LIPITOR) 10 MG tablet; Take 1 tablet by mouth Daily.  -     CBC Auto Differential    Migraine syndrome  -     rizatriptan (MAXALT) 10 MG tablet; Take 1 tab po prn migraine. May repeat in 2 hours if needed, max 3 in 24 hr    Depression with anxiety  -     Vortioxetine HBr (TRINTELLIX) 20 MG tablet; Take 20 mg by mouth Daily.  -     ARIPiprazole (ABILIFY) 5 MG tablet; Take 1 tablet by mouth every night at bedtime.    DDD (degenerative disc disease), cervical  -     tiZANidine (ZANAFLEX) 4 MG tablet; Take 1 tablet by mouth Every 8 (Eight) Hours As Needed for Muscle Spasms.    Need for hepatitis C screening test  -     Hepatitis C Antibody    Other orders  -     Topiramate ER (TROKENDI XR) 200 MG capsule sustained-release 24 hr; Take 1 capsule by mouth Daily.  -     montelukast (SINGULAIR) 10 MG tablet; Take 1 tablet by mouth Every Night.        1. CV- hyperlipid- labs today. RF lipitor x1yr today.   2. ENDO- hypothyroid- clinically at goal. RF synthroid today. Labs today with TSH and free T4. Will continue to accept a slightly suppressed TSH as long as her free T4 stays in range.   3. PSYCH- depression with anxiety- mood stable. RF trintellix and abilify today.   4. NEURO- migraine- stable on trokendi. RF today.  5. ORTHO- pt advised to call ortho immediately due to the hip injury. Discussed possible pain management but pt does not want to take narcotics routinely. Will address with new provider once she is released by Ortho  6. RECHECK- 6mo routine

## 2019-03-14 ENCOUNTER — OFFICE VISIT (OUTPATIENT)
Dept: ORTHOPEDIC SURGERY | Facility: CLINIC | Age: 59
End: 2019-03-14

## 2019-03-14 ENCOUNTER — TELEPHONE (OUTPATIENT)
Dept: ORTHOPEDIC SURGERY | Facility: CLINIC | Age: 59
End: 2019-03-14

## 2019-03-14 ENCOUNTER — LAB (OUTPATIENT)
Dept: LAB | Facility: HOSPITAL | Age: 59
End: 2019-03-14

## 2019-03-14 DIAGNOSIS — Z96.642 STATUS POST TOTAL HIP REPLACEMENT, LEFT: Primary | ICD-10-CM

## 2019-03-14 DIAGNOSIS — M25.552 PAIN OF LEFT HIP JOINT: ICD-10-CM

## 2019-03-14 LAB
BASOPHILS # BLD AUTO: 0.04 10*3/MM3 (ref 0–0.2)
BASOPHILS NFR BLD AUTO: 0.7 % (ref 0–1)
CRP SERPL-MCNC: 0.06 MG/DL (ref 0–1)
DEPRECATED RDW RBC AUTO: 48.4 FL (ref 37–54)
EOSINOPHIL # BLD AUTO: 0.16 10*3/MM3 (ref 0–0.3)
EOSINOPHIL NFR BLD AUTO: 2.7 % (ref 0–3)
ERYTHROCYTE [DISTWIDTH] IN BLOOD BY AUTOMATED COUNT: 13.5 % (ref 11.3–14.5)
ERYTHROCYTE [SEDIMENTATION RATE] IN BLOOD: 41 MM/HR (ref 0–30)
HCT VFR BLD AUTO: 38 % (ref 34.5–44)
HGB BLD-MCNC: 11.9 G/DL (ref 11.5–15.5)
IMM GRANULOCYTES # BLD AUTO: 0.01 10*3/MM3 (ref 0–0.05)
IMM GRANULOCYTES NFR BLD AUTO: 0.2 % (ref 0–0.6)
LYMPHOCYTES # BLD AUTO: 2.13 10*3/MM3 (ref 0.6–4.8)
LYMPHOCYTES NFR BLD AUTO: 36.2 % (ref 24–44)
MCH RBC QN AUTO: 30.8 PG (ref 27–31)
MCHC RBC AUTO-ENTMCNC: 31.3 G/DL (ref 32–36)
MCV RBC AUTO: 98.4 FL (ref 80–99)
MONOCYTES # BLD AUTO: 0.43 10*3/MM3 (ref 0–1)
MONOCYTES NFR BLD AUTO: 7.3 % (ref 0–12)
NEUTROPHILS # BLD AUTO: 3.13 10*3/MM3 (ref 1.5–8.3)
NEUTROPHILS NFR BLD AUTO: 53.1 % (ref 41–71)
PLATELET # BLD AUTO: 299 10*3/MM3 (ref 150–450)
PMV BLD AUTO: 10.5 FL (ref 6–12)
RBC # BLD AUTO: 3.86 10*6/MM3 (ref 3.89–5.14)
WBC NRBC COR # BLD: 5.89 10*3/MM3 (ref 3.5–10.8)

## 2019-03-14 PROCEDURE — 99024 POSTOP FOLLOW-UP VISIT: CPT | Performed by: ORTHOPAEDIC SURGERY

## 2019-03-14 PROCEDURE — 86140 C-REACTIVE PROTEIN: CPT

## 2019-03-14 PROCEDURE — 85025 COMPLETE CBC W/AUTO DIFF WBC: CPT

## 2019-03-14 NOTE — PROGRESS NOTES
"Orthopaedic Clinic Note:  Hip Post Op    Chief Complaint   Patient presents with   • Left Hip - Follow-up, Pain, Post-op     Total Hip arthroplasty 1/7/19. Patient called with concerns of dislocation. [place] \"popping\" on early Sunday morning.        HPI    Ms. Ames is 2  month(s) s/p left total hip arthroplasty.  Patient was doing extremely well after total hip replacement when she felt a pop this past Saturday and her hip as well as in a subsequent increase in pain.  She states that she felt like the hip \"popped out of place and then popped back in\".  Since then she has had increased pain localized to the lateral trochanter and gluteal region.  She rates the pain a 7/10 on the pain scale.  She describes it as a dull aching sensation.  She also complains of some slight pain in the groin but this is unchanged compared to prior to this incident.  She is here today due to concern of her increase in pain as well as the popping episode resulting in a damaging incident to her hip.    Past Medical History:   Diagnosis Date   • COPD (chronic obstructive pulmonary disease) (CMS/HCC)    • Fibromyalgia    • Hyperlipidemia    • Malignant neoplasm (CMS/HCC)    • Migraine    • Syncope    • Thyroid nodule    • Wears dentures     UPPER AND LOWER       Past Surgical History:   Procedure Laterality Date   • APPENDECTOMY     • CERVICAL SPINE SURGERY      Fibromyalgia and DDD with h/o C spine surgery- initially on flexeril bid.   • COLONOSCOPY  2015   • NECK SURGERY     • NOSE SURGERY      r/t Skin [place]   • SKIN CANCER EXCISION     • THYROID LOBECTOMY Right     On discussion, pt reported a h/o right thyroid lobectomy for goiter.U/S demo surgical absence of right lobe and diffuse nodularity of the left lobe with a dominant nodule in the lower pole of 1.8 x 3.5 cm.   • TONSILLECTOMY     • TOTAL HIP ARTHROPLASTY Left 1/7/2019    Procedure: TOTAL HIP ARTHROPLASTY LEFT;  Surgeon: Allen Madera MD;  Location: On license of [facility] OR;  Service: " Orthopedics   • TOTAL THYROIDECTOMY        Family History   Problem Relation Age of Onset   • Other Mother         malignant neoplasm   • Coronary artery disease Mother         MI (60's)   • Heart attack Mother    • Other Other         malignant neoplasm   • Valvular heart disease Father    • Birth defects Son      Social History     Socioeconomic History   • Marital status:      Spouse name: Not on file   • Number of children: Not on file   • Years of education: Not on file   • Highest education level: Not on file   Social Needs   • Financial resource strain: Not on file   • Food insecurity - worry: Not on file   • Food insecurity - inability: Not on file   • Transportation needs - medical: Not on file   • Transportation needs - non-medical: Not on file   Occupational History   • Occupation: [employer]   Tobacco Use   • Smoking status: Former Smoker     Packs/day: 0.00     Years: 40.00     Pack years: 0.00     Types: Cigarettes   • Smokeless tobacco: Never Used   • Tobacco comment: QUIT SMOKING WITH E CIG-1 WEEK AGO    Substance and Sexual Activity   • Alcohol use: Yes     Alcohol/week: 0.6 oz     Types: 1 Cans of beer per week     Comment: 1 drink per month   • Drug use: No   • Sexual activity: Yes     Partners: Male   Other Topics Concern   • Not on file   Social History Narrative    Caffeine:  2-4 per day      Current Outpatient Medications on File Prior to Visit   Medication Sig Dispense Refill   • ARIPiprazole (ABILIFY) 5 MG tablet Take 1 tablet by mouth every night at bedtime. 90 tablet 1   • aspirin  MG EC tablet Take 1 tablet by mouth Every 12 (Twelve) Hours. 60 tablet 0   • atorvastatin (LIPITOR) 10 MG tablet Take 1 tablet by mouth Daily. 90 tablet 1   • diphenoxylate-atropine (LOMOTIL) 2.5-0.025 MG per tablet Take 1 tablet by mouth 4 (Four) Times a Day As Needed for Diarrhea. 120 tablet 0   • docusate sodium 100 MG capsule Take 100 mg by mouth 2 (Two) Times a Day As Needed for Constipation.  60 each 0   • DULoxetine (CYMBALTA) 60 MG capsule TAKE 1 CAPSULE DAILY 90 capsule 1   • HYDROcodone-acetaminophen (NORCO) 5-325 MG per tablet Take 1 tablet by mouth Every 4 (Four) Hours As Needed (pain). 30 tablet 0   • levothyroxine (SYNTHROID, LEVOTHROID) 88 MCG tablet TAKE 1 TABLET DAILY. FASTING AND WAIT 1 HOUR FOR FOOD OR OTHER MEDICATIONS 90 tablet 1   • meloxicam (MOBIC) 15 MG tablet Take 1 tablet by mouth Daily. 30 tablet 2   • montelukast (SINGULAIR) 10 MG tablet Take 1 tablet by mouth Every Night. 90 tablet 1   • mupirocin (BACTROBAN) 2 % ointment      • rizatriptan (MAXALT) 10 MG tablet Take 1 tab po prn migraine. May repeat in 2 hours if needed, max 3 in 24 hr 27 tablet 1   • tiZANidine (ZANAFLEX) 4 MG tablet Take 1 tablet by mouth Every 8 (Eight) Hours As Needed for Muscle Spasms. 180 tablet 1   • Topiramate ER (TROKENDI XR) 200 MG capsule sustained-release 24 hr Take 1 capsule by mouth Daily. 90 capsule 1   • Vortioxetine HBr (TRINTELLIX) 20 MG tablet Take 20 mg by mouth Daily. 90 tablet 1     No current facility-administered medications on file prior to visit.       No Known Allergies     Review of Systems   Constitutional: Positive for activity change.   HENT: Negative.    Eyes: Negative.    Respiratory: Negative.    Cardiovascular: Negative.    Gastrointestinal: Negative.    Endocrine: Negative.    Genitourinary: Negative.    Musculoskeletal: Positive for arthralgias (left hip).   Allergic/Immunologic: Negative.    Neurological: Negative.    Hematological: Negative.    Psychiatric/Behavioral: Negative.         Physical Exam  not currently breastfeeding.    There is no height or weight on file to calculate BMI.    GENERAL APPEARANCE: awake, alert, oriented, in no acute distress and well developed, well nourished  LUNGS:  breathing nonlabored  EXTREMITIES: no clubbing, cyanosis  PERIPHERAL PULSES: palpable dorsalis pedis and posterior tibial pulses bilaterally.    GAIT:  Normal            Hip Exam:   "Left    RANGE OF MOTION:  EXTENSION/FLEXION:  normal (0-110 degrees)  IR:  20  ER:  35  PAIN WITH HIP MOTION:  yes slight pain localized lateral trochanter and gluteal region  PAIN WITH LOGROLL:  no     STRENGTH:  ABDUCTOR:  5/5  ADDUCTOR:  5/5  HIP FLEXION:  5/5    GREATER TROCHANTER BURSAL PAIN:  yes    SENSATION TO LIGHT TOUCH:  DEEP PERONEAL/SUPERFICIAL PERONEAL/SURAL/SAPHENOUS/TIBIAL:   intact    EDEMA:  no  ERYTHEMA:  no  WOUNDS/INCISIONS:  yes, Well-healed posterior lateral incision with no evidence of erythema or drainage.  _______________________________________________________________  _______________________________________________________________    RADIOGRAPHIC FINDINGS:   Indication: Status post left total hip arthroplasty    Comparison: Todays xrays were compared to previous xrays from 2/19/2019    AP pelvis: Left: Demonstrate a well positioned total hip without evidence of wear, loosening, fracture or osteolysis, femoral head is concentrically reduced within the acetabulum and No significant changes compared to prior radiographs.        Assessment/Plan:   Diagnosis Plan   1. Status post total hip replacement, left  XR Hip With or Without Pelvis 1 View Left   2. Pain of left hip joint  CBC Auto Differential    C-reactive Protein    Sedimentation Rate     I discussed with the patient that I see no radiographic or clinical evidence of complication from the total hip arthroplasty.  It is unclear what caused a \"pop\" in her hip.  It is possible that she tore the repair of the posterior capsule and this is resulting in her increased pain.  As a precaution, I recommended she reinstitute the posterior hip precautions until I see her back in clinic.  We will also obtain baseline lab work including CBC, sed rate and CRP.  If those labs are elevated, I will call her with the results and encouraged her to come back sooner for hip aspiration.  If those labs are on concerning for infection, which she will follow-up " as previously scheduled.    Addendum: Patient CRP and white blood cell count are within normal limits.  Sed rate is mildly elevated at 41.  Given the normal CRP, infection unlikely.    Allen Madera MD  03/14/19  11:26 AM

## 2019-03-14 NOTE — TELEPHONE ENCOUNTER
Spoke with Ms. Ames regarding her labs. Dr. Madera didn't see anything alarming. She had slight elevation however that was expected due to the inflammation that she was having. Patient understood.

## 2019-03-15 LAB — HAV AB SER QL IA: NEGATIVE

## 2019-04-18 ENCOUNTER — OFFICE VISIT (OUTPATIENT)
Dept: ORTHOPEDIC SURGERY | Facility: CLINIC | Age: 59
End: 2019-04-18

## 2019-04-18 VITALS — BODY MASS INDEX: 25.01 KG/M2 | HEART RATE: 76 BPM | WEIGHT: 150.13 LBS | OXYGEN SATURATION: 99 % | HEIGHT: 65 IN

## 2019-04-18 DIAGNOSIS — Z96.642 STATUS POST TOTAL HIP REPLACEMENT, LEFT: Primary | ICD-10-CM

## 2019-04-18 DIAGNOSIS — M70.62 TROCHANTERIC BURSITIS OF LEFT HIP: ICD-10-CM

## 2019-04-18 PROCEDURE — 20610 DRAIN/INJ JOINT/BURSA W/O US: CPT | Performed by: ORTHOPAEDIC SURGERY

## 2019-04-18 PROCEDURE — 99213 OFFICE O/P EST LOW 20 MIN: CPT | Performed by: ORTHOPAEDIC SURGERY

## 2019-04-18 RX ORDER — BUPIVACAINE HYDROCHLORIDE 2.5 MG/ML
3 INJECTION, SOLUTION INFILTRATION; PERINEURAL
Status: COMPLETED | OUTPATIENT
Start: 2019-04-18 | End: 2019-04-18

## 2019-04-18 RX ORDER — TRIAMCINOLONE ACETONIDE 40 MG/ML
80 INJECTION, SUSPENSION INTRA-ARTICULAR; INTRAMUSCULAR
Status: COMPLETED | OUTPATIENT
Start: 2019-04-18 | End: 2019-04-18

## 2019-04-18 RX ADMIN — BUPIVACAINE HYDROCHLORIDE 3 ML: 2.5 INJECTION, SOLUTION INFILTRATION; PERINEURAL at 11:14

## 2019-04-18 RX ADMIN — TRIAMCINOLONE ACETONIDE 80 MG: 40 INJECTION, SUSPENSION INTRA-ARTICULAR; INTRAMUSCULAR at 11:14

## 2019-04-18 NOTE — PROGRESS NOTES
Procedure   Large Joint Arthrocentesis: L greater trochanteric bursa  Date/Time: 4/18/2019 11:14 AM  Consent given by: patient  Site marked: site marked  Timeout: Immediately prior to procedure a time out was called to verify the correct patient, procedure, equipment, support staff and site/side marked as required   Supporting Documentation  Indications: pain   Procedure Details  Location: hip - L greater trochanteric bursa  Preparation: Patient was prepped and draped in the usual sterile fashion  Needle size: 22 G  Approach: anterolateral  Medications administered: 3 mL bupivacaine 0.25 %; 80 mg triamcinolone acetonide 40 MG/ML (3 cc Lidocaine 1% NDC: 2311-4101-49; LOT: -DK; EXP: 05/01/2020)  Patient tolerance: patient tolerated the procedure well with no immediate complications

## 2019-04-18 NOTE — PROGRESS NOTES
Orthopaedic Clinic Note: Hip Established Patient    Chief Complaint   Patient presents with   • Left Hip - Follow-up, Pain     Left Total Hip Arthroplasty 01/07/2019        HPI    It has been 3.5  month(s) since Ms. Ames's last visit. She returns to clinic today for follow-up left total hip arthroplasty.  She is continuing to have lateral based hip pain which she states is a 8/10 on the pain scale at its worst.  She denies any pain in her groin.  She is ambulate in with no assistive device.  She denies fevers chills or constitutional symptoms.  She has completed outpatient physical therapy.  Overall, since the initial pop that she felt in her hip 2 months postop, she has had continued lateral based hip pain.  She did obtain blood work at her last visit which revealed a normal CRP and slightly elevated sed rate.    Past Medical History:   Diagnosis Date   • COPD (chronic obstructive pulmonary disease) (CMS/HCC)    • Fibromyalgia    • Hyperlipidemia    • Malignant neoplasm (CMS/HCC)    • Migraine    • Syncope    • Thyroid nodule    • Wears dentures     UPPER AND LOWER       Past Surgical History:   Procedure Laterality Date   • APPENDECTOMY     • CERVICAL SPINE SURGERY      Fibromyalgia and DDD with h/o C spine surgery- initially on flexeril bid.   • COLONOSCOPY  2015   • NECK SURGERY     • NOSE SURGERY      r/t Skin CA   • SKIN CANCER EXCISION     • THYROID LOBECTOMY Right     On discussion, pt reported a h/o right thyroid lobectomy for goiter.U/S demo surgical absence of right lobe and diffuse nodularity of the left lobe with a dominant nodule in the lower pole of 1.8 x 3.5 cm.   • TONSILLECTOMY     • TOTAL HIP ARTHROPLASTY Left 1/7/2019    Procedure: TOTAL HIP ARTHROPLASTY LEFT;  Surgeon: Allen Madera MD;  Location: [facility];  Service: Orthopedics   • TOTAL THYROIDECTOMY        Family History   Problem Relation Age of Onset   • Other Mother         malignant neoplasm   • Coronary artery disease Mother          MI (60's)   • Heart attack Mother    • Other Other         malignant neoplasm   • Valvular heart disease Father    • Birth defects Son      Social History     Socioeconomic History   • Marital status:      Spouse name: Not on file   • Number of children: Not on file   • Years of education: Not on file   • Highest education level: Not on file   Occupational History   • Occupation: [employer]   Tobacco Use   • Smoking status: Former Smoker     Packs/day: 0.00     Years: 40.00     Pack years: 0.00     Types: Cigarettes   • Smokeless tobacco: Never Used   • Tobacco comment: QUIT SMOKING WITH E CIG-1 WEEK AGO    Substance and Sexual Activity   • Alcohol use: Yes     Alcohol/week: 0.6 oz     Types: 1 Cans of beer per week     Comment: 1 drink per month   • Drug use: No   • Sexual activity: Yes     Partners: Male   Social History Narrative    Caffeine:  2-4 per day      Current Outpatient Medications on File Prior to Visit   Medication Sig Dispense Refill   • ARIPiprazole (ABILIFY) 5 MG tablet Take 1 tablet by mouth every night at bedtime. 90 tablet 1   • aspirin  MG EC tablet Take 1 tablet by mouth Every 12 (Twelve) Hours. 60 tablet 0   • atorvastatin (LIPITOR) 10 MG tablet Take 1 tablet by mouth Daily. 90 tablet 1   • diphenoxylate-atropine (LOMOTIL) 2.5-0.025 MG per tablet Take 1 tablet by mouth 4 (Four) Times a Day As Needed for Diarrhea. 120 tablet 0   • docusate sodium 100 MG capsule Take 100 mg by mouth 2 (Two) Times a Day As Needed for Constipation. 60 each 0   • DULoxetine (CYMBALTA) 60 MG capsule TAKE 1 CAPSULE DAILY 90 capsule 1   • levothyroxine (SYNTHROID, LEVOTHROID) 88 MCG tablet TAKE 1 TABLET DAILY. FASTING AND WAIT 1 HOUR FOR FOOD OR OTHER MEDICATIONS 90 tablet 1   • meloxicam (MOBIC) 15 MG tablet Take 1 tablet by mouth Daily. 30 tablet 2   • montelukast (SINGULAIR) 10 MG tablet Take 1 tablet by mouth Every Night. 90 tablet 1   • mupirocin (BACTROBAN) 2 % ointment      • rizatriptan  "(MAXALT) 10 MG tablet Take 1 tab po prn migraine. May repeat in 2 hours if needed, max 3 in 24 hr 27 tablet 1   • tiZANidine (ZANAFLEX) 4 MG tablet Take 1 tablet by mouth Every 8 (Eight) Hours As Needed for Muscle Spasms. 180 tablet 1   • Topiramate ER (TROKENDI XR) 200 MG capsule sustained-release 24 hr Take 1 capsule by mouth Daily. 90 capsule 1   • Vortioxetine HBr (TRINTELLIX) 20 MG tablet Take 20 mg by mouth Daily. 90 tablet 1   • [DISCONTINUED] HYDROcodone-acetaminophen (NORCO) 5-325 MG per tablet Take 1 tablet by mouth Every 4 (Four) Hours As Needed (pain). 30 tablet 0     No current facility-administered medications on file prior to visit.       No Known Allergies     Review of Systems   Constitutional: Negative.    HENT: Negative.    Eyes: Negative.    Respiratory: Negative.    Cardiovascular: Negative.    Gastrointestinal: Negative.    Endocrine: Negative.    Genitourinary: Negative.    Musculoskeletal: Positive for arthralgias and joint swelling.   Skin: Negative.    Allergic/Immunologic: Negative.    Neurological: Negative.    Hematological: Negative.    Psychiatric/Behavioral: Negative.         Physical Exam  Pulse 76, height 165.1 cm (65\"), weight 68.1 kg (150 lb 2.1 oz), SpO2 99 %, not currently breastfeeding.    Body mass index is 24.98 kg/m².    GENERAL APPEARANCE: awake, alert, oriented, in no acute distress and well developed, well nourished  LUNGS:  breathing nonlabored  EXTREMITIES: no clubbing, cyanosis  PERIPHERAL PULSES: palpable dorsalis pedis and posterior tibial pulses bilaterally.    GAIT:  Normal            Hip Exam:  Left    RANGE OF MOTION:  EXTENSION/FLEXION:  normal (0-110 degrees)  IR (at 90 degrees of flexion):  20  ER (at 90 degrees of flexion):  35  PAIN WITH HIP MOTION:  no  PAIN WITH LOGROLL:  no     STINCHFIELD TEST: negative    STRENGTH:  ABDUCTOR:  5/5  ADDUCTOR:  5/5  HIP FLEXION:  5/5    GREATER TROCHANTER BURSAL PAIN:  yes    SENSATION TO LIGHT TOUCH:  DEEP " PERONEAL/SUPERFICIAL PERONEAL/SURAL/SAPHENOUS/TIBIAL:   intact    EDEMA:  no  ERYTHEMA:  no  WOUNDS/INCISIONS:  yes, Well-healed posterior lateral hip incision  _________________________________________________________________  _________________________________________________________________    RADIOGRAPHIC FINDINGS:   Indication: Status post left total hip arthroplasty    Comparison: Todays xrays were compared to previous xrays from 3/14/2019    AP pelvis: Left: Demonstrate a well positioned total hip without evidence of wear, loosening, fracture or osteolysis, femoral head is concentrically reduced within the acetabulum and No significant changes compared to prior radiographs.      Assessment/Plan:   Diagnosis Plan   1. Status post total hip replacement, left  XR Pelvis 1 or 2 View    Large Joint Arthrocentesis: L greater trochanteric bursa   2. Trochanteric bursitis of left hip       Patient has evidence of trochanteric bursitis.  I believe this is the majority of her pain.  I see no clinical or radiographic evidence of hip infection or complication from the total hip arthroplasty apart from this residual soreness in the bursal region.  I recommended proceeding with trochanteric bursa injection of the left hip.  She is agreeable to this.  She will follow-up in 6 weeks for repeat evaluation.    Procedure Note:  I discussed with the patient the potential benefits of performing a therapeutic injection of the left hip trochanteric bursa as well as potential risks including but not limited to infection, swelling, pain, bleeding, bruising, nerve/vessel damage, skin color changes, transient elevation in blood glucose levels, and fat atrophy. After informed consent and after the area was prepped with alcohol, ethyl chloride was used to numb the skin. Via the direct lateral approach, 3cc of 1% lidocaine, 3cc of 0.25% marcaine and 2 cc of 40mg/ml of Kenalog were injected into the left hip trochanteric bursa. The patient  tolerated the procedure well. There were no complications. A sterile dressing was placed over the injection site.      Allen Madera MD  04/18/19  11:56 AM

## 2019-05-28 ENCOUNTER — OFFICE VISIT (OUTPATIENT)
Dept: ORTHOPEDIC SURGERY | Facility: CLINIC | Age: 59
End: 2019-05-28

## 2019-05-28 VITALS — OXYGEN SATURATION: 99 % | WEIGHT: 142.42 LBS | BODY MASS INDEX: 23.73 KG/M2 | HEIGHT: 65 IN | HEART RATE: 82 BPM

## 2019-05-28 DIAGNOSIS — Z96.642 STATUS POST TOTAL HIP REPLACEMENT, LEFT: Primary | ICD-10-CM

## 2019-05-28 DIAGNOSIS — M70.62 TROCHANTERIC BURSITIS OF LEFT HIP: ICD-10-CM

## 2019-05-28 PROCEDURE — 99212 OFFICE O/P EST SF 10 MIN: CPT | Performed by: ORTHOPAEDIC SURGERY

## 2019-05-28 NOTE — PROGRESS NOTES
Orthopaedic Clinic Note: Hip Established Patient    Chief Complaint   Patient presents with   • Follow-up     5.5 week - Left Total Hip Arthroplasty 01/07/2019        HPI    It has been 6  week(s) since Ms. Ames's last visit. She returns to clinic today for follow-up left hip arthroplasty with subsequent pain.  She is localizing her pain laterally at the trochanter.  She received a trochanteric bursa injection in her last visit which provided significant improvement.  Her pain is now just a 2/10 on pain scale at worst.  She localizes it laterally at the trochanteric bursa.  She is ambulate with no assistive device.  Overall she is doing much better.    Past Medical History:   Diagnosis Date   • COPD (chronic obstructive pulmonary disease) (CMS/HCC)    • Fibromyalgia    • Hyperlipidemia    • Malignant neoplasm (CMS/HCC)    • Migraine    • Syncope    • Thyroid nodule    • Wears dentures     UPPER AND LOWER       Past Surgical History:   Procedure Laterality Date   • APPENDECTOMY     • CERVICAL SPINE SURGERY      Fibromyalgia and DDD with h/o C spine surgery- initially on flexeril bid.   • COLONOSCOPY  2015   • NECK SURGERY     • NOSE SURGERY      r/t Skin CA   • SKIN CANCER EXCISION     • THYROID LOBECTOMY Right     On discussion, pt reported a h/o right thyroid lobectomy for goiter.U/S demo surgical absence of right lobe and diffuse nodularity of the left lobe with a dominant nodule in the lower pole of 1.8 x 3.5 cm.   • TONSILLECTOMY     • TOTAL HIP ARTHROPLASTY Left 1/7/2019    Procedure: TOTAL HIP ARTHROPLASTY LEFT;  Surgeon: Allen Madera MD;  Location: [facility];  Service: Orthopedics   • TOTAL THYROIDECTOMY        Family History   Problem Relation Age of Onset   • Other Mother         malignant neoplasm   • Coronary artery disease Mother         MI (60's)   • Heart attack Mother    • Other Other         malignant neoplasm   • Valvular heart disease Father    • Birth defects Son      Social History      Socioeconomic History   • Marital status:      Spouse name: Not on file   • Number of children: Not on file   • Years of education: Not on file   • Highest education level: Not on file   Occupational History   • Occupation: [employer]   Tobacco Use   • Smoking status: Former Smoker     Packs/day: 0.00     Years: 40.00     Pack years: 0.00     Types: Cigarettes   • Smokeless tobacco: Never Used   • Tobacco comment: QUIT SMOKING WITH E CIG-1 WEEK AGO    Substance and Sexual Activity   • Alcohol use: Yes     Alcohol/week: 0.6 oz     Types: 1 Cans of beer per week     Comment: 1 drink per month   • Drug use: No   • Sexual activity: Yes     Partners: Male   Social History Narrative    Caffeine:  2-4 per day      Current Outpatient Medications on File Prior to Visit   Medication Sig Dispense Refill   • ARIPiprazole (ABILIFY) 5 MG tablet Take 1 tablet by mouth every night at bedtime. 90 tablet 1   • aspirin  MG EC tablet Take 1 tablet by mouth Every 12 (Twelve) Hours. 60 tablet 0   • atorvastatin (LIPITOR) 10 MG tablet Take 1 tablet by mouth Daily. 90 tablet 1   • diphenoxylate-atropine (LOMOTIL) 2.5-0.025 MG per tablet Take 1 tablet by mouth 4 (Four) Times a Day As Needed for Diarrhea. 120 tablet 0   • docusate sodium 100 MG capsule Take 100 mg by mouth 2 (Two) Times a Day As Needed for Constipation. 60 each 0   • DULoxetine (CYMBALTA) 60 MG capsule TAKE 1 CAPSULE DAILY 90 capsule 1   • levothyroxine (SYNTHROID, LEVOTHROID) 88 MCG tablet TAKE 1 TABLET DAILY. FASTING AND WAIT 1 HOUR FOR FOOD OR OTHER MEDICATIONS 90 tablet 1   • meloxicam (MOBIC) 15 MG tablet Take 1 tablet by mouth Daily. 30 tablet 2   • montelukast (SINGULAIR) 10 MG tablet Take 1 tablet by mouth Every Night. 90 tablet 1   • mupirocin (BACTROBAN) 2 % ointment      • rizatriptan (MAXALT) 10 MG tablet Take 1 tab po prn migraine. May repeat in 2 hours if needed, max 3 in 24 hr 27 tablet 1   • tiZANidine (ZANAFLEX) 4 MG tablet Take 1 tablet by  "mouth Every 8 (Eight) Hours As Needed for Muscle Spasms. 180 tablet 1   • Topiramate ER (TROKENDI XR) 200 MG capsule sustained-release 24 hr Take 1 capsule by mouth Daily. 90 capsule 1   • Vortioxetine HBr (TRINTELLIX) 20 MG tablet Take 20 mg by mouth Daily. 90 tablet 1     No current facility-administered medications on file prior to visit.       No Known Allergies     Review of Systems   Constitutional: Negative.    HENT: Negative.    Eyes: Negative.    Respiratory: Negative.    Cardiovascular: Negative.    Gastrointestinal: Negative.    Endocrine: Negative.    Genitourinary: Negative.    Musculoskeletal: Positive for arthralgias.   Skin: Negative.    Allergic/Immunologic: Negative.    Neurological: Negative.    Hematological: Negative.    Psychiatric/Behavioral: Negative.         The patient's Review of Systems was personally reviewed and confirmed as accurate.    Physical Exam  Pulse 82, height 165.1 cm (65\"), weight 64.6 kg (142 lb 6.7 oz), SpO2 99 %, not currently breastfeeding.    Body mass index is 23.7 kg/m².    GENERAL APPEARANCE: awake, alert, oriented, in no acute distress and well developed, well nourished  LUNGS:  breathing nonlabored  EXTREMITIES: no clubbing, cyanosis  PERIPHERAL PULSES: palpable dorsalis pedis and posterior tibial pulses bilaterally.    GAIT:  Normal            Hip Exam:  Left     RANGE OF MOTION:  EXTENSION/FLEXION:  normal (0-110 degrees)  IR (at 90 degrees of flexion):  20  ER (at 90 degrees of flexion):  35  PAIN WITH HIP MOTION:  no  PAIN WITH LOGROLL:  no      STINCHFIELD TEST: negative     STRENGTH:  ABDUCTOR:    5/5  ADDUCTOR:  5/5  HIP FLEXION:  5/5     GREATER TROCHANTER BURSAL PAIN:  yes    SENSATION TO LIGHT TOUCH:  DEEP PERONEAL/SUPERFICIAL PERONEAL/SURAL/SAPHENOUS/TIBIAL:   intact    EDEMA:  no  ERYTHEMA:  no  WOUNDS/INCISIONS:  yes, Well-healed posterior lateral hip " incision  _________________________________________________________________  _________________________________________________________________    RADIOGRAPHIC FINDINGS:   No new imaging today    Assessment/Plan:   Diagnosis Plan   1. Status post total hip replacement, left     2. Trochanteric bursitis of left hip       Patient symptoms have significantly improved with the bursa injection.  As a result, I recommend no further intervention at this time apart from continued physical therapy for hip strengthening exercises.  She will follow-up in 2 months for repeat assessment x-ray AP pelvis    Allen Madera MD  05/28/19  10:14 AM

## 2019-06-11 ENCOUNTER — OFFICE VISIT (OUTPATIENT)
Dept: INTERNAL MEDICINE | Facility: CLINIC | Age: 59
End: 2019-06-11

## 2019-06-11 VITALS
OXYGEN SATURATION: 98 % | WEIGHT: 149 LBS | RESPIRATION RATE: 18 BRPM | TEMPERATURE: 97.5 F | HEART RATE: 78 BPM | BODY MASS INDEX: 24.83 KG/M2 | DIASTOLIC BLOOD PRESSURE: 78 MMHG | SYSTOLIC BLOOD PRESSURE: 124 MMHG | HEIGHT: 65 IN

## 2019-06-11 DIAGNOSIS — F41.9 ANXIETY: ICD-10-CM

## 2019-06-11 DIAGNOSIS — E03.9 HYPOTHYROIDISM (ACQUIRED): Primary | ICD-10-CM

## 2019-06-11 DIAGNOSIS — R22.1 NECK SWELLING: ICD-10-CM

## 2019-06-11 DIAGNOSIS — K58.0 IRRITABLE BOWEL SYNDROME WITH DIARRHEA: ICD-10-CM

## 2019-06-11 DIAGNOSIS — E04.1 NODULAR THYROID DISEASE: ICD-10-CM

## 2019-06-11 LAB
T3FREE SERPL-MCNC: 2.9 PG/ML (ref 2–4.4)
TSH SERPL DL<=0.05 MIU/L-ACNC: 1.45 MIU/ML (ref 0.27–4.2)

## 2019-06-11 PROCEDURE — 99214 OFFICE O/P EST MOD 30 MIN: CPT | Performed by: NURSE PRACTITIONER

## 2019-06-11 PROCEDURE — 84481 FREE ASSAY (FT-3): CPT | Performed by: NURSE PRACTITIONER

## 2019-06-11 PROCEDURE — 84443 ASSAY THYROID STIM HORMONE: CPT | Performed by: NURSE PRACTITIONER

## 2019-06-11 RX ORDER — DIPHENOXYLATE HYDROCHLORIDE AND ATROPINE SULFATE 2.5; .025 MG/1; MG/1
1 TABLET ORAL 4 TIMES DAILY PRN
Qty: 120 TABLET | Refills: 0 | Status: SHIPPED | OUTPATIENT
Start: 2019-06-11 | End: 2019-08-06 | Stop reason: ALTCHOICE

## 2019-06-11 NOTE — PROGRESS NOTES
Subjective   Alisa Ames is a 59 y.o. female here today for sore throat and neck swelling. She previously had thyroid removed. She has been taking her synthroid with food and other medications in the morning. She read on the bottle that she should take with an empty stomach. She started doing this a week ago and neck swelling has decreased some. She's had an increase in depression, anxiety, insomnia, and dry skin. She is concerned about thyroid levels. She's been out of prescribed Lomotil which has helped to control diarrhea and nausea. She would like a refill on this today. She denies any shortness of air or chest pain.     Chief Complaint   Patient presents with   • Swollen Glands       Review of Systems   Constitutional: Positive for fatigue. Negative for activity change, appetite change, chills, diaphoresis, fever, unexpected weight gain and unexpected weight loss.   HENT: Positive for sore throat and voice change. Negative for ear discharge, ear pain, mouth sores, nosebleeds, sinus pressure and sneezing.    Eyes: Negative.  Negative for pain, discharge and itching.   Respiratory: Negative.  Negative for cough, chest tightness, shortness of breath and wheezing.    Cardiovascular: Negative.  Negative for chest pain, palpitations and leg swelling.   Gastrointestinal: Positive for diarrhea and nausea. Negative for abdominal pain, constipation and vomiting.   Endocrine: Negative.  Negative for heat intolerance, polydipsia and polyphagia.   Genitourinary: Negative.  Negative for dysuria, flank pain, frequency, hematuria and urgency.   Musculoskeletal: Negative.  Negative for arthralgias, back pain, gait problem, joint swelling, myalgias, neck pain and neck stiffness.   Skin: Negative.  Negative for color change, pallor and rash.   Allergic/Immunologic: Negative.  Negative for immunocompromised state.   Neurological: Positive for headache. Negative for seizures, speech difficulty, weakness and numbness.    Hematological: Negative.  Negative for adenopathy.   Psychiatric/Behavioral: Positive for sleep disturbance. Negative for agitation, decreased concentration and depressed mood. The patient is nervous/anxious.        Past Medical History:   Diagnosis Date   • COPD (chronic obstructive pulmonary disease) (CMS/HCC)    • Fibromyalgia    • Hyperlipidemia    • Malignant neoplasm (CMS/HCC)    • Migraine    • Syncope    • Thyroid nodule    • Wears dentures     UPPER AND LOWER      Past Surgical History:   Procedure Laterality Date   • APPENDECTOMY     • CERVICAL SPINE SURGERY      Fibromyalgia and DDD with h/o C spine surgery- initially on flexeril bid.   • COLONOSCOPY  2015   • NECK SURGERY     • NOSE SURGERY      r/t Skin CA   • SKIN CANCER EXCISION     • THYROID LOBECTOMY Right     On discussion, pt reported a h/o right thyroid lobectomy for goiter.U/S demo surgical absence of right lobe and diffuse nodularity of the left lobe with a dominant nodule in the lower pole of 1.8 x 3.5 cm.   • TONSILLECTOMY     • TOTAL HIP ARTHROPLASTY Left 1/7/2019    Procedure: TOTAL HIP ARTHROPLASTY LEFT;  Surgeon: Allen Madera MD;  Location: [facility];  Service: Orthopedics   • TOTAL THYROIDECTOMY       Family History   Problem Relation Age of Onset   • Other Mother         malignant neoplasm   • Coronary artery disease Mother         MI (60's)   • Heart attack Mother    • Other Other         malignant neoplasm   • Valvular heart disease Father    • Birth defects Son      Social History     Socioeconomic History   • Marital status:      Spouse name: Not on file   • Number of children: Not on file   • Years of education: Not on file   • Highest education level: Not on file   Occupational History   • Occupation: [employer]   Tobacco Use   • Smoking status: Former Smoker     Packs/day: 0.00     Years: 40.00     Pack years: 0.00     Types: Cigarettes   • Smokeless tobacco: Never Used   • Tobacco comment: QUIT SMOKING WITH E  CIG-1 WEEK AGO    Substance and Sexual Activity   • Alcohol use: Yes     Alcohol/week: 0.6 oz     Types: 1 Cans of beer per week     Comment: 1 drink per month   • Drug use: No   • Sexual activity: Yes     Partners: Male   Social History Narrative    Caffeine:  2-4 per day     No Known Allergies      Current Outpatient Medications:   •  ARIPiprazole (ABILIFY) 5 MG tablet, Take 1 tablet by mouth every night at bedtime., Disp: 90 tablet, Rfl: 1  •  aspirin  MG EC tablet, Take 1 tablet by mouth Every 12 (Twelve) Hours., Disp: 60 tablet, Rfl: 0  •  atorvastatin (LIPITOR) 10 MG tablet, Take 1 tablet by mouth Daily., Disp: 90 tablet, Rfl: 1  •  diphenoxylate-atropine (LOMOTIL) 2.5-0.025 MG per tablet, Take 1 tablet by mouth 4 (Four) Times a Day As Needed for Diarrhea., Disp: 120 tablet, Rfl: 0  •  docusate sodium 100 MG capsule, Take 100 mg by mouth 2 (Two) Times a Day As Needed for Constipation., Disp: 60 each, Rfl: 0  •  DULoxetine (CYMBALTA) 60 MG capsule, TAKE 1 CAPSULE DAILY, Disp: 90 capsule, Rfl: 1  •  levothyroxine (SYNTHROID, LEVOTHROID) 88 MCG tablet, TAKE 1 TABLET DAILY. FASTING AND WAIT 1 HOUR FOR FOOD OR OTHER MEDICATIONS, Disp: 90 tablet, Rfl: 1  •  meloxicam (MOBIC) 15 MG tablet, Take 1 tablet by mouth Daily., Disp: 30 tablet, Rfl: 2  •  montelukast (SINGULAIR) 10 MG tablet, Take 1 tablet by mouth Every Night., Disp: 90 tablet, Rfl: 1  •  mupirocin (BACTROBAN) 2 % ointment, , Disp: , Rfl:   •  rizatriptan (MAXALT) 10 MG tablet, Take 1 tab po prn migraine. May repeat in 2 hours if needed, max 3 in 24 hr, Disp: 27 tablet, Rfl: 1  •  tiZANidine (ZANAFLEX) 4 MG tablet, Take 1 tablet by mouth Every 8 (Eight) Hours As Needed for Muscle Spasms., Disp: 180 tablet, Rfl: 1  •  Topiramate ER (TROKENDI XR) 200 MG capsule sustained-release 24 hr, Take 1 capsule by mouth Daily., Disp: 90 capsule, Rfl: 1  •  Vortioxetine HBr (TRINTELLIX) 20 MG tablet, Take 20 mg by mouth Daily., Disp: 90 tablet, Rfl: 1    Objective  "  Vitals:    06/11/19 0910   BP: 124/78   BP Location: Left arm   Patient Position: Sitting   Cuff Size: Adult   Pulse: 78   Resp: 18   Temp: 97.5 °F (36.4 °C)   TempSrc: Temporal   SpO2: 98%   Weight: 67.6 kg (149 lb)   Height: 165.1 cm (65\")   PainSc: 0-No pain     Body mass index is 24.79 kg/m².    Physical Exam   Constitutional: She is oriented to person, place, and time. She appears well-developed and well-nourished.   HENT:   Head: Normocephalic and atraumatic.   Mouth/Throat: Posterior oropharyngeal erythema present. Tonsils are 0 on the right. Tonsils are 0 on the left.   Eyes: EOM are normal. Pupils are equal, round, and reactive to light.   Neck: Normal range of motion.   Mild swelling of neck bilaterally. No lymph nodes palpated.    Cardiovascular: Normal rate, regular rhythm and normal heart sounds.   Pulmonary/Chest: Effort normal and breath sounds normal.   Abdominal: Soft. Bowel sounds are normal.   Musculoskeletal: Normal range of motion.   Neurological: She is alert and oriented to person, place, and time.   Skin: Skin is warm and dry.   Psychiatric: Her speech is normal and behavior is normal. Thought content normal. Her mood appears anxious.   Vitals reviewed.      Assessment/Plan   Problem List Items Addressed This Visit        Endocrine    Hypothyroidism (acquired) - Primary    Relevant Medications    levothyroxine (SYNTHROID, LEVOTHROID) 88 MCG tablet    Other Relevant Orders    TSH Rfx On Abnormal To Free T4    T3, Free      Other Visit Diagnoses     Irritable bowel syndrome with diarrhea        Relevant Medications    diphenoxylate-atropine (LOMOTIL) 2.5-0.025 MG per tablet    Neck swelling        Relevant Orders    TSH Rfx On Abnormal To Free T4    T3, Free    Anxiety        Nodular thyroid disease        Relevant Orders    TSH Rfx On Abnormal To Free T4    T3, Free                 Plan of care reviewed with the patient at the conclusion of today's visit.  Education was provided regarding " diagnosis, management, and any prescribed or recommended OTC medications.  Patient verbalized understanding of and agreement with management plan.     Return if symptoms worsen or fail to improve.      Shanae Torres, APRN

## 2019-06-12 DIAGNOSIS — E03.9 HYPOTHYROIDISM (ACQUIRED): Primary | ICD-10-CM

## 2019-06-12 NOTE — PROGRESS NOTES
I have reviewed the notes, assessments, and/or procedures performed by GERSON Porter and I concur with her documentation of Alisa Ames.

## 2019-07-23 ENCOUNTER — OFFICE VISIT (OUTPATIENT)
Dept: INTERNAL MEDICINE | Facility: CLINIC | Age: 59
End: 2019-07-23

## 2019-07-23 VITALS
SYSTOLIC BLOOD PRESSURE: 120 MMHG | HEART RATE: 75 BPM | TEMPERATURE: 96.9 F | WEIGHT: 148.38 LBS | OXYGEN SATURATION: 100 % | DIASTOLIC BLOOD PRESSURE: 74 MMHG | HEIGHT: 65 IN | RESPIRATION RATE: 18 BRPM | BODY MASS INDEX: 24.72 KG/M2

## 2019-07-23 DIAGNOSIS — F41.9 ANXIETY: ICD-10-CM

## 2019-07-23 DIAGNOSIS — F43.9 STRESS: ICD-10-CM

## 2019-07-23 DIAGNOSIS — R42 LIGHT-HEADEDNESS: ICD-10-CM

## 2019-07-23 DIAGNOSIS — F45.8 PSYCHOGENIC DIZZINESS: Primary | ICD-10-CM

## 2019-07-23 DIAGNOSIS — F32.A DEPRESSION, UNSPECIFIED DEPRESSION TYPE: ICD-10-CM

## 2019-07-23 PROCEDURE — 99214 OFFICE O/P EST MOD 30 MIN: CPT | Performed by: NURSE PRACTITIONER
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
## 2019-07-29 ENCOUNTER — TELEPHONE (OUTPATIENT)
Dept: INTERNAL MEDICINE | Facility: CLINIC | Age: 59
End: 2019-07-29

## 2019-07-29 DIAGNOSIS — R92.8 ABNORMAL MAMMOGRAM: Primary | ICD-10-CM

## 2019-07-29 NOTE — TELEPHONE ENCOUNTER
Patient called and wanted to know if you could order her to have a mammogram and an ultrasound done on her breast at [facility]? Patient was told she had a spot on her breast. Please give pt a call.

## 2019-07-30 ENCOUNTER — TELEPHONE (OUTPATIENT)
Dept: INTERNAL MEDICINE | Facility: CLINIC | Age: 59
End: 2019-07-30

## 2019-07-31 DIAGNOSIS — E07.89 PAINFUL THYROID: ICD-10-CM

## 2019-07-31 DIAGNOSIS — E03.9 HYPOTHYROIDISM (ACQUIRED): Primary | ICD-10-CM

## 2019-08-02 ENCOUNTER — TELEPHONE (OUTPATIENT)
Dept: INTERNAL MEDICINE | Facility: CLINIC | Age: 59
End: 2019-08-02

## 2019-08-02 DIAGNOSIS — R92.8 ABNORMAL MAMMOGRAM OF LEFT BREAST: Primary | ICD-10-CM

## 2019-08-02 NOTE — TELEPHONE ENCOUNTER
----- Message from Anisha Raymond sent at 8/2/2019  9:57 AM EDT -----  PATIENT CALLED AND SAID SHE WAS ORDERED SOME IMAGING ON HER THYROID AND SHE DOESN'T HAVE A THYROID. PATIENT SAID SHE WAS ONLY NEEDING IMAGING DONE ON HER L BREAST.

## 2019-08-02 NOTE — TELEPHONE ENCOUNTER
We are working on getting the left breast ordered. Can you give her a call and help her understand about the thyroid.

## 2019-08-06 ENCOUNTER — TELEPHONE (OUTPATIENT)
Dept: INTERNAL MEDICINE | Facility: CLINIC | Age: 59
End: 2019-08-06

## 2019-08-06 ENCOUNTER — OFFICE VISIT (OUTPATIENT)
Dept: INTERNAL MEDICINE | Facility: CLINIC | Age: 59
End: 2019-08-06

## 2019-08-06 VITALS
TEMPERATURE: 97 F | HEART RATE: 73 BPM | DIASTOLIC BLOOD PRESSURE: 80 MMHG | WEIGHT: 149.25 LBS | BODY MASS INDEX: 24.87 KG/M2 | SYSTOLIC BLOOD PRESSURE: 126 MMHG | HEIGHT: 65 IN | OXYGEN SATURATION: 98 % | RESPIRATION RATE: 18 BRPM

## 2019-08-06 DIAGNOSIS — K58.0 IRRITABLE BOWEL SYNDROME WITH DIARRHEA: ICD-10-CM

## 2019-08-06 DIAGNOSIS — N63.0 BREAST NODULE: ICD-10-CM

## 2019-08-06 DIAGNOSIS — R92.8 ABNORMAL MAMMOGRAM OF LEFT BREAST: ICD-10-CM

## 2019-08-06 DIAGNOSIS — F48.8 PSYCHOGENIC SYNCOPE: Primary | ICD-10-CM

## 2019-08-06 PROCEDURE — 99214 OFFICE O/P EST MOD 30 MIN: CPT | Performed by: NURSE PRACTITIONER

## 2019-08-06 RX ORDER — DICYCLOMINE HCL 20 MG
TABLET ORAL
Qty: 90 TABLET | Refills: 1 | Status: SHIPPED | OUTPATIENT
Start: 2019-08-06 | End: 2021-11-05

## 2019-08-06 NOTE — TELEPHONE ENCOUNTER
MARGIE FROM [facility] IS NEEDING AN ULTRASOUND ORDER ON HER THYROID FAXED OVER TO HER [phone]

## 2019-08-06 NOTE — PROGRESS NOTES
Subjective   Chief Complaint   Patient presents with   • Dizziness     follow up       Alisa Ames is a 59 y.o. female here today for follow up. Headaches, dizziness, and diarrhea are some better. Since taking off work her symptoms have greatly improved. Diarrhea has been severe and lomotil has not been working as well. She would like to try something different for flare ups due to severity of diarrhea and abdominal cramping. She is going back to work on the 8th but is concerned this will cause symptoms to return. She is thinking about leaving her job due to the stress causing health issues. She has been seeing a behavioral therapist that has helped. She is getting back massages that have greatly helped with back and neck pain. She has been taking synthroid as prescribed and neck fullness at thyroid site has greatly improved. She has left breast nodule and biopsy in recommended. She is unsure if [facility] is taking care of this appt. Contacted [facility] [place] and they do need a left breast ultrasound guided biopsy order so her appt can be scheduled. She denies any shortness of air or chest pain.     The following portions of the patient's history were reviewed and updated as appropriate: allergies, current medications, past family history, past medical history, past social history, past surgical history and problem list.    Review of Systems   Constitutional: Positive for fatigue. Negative for activity change, appetite change, chills, diaphoresis, fever, unexpected weight gain and unexpected weight loss.   HENT: Negative for ear discharge, ear pain, mouth sores, nosebleeds, sinus pressure, sneezing, sore throat and voice change.    Eyes: Negative.  Negative for pain, discharge and itching.   Respiratory: Negative.  Negative for cough, chest tightness, shortness of breath and wheezing.    Cardiovascular: Negative.  Negative for chest pain, palpitations and leg swelling.   Gastrointestinal: Positive for  abdominal pain, diarrhea and nausea. Negative for constipation and vomiting.   Endocrine: Negative.  Negative for heat intolerance, polydipsia and polyphagia.   Genitourinary: Negative.  Negative for dysuria, flank pain, frequency, hematuria and urgency.   Musculoskeletal: Negative.  Negative for arthralgias, back pain, gait problem, joint swelling, myalgias, neck pain and neck stiffness.   Skin: Negative.  Negative for color change, pallor and rash.   Allergic/Immunologic: Negative.  Negative for immunocompromised state.   Neurological: Positive for dizziness, light-headedness and headache. Negative for seizures, speech difficulty, weakness and numbness.   Hematological: Negative.  Negative for adenopathy.   Psychiatric/Behavioral: Positive for sleep disturbance. Negative for agitation, decreased concentration and depressed mood. The patient is nervous/anxious.        Current Outpatient Medications on File Prior to Visit   Medication Sig   • ARIPiprazole (ABILIFY) 5 MG tablet Take 1 tablet by mouth every night at bedtime.   • aspirin  MG EC tablet Take 1 tablet by mouth Every 12 (Twelve) Hours.   • atorvastatin (LIPITOR) 10 MG tablet Take 1 tablet by mouth Daily.   • docusate sodium 100 MG capsule Take 100 mg by mouth 2 (Two) Times a Day As Needed for Constipation.   • levothyroxine (SYNTHROID, LEVOTHROID) 88 MCG tablet TAKE 1 TABLET DAILY. FASTING AND WAIT 1 HOUR FOR FOOD OR OTHER MEDICATIONS   • meloxicam (MOBIC) 15 MG tablet Take 1 tablet by mouth Daily.   • montelukast (SINGULAIR) 10 MG tablet Take 1 tablet by mouth Every Night.   • mupirocin (BACTROBAN) 2 % ointment    • rizatriptan (MAXALT) 10 MG tablet Take 1 tab po prn migraine. May repeat in 2 hours if needed, max 3 in 24 hr   • tiZANidine (ZANAFLEX) 4 MG tablet Take 1 tablet by mouth Every 8 (Eight) Hours As Needed for Muscle Spasms.   • Topiramate ER (TROKENDI XR) 200 MG capsule sustained-release 24 hr Take 1 capsule by mouth Daily.   •  "Vortioxetine HBr (TRINTELLIX) 20 MG tablet Take 20 mg by mouth Daily.     No current facility-administered medications on file prior to visit.        Objective   Vitals:    08/06/19 1017   BP: 126/80   BP Location: Left arm   Patient Position: Sitting   Cuff Size: Adult   Pulse: 73   Resp: 18   Temp: 97 °F (36.1 °C)   TempSrc: Temporal   SpO2: 98%   Weight: 67.7 kg (149 lb 4 oz)   Height: 165.1 cm (65\")   PainSc: 0-No pain     Body mass index is 24.84 kg/m².    Physical Exam   Constitutional: She is oriented to person, place, and time. She appears well-developed and well-nourished.   HENT:   Head: Normocephalic and atraumatic.   Eyes: EOM are normal. Pupils are equal, round, and reactive to light.   Neck: Normal range of motion.       Cardiovascular: Normal rate, regular rhythm and normal heart sounds.   Pulmonary/Chest: Effort normal and breath sounds normal.   Abdominal: Soft. Bowel sounds are normal.   Musculoskeletal: Normal range of motion.   Neurological: She is alert and oriented to person, place, and time.   Skin: Skin is warm and dry.   Psychiatric: Her speech is normal and behavior is normal. Thought content normal. Her mood appears anxious. She exhibits a depressed mood.   Vitals reviewed.      Assessment/Plan   Alisa was seen today for dizziness.    Diagnoses and all orders for this visit:    Psychogenic syncope   Continue leave from work with consideration that she may need to switch positions or change employment due to stress affecting her overall health.    Rise slowly from sitting to standing    Irritable bowel syndrome with diarrhea   dicyclomine (BENTYL) 20 MG tablet; Take 1/2-1 tablet up to four times daily as needed for diarrhea.    Breast nodule   Left breast guided US - biopsy; order cannot be located in Psychiatric. Order was handwritten and faxed to radiology dept at [facility]. Order was scanned to chart.     Abnormal mammogram of left breast   Left breast guided US - biopsy; order cannot " be located in Epic. Order was handwritten and faxed to radiology dept at [facility]. Order was scanned to chart.            Current Outpatient Medications:   •  ARIPiprazole (ABILIFY) 5 MG tablet, Take 1 tablet by mouth every night at bedtime., Disp: 90 tablet, Rfl: 1  •  aspirin  MG EC tablet, Take 1 tablet by mouth Every 12 (Twelve) Hours., Disp: 60 tablet, Rfl: 0  •  atorvastatin (LIPITOR) 10 MG tablet, Take 1 tablet by mouth Daily., Disp: 90 tablet, Rfl: 1  •  docusate sodium 100 MG capsule, Take 100 mg by mouth 2 (Two) Times a Day As Needed for Constipation., Disp: 60 each, Rfl: 0  •  levothyroxine (SYNTHROID, LEVOTHROID) 88 MCG tablet, TAKE 1 TABLET DAILY. FASTING AND WAIT 1 HOUR FOR FOOD OR OTHER MEDICATIONS, Disp: 90 tablet, Rfl: 1  •  meloxicam (MOBIC) 15 MG tablet, Take 1 tablet by mouth Daily., Disp: 30 tablet, Rfl: 2  •  montelukast (SINGULAIR) 10 MG tablet, Take 1 tablet by mouth Every Night., Disp: 90 tablet, Rfl: 1  •  mupirocin (BACTROBAN) 2 % ointment, , Disp: , Rfl:   •  rizatriptan (MAXALT) 10 MG tablet, Take 1 tab po prn migraine. May repeat in 2 hours if needed, max 3 in 24 hr, Disp: 27 tablet, Rfl: 1  •  tiZANidine (ZANAFLEX) 4 MG tablet, Take 1 tablet by mouth Every 8 (Eight) Hours As Needed for Muscle Spasms., Disp: 180 tablet, Rfl: 1  •  Topiramate ER (TROKENDI XR) 200 MG capsule sustained-release 24 hr, Take 1 capsule by mouth Daily., Disp: 90 capsule, Rfl: 1  •  Vortioxetine HBr (TRINTELLIX) 20 MG tablet, Take 20 mg by mouth Daily., Disp: 90 tablet, Rfl: 1  •  dicyclomine (BENTYL) 20 MG tablet, Take 1/2-1 tablet up to four times daily as needed for diarrhea., Disp: 90 tablet, Rfl: 1       Plan of care reviewed with the patient at the conclusion of today's visit.  Education was provided regarding diagnosis, management, and any prescribed or recommended OTC medications.  Patient verbalized understanding of and agreement with management plan.     Return in about 3 weeks (around  8/27/2019), or if symptoms worsen or fail to improve.      Shanae Torres, APRN

## 2019-08-08 ENCOUNTER — TELEPHONE (OUTPATIENT)
Dept: INTERNAL MEDICINE | Facility: CLINIC | Age: 59
End: 2019-08-08

## 2019-08-26 ENCOUNTER — OFFICE VISIT (OUTPATIENT)
Dept: INTERNAL MEDICINE | Facility: CLINIC | Age: 59
End: 2019-08-26

## 2019-08-26 VITALS
TEMPERATURE: 96.9 F | WEIGHT: 148.38 LBS | HEART RATE: 83 BPM | RESPIRATION RATE: 18 BRPM | DIASTOLIC BLOOD PRESSURE: 78 MMHG | HEIGHT: 65 IN | SYSTOLIC BLOOD PRESSURE: 136 MMHG | BODY MASS INDEX: 24.72 KG/M2 | OXYGEN SATURATION: 98 %

## 2019-08-26 DIAGNOSIS — F48.8 PSYCHOGENIC SYNCOPE: ICD-10-CM

## 2019-08-26 DIAGNOSIS — F41.8 DEPRESSION WITH ANXIETY: Chronic | ICD-10-CM

## 2019-08-26 DIAGNOSIS — K21.9 GASTROESOPHAGEAL REFLUX DISEASE WITHOUT ESOPHAGITIS: Primary | ICD-10-CM

## 2019-08-26 DIAGNOSIS — K58.0 IRRITABLE BOWEL SYNDROME WITH DIARRHEA: ICD-10-CM

## 2019-08-26 DIAGNOSIS — G47.00 INSOMNIA, UNSPECIFIED TYPE: ICD-10-CM

## 2019-08-26 PROCEDURE — 99214 OFFICE O/P EST MOD 30 MIN: CPT | Performed by: NURSE PRACTITIONER

## 2019-08-26 RX ORDER — QUETIAPINE FUMARATE 50 MG/1
TABLET, FILM COATED ORAL
COMMUNITY
Start: 2019-08-13 | End: 2020-02-26

## 2019-08-26 RX ORDER — OMEPRAZOLE 20 MG/1
20 CAPSULE, DELAYED RELEASE ORAL DAILY
Qty: 30 CAPSULE | Refills: 5 | Status: SHIPPED | OUTPATIENT
Start: 2019-08-26 | End: 2020-03-10

## 2019-08-26 NOTE — PROGRESS NOTES
Subjective   Chief Complaint   Patient presents with   • psychogenic synope     follow up       Alisa Ames is a 59 y.o. female here today for follow-up.  Patient has increase gastric reflux and nausea.  She is having a difficult time tolerating certain foods including those that are spicy.  She has not had any vomiting events.  She is having some epigastric discomfort.  She has history of irritable bowel syndrome with diarrhea and the Bentyl has greatly helped her symptoms.  This is also helped with generalized abdominal cramping.  She has not had any syncope events recently.  She is willing to go back to work without restrictions.  She is been seeing her behavioral therapist who feels that her depression, anxiety, and mood stability is worse.  She is experiencing insomnia.  She saw a psychiatrist that added on Seroquel at bedtime.  She feels that this is helping her sleep and keep calm at night.  She denies any thoughts of self-harm or harming others.  She denies any shortness of air or chest pain.      The following portions of the patient's history were reviewed and updated as appropriate: allergies, current medications, past family history, past medical history, past social history, past surgical history and problem list.    Review of Systems   Constitutional: Positive for fatigue. Negative for activity change, appetite change, chills, diaphoresis, fever, unexpected weight gain and unexpected weight loss.   HENT: Negative for ear discharge, ear pain, mouth sores, nosebleeds, sinus pressure, sneezing, sore throat and voice change.    Eyes: Negative.  Negative for pain, discharge and itching.   Respiratory: Negative.  Negative for cough, chest tightness, shortness of breath and wheezing.    Cardiovascular: Negative.  Negative for chest pain, palpitations and leg swelling.   Gastrointestinal: Positive for abdominal pain, diarrhea, GERD and indigestion. Negative for constipation, nausea and vomiting.   Endocrine:  Negative.  Negative for heat intolerance, polydipsia and polyphagia.   Genitourinary: Negative.  Negative for dysuria, flank pain, frequency, hematuria and urgency.   Musculoskeletal: Negative.  Negative for arthralgias, back pain, gait problem, joint swelling, myalgias, neck pain and neck stiffness.   Skin: Negative.  Negative for color change, pallor and rash.   Allergic/Immunologic: Negative.  Negative for immunocompromised state.   Neurological: Positive for dizziness, light-headedness and headache. Negative for seizures, speech difficulty, weakness and numbness.   Hematological: Negative.  Negative for adenopathy.   Psychiatric/Behavioral: Positive for decreased concentration, dysphoric mood, sleep disturbance and depressed mood. Negative for agitation. The patient is nervous/anxious.        Current Outpatient Medications on File Prior to Visit   Medication Sig   • ARIPiprazole (ABILIFY) 5 MG tablet Take 1 tablet by mouth every night at bedtime.   • aspirin  MG EC tablet Take 1 tablet by mouth Every 12 (Twelve) Hours.   • atorvastatin (LIPITOR) 10 MG tablet Take 1 tablet by mouth Daily.   • dicyclomine (BENTYL) 20 MG tablet Take 1/2-1 tablet up to four times daily as needed for diarrhea.   • docusate sodium 100 MG capsule Take 100 mg by mouth 2 (Two) Times a Day As Needed for Constipation.   • levothyroxine (SYNTHROID, LEVOTHROID) 88 MCG tablet TAKE 1 TABLET DAILY. FASTING AND WAIT 1 HOUR FOR FOOD OR OTHER MEDICATIONS   • meloxicam (MOBIC) 15 MG tablet Take 1 tablet by mouth Daily.   • montelukast (SINGULAIR) 10 MG tablet Take 1 tablet by mouth Every Night.   • mupirocin (BACTROBAN) 2 % ointment    • QUEtiapine (SEROquel) 50 MG tablet    • rizatriptan (MAXALT) 10 MG tablet Take 1 tab po prn migraine. May repeat in 2 hours if needed, max 3 in 24 hr   • tiZANidine (ZANAFLEX) 4 MG tablet Take 1 tablet by mouth Every 8 (Eight) Hours As Needed for Muscle Spasms.   • Topiramate ER (TROKENDI XR) 200 MG capsule  "sustained-release 24 hr Take 1 capsule by mouth Daily.   • Vortioxetine HBr (TRINTELLIX) 20 MG tablet Take 20 mg by mouth Daily.     No current facility-administered medications on file prior to visit.        Objective   Vitals:    08/26/19 1020   BP: 136/78   BP Location: Left arm   Patient Position: Sitting   Cuff Size: Adult   Pulse: 83   Resp: 18   Temp: 96.9 °F (36.1 °C)   TempSrc: Temporal   SpO2: 98%   Weight: 67.3 kg (148 lb 6 oz)   Height: 165.1 cm (65\")   PainSc: 0-No pain     Body mass index is 24.69 kg/m².    Physical Exam   Constitutional: She is oriented to person, place, and time. She appears well-developed and well-nourished.   HENT:   Head: Normocephalic and atraumatic.   Nose: Nose normal.   Eyes: EOM are normal. Pupils are equal, round, and reactive to light.   Neck: Trachea normal and normal range of motion. No thyromegaly present.       Cardiovascular: Normal rate, regular rhythm and normal heart sounds.   Pulmonary/Chest: Effort normal and breath sounds normal.   Abdominal: Soft. Bowel sounds are normal. There is no tenderness.   Musculoskeletal: Normal range of motion.   Neurological: She is alert and oriented to person, place, and time. She has normal strength. GCS eye subscore is 4. GCS verbal subscore is 5. GCS motor subscore is 6.   Skin: Skin is warm and dry.   Psychiatric: Her speech is normal and behavior is normal. Thought content normal. Her mood appears anxious. Cognition and memory are normal. She expresses impulsivity. She exhibits a depressed mood.   Vitals reviewed.      Assessment/Plan   Alisa was seen today for psychogenic synope.    Diagnoses and all orders for this visit:    Gastroesophageal reflux disease without esophagitis -new condition requiring medication management  -     omeprazole (PRILOSEC) 20 MG capsule; Take 1 capsule by mouth Daily.    Irritable bowel syndrome with diarrhea -chronic condition stable with continued medication management   dicyclomine (BENTYL) 20 " MG tablet, Take 1/2-1 tablet up to four times daily as needed for diarrhea., Disp: 90 tablet, Rfl: 1    Psychogenic syncope -appears currently stable but unpredictable based upon stress and anxiety.  Continue current medication regimen   ARIPiprazole (ABILIFY) 5 MG tablet, Take 1 tablet by mouth every night at bedtime., Disp: 90 tablet, Rfl: 1   Vortioxetine HBr (TRINTELLIX) 20 MG tablet, Take 20 mg by mouth Daily., Disp: 90 tablet, Rfl: 1     Depression with anxiety -chronic and stable requiring medication management.  She is seeing behavioral health specialist and has now started seeing a psychiatrist.  ARIPiprazole (ABILIFY) 5 MG tablet, Take 1 tablet by mouth every night at bedtime., Disp: 90 tablet, Rfl: 1   Vortioxetine HBr (TRINTELLIX) 20 MG tablet, Take 20 mg by mouth Daily., Disp: 90 tablet, Rfl: 1    Insomnia, unspecified type -new condition requiring medication management by psychiatry  QUEtiapine (SEROquel) 50 MG tablet, , Disp: , Rfl:              Current Outpatient Medications:   •  ARIPiprazole (ABILIFY) 5 MG tablet, Take 1 tablet by mouth every night at bedtime., Disp: 90 tablet, Rfl: 1  •  aspirin  MG EC tablet, Take 1 tablet by mouth Every 12 (Twelve) Hours., Disp: 60 tablet, Rfl: 0  •  atorvastatin (LIPITOR) 10 MG tablet, Take 1 tablet by mouth Daily., Disp: 90 tablet, Rfl: 1  •  dicyclomine (BENTYL) 20 MG tablet, Take 1/2-1 tablet up to four times daily as needed for diarrhea., Disp: 90 tablet, Rfl: 1  •  docusate sodium 100 MG capsule, Take 100 mg by mouth 2 (Two) Times a Day As Needed for Constipation., Disp: 60 each, Rfl: 0  •  levothyroxine (SYNTHROID, LEVOTHROID) 88 MCG tablet, TAKE 1 TABLET DAILY. FASTING AND WAIT 1 HOUR FOR FOOD OR OTHER MEDICATIONS, Disp: 90 tablet, Rfl: 1  •  meloxicam (MOBIC) 15 MG tablet, Take 1 tablet by mouth Daily., Disp: 30 tablet, Rfl: 2  •  montelukast (SINGULAIR) 10 MG tablet, Take 1 tablet by mouth Every Night., Disp: 90 tablet, Rfl: 1  •  mupirocin  (BACTROBAN) 2 % ointment, , Disp: , Rfl:   •  QUEtiapine (SEROquel) 50 MG tablet, , Disp: , Rfl:   •  rizatriptan (MAXALT) 10 MG tablet, Take 1 tab po prn migraine. May repeat in 2 hours if needed, max 3 in 24 hr, Disp: 27 tablet, Rfl: 1  •  tiZANidine (ZANAFLEX) 4 MG tablet, Take 1 tablet by mouth Every 8 (Eight) Hours As Needed for Muscle Spasms., Disp: 180 tablet, Rfl: 1  •  Topiramate ER (TROKENDI XR) 200 MG capsule sustained-release 24 hr, Take 1 capsule by mouth Daily., Disp: 90 capsule, Rfl: 1  •  Vortioxetine HBr (TRINTELLIX) 20 MG tablet, Take 20 mg by mouth Daily., Disp: 90 tablet, Rfl: 1  •  omeprazole (PRILOSEC) 20 MG capsule, Take 1 capsule by mouth Daily., Disp: 30 capsule, Rfl: 5       Plan of care reviewed with the patient at the conclusion of today's visit.  Education was provided regarding diagnosis, management, and any prescribed or recommended OTC medications.  Patient verbalized understanding of and agreement with management plan.     Return in about 6 months (around 2/26/2020), or if symptoms worsen or fail to improve.      Shanae Torres, APRN

## 2019-09-09 ENCOUNTER — TELEPHONE (OUTPATIENT)
Dept: INTERNAL MEDICINE | Facility: CLINIC | Age: 59
End: 2019-09-09

## 2019-09-09 NOTE — TELEPHONE ENCOUNTER
Called and explained to patient that I havent received the results from the provider and will inquire about them from the provider and as soon as I get the results I will contact her.

## 2019-09-09 NOTE — TELEPHONE ENCOUNTER
Please call her with her breast biopsy. asap please she id worried. She goes to work at 1:00 work number is [phone] or try cell phone

## 2019-09-09 NOTE — TELEPHONE ENCOUNTER
Please contact patient and advise that biopsy was negative for cancer. Radiologist recommends repeat mammogram and US in 3 months to make sure no changes have occurred.

## 2019-09-10 DIAGNOSIS — F41.8 DEPRESSION WITH ANXIETY: Chronic | ICD-10-CM

## 2019-09-27 ENCOUNTER — TELEPHONE (OUTPATIENT)
Dept: INTERNAL MEDICINE | Facility: CLINIC | Age: 59
End: 2019-09-27

## 2019-09-27 DIAGNOSIS — M50.30 DDD (DEGENERATIVE DISC DISEASE), CERVICAL: ICD-10-CM

## 2019-09-27 RX ORDER — TIZANIDINE 4 MG/1
4 TABLET ORAL EVERY 8 HOURS PRN
Qty: 180 TABLET | Refills: 1 | Status: SHIPPED | OUTPATIENT
Start: 2019-09-27 | End: 2020-02-04 | Stop reason: SDUPTHER

## 2019-10-01 ENCOUNTER — TELEPHONE (OUTPATIENT)
Dept: INTERNAL MEDICINE | Facility: CLINIC | Age: 59
End: 2019-10-01

## 2019-10-03 DIAGNOSIS — F41.8 DEPRESSION WITH ANXIETY: Chronic | ICD-10-CM

## 2019-10-03 DIAGNOSIS — E03.9 HYPOTHYROIDISM (ACQUIRED): ICD-10-CM

## 2019-10-03 RX ORDER — LEVOTHYROXINE SODIUM 88 UG/1
TABLET ORAL
Qty: 90 TABLET | Refills: 1 | Status: SHIPPED | OUTPATIENT
Start: 2019-10-03 | End: 2020-05-28

## 2019-10-03 RX ORDER — ARIPIPRAZOLE 5 MG/1
5 TABLET ORAL
Qty: 90 TABLET | Refills: 1 | Status: SHIPPED | OUTPATIENT
Start: 2019-10-03 | End: 2020-06-09

## 2019-10-03 NOTE — TELEPHONE ENCOUNTER
I changed her Cymbalta (duloxetine) to Trintellix. I weaned her off the cymbalta. Contact her and ask if she is still taking the cymbalta.

## 2019-10-04 NOTE — TELEPHONE ENCOUNTER
Spoke with patient about medication, and patient states she ran out and forgot that the provider actually took her off her medication. Patient voiced undetstanding that she isnt taking Cymbalta.

## 2019-11-04 RX ORDER — MONTELUKAST SODIUM 10 MG/1
10 TABLET ORAL NIGHTLY
Qty: 90 TABLET | Refills: 1 | Status: SHIPPED | OUTPATIENT
Start: 2019-11-04 | End: 2020-02-04 | Stop reason: SDUPTHER

## 2020-01-16 ENCOUNTER — TELEPHONE (OUTPATIENT)
Dept: INTERNAL MEDICINE | Facility: CLINIC | Age: 60
End: 2020-01-16

## 2020-01-16 DIAGNOSIS — E78.00 PURE HYPERCHOLESTEROLEMIA: ICD-10-CM

## 2020-01-16 RX ORDER — ATORVASTATIN CALCIUM 10 MG/1
10 TABLET, FILM COATED ORAL DAILY
Qty: 90 TABLET | Refills: 1 | Status: SHIPPED | OUTPATIENT
Start: 2020-01-16 | End: 2020-01-16 | Stop reason: SDUPTHER

## 2020-01-16 RX ORDER — ATORVASTATIN CALCIUM 10 MG/1
10 TABLET, FILM COATED ORAL DAILY
Qty: 90 TABLET | Refills: 1 | Status: SHIPPED | OUTPATIENT
Start: 2020-01-16 | End: 2020-07-15

## 2020-01-16 NOTE — TELEPHONE ENCOUNTER
Pt needing refills on:    Atorvastatin, 10 mg, once daily  90 day supply    [facility]    Alisa: [phone]

## 2020-02-04 ENCOUNTER — TELEPHONE (OUTPATIENT)
Dept: INTERNAL MEDICINE | Facility: CLINIC | Age: 60
End: 2020-02-04

## 2020-02-04 DIAGNOSIS — M50.30 DDD (DEGENERATIVE DISC DISEASE), CERVICAL: ICD-10-CM

## 2020-02-04 DIAGNOSIS — F41.8 DEPRESSION WITH ANXIETY: Chronic | ICD-10-CM

## 2020-02-04 RX ORDER — TIZANIDINE 4 MG/1
4 TABLET ORAL EVERY 8 HOURS PRN
Qty: 180 TABLET | Refills: 1 | Status: SHIPPED | OUTPATIENT
Start: 2020-02-04 | End: 2020-06-09 | Stop reason: SDUPTHER

## 2020-02-04 RX ORDER — MONTELUKAST SODIUM 10 MG/1
10 TABLET ORAL NIGHTLY
Qty: 90 TABLET | Refills: 1 | Status: SHIPPED | OUTPATIENT
Start: 2020-02-04 | End: 2020-09-04 | Stop reason: SDUPTHER

## 2020-02-04 NOTE — TELEPHONE ENCOUNTER
Pt called in for med refill. Pt has 4 tablets left. Going out of town for 4 months.      Pt Contact: [phone]    Pt Meds: tiZANidine (ZANAFLEX) 4 MG tablet    Pt Pharmacy: [facility] - [address] [phone] [facility] [phone] FX

## 2020-02-04 NOTE — TELEPHONE ENCOUNTER
PT CALLED THIS MORNING, FORGOT TO ADD THE FOLLOWING MEDICATIONS.     Vortioxetine HBr (TRINTELLIX) 20 MG tablet    montelukast (SINGULAIR) 10 MG tablet    [facility] - CONFIRMED.     PT CALLBACK [phone]

## 2020-02-20 ENCOUNTER — TELEPHONE (OUTPATIENT)
Dept: INTERNAL MEDICINE | Facility: CLINIC | Age: 60
End: 2020-02-20

## 2020-02-20 NOTE — TELEPHONE ENCOUNTER
Please put in ref. For Bran MAYES, ortho. Right knee. She needs a m.r.i. On right knee from where she fell.phone [phone] fax [phone] attn. Saumya.

## 2020-02-24 NOTE — TELEPHONE ENCOUNTER
Contact patient and explain that I need to see her in office and have her do a knee xray first. Insurance might require her to do physical therapy before MRI. Have her schedule an appt.

## 2020-02-26 ENCOUNTER — OFFICE VISIT (OUTPATIENT)
Dept: INTERNAL MEDICINE | Facility: CLINIC | Age: 60
End: 2020-02-26

## 2020-02-26 VITALS
OXYGEN SATURATION: 98 % | HEIGHT: 65 IN | BODY MASS INDEX: 26.08 KG/M2 | SYSTOLIC BLOOD PRESSURE: 128 MMHG | RESPIRATION RATE: 20 BRPM | DIASTOLIC BLOOD PRESSURE: 88 MMHG | WEIGHT: 156.5 LBS | HEART RATE: 78 BPM | TEMPERATURE: 97.1 F

## 2020-02-26 DIAGNOSIS — M25.561 ACUTE PAIN OF RIGHT KNEE: Primary | ICD-10-CM

## 2020-02-26 DIAGNOSIS — Z13.21 ENCOUNTER FOR VITAMIN DEFICIENCY SCREENING: ICD-10-CM

## 2020-02-26 DIAGNOSIS — Z13.0 SCREENING FOR BLOOD DISEASE: ICD-10-CM

## 2020-02-26 DIAGNOSIS — K58.0 IRRITABLE BOWEL SYNDROME WITH DIARRHEA: ICD-10-CM

## 2020-02-26 DIAGNOSIS — E78.5 HYPERLIPIDEMIA, UNSPECIFIED HYPERLIPIDEMIA TYPE: ICD-10-CM

## 2020-02-26 DIAGNOSIS — K21.9 GASTROESOPHAGEAL REFLUX DISEASE WITHOUT ESOPHAGITIS: ICD-10-CM

## 2020-02-26 DIAGNOSIS — G43.909 MIGRAINE SYNDROME: ICD-10-CM

## 2020-02-26 DIAGNOSIS — F41.8 DEPRESSION WITH ANXIETY: Chronic | ICD-10-CM

## 2020-02-26 DIAGNOSIS — E03.9 HYPOTHYROIDISM (ACQUIRED): ICD-10-CM

## 2020-02-26 DIAGNOSIS — S82.124A CLOSED NONDISPLACED FRACTURE OF LATERAL CONDYLE OF RIGHT TIBIA, INITIAL ENCOUNTER: ICD-10-CM

## 2020-02-26 LAB
25(OH)D3 SERPL-MCNC: 31.3 NG/ML (ref 30–100)
ALBUMIN SERPL-MCNC: 4.7 G/DL (ref 3.5–5.2)
ALBUMIN/GLOB SERPL: 1.3 G/DL
ALP SERPL-CCNC: 122 U/L (ref 39–117)
ALT SERPL W P-5'-P-CCNC: 16 U/L (ref 1–33)
ANION GAP SERPL CALCULATED.3IONS-SCNC: 14.9 MMOL/L (ref 5–15)
AST SERPL-CCNC: 28 U/L (ref 1–32)
BASOPHILS # BLD AUTO: 0.06 10*3/MM3 (ref 0–0.2)
BASOPHILS NFR BLD AUTO: 1.2 % (ref 0–1.5)
BILIRUB SERPL-MCNC: 0.3 MG/DL (ref 0.2–1.2)
BUN BLD-MCNC: 17 MG/DL (ref 8–23)
BUN/CREAT SERPL: 15.3 (ref 7–25)
CALCIUM SPEC-SCNC: 10.2 MG/DL (ref 8.6–10.5)
CHLORIDE SERPL-SCNC: 102 MMOL/L (ref 98–107)
CHOLEST SERPL-MCNC: 242 MG/DL (ref 0–200)
CO2 SERPL-SCNC: 23.1 MMOL/L (ref 22–29)
CREAT BLD-MCNC: 1.11 MG/DL (ref 0.57–1)
DEPRECATED RDW RBC AUTO: 44.3 FL (ref 37–54)
EOSINOPHIL # BLD AUTO: 0.14 10*3/MM3 (ref 0–0.4)
EOSINOPHIL NFR BLD AUTO: 2.8 % (ref 0.3–6.2)
ERYTHROCYTE [DISTWIDTH] IN BLOOD BY AUTOMATED COUNT: 13.8 % (ref 12.3–15.4)
FOLATE SERPL-MCNC: 7.77 NG/ML (ref 4.78–24.2)
GFR SERPL CREATININE-BSD FRML MDRD: 50 ML/MIN/1.73
GLOBULIN UR ELPH-MCNC: 3.5 GM/DL
GLUCOSE BLD-MCNC: 88 MG/DL (ref 65–99)
HCT VFR BLD AUTO: 37.4 % (ref 34–46.6)
HDLC SERPL-MCNC: 59 MG/DL (ref 40–60)
HGB BLD-MCNC: 12.5 G/DL (ref 12–15.9)
IMM GRANULOCYTES # BLD AUTO: 0.01 10*3/MM3 (ref 0–0.05)
IMM GRANULOCYTES NFR BLD AUTO: 0.2 % (ref 0–0.5)
LDLC SERPL CALC-MCNC: 151 MG/DL (ref 0–100)
LDLC/HDLC SERPL: 2.56 {RATIO}
LYMPHOCYTES # BLD AUTO: 2.03 10*3/MM3 (ref 0.7–3.1)
LYMPHOCYTES NFR BLD AUTO: 40.9 % (ref 19.6–45.3)
MCH RBC QN AUTO: 29.8 PG (ref 26.6–33)
MCHC RBC AUTO-ENTMCNC: 33.4 G/DL (ref 31.5–35.7)
MCV RBC AUTO: 89.3 FL (ref 79–97)
MONOCYTES # BLD AUTO: 0.44 10*3/MM3 (ref 0.1–0.9)
MONOCYTES NFR BLD AUTO: 8.9 % (ref 5–12)
NEUTROPHILS # BLD AUTO: 2.28 10*3/MM3 (ref 1.7–7)
NEUTROPHILS NFR BLD AUTO: 46 % (ref 42.7–76)
NRBC BLD AUTO-RTO: 0 /100 WBC (ref 0–0.2)
PLATELET # BLD AUTO: 355 10*3/MM3 (ref 140–450)
PMV BLD AUTO: 10.2 FL (ref 6–12)
POTASSIUM BLD-SCNC: 4 MMOL/L (ref 3.5–5.2)
PROT SERPL-MCNC: 8.2 G/DL (ref 6–8.5)
RBC # BLD AUTO: 4.19 10*6/MM3 (ref 3.77–5.28)
SODIUM BLD-SCNC: 140 MMOL/L (ref 136–145)
TRIGL SERPL-MCNC: 159 MG/DL (ref 0–150)
TSH SERPL DL<=0.05 MIU/L-ACNC: 1.84 UIU/ML (ref 0.27–4.2)
VIT B12 BLD-MCNC: 364 PG/ML (ref 211–946)
VLDLC SERPL-MCNC: 31.8 MG/DL (ref 5–40)
WBC NRBC COR # BLD: 4.96 10*3/MM3 (ref 3.4–10.8)

## 2020-02-26 PROCEDURE — 80053 COMPREHEN METABOLIC PANEL: CPT | Performed by: NURSE PRACTITIONER

## 2020-02-26 PROCEDURE — 99214 OFFICE O/P EST MOD 30 MIN: CPT | Performed by: NURSE PRACTITIONER

## 2020-02-26 PROCEDURE — 84443 ASSAY THYROID STIM HORMONE: CPT | Performed by: NURSE PRACTITIONER

## 2020-02-26 PROCEDURE — 82607 VITAMIN B-12: CPT | Performed by: NURSE PRACTITIONER

## 2020-02-26 PROCEDURE — 85025 COMPLETE CBC W/AUTO DIFF WBC: CPT | Performed by: NURSE PRACTITIONER

## 2020-02-26 PROCEDURE — 80061 LIPID PANEL: CPT | Performed by: NURSE PRACTITIONER

## 2020-02-26 PROCEDURE — 82746 ASSAY OF FOLIC ACID SERUM: CPT | Performed by: NURSE PRACTITIONER

## 2020-02-26 PROCEDURE — 82306 VITAMIN D 25 HYDROXY: CPT | Performed by: NURSE PRACTITIONER

## 2020-02-26 RX ORDER — HYDROCODONE BITARTRATE AND ACETAMINOPHEN 7.5; 325 MG/1; MG/1
TABLET ORAL
COMMUNITY
Start: 2020-02-19 | End: 2020-04-27

## 2020-02-26 NOTE — PROGRESS NOTES
Subjective   Chief Complaint   Patient presents with   • Knee Pain      Alisa Ames is a 59 y.o. female here today for follow-up on GERD, irritable bowel syndrome, hypothyroidism, migraines, depression, anxiety, and right knee pain.  She had a fall about 2 weeks ago and went to the emergency room and was referred to orthopedic specialist, Dr. Frances.  She had x-ray that was normal but Dr. Frances feels that she has fractured her tibia and needs MRI.  She is requesting a referral to Dr. Frances for insurance purposes.  She will sign a release for this office to receive her x-ray.  She is currently wearing a knee brace with crutches and Dr. Frances is giving her hydrocodone for pain.  Her migraines have been stable on Trokendi and she is rarely having to use Maxalt.  Omeprazole continues to help with her GERD symptoms and she occasionally takes the dicyclomine for irritable bowel to help with diarrhea.  She has been following a heart healthy low-cholesterol diet.  She is tolerating her Synthroid well and feels that her thyroid is stable.  She is seeing a psychiatrist for anxiety and depression and her Abilify was recently increased to 15 mg daily.  This has greatly helped with depression and irritability.  She is still experiencing symptoms but feel that they have improved.  She denies any shortness of breath or chest pain.    I have reviewed the following portions of the patient's history and confirmed they are accurate: allergies, current medications, past family history, past medical history, past social history, past surgical history and problem list    I have personally completed the patient's review of systems.    Review of Systems   Constitutional: Positive for activity change and fatigue. Negative for appetite change, unexpected weight gain and unexpected weight loss.   Respiratory: Negative for chest tightness and shortness of breath.    Cardiovascular: Negative for chest pain, palpitations and leg swelling.    Gastrointestinal: Positive for diarrhea. Negative for abdominal pain, constipation, nausea, vomiting, GERD and indigestion.   Genitourinary: Negative for dysuria.   Musculoskeletal: Positive for arthralgias, gait problem, joint swelling and myalgias. Negative for back pain, neck pain and neck stiffness.   Allergic/Immunologic: Negative.    Neurological: Negative for dizziness, seizures, speech difficulty, weakness, light-headedness, numbness and headache.   Psychiatric/Behavioral: Positive for depressed mood. Negative for agitation, decreased concentration, dysphoric mood and sleep disturbance. The patient is nervous/anxious.        Current Outpatient Medications on File Prior to Visit   Medication Sig   • ARIPiprazole (ABILIFY) 5 MG tablet Take 1 tablet by mouth every night at bedtime. (Patient taking differently: Take 15 mg by mouth every night at bedtime.)   • atorvastatin (LIPITOR) 10 MG tablet Take 1 tablet by mouth Daily.   • dicyclomine (BENTYL) 20 MG tablet Take 1/2-1 tablet up to four times daily as needed for diarrhea.   • docusate sodium 100 MG capsule Take 100 mg by mouth 2 (Two) Times a Day As Needed for Constipation.   • levothyroxine (SYNTHROID, LEVOTHROID) 88 MCG tablet TAKE 1 TABLET DAILY. FASTING AND WAIT 1 HOUR FOR FOOD OR OTHER MEDICATIONS   • montelukast (SINGULAIR) 10 MG tablet Take 1 tablet by mouth Every Night.   • mupirocin (BACTROBAN) 2 % ointment    • omeprazole (PRILOSEC) 20 MG capsule Take 1 capsule by mouth Daily.   • rizatriptan (MAXALT) 10 MG tablet Take 1 tab po prn migraine. May repeat in 2 hours if needed, max 3 in 24 hr   • tiZANidine (ZANAFLEX) 4 MG tablet Take 1 tablet by mouth Every 8 (Eight) Hours As Needed for Muscle Spasms.   • Topiramate ER (TROKENDI XR) 200 MG capsule sustained-release 24 hr Take 1 capsule by mouth Daily.   • Vortioxetine HBr (TRINTELLIX) 20 MG tablet Take 20 mg by mouth Daily.   • [DISCONTINUED] aspirin  MG EC tablet Take 1 tablet by mouth Every  "12 (Twelve) Hours.   • [DISCONTINUED] meloxicam (MOBIC) 15 MG tablet Take 1 tablet by mouth Daily.   • [DISCONTINUED] QUEtiapine (SEROquel) 50 MG tablet    • HYDROcodone-acetaminophen (NORCO) 7.5-325 MG per tablet      No current facility-administered medications on file prior to visit.        Objective   Vitals:    02/26/20 1016   BP: 128/88   BP Location: Left arm   Patient Position: Sitting   Cuff Size: Large Adult   Pulse: 78   Resp: 20   Temp: 97.1 °F (36.2 °C)   TempSrc: Temporal   SpO2: 98%   Weight: 71 kg (156 lb 8 oz)   Height: 165.1 cm (65\")   PainSc:   5   PainLoc: Knee     Body mass index is 26.04 kg/m².    Physical Exam   Constitutional: She is oriented to person, place, and time. She appears well-developed and well-nourished.   HENT:   Head: Normocephalic and atraumatic.   Nose: Nose normal.   Eyes: Pupils are equal, round, and reactive to light. EOM are normal.   Neck: Trachea normal. No thyromegaly present.   Cardiovascular: Normal rate, regular rhythm and normal heart sounds.   Pulmonary/Chest: Effort normal and breath sounds normal.   Abdominal: Soft. Bowel sounds are normal. There is no tenderness.   Musculoskeletal:        Right knee: She exhibits decreased range of motion. Tenderness found. Lateral joint line tenderness noted.   Neurological: She is alert and oriented to person, place, and time. She has normal strength. GCS eye subscore is 4. GCS verbal subscore is 5. GCS motor subscore is 6.   Skin: Skin is warm and dry.   Psychiatric: She has a normal mood and affect. Her speech is normal and behavior is normal. Judgment and thought content normal. Cognition and memory are normal.   Vitals reviewed.      Assessment/Plan   Alisa was seen today for knee pain.    Diagnoses and all orders for this visit:    Acute pain of right knee  New onset issue unstable requiring further work up and monitoring. Will eat well balanced diet, increase water intake, increase physical activity as tolerated, and get " adequate rest.  Continue using knee brace and crutches for support when walking.  Continue hydrocodone prescribed by orthopedic specialist.  -     Ambulatory Referral to Orthopedic Surgery    Closed nondisplaced fracture of lateral condyle of right tibia, initial encounter  New onset issue unstable requiring further work up and monitoring. Will eat well balanced diet, increase water intake, increase physical activity as tolerated, and get adequate rest.  Continue using knee brace and crutches for support when walking.  Continue hydrocodone prescribed by orthopedic specialist.  -     Ambulatory Referral to Orthopedic Surgery    Depression with anxiety  Chronic issue unstable but improving requiring medication management and monitoring. Will eat well balanced diet, increase water intake, increase physical activity during the day, and get adequate rest. Discussed relaxation and coping skills and exercises. Suggested self referral to behavioral therapist. Given contact number to [facility] crisis line [phone] and [facility] [phone].  Continue appointments with psychiatrist.    Gastroesophageal reflux disease without esophagitis  Chronic issue stable requiring medication management and monitoring. Will eat well balanced diet refraining from spicy and acidic foods, increase water intake refraining from soda/caffeine and alcohol, increase physical activity, and get adequate rest.   Continue omeprazole.    Irritable bowel syndrome with diarrhea  Chronic issue stable requiring medication management and monitoring. Will eat well balanced FODMAP diet refraining from spicy and acidic foods, increase water intake refraining from soda/caffeine and alcohol, increase physical activity, and get adequate rest. Suggested OTC probiotic daily.   Continue Bentyl as needed for diarrhea.    Hypothyroidism (acquired)  Chronic issue stable requiring continued medication management. Will  continue heart healthy diet, increase water intake, increase physical activity as tolerated, and get adequate rest.   -     TSH Rfx On Abnormal To Free T4    Migraine syndrome  Chronic condition stable requiring medication management and monitoring.  Will eat well balanced low sodium diet, increase water intake including at least two electrolyte balanced drinks such as Propel or Gatorade daily, increase physical activity, and get adequate rest. Will keep log of migraine frequency, severity, and symptoms for next appt.   Continue Trokendi daily and Maxalt as needed.    Hyperlipidemia, unspecified hyperlipidemia type  Chronic issue stable requiring continued medication management. Will continue heart healthy diet, increase water intake, increase physical activity as tolerated, and get adequate rest.   Continue atorvastatin.  -     Lipid Panel    Encounter for vitamin deficiency screening  -     Vitamin B12 & Folate  -     Vitamin D 25 Hydroxy    Screening for blood disease  -     CBC Auto Differential  -     Comprehensive Metabolic Panel  -     Lipid Panel  -     Vitamin B12 & Folate  -     Vitamin D 25 Hydroxy  -     TSH Rfx On Abnormal To Free T4           Current Outpatient Medications:   •  ARIPiprazole (ABILIFY) 5 MG tablet, Take 1 tablet by mouth every night at bedtime. (Patient taking differently: Take 15 mg by mouth every night at bedtime.), Disp: 90 tablet, Rfl: 1  •  atorvastatin (LIPITOR) 10 MG tablet, Take 1 tablet by mouth Daily., Disp: 90 tablet, Rfl: 1  •  dicyclomine (BENTYL) 20 MG tablet, Take 1/2-1 tablet up to four times daily as needed for diarrhea., Disp: 90 tablet, Rfl: 1  •  docusate sodium 100 MG capsule, Take 100 mg by mouth 2 (Two) Times a Day As Needed for Constipation., Disp: 60 each, Rfl: 0  •  levothyroxine (SYNTHROID, LEVOTHROID) 88 MCG tablet, TAKE 1 TABLET DAILY. FASTING AND WAIT 1 HOUR FOR FOOD OR OTHER MEDICATIONS, Disp: 90 tablet, Rfl: 1  •  montelukast (SINGULAIR) 10 MG tablet,  Take 1 tablet by mouth Every Night., Disp: 90 tablet, Rfl: 1  •  mupirocin (BACTROBAN) 2 % ointment, , Disp: , Rfl:   •  omeprazole (PRILOSEC) 20 MG capsule, Take 1 capsule by mouth Daily., Disp: 30 capsule, Rfl: 5  •  rizatriptan (MAXALT) 10 MG tablet, Take 1 tab po prn migraine. May repeat in 2 hours if needed, max 3 in 24 hr, Disp: 27 tablet, Rfl: 1  •  tiZANidine (ZANAFLEX) 4 MG tablet, Take 1 tablet by mouth Every 8 (Eight) Hours As Needed for Muscle Spasms., Disp: 180 tablet, Rfl: 1  •  Topiramate ER (TROKENDI XR) 200 MG capsule sustained-release 24 hr, Take 1 capsule by mouth Daily., Disp: 90 capsule, Rfl: 1  •  Vortioxetine HBr (TRINTELLIX) 20 MG tablet, Take 20 mg by mouth Daily., Disp: 90 tablet, Rfl: 1  •  HYDROcodone-acetaminophen (NORCO) 7.5-325 MG per tablet, , Disp: , Rfl:        Plan of care reviewed with the patient at the conclusion of today's visit.  Education was provided regarding diagnosis, management, and any prescribed or recommended OTC medications.  Patient verbalized understanding of and agreement with management plan.     Return in about 6 months (around 8/26/2020), or if symptoms worsen or fail to improve.      Shanae Torres, GERSON    Please note that portions of this note were completed with a voice recognition program. Efforts were made to edit the dictations, but occasionally words are mistranscribed.

## 2020-03-09 ENCOUNTER — TELEPHONE (OUTPATIENT)
Dept: INTERNAL MEDICINE | Facility: CLINIC | Age: 60
End: 2020-03-09

## 2020-03-09 NOTE — TELEPHONE ENCOUNTER
PT CALLED IN STATED THE ORTHOPEDIC REFERRAL HAS NOT BEEN RECEIVED BY DR SCHMITT OFFICE CAN THIS BE RESENT AND ADVISE PT ONCE COMPLETED      PT CB NUMBER: [phone]   FAX# [phone]

## 2020-03-10 DIAGNOSIS — K21.9 GASTROESOPHAGEAL REFLUX DISEASE WITHOUT ESOPHAGITIS: ICD-10-CM

## 2020-03-10 RX ORDER — OMEPRAZOLE 20 MG/1
CAPSULE, DELAYED RELEASE ORAL
Qty: 30 CAPSULE | Refills: 11 | Status: SHIPPED | OUTPATIENT
Start: 2020-03-10 | End: 2021-02-24 | Stop reason: SDUPTHER

## 2020-04-27 ENCOUNTER — OFFICE VISIT (OUTPATIENT)
Dept: INTERNAL MEDICINE | Facility: CLINIC | Age: 60
End: 2020-04-27

## 2020-04-27 ENCOUNTER — HOSPITAL ENCOUNTER (OUTPATIENT)
Dept: GENERAL RADIOLOGY | Facility: HOSPITAL | Age: 60
Discharge: HOME | End: 2020-04-27
Admitting: NURSE PRACTITIONER

## 2020-04-27 VITALS
SYSTOLIC BLOOD PRESSURE: 130 MMHG | BODY MASS INDEX: 26.26 KG/M2 | TEMPERATURE: 97.5 F | WEIGHT: 157.6 LBS | DIASTOLIC BLOOD PRESSURE: 78 MMHG | HEIGHT: 65 IN

## 2020-04-27 DIAGNOSIS — M25.512 ACUTE PAIN OF LEFT SHOULDER: Primary | ICD-10-CM

## 2020-04-27 DIAGNOSIS — M25.561 CHRONIC PAIN OF RIGHT KNEE: ICD-10-CM

## 2020-04-27 DIAGNOSIS — M79.602 LEFT ARM PAIN: ICD-10-CM

## 2020-04-27 DIAGNOSIS — G89.29 CHRONIC PAIN OF RIGHT KNEE: ICD-10-CM

## 2020-04-27 DIAGNOSIS — M25.512 ACUTE PAIN OF LEFT SHOULDER: ICD-10-CM

## 2020-04-27 PROCEDURE — 73060 X-RAY EXAM OF HUMERUS: CPT

## 2020-04-27 PROCEDURE — 73030 X-RAY EXAM OF SHOULDER: CPT

## 2020-04-27 PROCEDURE — 99214 OFFICE O/P EST MOD 30 MIN: CPT | Performed by: NURSE PRACTITIONER

## 2020-04-27 RX ORDER — ACETAMINOPHEN AND CODEINE PHOSPHATE 300; 30 MG/1; MG/1
1 TABLET ORAL EVERY 4 HOURS PRN
Qty: 60 TABLET | Refills: 0 | COMMUNITY
Start: 2020-04-27 | End: 2020-08-07

## 2020-04-28 RX ORDER — TOPIRAMATE 200 MG/1
CAPSULE, EXTENDED RELEASE ORAL
Qty: 90 CAPSULE | Refills: 3 | Status: SHIPPED | OUTPATIENT
Start: 2020-04-28 | End: 2020-09-02 | Stop reason: SDUPTHER

## 2020-05-04 ENCOUNTER — TELEPHONE (OUTPATIENT)
Dept: INTERNAL MEDICINE | Facility: CLINIC | Age: 60
End: 2020-05-04

## 2020-05-04 NOTE — TELEPHONE ENCOUNTER
PT WENT TO PHYSICAL THERAPY FOR HER LEFT ARM  AND THEY STATED THAT THEY DIDN'T HAVE A REFERRAL ON FILE FROM THE PROVIDER.  SHE STATES THAT SHE WAS TOLD ONE WOULD BE PUT IN FOR HER DURING THE LAST VISIT. SHE WAS REQUESTING TO GO TO [facility] IN [place] AND WOULD LIKE A RETURN CALL TO FOLLOW UP.

## 2020-05-14 ENCOUNTER — TELEPHONE (OUTPATIENT)
Dept: INTERNAL MEDICINE | Facility: CLINIC | Age: 60
End: 2020-05-14

## 2020-05-28 DIAGNOSIS — E03.9 HYPOTHYROIDISM (ACQUIRED): ICD-10-CM

## 2020-05-28 RX ORDER — LEVOTHYROXINE SODIUM 88 UG/1
TABLET ORAL
Qty: 90 TABLET | Refills: 3 | Status: SHIPPED | OUTPATIENT
Start: 2020-05-28 | End: 2020-05-29 | Stop reason: SDUPTHER

## 2020-05-29 DIAGNOSIS — E03.9 HYPOTHYROIDISM (ACQUIRED): ICD-10-CM

## 2020-05-29 RX ORDER — LEVOTHYROXINE SODIUM 88 UG/1
TABLET ORAL
Qty: 90 TABLET | Refills: 0 | Status: SHIPPED | OUTPATIENT
Start: 2020-05-29 | End: 2020-06-22 | Stop reason: SDUPTHER

## 2020-06-09 ENCOUNTER — OFFICE VISIT (OUTPATIENT)
Dept: INTERNAL MEDICINE | Facility: CLINIC | Age: 60
End: 2020-06-09

## 2020-06-09 ENCOUNTER — TELEPHONE (OUTPATIENT)
Dept: INTERNAL MEDICINE | Facility: CLINIC | Age: 60
End: 2020-06-09

## 2020-06-09 VITALS
WEIGHT: 156 LBS | HEART RATE: 72 BPM | BODY MASS INDEX: 25.99 KG/M2 | TEMPERATURE: 98.6 F | SYSTOLIC BLOOD PRESSURE: 126 MMHG | DIASTOLIC BLOOD PRESSURE: 84 MMHG | RESPIRATION RATE: 18 BRPM | OXYGEN SATURATION: 96 % | HEIGHT: 65 IN

## 2020-06-09 DIAGNOSIS — M79.602 LEFT ARM PAIN: ICD-10-CM

## 2020-06-09 DIAGNOSIS — M25.612 DECREASED RANGE OF MOTION OF LEFT SHOULDER: ICD-10-CM

## 2020-06-09 DIAGNOSIS — M50.30 DDD (DEGENERATIVE DISC DISEASE), CERVICAL: ICD-10-CM

## 2020-06-09 DIAGNOSIS — M25.512 ACUTE PAIN OF LEFT SHOULDER: Primary | ICD-10-CM

## 2020-06-09 PROCEDURE — 99214 OFFICE O/P EST MOD 30 MIN: CPT | Performed by: NURSE PRACTITIONER

## 2020-06-09 RX ORDER — ARIPIPRAZOLE 15 MG/1
TABLET ORAL
COMMUNITY
Start: 2020-06-08 | End: 2021-05-25 | Stop reason: SDUPTHER

## 2020-06-09 RX ORDER — HYDROCODONE BITARTRATE AND ACETAMINOPHEN 7.5; 325 MG/1; MG/1
1 TABLET ORAL EVERY 6 HOURS PRN
Qty: 28 TABLET | Refills: 0 | Status: SHIPPED | OUTPATIENT
Start: 2020-06-09 | End: 2020-08-07

## 2020-06-09 RX ORDER — TIZANIDINE 4 MG/1
4 TABLET ORAL EVERY 8 HOURS PRN
Qty: 180 TABLET | Refills: 1 | Status: SHIPPED | OUTPATIENT
Start: 2020-06-09 | End: 2020-07-29 | Stop reason: SDUPTHER

## 2020-06-09 NOTE — PROGRESS NOTES
Subjective   Chief Complaint   Patient presents with   • Arm Pain      Alisa Ames is a 60 y.o. female here today left shoulder and arm pain.  Symptoms began about 5 weeks ago and she began having significant left shoulder pain with decreased range of motion.  She feels like her shoulder is frozen.  X-ray was normal and she started physical therapy 4 weeks ago.  Physical therapist reports she had a 12 degrees range of motion upon starting physical therapy and now she has a 20 degree.  Patient feels that this is just mild progress for the 4 weeks that she has been working on improving her shoulder.  She ranks her pain as 6 out of 10 on most days but when she tries to use her left arm or lift anything she ranks her pain a 10 out of 10.    I have reviewed the following portions of the patient's history and confirmed they are accurate: allergies, current medications, past family history, past medical history, past social history, past surgical history and problem list    I have personally completed the patient's review of systems.    Review of Systems   Constitutional: Positive for activity change and fatigue. Negative for appetite change, chills, diaphoresis, fever, unexpected weight gain and unexpected weight loss.   HENT: Negative for ear discharge, ear pain, mouth sores, nosebleeds, sinus pressure, sneezing and sore throat.    Eyes: Negative for pain, discharge and itching.   Respiratory: Negative for cough, chest tightness, shortness of breath and wheezing.    Cardiovascular: Negative for chest pain, palpitations and leg swelling.   Gastrointestinal: Negative for abdominal pain, constipation, diarrhea, nausea and vomiting.   Endocrine: Negative for heat intolerance, polydipsia and polyphagia.   Genitourinary: Negative for dysuria, flank pain, frequency, hematuria and urgency.   Musculoskeletal: Positive for arthralgias, gait problem, joint swelling and myalgias. Negative for back pain, neck pain and neck  "stiffness.   Skin: Negative for color change, pallor and rash.   Allergic/Immunologic: Negative.  Negative for immunocompromised state.   Neurological: Negative for dizziness, seizures, speech difficulty, weakness, light-headedness, numbness and headache.   Hematological: Negative for adenopathy.   Psychiatric/Behavioral: Positive for depressed mood. Negative for agitation, decreased concentration, dysphoric mood, sleep disturbance and suicidal ideas. The patient is nervous/anxious.        Current Outpatient Medications on File Prior to Visit   Medication Sig   • acetaminophen-codeine (TYLENOL #3) 300-30 MG per tablet Take 1 tablet by mouth Every 4 (Four) Hours As Needed for Moderate Pain .   • ARIPiprazole (ABILIFY) 15 MG tablet    • atorvastatin (LIPITOR) 10 MG tablet Take 1 tablet by mouth Daily.   • dicyclomine (BENTYL) 20 MG tablet Take 1/2-1 tablet up to four times daily as needed for diarrhea.   • docusate sodium 100 MG capsule Take 100 mg by mouth 2 (Two) Times a Day As Needed for Constipation.   • montelukast (SINGULAIR) 10 MG tablet Take 1 tablet by mouth Every Night.   • mupirocin (BACTROBAN) 2 % ointment    • omeprazole (priLOSEC) 20 MG capsule TAKE 1 CAPSULE DAILY   • rizatriptan (MAXALT) 10 MG tablet Take 1 tab po prn migraine. May repeat in 2 hours if needed, max 3 in 24 hr   • TROKENDI  MG capsule sustained-release 24 hr TAKE 1 CAPSULE DAILY   • Vortioxetine HBr (TRINTELLIX) 20 MG tablet Take 20 mg by mouth Daily.     No current facility-administered medications on file prior to visit.        Objective   Vitals:    06/09/20 1522   BP: 126/84   Pulse: 72   Resp: 18   Temp: 98.6 °F (37 °C)   TempSrc: Temporal   SpO2: 96%   Weight: 70.8 kg (156 lb)   Height: 165.1 cm (65\")     Body mass index is 25.96 kg/m².    Physical Exam   Constitutional: She is oriented to person, place, and time. She appears well-developed and well-nourished.   HENT:   Head: Normocephalic and atraumatic.   Nose: Nose normal. "   Eyes: Pupils are equal, round, and reactive to light. Conjunctivae and lids are normal.   Neck: Trachea normal. No thyromegaly present.   Cardiovascular: Normal rate, regular rhythm and normal heart sounds.   Pulmonary/Chest: Effort normal and breath sounds normal. No respiratory distress.   Musculoskeletal:        Left shoulder: She exhibits decreased range of motion, tenderness, pain and spasm.   Neurological: She is alert and oriented to person, place, and time. She has normal strength. GCS eye subscore is 4. GCS verbal subscore is 5. GCS motor subscore is 6.   Skin: Skin is warm and dry.   Psychiatric: Her speech is normal and behavior is normal. Thought content normal. Her mood appears anxious. Cognition and memory are normal.   Vitals reviewed.      Assessment/Plan   Alisa was seen today for arm pain.    Diagnoses and all orders for this visit:    Acute pain of left shoulder  Acute issue unstable requiring further work up and monitoring. Will eat well balanced diet, increase water intake, increase physical activity as tolerated, and get adequate rest. Can use warmth or ice for discomfort in this area. Discussed resting joint and can use supportive wrap.   -     MRI Shoulder Left Without Contrast; Future  -     HYDROcodone-acetaminophen (NORCO) 7.5-325 MG per tablet; Take 1 tablet by mouth Every 6 (Six) Hours As Needed for Moderate Pain .    Left arm pain  Acute issue unstable requiring further work up and monitoring. Will eat well balanced diet, increase water intake, increase physical activity as tolerated, and get adequate rest. Can use warmth or ice for discomfort in this area. Discussed resting joint and can use supportive wrap.   -     MRI Shoulder Left Without Contrast; Future  -     HYDROcodone-acetaminophen (NORCO) 7.5-325 MG per tablet; Take 1 tablet by mouth Every 6 (Six) Hours As Needed for Moderate Pain .    Decreased range of motion of left shoulder  Acute issue unstable requiring further work  up and monitoring. Will eat well balanced diet, increase water intake, increase physical activity as tolerated, and get adequate rest. Can use warmth or ice for discomfort in this area. Discussed resting joint and can use supportive wrap.   -     MRI Shoulder Left Without Contrast; Future           Current Outpatient Medications:   •  acetaminophen-codeine (TYLENOL #3) 300-30 MG per tablet, Take 1 tablet by mouth Every 4 (Four) Hours As Needed for Moderate Pain ., Disp: 60 tablet, Rfl: 0  •  ARIPiprazole (ABILIFY) 15 MG tablet, , Disp: , Rfl:   •  atorvastatin (LIPITOR) 10 MG tablet, Take 1 tablet by mouth Daily., Disp: 90 tablet, Rfl: 1  •  dicyclomine (BENTYL) 20 MG tablet, Take 1/2-1 tablet up to four times daily as needed for diarrhea., Disp: 90 tablet, Rfl: 1  •  docusate sodium 100 MG capsule, Take 100 mg by mouth 2 (Two) Times a Day As Needed for Constipation., Disp: 60 each, Rfl: 0  •  montelukast (SINGULAIR) 10 MG tablet, Take 1 tablet by mouth Every Night., Disp: 90 tablet, Rfl: 1  •  mupirocin (BACTROBAN) 2 % ointment, , Disp: , Rfl:   •  omeprazole (priLOSEC) 20 MG capsule, TAKE 1 CAPSULE DAILY, Disp: 30 capsule, Rfl: 11  •  rizatriptan (MAXALT) 10 MG tablet, Take 1 tab po prn migraine. May repeat in 2 hours if needed, max 3 in 24 hr, Disp: 27 tablet, Rfl: 1  •  TROKENDI  MG capsule sustained-release 24 hr, TAKE 1 CAPSULE DAILY, Disp: 90 capsule, Rfl: 3  •  Vortioxetine HBr (TRINTELLIX) 20 MG tablet, Take 20 mg by mouth Daily., Disp: 90 tablet, Rfl: 1  •  HYDROcodone-acetaminophen (NORCO) 7.5-325 MG per tablet, Take 1 tablet by mouth Every 6 (Six) Hours As Needed for Moderate Pain ., Disp: 28 tablet, Rfl: 0  •  levothyroxine (SYNTHROID, LEVOTHROID) 88 MCG tablet, TAKE 1 TABLET DAILY, FASTING AND WAIT ONE HOUR FOR FOOD OR OTHER MEDICATIONS, Disp: 90 tablet, Rfl: 0  •  tiZANidine (ZANAFLEX) 4 MG tablet, Take 1 tablet by mouth Every 8 (Eight) Hours As Needed for Muscle Spasms., Disp: 180 tablet, Rfl:  1       Plan of care reviewed with the patient at the conclusion of today's visit.  Education was provided regarding diagnosis, management, and any prescribed or recommended OTC medications.  Patient verbalized understanding of and agreement with management plan.     Return if symptoms worsen or fail to improve.      GERSON Arellano    Please note that portions of this note were completed with a voice recognition program. Efforts were made to edit the dictations, but occasionally words are mistranscribed.

## 2020-06-09 NOTE — TELEPHONE ENCOUNTER
Patient requested a refill of tiZANidine (ZANAFLEX) 4 MG tablet     Please call and advise. Patient call back [phone]    [id number] - [facility] [address] AT [facility] [address] - [phone]  - [phone] FX

## 2020-06-15 ENCOUNTER — TELEPHONE (OUTPATIENT)
Dept: INTERNAL MEDICINE | Facility: CLINIC | Age: 60
End: 2020-06-15

## 2020-06-15 NOTE — TELEPHONE ENCOUNTER
Looks like patient was at [facility] ER and had labs and testing done.  Please request ER notes for me. thanks

## 2020-06-22 ENCOUNTER — TELEPHONE (OUTPATIENT)
Dept: INTERNAL MEDICINE | Facility: CLINIC | Age: 60
End: 2020-06-22

## 2020-06-22 DIAGNOSIS — E03.9 HYPOTHYROIDISM (ACQUIRED): ICD-10-CM

## 2020-06-22 DIAGNOSIS — R06.02 SHORTNESS OF BREATH: Primary | ICD-10-CM

## 2020-06-22 DIAGNOSIS — R50.9 FEVER, UNSPECIFIED FEVER CAUSE: ICD-10-CM

## 2020-06-22 RX ORDER — LEVOTHYROXINE SODIUM 88 UG/1
TABLET ORAL
Qty: 90 TABLET | Refills: 0 | Status: SHIPPED | OUTPATIENT
Start: 2020-06-22 | End: 2020-09-03 | Stop reason: SDUPTHER

## 2020-06-22 NOTE — TELEPHONE ENCOUNTER
Patient requesting 90 day supply of levothyroxine (SYNTHROID, LEVOTHROID) 88 MCG tablet to be sent to:    [facility] - [place], [place] [address] [phone] [facility] [phone]      Patient's call back number is:  [phone]

## 2020-06-22 NOTE — TELEPHONE ENCOUNTER
Caller: Alisa Ames A    Relationship to patient: Self    Best call back number: [phone]     Concerns or Questions if Applicable:   Patient stated that over the weekend she had some shortness of breath with exertion, a temp of 99.6 - ibuprofen helped, and she had loose stool but she stated she is not currently having any of those symptoms. She stated she did a lot of walking at the park prior to symptoms and it could be from COPD but wanted to check with Shanae and let her know. She stated she has IBS so that could be the reason for the loose stool.     Please call patient back and advise. She stated if the symptoms come back or get worse she will go to the hosptial to be checked out.

## 2020-06-22 NOTE — TELEPHONE ENCOUNTER
I have ordered a COVID test for patient. Ask her to quarantine at home expect for going and getting this test done. Please advise her on where in [place] to go for this.

## 2020-06-25 ENCOUNTER — OFFICE VISIT (OUTPATIENT)
Dept: INTERNAL MEDICINE | Facility: CLINIC | Age: 60
End: 2020-06-25

## 2020-06-25 DIAGNOSIS — J06.9 UPPER RESPIRATORY TRACT INFECTION, UNSPECIFIED TYPE: ICD-10-CM

## 2020-06-25 DIAGNOSIS — K90.49 FOOD INTOLERANCE: ICD-10-CM

## 2020-06-25 DIAGNOSIS — K58.0 IRRITABLE BOWEL SYNDROME WITH DIARRHEA: Primary | ICD-10-CM

## 2020-06-25 PROCEDURE — 99443 PR PHYS/QHP TELEPHONE EVALUATION 21-30 MIN: CPT | Performed by: NURSE PRACTITIONER

## 2020-06-25 NOTE — PROGRESS NOTES
Subjective   Chief Complaint   Patient presents with   • Diarrhea      This is Telephone visit.  You have chosen to receive care through a telephone visit today. Do you consent to use a telephone visit for your medical care today? Yes    Alisa Ames is a 60 y.o. female here today for recent sick symptoms, diarrhea, and food intolerances. Went to [employer] on Saturday and that night had fever of 99.6 with shortness of air. Sunday continued to have shortness of air with cough, headache, and diarrhea. She had diarrhea weeks ago due to IBS but Bentyl is no longer working. She has COPD and could have just over exerted herself on Saturday. She had COVID test on Monday that was negative. She slept and rested on on Monday and Tuesday. Fever has resolved but still has diarrhea. She thinks certain foods and drinks are triggering IBS to kick in. She is not able to tolerate caffeine because this induces diarrhea. No chest pain or recent shortness of breath.     I have reviewed the following portions of the patient's history and confirmed they are accurate: allergies, current medications, past family history, past medical history, past social history, past surgical history and problem list    I have personally completed the patient's review of systems.    Review of Systems   Constitutional: Positive for activity change and fatigue. Negative for appetite change, chills, diaphoresis, fever, unexpected weight gain and unexpected weight loss.   HENT: Negative for ear discharge, ear pain, mouth sores, nosebleeds, sinus pressure, sneezing and sore throat.    Eyes: Negative for pain, discharge and itching.   Respiratory: Negative for cough, chest tightness, shortness of breath and wheezing.    Cardiovascular: Negative for chest pain, palpitations and leg swelling.   Gastrointestinal: Positive for diarrhea. Negative for abdominal pain, constipation, nausea and vomiting.   Endocrine: Negative for heat intolerance, polydipsia and  polyphagia.   Genitourinary: Negative for dysuria, flank pain, frequency, hematuria and urgency.   Musculoskeletal: Positive for arthralgias, gait problem, joint swelling and myalgias. Negative for back pain, neck pain and neck stiffness.   Skin: Negative for color change, pallor and rash.   Allergic/Immunologic: Negative.  Negative for immunocompromised state.   Neurological: Negative for dizziness, seizures, speech difficulty, weakness, light-headedness, numbness and headache.   Hematological: Negative for adenopathy.   Psychiatric/Behavioral: Positive for depressed mood. Negative for agitation, decreased concentration, dysphoric mood, sleep disturbance and suicidal ideas. The patient is nervous/anxious.        Current Outpatient Medications on File Prior to Visit   Medication Sig   • acetaminophen-codeine (TYLENOL #3) 300-30 MG per tablet Take 1 tablet by mouth Every 4 (Four) Hours As Needed for Moderate Pain .   • ARIPiprazole (ABILIFY) 15 MG tablet    • atorvastatin (LIPITOR) 10 MG tablet Take 1 tablet by mouth Daily.   • dicyclomine (BENTYL) 20 MG tablet Take 1/2-1 tablet up to four times daily as needed for diarrhea.   • docusate sodium 100 MG capsule Take 100 mg by mouth 2 (Two) Times a Day As Needed for Constipation.   • HYDROcodone-acetaminophen (NORCO) 7.5-325 MG per tablet Take 1 tablet by mouth Every 6 (Six) Hours As Needed for Moderate Pain .   • levothyroxine (SYNTHROID, LEVOTHROID) 88 MCG tablet TAKE 1 TABLET DAILY, FASTING AND WAIT ONE HOUR FOR FOOD OR OTHER MEDICATIONS   • montelukast (SINGULAIR) 10 MG tablet Take 1 tablet by mouth Every Night.   • mupirocin (BACTROBAN) 2 % ointment    • omeprazole (priLOSEC) 20 MG capsule TAKE 1 CAPSULE DAILY   • rizatriptan (MAXALT) 10 MG tablet Take 1 tab po prn migraine. May repeat in 2 hours if needed, max 3 in 24 hr   • tiZANidine (ZANAFLEX) 4 MG tablet Take 1 tablet by mouth Every 8 (Eight) Hours As Needed for Muscle Spasms.   • TROKENDI  MG capsule  sustained-release 24 hr TAKE 1 CAPSULE DAILY   • Vortioxetine HBr (TRINTELLIX) 20 MG tablet Take 20 mg by mouth Daily.     No current facility-administered medications on file prior to visit.        Objective   There were no vitals filed for this visit.  There is no height or weight on file to calculate BMI.    Physical Exam   Constitutional: She is oriented to person, place, and time.   Pulmonary/Chest: No respiratory distress.   Neurological: She is alert and oriented to person, place, and time.   Psychiatric: She has a normal mood and affect. Her speech is normal. Thought content normal. Cognition and memory are normal.       Assessment/Plan   Diagnoses and all orders for this visit:    Irritable bowel syndrome with diarrhea  Chronic issue unstable requiring medication management and monitoring. Will eat well balanced low FODMAP diet refraining from spicy and acidic foods, increase water intake refraining from soda/caffeine and alcohol, increase physical activity, and get adequate rest. Suggested OTC probiotic daily.   Suggested patient keep a food log to keep track of food triggers.  She can continue the Bentyl as needed.  Discussed doing possible stool culture work-up if diarrhea worsens or does not improve.  -     Ambulatory Referral to Allergy    Upper respiratory tract infection, unspecified type  New onset issue requiring medication management. Suggested coolmist humidifier at home especially at bedtime to help with congestion and breathing.  Can use over-the-counter anti-histamines and plain Mucinex as needed. Will eat a well-balanced diet, increase water intake, and get adequate rest.    Patient no longer has upper respiratory symptoms.  Discussed with her that if symptoms  or fever come back she is to quarantine in place and contact this office for COVID test to be ordered for her to go to urgent care.    Food intolerance  Recurrent issue unstable requiring referral to specialty.  Keep food log  -      Ambulatory Referral to Allergy           Current Outpatient Medications:   •  acetaminophen-codeine (TYLENOL #3) 300-30 MG per tablet, Take 1 tablet by mouth Every 4 (Four) Hours As Needed for Moderate Pain ., Disp: 60 tablet, Rfl: 0  •  ARIPiprazole (ABILIFY) 15 MG tablet, , Disp: , Rfl:   •  atorvastatin (LIPITOR) 10 MG tablet, Take 1 tablet by mouth Daily., Disp: 90 tablet, Rfl: 1  •  dicyclomine (BENTYL) 20 MG tablet, Take 1/2-1 tablet up to four times daily as needed for diarrhea., Disp: 90 tablet, Rfl: 1  •  docusate sodium 100 MG capsule, Take 100 mg by mouth 2 (Two) Times a Day As Needed for Constipation., Disp: 60 each, Rfl: 0  •  HYDROcodone-acetaminophen (NORCO) 7.5-325 MG per tablet, Take 1 tablet by mouth Every 6 (Six) Hours As Needed for Moderate Pain ., Disp: 28 tablet, Rfl: 0  •  levothyroxine (SYNTHROID, LEVOTHROID) 88 MCG tablet, TAKE 1 TABLET DAILY, FASTING AND WAIT ONE HOUR FOR FOOD OR OTHER MEDICATIONS, Disp: 90 tablet, Rfl: 0  •  montelukast (SINGULAIR) 10 MG tablet, Take 1 tablet by mouth Every Night., Disp: 90 tablet, Rfl: 1  •  mupirocin (BACTROBAN) 2 % ointment, , Disp: , Rfl:   •  omeprazole (priLOSEC) 20 MG capsule, TAKE 1 CAPSULE DAILY, Disp: 30 capsule, Rfl: 11  •  rizatriptan (MAXALT) 10 MG tablet, Take 1 tab po prn migraine. May repeat in 2 hours if needed, max 3 in 24 hr, Disp: 27 tablet, Rfl: 1  •  tiZANidine (ZANAFLEX) 4 MG tablet, Take 1 tablet by mouth Every 8 (Eight) Hours As Needed for Muscle Spasms., Disp: 180 tablet, Rfl: 1  •  TROKENDI  MG capsule sustained-release 24 hr, TAKE 1 CAPSULE DAILY, Disp: 90 capsule, Rfl: 3  •  Vortioxetine HBr (TRINTELLIX) 20 MG tablet, Take 20 mg by mouth Daily., Disp: 90 tablet, Rfl: 1       Plan of care reviewed with the patient at the conclusion of today's visit.  Education was provided regarding diagnosis, management, and any prescribed or recommended OTC medications.  Patient verbalized understanding of and agreement with management  plan.     Return if symptoms worsen or fail to improve.     I spent 25 minutes in medical discussion with patient during this visit.     Shanae Torres, GERSON    Please note that portions of this note were completed with a voice recognition program. Efforts were made to edit the dictations, but occasionally words are mistranscribed.

## 2020-06-26 ENCOUNTER — HOSPITAL ENCOUNTER (OUTPATIENT)
Dept: MRI IMAGING | Facility: HOSPITAL | Age: 60
Discharge: HOME | End: 2020-06-26
Admitting: NURSE PRACTITIONER

## 2020-06-26 ENCOUNTER — TELEPHONE (OUTPATIENT)
Dept: INTERNAL MEDICINE | Facility: CLINIC | Age: 60
End: 2020-06-26

## 2020-06-26 DIAGNOSIS — M79.602 LEFT ARM PAIN: ICD-10-CM

## 2020-06-26 DIAGNOSIS — M25.612 DECREASED RANGE OF MOTION OF LEFT SHOULDER: ICD-10-CM

## 2020-06-26 DIAGNOSIS — M25.512 ACUTE PAIN OF LEFT SHOULDER: ICD-10-CM

## 2020-06-26 PROCEDURE — 73221 MRI JOINT UPR EXTREM W/O DYE: CPT

## 2020-06-29 DIAGNOSIS — R93.89 ABNORMAL MRI: ICD-10-CM

## 2020-06-29 DIAGNOSIS — M25.512 ACUTE PAIN OF LEFT SHOULDER: Primary | ICD-10-CM

## 2020-07-07 DIAGNOSIS — F41.8 DEPRESSION WITH ANXIETY: Chronic | ICD-10-CM

## 2020-07-07 NOTE — TELEPHONE ENCOUNTER
Vortioxetine HBr (TRINTELLIX) 20 MG tablet    Send to:  [facility] - [place], [place] [address] [phone] [facility] [phone] [place]

## 2020-07-08 ENCOUNTER — OFFICE VISIT (OUTPATIENT)
Dept: ORTHOPEDIC SURGERY | Facility: CLINIC | Age: 60
End: 2020-07-08

## 2020-07-08 VITALS — OXYGEN SATURATION: 98 % | HEIGHT: 65 IN | WEIGHT: 156.09 LBS | HEART RATE: 51 BPM | BODY MASS INDEX: 26.01 KG/M2

## 2020-07-08 DIAGNOSIS — M25.512 ACUTE PAIN OF LEFT SHOULDER: Primary | ICD-10-CM

## 2020-07-08 DIAGNOSIS — M75.112 NONTRAUMATIC INCOMPLETE TEAR OF LEFT ROTATOR CUFF: ICD-10-CM

## 2020-07-08 DIAGNOSIS — M75.02 ADHESIVE CAPSULITIS OF LEFT SHOULDER: ICD-10-CM

## 2020-07-08 PROCEDURE — 99214 OFFICE O/P EST MOD 30 MIN: CPT | Performed by: ORTHOPAEDIC SURGERY

## 2020-07-08 PROCEDURE — 20610 DRAIN/INJ JOINT/BURSA W/O US: CPT | Performed by: ORTHOPAEDIC SURGERY

## 2020-07-08 RX ORDER — TRIAMCINOLONE ACETONIDE 40 MG/ML
40 INJECTION, SUSPENSION INTRA-ARTICULAR; INTRAMUSCULAR
Status: COMPLETED | OUTPATIENT
Start: 2020-07-08 | End: 2020-07-08

## 2020-07-08 RX ORDER — LIDOCAINE HYDROCHLORIDE 10 MG/ML
4 INJECTION, SOLUTION EPIDURAL; INFILTRATION; INTRACAUDAL; PERINEURAL
Status: COMPLETED | OUTPATIENT
Start: 2020-07-08 | End: 2020-07-08

## 2020-07-08 RX ORDER — BUPIVACAINE HYDROCHLORIDE 2.5 MG/ML
4 INJECTION, SOLUTION EPIDURAL; INFILTRATION; INTRACAUDAL
Status: COMPLETED | OUTPATIENT
Start: 2020-07-08 | End: 2020-07-08

## 2020-07-08 RX ADMIN — BUPIVACAINE HYDROCHLORIDE 4 ML: 2.5 INJECTION, SOLUTION EPIDURAL; INFILTRATION; INTRACAUDAL at 13:49

## 2020-07-08 RX ADMIN — LIDOCAINE HYDROCHLORIDE 4 ML: 10 INJECTION, SOLUTION EPIDURAL; INFILTRATION; INTRACAUDAL; PERINEURAL at 13:49

## 2020-07-08 RX ADMIN — TRIAMCINOLONE ACETONIDE 40 MG: 40 INJECTION, SUSPENSION INTRA-ARTICULAR; INTRAMUSCULAR at 13:49

## 2020-07-08 NOTE — PROGRESS NOTES
[facility] Note    Subjective     Chief Complaint   Patient presents with   • Left Shoulder - Pain        HPI  Alisa Ames is a 60 y.o. female.  She complains of left shoulder pain.  Pain is 9 out of 10.  Is aching stabbing.  She goes to [facility] [place].  She believes she has a frozen shoulder.  Pain is worse with all activity.  Ice relieves it somewhat.  Past Medical History:   Diagnosis Date   • COPD (chronic obstructive pulmonary disease) (CMS/HCC)    • Fibromyalgia    • Hyperlipidemia    • Malignant neoplasm (CMS/HCC)    • Migraine    • Syncope    • Thyroid nodule    • Wears dentures     UPPER AND LOWER       Past Surgical History:   Procedure Laterality Date   • APPENDECTOMY     • CERVICAL SPINE SURGERY      Fibromyalgia and DDD with h/o C spine surgery- initially on flexeril bid.   • COLONOSCOPY  2015   • NECK SURGERY     • NOSE SURGERY      r/t Skin CA   • SKIN CANCER EXCISION     • THYROID LOBECTOMY Right     On discussion, pt reported a h/o right thyroid lobectomy for goiter.U/S demo surgical absence of right lobe and diffuse nodularity of the left lobe with a dominant nodule in the lower pole of 1.8 x 3.5 cm.   • TONSILLECTOMY     • TOTAL HIP ARTHROPLASTY Left 1/7/2019    Procedure: TOTAL HIP ARTHROPLASTY LEFT;  Surgeon: Allen Madera MD;  Location: [facility];  Service: Orthopedics   • TOTAL THYROIDECTOMY        Family History   Problem Relation Age of Onset   • Other Mother         malignant neoplasm   • Coronary artery disease Mother         MI (60's)   • Heart attack Mother    • Other Other         malignant neoplasm   • Valvular heart disease Father    • Birth defects Son      Social History     Socioeconomic History   • Marital status:      Spouse name: Not on file   • Number of children: Not on file   • Years of education: Not on file   • Highest education level: Not on file   Occupational History   • Occupation: [employer]   Tobacco Use   •  Smoking status: Former Smoker     Packs/day: 0.00     Years: 40.00     Pack years: 0.00     Types: Cigarettes   • Smokeless tobacco: Never Used   • Tobacco comment: QUIT SMOKING WITH E CIG-1 WEEK AGO    Substance and Sexual Activity   • Alcohol use: Yes     Alcohol/week: 1.0 standard drinks     Types: 1 Cans of beer per week     Comment: 1 drink per month   • Drug use: No   • Sexual activity: Yes     Partners: Male   Social History Narrative    Caffeine:  2-4 per day      Current Outpatient Medications on File Prior to Visit   Medication Sig Dispense Refill   • acetaminophen-codeine (TYLENOL #3) 300-30 MG per tablet Take 1 tablet by mouth Every 4 (Four) Hours As Needed for Moderate Pain . 60 tablet 0   • ARIPiprazole (ABILIFY) 15 MG tablet      • atorvastatin (LIPITOR) 10 MG tablet Take 1 tablet by mouth Daily. 90 tablet 1   • dicyclomine (BENTYL) 20 MG tablet Take 1/2-1 tablet up to four times daily as needed for diarrhea. 90 tablet 1   • docusate sodium 100 MG capsule Take 100 mg by mouth 2 (Two) Times a Day As Needed for Constipation. 60 each 0   • HYDROcodone-acetaminophen (NORCO) 7.5-325 MG per tablet Take 1 tablet by mouth Every 6 (Six) Hours As Needed for Moderate Pain . 28 tablet 0   • levothyroxine (SYNTHROID, LEVOTHROID) 88 MCG tablet TAKE 1 TABLET DAILY, FASTING AND WAIT ONE HOUR FOR FOOD OR OTHER MEDICATIONS 90 tablet 0   • montelukast (SINGULAIR) 10 MG tablet Take 1 tablet by mouth Every Night. 90 tablet 1   • mupirocin (BACTROBAN) 2 % ointment      • omeprazole (priLOSEC) 20 MG capsule TAKE 1 CAPSULE DAILY 30 capsule 11   • rizatriptan (MAXALT) 10 MG tablet Take 1 tab po prn migraine. May repeat in 2 hours if needed, max 3 in 24 hr 27 tablet 1   • tiZANidine (ZANAFLEX) 4 MG tablet Take 1 tablet by mouth Every 8 (Eight) Hours As Needed for Muscle Spasms. 180 tablet 1   • TROKENDI  MG capsule sustained-release 24 hr TAKE 1 CAPSULE DAILY 90 capsule 3   • Vortioxetine HBr (Trintellix) 20 MG tablet  "Take 20 mg by mouth Daily. 90 tablet 1     No current facility-administered medications on file prior to visit.       No Known Allergies     The following portions of the patient's history were reviewed and updated as appropriate: allergies, current medications, past family history, past medical history, past social history, past surgical history and problem list.    Review of Systems   Constitutional: Negative.    HENT: Negative.    Eyes: Negative.    Respiratory: Negative.    Cardiovascular: Negative.    Gastrointestinal: Negative.    Endocrine: Negative.    Genitourinary: Negative.    Musculoskeletal: Positive for arthralgias and joint swelling.   Skin: Negative.    Allergic/Immunologic: Negative.    Neurological: Negative.    Hematological: Negative.    Psychiatric/Behavioral: Negative.         Objective      Physical Exam  Pulse 51   Ht 165.1 cm (65\")   Wt 70.8 kg (156 lb 1.4 oz)   SpO2 98%   BMI 25.97 kg/m²     Body mass index is 25.97 kg/m².    GENERAL APPEARANCE: awake, alert & oriented x 3, in no acute distress and well developed, well nourished  PSYCH: normal mood and affect  LUNGS:  breathing nonlabored, no wheezing  EYES: sclera anicteric, pupils equal  CARDIOVASCULAR: palpable pulses dorsalis pedis, palpable posterior tibial bilaterally. Capillary refill less than 2 seconds  INTEGUMENTARY: skin intact, no clubbing, cyanosis  NEUROLOGIC:  Normal Sensation and reflexes       Ortho Exam  Musculoskeletal   Upper Extremity   Left Shoulder     Inspection and Palpation:     Tenderness - none     Crepitus - none    Sensation is normal    Examination reveals no ecchymosis.      Strength and Tone:    Supraspinatus, Infraspinatus - 4/5    Subscapularis -4/5    Deltoid - 4/5   Range of Motion   Right shoulder:    Internal Rotation: ROM - T7    External Rotation: AROM - 80 degrees    Elevation through flexion: AROM - 180 degrees     Abduction - 180   Left Shoulder:    Internal Rotation: ROM - T7    External " Rotation: AROM - 80 degrees    Elevation through flexion: AROM - 80 degrees     Abduction - 80 degrees   Instability   Left shoulder    Sulcus sign negative    Apprehension test negative    César relocation test negative    Jerk test negative   Impingement   Left shoulder    Hernadez impingement test positive    Neer impingement test positive   Functional Testing   Left shoulder    AC crossover adduction test negative    Abdominal compression test negative    Lift-off sign negative    Speed's test negative    Lynch's test negative    Horriblower's sign negative     Imaging/Studies  Imaging Results (Last 7 Days)     ** No results found for the last 168 hours. **        I viewed her x-rays from April 27 which are negative and her MRI from June 26 which shows a partial cuff tear  Assessment/Plan        ICD-10-CM ICD-9-CM   1. Acute pain of left shoulder M25.512 719.41   2. Nontraumatic incomplete tear of left rotator cuff M75.112 726.13   3. Adhesive capsulitis of left shoulder M75.02 726.0       Orders Placed This Encounter   Procedures   • Large Joint Arthrocentesis: L glenohumeral   • Ambulatory Referral to Physical Therapy   We discussed different treatment options.  Today I injected her left shoulder glenohumeral space with cortisone.  I have ordered physical therapy.  She will continue prescription strength ibuprofen.  I will see her back in 4 weeks.    Medical Decision Making  Management Options : prescription/IM medicine and physical/occupational therapy  Data/Risk: radiology tests, summary of old records and independent visualization of imaging, lab tests, or EMG/NCV    Carroll Smith MD  07/08/20  13:52         EMR Dragon/Transcription disclaimer:  Much of this encounter note is an electronic transcription of spoken language to printed text. Electronic transcription of spoken language may permit erroneous, or at times, nonsensical words or phrases to be inadvertently transcribed. Although I have reviewed  the note for such errors, some may still exist.

## 2020-07-08 NOTE — PROGRESS NOTES
Procedure   Large Joint Arthrocentesis: L glenohumeral  Date/Time: 7/8/2020 1:49 PM  Consent given by: patient  Site marked: site marked  Timeout: Immediately prior to procedure a time out was called to verify the correct patient, procedure, equipment, support staff and site/side marked as required   Supporting Documentation  Indications: pain   Procedure Details  Location: shoulder - L glenohumeral  Needle size: 22 G  Approach: posterior  Medications administered: 4 mL bupivacaine (PF) 0.25 %; 4 mL lidocaine PF 1% 1 %; 40 mg triamcinolone acetonide 40 MG/ML  Patient tolerance: patient tolerated the procedure well with no immediate complications

## 2020-07-13 ENCOUNTER — TELEPHONE (OUTPATIENT)
Dept: INTERNAL MEDICINE | Facility: CLINIC | Age: 60
End: 2020-07-13

## 2020-07-13 NOTE — TELEPHONE ENCOUNTER
Pt states she was told she has frozen shoulder with a tear. Offered her steriod's  no pain med's. steriod's take 2 wk's to come work.

## 2020-07-13 NOTE — TELEPHONE ENCOUNTER
I have not received a note from ortho yet. What did they decide and why does she want 2nd opinion?

## 2020-07-13 NOTE — TELEPHONE ENCOUNTER
Patient called wanting to know if you can put in a referral to see someone else to look at her shoulder? Patient said she wants a second opinion. Please give pt a call

## 2020-07-15 DIAGNOSIS — E78.00 PURE HYPERCHOLESTEROLEMIA: ICD-10-CM

## 2020-07-15 RX ORDER — ATORVASTATIN CALCIUM 10 MG/1
TABLET, FILM COATED ORAL
Qty: 90 TABLET | Refills: 3 | Status: SHIPPED | OUTPATIENT
Start: 2020-07-15 | End: 2021-05-25

## 2020-07-16 DIAGNOSIS — R93.89 ABNORMAL MRI: Primary | ICD-10-CM

## 2020-07-16 DIAGNOSIS — M25.512 ACUTE PAIN OF LEFT SHOULDER: ICD-10-CM

## 2020-07-28 ENCOUNTER — TELEPHONE (OUTPATIENT)
Dept: INTERNAL MEDICINE | Facility: CLINIC | Age: 60
End: 2020-07-28

## 2020-07-28 NOTE — TELEPHONE ENCOUNTER
PATIENT IS STILL EXPERIENCING STOMACH PAIN BUT NOT BLEEDING OR DIARRHEA, HOWEVER SINCE SHE FEELS ANOTHER LOCKDOWN IS COMING SHE IS HOPING YOU COULD REFER HER TO GASTRO DOCTOR BEFORE SHE CAN'T GET IN    PATIENT PH: [phone]

## 2020-07-28 NOTE — TELEPHONE ENCOUNTER
She is overdue for colonoscopy. Is this what she is wanting? If it's due to abdominal pain only then I need to work this up in the office and do US or CT scan first but she needs appt for this. She can do telehealth if want.

## 2020-07-28 NOTE — TELEPHONE ENCOUNTER
Called and spoke to patient, she states that she just wants to do telehealth with Shana[facility] over having a colonoscopy completed. Transferred her to the front to have this scheduled.

## 2020-07-29 DIAGNOSIS — M50.30 DDD (DEGENERATIVE DISC DISEASE), CERVICAL: ICD-10-CM

## 2020-07-29 RX ORDER — TIZANIDINE 4 MG/1
4 TABLET ORAL EVERY 8 HOURS PRN
Qty: 180 TABLET | Refills: 1 | Status: SHIPPED | OUTPATIENT
Start: 2020-07-29 | End: 2021-02-22 | Stop reason: SDUPTHER

## 2020-08-07 ENCOUNTER — OFFICE VISIT (OUTPATIENT)
Dept: INTERNAL MEDICINE | Facility: CLINIC | Age: 60
End: 2020-08-07

## 2020-08-07 ENCOUNTER — LAB (OUTPATIENT)
Dept: LAB | Facility: HOSPITAL | Age: 60
End: 2020-08-07

## 2020-08-07 VITALS
SYSTOLIC BLOOD PRESSURE: 130 MMHG | WEIGHT: 150 LBS | RESPIRATION RATE: 18 BRPM | HEIGHT: 65 IN | BODY MASS INDEX: 24.99 KG/M2 | DIASTOLIC BLOOD PRESSURE: 76 MMHG | TEMPERATURE: 97.1 F | HEART RATE: 71 BPM | OXYGEN SATURATION: 98 %

## 2020-08-07 DIAGNOSIS — E78.00 PURE HYPERCHOLESTEROLEMIA: Primary | ICD-10-CM

## 2020-08-07 DIAGNOSIS — K62.5 RECTAL BLEEDING: ICD-10-CM

## 2020-08-07 DIAGNOSIS — E03.9 HYPOTHYROIDISM (ACQUIRED): ICD-10-CM

## 2020-08-07 DIAGNOSIS — Z13.0 SCREENING FOR BLOOD DISEASE: ICD-10-CM

## 2020-08-07 DIAGNOSIS — K58.2 IRRITABLE BOWEL SYNDROME WITH BOTH CONSTIPATION AND DIARRHEA: ICD-10-CM

## 2020-08-07 DIAGNOSIS — M25.512 CHRONIC LEFT SHOULDER PAIN: ICD-10-CM

## 2020-08-07 DIAGNOSIS — G89.29 CHRONIC LEFT SHOULDER PAIN: ICD-10-CM

## 2020-08-07 DIAGNOSIS — Z13.1 SCREENING FOR DIABETES MELLITUS: ICD-10-CM

## 2020-08-07 LAB
ALBUMIN SERPL-MCNC: 4.4 G/DL (ref 3.5–5.2)
ALBUMIN/GLOB SERPL: 1.5 G/DL
ALP SERPL-CCNC: 101 U/L (ref 39–117)
ALT SERPL W P-5'-P-CCNC: 15 U/L (ref 1–33)
ANION GAP SERPL CALCULATED.3IONS-SCNC: 12.1 MMOL/L (ref 5–15)
AST SERPL-CCNC: 14 U/L (ref 1–32)
BILIRUB SERPL-MCNC: 0.3 MG/DL (ref 0–1.2)
BUN SERPL-MCNC: 15 MG/DL (ref 8–23)
BUN/CREAT SERPL: 16.7 (ref 7–25)
CALCIUM SPEC-SCNC: 9.5 MG/DL (ref 8.6–10.5)
CHLORIDE SERPL-SCNC: 106 MMOL/L (ref 98–107)
CHOLEST SERPL-MCNC: 205 MG/DL (ref 0–200)
CO2 SERPL-SCNC: 21.9 MMOL/L (ref 22–29)
CREAT SERPL-MCNC: 0.9 MG/DL (ref 0.57–1)
GFR SERPL CREATININE-BSD FRML MDRD: 63 ML/MIN/1.73
GLOBULIN UR ELPH-MCNC: 2.9 GM/DL
GLUCOSE SERPL-MCNC: 96 MG/DL (ref 65–99)
HBA1C MFR BLD: 5.6 % (ref 4.8–5.6)
HDLC SERPL-MCNC: 70 MG/DL (ref 40–60)
LDLC SERPL CALC-MCNC: 117 MG/DL (ref 0–100)
LDLC/HDLC SERPL: 1.67 {RATIO}
POTASSIUM SERPL-SCNC: 3.6 MMOL/L (ref 3.5–5.2)
PROT SERPL-MCNC: 7.3 G/DL (ref 6–8.5)
SODIUM SERPL-SCNC: 140 MMOL/L (ref 136–145)
TRIGL SERPL-MCNC: 90 MG/DL (ref 0–150)
TSH SERPL DL<=0.05 MIU/L-ACNC: 0.06 UIU/ML (ref 0.27–4.2)
VLDLC SERPL-MCNC: 18 MG/DL (ref 5–40)

## 2020-08-07 PROCEDURE — 83036 HEMOGLOBIN GLYCOSYLATED A1C: CPT | Performed by: NURSE PRACTITIONER

## 2020-08-07 PROCEDURE — 84439 ASSAY OF FREE THYROXINE: CPT | Performed by: NURSE PRACTITIONER

## 2020-08-07 PROCEDURE — 80053 COMPREHEN METABOLIC PANEL: CPT | Performed by: NURSE PRACTITIONER

## 2020-08-07 PROCEDURE — 84443 ASSAY THYROID STIM HORMONE: CPT | Performed by: NURSE PRACTITIONER

## 2020-08-07 PROCEDURE — 85027 COMPLETE CBC AUTOMATED: CPT | Performed by: NURSE PRACTITIONER

## 2020-08-07 PROCEDURE — 80061 LIPID PANEL: CPT | Performed by: NURSE PRACTITIONER

## 2020-08-07 PROCEDURE — 99214 OFFICE O/P EST MOD 30 MIN: CPT | Performed by: NURSE PRACTITIONER

## 2020-08-07 NOTE — PROGRESS NOTES
"Subjective   Chief Complaint   Patient presents with   • Rectal Bleeding     x 1 month  (no bleeding in 2-3 weeks)   • Abdominal Pain     \"every now and then\"   • Irritable Bowel Syndrome     The medication is no longer working      Alisa Ames is a 60 y.o. female here today for hyperlipidemia, hypothyroidism, GI issues and chronic pain.  She been following a heart healthy low-cholesterol and fat diet.  She is trying to increase her physical activity.  She is taking Synthroid on empty stomach and feels that her thyroid is stable.  She's had intermittent rectal bleeding for months but no recent bleeding in past 2-3 weeks. No rectal pain or hemorrhoids.  Having intermittent generalized abdominal pain not consistent with eating or being hungry.  No nausea or vomiting.  Has irritable bowel syndrome with diarrhea and having frequent loose bowel movements.  Dicyclomine is no longer helping.  She has chronic left shoulder pain that is unbearable at times.  Ranking her pain 8 out of 10.  She has consulted with orthopedic surgery and they do not want to do any surgical interventions.  She would like referral to pain management to consider injections.  She has been following a heart healthy low-cholesterol and fat diet.  Thyroid feels stable and taking Synthroid on empty stomach.  No shortness of breath or chest pain.      I have reviewed the following portions of the patient's history and confirmed they are accurate: allergies, current medications, past family history, past medical history, past social history, past surgical history and problem list    I have personally completed the patient's review of systems.    Review of Systems   Constitutional: Positive for activity change and fatigue. Negative for appetite change, chills, diaphoresis, fever, unexpected weight gain and unexpected weight loss.   HENT: Negative for ear discharge, ear pain, mouth sores, nosebleeds, sinus pressure, sneezing and sore throat.    Eyes: " Negative for pain, discharge and itching.   Respiratory: Negative for cough, chest tightness, shortness of breath and wheezing.    Cardiovascular: Negative for chest pain, palpitations and leg swelling.   Gastrointestinal: Positive for abdominal pain, blood in stool and diarrhea. Negative for constipation, nausea and vomiting.   Endocrine: Negative for heat intolerance, polydipsia and polyphagia.   Genitourinary: Negative for dysuria, flank pain, frequency, hematuria and urgency.   Musculoskeletal: Positive for arthralgias, gait problem, joint swelling and myalgias. Negative for back pain, neck pain and neck stiffness.   Skin: Negative for color change, pallor and rash.   Allergic/Immunologic: Negative.  Negative for immunocompromised state.   Neurological: Negative for dizziness, seizures, speech difficulty, weakness, light-headedness, numbness and headache.   Hematological: Negative for adenopathy.   Psychiatric/Behavioral: Positive for depressed mood. Negative for agitation, decreased concentration, dysphoric mood, sleep disturbance and suicidal ideas. The patient is nervous/anxious.        Current Outpatient Medications on File Prior to Visit   Medication Sig   • atorvastatin (LIPITOR) 10 MG tablet TAKE 1 TABLET DAILY   • dicyclomine (BENTYL) 20 MG tablet Take 1/2-1 tablet up to four times daily as needed for diarrhea.   • docusate sodium 100 MG capsule Take 100 mg by mouth 2 (Two) Times a Day As Needed for Constipation.   • levothyroxine (SYNTHROID, LEVOTHROID) 88 MCG tablet TAKE 1 TABLET DAILY, FASTING AND WAIT ONE HOUR FOR FOOD OR OTHER MEDICATIONS   • montelukast (SINGULAIR) 10 MG tablet Take 1 tablet by mouth Every Night.   • mupirocin (BACTROBAN) 2 % ointment    • omeprazole (priLOSEC) 20 MG capsule TAKE 1 CAPSULE DAILY   • rizatriptan (MAXALT) 10 MG tablet Take 1 tab po prn migraine. May repeat in 2 hours if needed, max 3 in 24 hr   • tiZANidine (ZANAFLEX) 4 MG tablet Take 1 tablet by mouth Every 8  "(Eight) Hours As Needed for Muscle Spasms.   • TROKENDI  MG capsule sustained-release 24 hr TAKE 1 CAPSULE DAILY   • Vortioxetine HBr (Trintellix) 20 MG tablet Take 20 mg by mouth Daily.   • ARIPiprazole (ABILIFY) 15 MG tablet      No current facility-administered medications on file prior to visit.        Objective   Vitals:    08/07/20 1407   BP: 130/76   Pulse: 71   Resp: 18   Temp: 97.1 °F (36.2 °C)   TempSrc: Temporal   SpO2: 98%   Weight: 68 kg (150 lb)   Height: 165.1 cm (65\")   PainSc:   4   PainLoc: Shoulder     Body mass index is 24.96 kg/m².    Physical Exam   Constitutional: She is oriented to person, place, and time. She appears well-developed and well-nourished.   HENT:   Head: Normocephalic and atraumatic.   Nose: Nose normal.   Eyes: Pupils are equal, round, and reactive to light. Conjunctivae and lids are normal.   Neck: Trachea normal. No thyromegaly present.   Cardiovascular: Normal rate, regular rhythm and normal heart sounds.   Pulmonary/Chest: Effort normal and breath sounds normal. No respiratory distress.   Abdominal: Soft. Normal appearance and bowel sounds are normal. There is no hepatosplenomegaly, splenomegaly or hepatomegaly. There is no tenderness. No hernia.   Musculoskeletal:        Left shoulder: She exhibits decreased range of motion, tenderness, pain and spasm.   Neurological: She is alert and oriented to person, place, and time. She has normal strength. GCS eye subscore is 4. GCS verbal subscore is 5. GCS motor subscore is 6.   Skin: Skin is warm and dry.   Psychiatric: Her speech is normal and behavior is normal. Thought content normal. Her mood appears anxious. Cognition and memory are normal.   Vitals reviewed.      Assessment/Plan   Problem List Items Addressed This Visit        Cardiovascular and Mediastinum    Pure hypercholesterolemia - Primary    Overview     Chronic stable requiring medication management, lifestyle changes, and monitoring. Discussed how this being " unstable increases risk of cardiovascular disease and adverse events. Will follow a hearth healthy diet low in cholesterol and fat. Discussed increasing fiber intake. Suggested fish oil or omega 3 supplement of 1200mg twice daily. Educated on how these help lower triglycerides and LDL. Will drink adequate water and increase physical activity as tolerated. Discussed importance of medication compliance.   Continue lipitor         Relevant Orders    Lipid Panel (Completed)       Digestive    Irritable bowel syndrome with both constipation and diarrhea    Overview     Chronic issue unstable requiring medication management and monitoring. Will eat well balanced low FODMAP diet refraining from spicy and acidic foods, increase water intake refraining from soda/caffeine and alcohol, increase physical activity, and get adequate rest. Suggested OTC probiotic daily.   Continue Bentyl PRN.          Relevant Orders    Ambulatory Referral to Gastroenterology       Endocrine    Hypothyroidism (acquired)    Overview     Had thyroidectomy          Current Assessment & Plan     Chronic issue stable requiring medication management and monitoring. Will eat well balanced heart healthy diet with adequate iodine.  Educated to use iodized salt and increase fish intake.  Educated patient to take Synthroid on empty stomach 30 minutes before eating.  Continue synthroid.          Relevant Orders    TSH Rfx On Abnormal To Free T4 (Completed)    T4, Free (Completed)       Nervous and Auditory    Chronic left shoulder pain    Current Assessment & Plan     Chronic issue unstable requiring medication management and monitoring. Will eat well balanced diet, increase water intake, increase physical activity as tolerated, and get adequate rest. Can use warmth or ice for discomfort in this area. Discussed stretching exercises.   Continue zanaflex. Can take IBU PRN.          Relevant Orders    Ambulatory Referral to Pain Management      Other Visit  Diagnoses     Rectal bleeding      New issue requiring further work up, referral to speciality, and monitoring.     Relevant Orders    Ambulatory Referral to Gastroenterology    Screening for diabetes mellitus        Relevant Orders    Hemoglobin A1c (Completed)    Screening for blood disease        Relevant Orders    CBC (No Diff) (Completed)    Comprehensive Metabolic Panel (Completed)             Current Outpatient Medications:   •  atorvastatin (LIPITOR) 10 MG tablet, TAKE 1 TABLET DAILY, Disp: 90 tablet, Rfl: 3  •  dicyclomine (BENTYL) 20 MG tablet, Take 1/2-1 tablet up to four times daily as needed for diarrhea., Disp: 90 tablet, Rfl: 1  •  docusate sodium 100 MG capsule, Take 100 mg by mouth 2 (Two) Times a Day As Needed for Constipation., Disp: 60 each, Rfl: 0  •  levothyroxine (SYNTHROID, LEVOTHROID) 88 MCG tablet, TAKE 1 TABLET DAILY, FASTING AND WAIT ONE HOUR FOR FOOD OR OTHER MEDICATIONS, Disp: 90 tablet, Rfl: 0  •  montelukast (SINGULAIR) 10 MG tablet, Take 1 tablet by mouth Every Night., Disp: 90 tablet, Rfl: 1  •  mupirocin (BACTROBAN) 2 % ointment, , Disp: , Rfl:   •  omeprazole (priLOSEC) 20 MG capsule, TAKE 1 CAPSULE DAILY, Disp: 30 capsule, Rfl: 11  •  rizatriptan (MAXALT) 10 MG tablet, Take 1 tab po prn migraine. May repeat in 2 hours if needed, max 3 in 24 hr, Disp: 27 tablet, Rfl: 1  •  tiZANidine (ZANAFLEX) 4 MG tablet, Take 1 tablet by mouth Every 8 (Eight) Hours As Needed for Muscle Spasms., Disp: 180 tablet, Rfl: 1  •  TROKENDI  MG capsule sustained-release 24 hr, TAKE 1 CAPSULE DAILY, Disp: 90 capsule, Rfl: 3  •  Vortioxetine HBr (Trintellix) 20 MG tablet, Take 20 mg by mouth Daily., Disp: 90 tablet, Rfl: 1  •  ARIPiprazole (ABILIFY) 15 MG tablet, , Disp: , Rfl:   •  sodium-potassium-magnesium sulfates (Suprep Bowel Prep Kit) 17.5-3.13-1.6 GM/177ML solution oral solution, Take 2 bottles by mouth Take As Directed. Do Not Eat The Day Before Your Procedure. Call [phone] for  questions., Disp: 354 mL, Rfl: 0       Plan of care reviewed with the patient at the conclusion of today's visit.  Education was provided regarding diagnosis, management, and any prescribed or recommended OTC medications.  Patient verbalized understanding of and agreement with management plan.     Return in about 3 months (around 11/7/2020), or if symptoms worsen or fail to improve.      GERSON Arellano    Please note that portions of this note were completed with a voice recognition program. Efforts were made to edit the dictations, but occasionally words are mistranscribed.

## 2020-08-08 LAB
DEPRECATED RDW RBC AUTO: 45.4 FL (ref 37–54)
ERYTHROCYTE [DISTWIDTH] IN BLOOD BY AUTOMATED COUNT: 13.9 % (ref 12.3–15.4)
HCT VFR BLD AUTO: 35.6 % (ref 34–46.6)
HGB BLD-MCNC: 11.7 G/DL (ref 12–15.9)
MCH RBC QN AUTO: 29.3 PG (ref 26.6–33)
MCHC RBC AUTO-ENTMCNC: 32.9 G/DL (ref 31.5–35.7)
MCV RBC AUTO: 89.2 FL (ref 79–97)
PLATELET # BLD AUTO: 298 10*3/MM3 (ref 140–450)
PMV BLD AUTO: 10.4 FL (ref 6–12)
RBC # BLD AUTO: 3.99 10*6/MM3 (ref 3.77–5.28)
T4 FREE SERPL-MCNC: 1.47 NG/DL (ref 0.93–1.7)
WBC # BLD AUTO: 4.49 10*3/MM3 (ref 3.4–10.8)

## 2020-08-12 RX ORDER — SODIUM, POTASSIUM,MAG SULFATES 17.5-3.13G
2 SOLUTION, RECONSTITUTED, ORAL ORAL TAKE AS DIRECTED
Qty: 354 ML | Refills: 0 | Status: SHIPPED | OUTPATIENT
Start: 2020-08-12 | End: 2020-09-01

## 2020-08-13 NOTE — PROGRESS NOTES
You are slightly anemic so I want you to eat diet high in iron. Your thyroid is more towards the hyperactive side and may need to be adjusted. Before doing this I want to recheck your labs in 3 weeks and check you for Hashimoto's thyroiditis which is an autoimmune form of hypothyroidism. Cholesterol and blood sugars looks better. All other labs look normal.

## 2020-08-14 ENCOUNTER — APPOINTMENT (OUTPATIENT)
Dept: PREADMISSION TESTING | Facility: HOSPITAL | Age: 60
End: 2020-08-14

## 2020-08-14 LAB
REF LAB TEST METHOD: NORMAL
SARS-COV-2 RNA RESP QL NAA+PROBE: NOT DETECTED

## 2020-08-14 PROCEDURE — U0002 COVID-19 LAB TEST NON-CDC: HCPCS

## 2020-08-14 PROCEDURE — C9803 HOPD COVID-19 SPEC COLLECT: HCPCS

## 2020-08-14 PROCEDURE — U0004 COV-19 TEST NON-CDC HGH THRU: HCPCS

## 2020-08-17 ENCOUNTER — OUTSIDE FACILITY SERVICE (OUTPATIENT)
Dept: GASTROENTEROLOGY | Facility: CLINIC | Age: 60
End: 2020-08-17

## 2020-08-17 PROCEDURE — 45385 COLONOSCOPY W/LESION REMOVAL: CPT | Performed by: INTERNAL MEDICINE

## 2020-08-17 PROCEDURE — 45380 COLONOSCOPY AND BIOPSY: CPT | Performed by: INTERNAL MEDICINE

## 2020-08-19 ENCOUNTER — OFFICE VISIT (OUTPATIENT)
Dept: ORTHOPEDIC SURGERY | Facility: CLINIC | Age: 60
End: 2020-08-19

## 2020-08-19 VITALS — HEIGHT: 65 IN | HEART RATE: 76 BPM | WEIGHT: 150 LBS | BODY MASS INDEX: 24.99 KG/M2 | OXYGEN SATURATION: 98 %

## 2020-08-19 DIAGNOSIS — M75.02 ADHESIVE CAPSULITIS OF LEFT SHOULDER: ICD-10-CM

## 2020-08-19 DIAGNOSIS — M75.112 NONTRAUMATIC INCOMPLETE TEAR OF LEFT ROTATOR CUFF: Primary | ICD-10-CM

## 2020-08-19 PROCEDURE — 99214 OFFICE O/P EST MOD 30 MIN: CPT | Performed by: ORTHOPAEDIC SURGERY

## 2020-08-19 NOTE — PROGRESS NOTES
[facility] Note    Subjective     Chief Complaint   Patient presents with   • Follow-up     6 weeks follow up for Acute pain of left shoulder        HPI  Alisa Ames is a 60 y.o. female.  She is follow-up left frozen shoulder.  She is not getting better.  She went to physical therapy [place].  Pain is 5 out of 10.  She had a cortisone shot.  She is tried anti-inflammatories.  She would like to proceed with surgery.  Past Medical History:   Diagnosis Date   • COPD (chronic obstructive pulmonary disease) (CMS/HCC)    • Fibromyalgia    • Hyperlipidemia    • Malignant neoplasm (CMS/HCC)    • Migraine    • Syncope    • Thyroid nodule    • Wears dentures     UPPER AND LOWER       Past Surgical History:   Procedure Laterality Date   • APPENDECTOMY     • CERVICAL SPINE SURGERY      Fibromyalgia and DDD with h/o C spine surgery- initially on flexeril bid.   • COLONOSCOPY  2015   • NECK SURGERY     • NOSE SURGERY      r/t Skin CA   • SKIN CANCER EXCISION     • THYROID LOBECTOMY Right     On discussion, pt reported a h/o right thyroid lobectomy for goiter.U/S demo surgical absence of right lobe and diffuse nodularity of the left lobe with a dominant nodule in the lower pole of 1.8 x 3.5 cm.   • TONSILLECTOMY     • TOTAL HIP ARTHROPLASTY Left 1/7/2019    Procedure: TOTAL HIP ARTHROPLASTY LEFT;  Surgeon: Allen Madera MD;  Location: [facility];  Service: Orthopedics   • TOTAL THYROIDECTOMY        Family History   Problem Relation Age of Onset   • Other Mother         malignant neoplasm   • Coronary artery disease Mother         MI (60's)   • Heart attack Mother    • Other Other         malignant neoplasm   • Valvular heart disease Father    • Birth defects Son      Social History     Socioeconomic History   • Marital status:      Spouse name: Not on file   • Number of children: Not on file   • Years of education: Not on file   • Highest education level: Not on file   Occupational History    • Occupation: [employer]   Tobacco Use   • Smoking status: Former Smoker     Packs/day: 0.00     Years: 40.00     Pack years: 0.00     Types: Cigarettes   • Smokeless tobacco: Never Used   • Tobacco comment: QUIT SMOKING WITH E CIG-1 WEEK AGO    Substance and Sexual Activity   • Alcohol use: Yes     Alcohol/week: 1.0 standard drinks     Types: 1 Cans of beer per week     Comment: 1 drink per month   • Drug use: No   • Sexual activity: Yes     Partners: Male   Social History Narrative    Caffeine:  2-4 per day      Current Outpatient Medications on File Prior to Visit   Medication Sig Dispense Refill   • ARIPiprazole (ABILIFY) 15 MG tablet      • atorvastatin (LIPITOR) 10 MG tablet TAKE 1 TABLET DAILY 90 tablet 3   • dicyclomine (BENTYL) 20 MG tablet Take 1/2-1 tablet up to four times daily as needed for diarrhea. 90 tablet 1   • docusate sodium 100 MG capsule Take 100 mg by mouth 2 (Two) Times a Day As Needed for Constipation. 60 each 0   • levothyroxine (SYNTHROID, LEVOTHROID) 88 MCG tablet TAKE 1 TABLET DAILY, FASTING AND WAIT ONE HOUR FOR FOOD OR OTHER MEDICATIONS 90 tablet 0   • montelukast (SINGULAIR) 10 MG tablet Take 1 tablet by mouth Every Night. 90 tablet 1   • mupirocin (BACTROBAN) 2 % ointment      • omeprazole (priLOSEC) 20 MG capsule TAKE 1 CAPSULE DAILY 30 capsule 11   • rizatriptan (MAXALT) 10 MG tablet Take 1 tab po prn migraine. May repeat in 2 hours if needed, max 3 in 24 hr 27 tablet 1   • sodium-potassium-magnesium sulfates (Suprep Bowel Prep Kit) 17.5-3.13-1.6 GM/177ML solution oral solution Take 2 bottles by mouth Take As Directed. Do Not Eat The Day Before Your Procedure. Call [phone] for questions. 354 mL 0   • tiZANidine (ZANAFLEX) 4 MG tablet Take 1 tablet by mouth Every 8 (Eight) Hours As Needed for Muscle Spasms. 180 tablet 1   • TROKENDI  MG capsule sustained-release 24 hr TAKE 1 CAPSULE DAILY 90 capsule 3   • Vortioxetine HBr (Trintellix) 20 MG tablet Take 20 mg by mouth  "Daily. 90 tablet 1     No current facility-administered medications on file prior to visit.       No Known Allergies     The following portions of the patient's history were reviewed and updated as appropriate: allergies, current medications, past family history, past medical history, past social history, past surgical history and problem list.    Review of Systems   Constitutional: Negative.    HENT: Negative.    Eyes: Negative.    Respiratory: Negative.    Cardiovascular: Negative.    Gastrointestinal: Negative.    Endocrine: Negative.    Genitourinary: Negative.    Musculoskeletal: Positive for arthralgias.   Skin: Negative.    Allergic/Immunologic: Negative.    Neurological: Negative.    Hematological: Negative.    Psychiatric/Behavioral: Negative.         Objective      Physical Exam  Pulse 76   Ht 165.1 cm (65\")   Wt 68 kg (150 lb)   SpO2 98%   BMI 24.96 kg/m²     Body mass index is 24.96 kg/m².    GENERAL APPEARANCE: awake, alert & oriented x 3, in no acute distress and well developed, well nourished  PSYCH: normal mood and affect  LUNGS:  breathing nonlabored, no wheezing  No change in left shoulder exam.  Forward flexion abduction 90 degrees.  Positive impingement and pain.  No passive motion beyond 90 degrees.  Imaging/Studies  Imaging Results (Last 7 Days)     ** No results found for the last 168 hours. **          Assessment/Plan        ICD-10-CM ICD-9-CM   1. Nontraumatic incomplete tear of left rotator cuff M75.112 726.13   2. Adhesive capsulitis of left shoulder M75.02 726.0     The plan is for left shoulder arthroscopy lysis of adhesions and manipulation under anesthesia.  She may have to have a rotator cuff repair.  She had a small partial tear.Treatment options and alternatives were discussed with patient.  Surgical risks include but are not limited to pain, bleeding, infection, failure to relieve symptoms, need for further procedures, recurrence of symptoms, damage to healthy adjacent " structures, hardware loosening/failure, stiffness, weakness, scar, blood clots/DVT/PE, loss of limb or life. We also discussed the postoperative protocol and expected outcome although no guarantees are possible with surgery. All questions were answered; the patient would like to proceed with surgical intervention.  Medical Decision Making  Management Options : over-the-counter medicine and major surgery with risk factors  Data/Risk: radiology tests and independent visualization of imaging, lab tests, or EMG/NCV    Carroll Smith MD  08/19/20  13:19         EMR Dragon/Transcription disclaimer:  Much of this encounter note is an electronic transcription of spoken language to printed text. Electronic transcription of spoken language may permit erroneous, or at times, nonsensical words or phrases to be inadvertently transcribed. Although I have reviewed the note for such errors, some may still exist.

## 2020-08-24 ENCOUNTER — TELEPHONE (OUTPATIENT)
Dept: ORTHOPEDIC SURGERY | Facility: CLINIC | Age: 60
End: 2020-08-24

## 2020-08-25 PROBLEM — M25.512 CHRONIC LEFT SHOULDER PAIN: Status: ACTIVE | Noted: 2020-08-25

## 2020-08-25 PROBLEM — K58.2 IRRITABLE BOWEL SYNDROME WITH BOTH CONSTIPATION AND DIARRHEA: Status: ACTIVE | Noted: 2020-08-25

## 2020-08-25 PROBLEM — G89.29 CHRONIC LEFT SHOULDER PAIN: Status: ACTIVE | Noted: 2020-08-25

## 2020-08-26 NOTE — ASSESSMENT & PLAN NOTE
Chronic issue unstable requiring medication management and monitoring. Will eat well balanced diet, increase water intake, increase physical activity as tolerated, and get adequate rest. Can use warmth or ice for discomfort in this area. Discussed stretching exercises.   Continue zanaflex. Can take IBU PRN.

## 2020-08-26 NOTE — ASSESSMENT & PLAN NOTE
Chronic issue stable requiring medication management and monitoring. Will eat well balanced heart healthy diet with adequate iodine.  Educated to use iodized salt and increase fish intake.  Educated patient to take Synthroid on empty stomach 30 minutes before eating.  Continue synthroid.

## 2020-08-28 ENCOUNTER — TELEPHONE (OUTPATIENT)
Dept: ORTHOPEDIC SURGERY | Facility: CLINIC | Age: 60
End: 2020-08-28

## 2020-08-28 NOTE — TELEPHONE ENCOUNTER
PATIENT HAS AN UPCOMING SURGERY WITH DR. SMITH AND HAS SOME QUESTIONS ABOUT A SPECIFIC OINTMENT THAT HER OTHER DOCTOR PRESCRIBED. SHE DID NOT REMEMBER THE NAME BUT SHE IS WONDERING IF SHE NEEDS TO STOP USING THIS PRIOR TO SURGERY. HER NUMBER -[phone]

## 2020-08-28 NOTE — TELEPHONE ENCOUNTER
Called patient back- is is using Voltaren Gel. I advised her to be safe that she could stop it the week before her surgery. She understood.     Laney

## 2020-09-01 ENCOUNTER — LAB (OUTPATIENT)
Dept: LAB | Facility: HOSPITAL | Age: 60
End: 2020-09-01

## 2020-09-01 ENCOUNTER — OFFICE VISIT (OUTPATIENT)
Dept: INTERNAL MEDICINE | Facility: CLINIC | Age: 60
End: 2020-09-01

## 2020-09-01 VITALS
HEART RATE: 66 BPM | DIASTOLIC BLOOD PRESSURE: 74 MMHG | RESPIRATION RATE: 16 BRPM | OXYGEN SATURATION: 97 % | TEMPERATURE: 97.2 F | HEIGHT: 65 IN | BODY MASS INDEX: 25.33 KG/M2 | SYSTOLIC BLOOD PRESSURE: 134 MMHG | WEIGHT: 152 LBS

## 2020-09-01 DIAGNOSIS — E89.0 POSTOPERATIVE HYPOTHYROIDISM: ICD-10-CM

## 2020-09-01 DIAGNOSIS — D50.8 IRON DEFICIENCY ANEMIA SECONDARY TO INADEQUATE DIETARY IRON INTAKE: Primary | ICD-10-CM

## 2020-09-01 DIAGNOSIS — E78.00 PURE HYPERCHOLESTEROLEMIA: ICD-10-CM

## 2020-09-01 LAB
ALBUMIN SERPL-MCNC: 4 G/DL (ref 3.5–5.2)
ALBUMIN/GLOB SERPL: 1.2 G/DL
ALP SERPL-CCNC: 105 U/L (ref 39–117)
ALT SERPL W P-5'-P-CCNC: 13 U/L (ref 1–33)
ANION GAP SERPL CALCULATED.3IONS-SCNC: 10 MMOL/L (ref 5–15)
AST SERPL-CCNC: 16 U/L (ref 1–32)
BASOPHILS # BLD AUTO: 0.05 10*3/MM3 (ref 0–0.2)
BASOPHILS NFR BLD AUTO: 1.2 % (ref 0–1.5)
BILIRUB SERPL-MCNC: 0.2 MG/DL (ref 0–1.2)
BUN SERPL-MCNC: 15 MG/DL (ref 8–23)
BUN/CREAT SERPL: 15.5 (ref 7–25)
CALCIUM SPEC-SCNC: 9.4 MG/DL (ref 8.6–10.5)
CHLORIDE SERPL-SCNC: 109 MMOL/L (ref 98–107)
CO2 SERPL-SCNC: 23 MMOL/L (ref 22–29)
CREAT SERPL-MCNC: 0.97 MG/DL (ref 0.57–1)
DEPRECATED RDW RBC AUTO: 44.9 FL (ref 37–54)
EOSINOPHIL # BLD AUTO: 0.12 10*3/MM3 (ref 0–0.4)
EOSINOPHIL NFR BLD AUTO: 2.8 % (ref 0.3–6.2)
ERYTHROCYTE [DISTWIDTH] IN BLOOD BY AUTOMATED COUNT: 13.8 % (ref 12.3–15.4)
GFR SERPL CREATININE-BSD FRML MDRD: 58 ML/MIN/1.73
GLOBULIN UR ELPH-MCNC: 3.3 GM/DL
GLUCOSE SERPL-MCNC: 92 MG/DL (ref 65–99)
HCT VFR BLD AUTO: 33.4 % (ref 34–46.6)
HGB BLD-MCNC: 11.1 G/DL (ref 12–15.9)
IMM GRANULOCYTES # BLD AUTO: 0.01 10*3/MM3 (ref 0–0.05)
IMM GRANULOCYTES NFR BLD AUTO: 0.2 % (ref 0–0.5)
IRON 24H UR-MRATE: 38 MCG/DL (ref 37–145)
IRON SATN MFR SERPL: 8 % (ref 20–50)
LYMPHOCYTES # BLD AUTO: 1.73 10*3/MM3 (ref 0.7–3.1)
LYMPHOCYTES NFR BLD AUTO: 40.8 % (ref 19.6–45.3)
MCH RBC QN AUTO: 29.7 PG (ref 26.6–33)
MCHC RBC AUTO-ENTMCNC: 33.2 G/DL (ref 31.5–35.7)
MCV RBC AUTO: 89.3 FL (ref 79–97)
MONOCYTES # BLD AUTO: 0.35 10*3/MM3 (ref 0.1–0.9)
MONOCYTES NFR BLD AUTO: 8.3 % (ref 5–12)
NEUTROPHILS NFR BLD AUTO: 1.98 10*3/MM3 (ref 1.7–7)
NEUTROPHILS NFR BLD AUTO: 46.7 % (ref 42.7–76)
NRBC BLD AUTO-RTO: 0 /100 WBC (ref 0–0.2)
PLATELET # BLD AUTO: 279 10*3/MM3 (ref 140–450)
PMV BLD AUTO: 10.4 FL (ref 6–12)
POTASSIUM SERPL-SCNC: 3.6 MMOL/L (ref 3.5–5.2)
PROT SERPL-MCNC: 7.3 G/DL (ref 6–8.5)
RBC # BLD AUTO: 3.74 10*6/MM3 (ref 3.77–5.28)
SODIUM SERPL-SCNC: 142 MMOL/L (ref 136–145)
TIBC SERPL-MCNC: 450 MCG/DL (ref 298–536)
TRANSFERRIN SERPL-MCNC: 302 MG/DL (ref 200–360)
TSH SERPL DL<=0.05 MIU/L-ACNC: 0.08 UIU/ML (ref 0.27–4.2)
WBC # BLD AUTO: 4.24 10*3/MM3 (ref 3.4–10.8)

## 2020-09-01 PROCEDURE — 83540 ASSAY OF IRON: CPT | Performed by: NURSE PRACTITIONER

## 2020-09-01 PROCEDURE — 84443 ASSAY THYROID STIM HORMONE: CPT | Performed by: NURSE PRACTITIONER

## 2020-09-01 PROCEDURE — 84439 ASSAY OF FREE THYROXINE: CPT | Performed by: NURSE PRACTITIONER

## 2020-09-01 PROCEDURE — 84466 ASSAY OF TRANSFERRIN: CPT | Performed by: NURSE PRACTITIONER

## 2020-09-01 PROCEDURE — 85025 COMPLETE CBC W/AUTO DIFF WBC: CPT | Performed by: NURSE PRACTITIONER

## 2020-09-01 PROCEDURE — 99214 OFFICE O/P EST MOD 30 MIN: CPT | Performed by: NURSE PRACTITIONER

## 2020-09-01 PROCEDURE — 80053 COMPREHEN METABOLIC PANEL: CPT | Performed by: NURSE PRACTITIONER

## 2020-09-01 RX ORDER — HYDROCODONE BITARTRATE AND ACETAMINOPHEN 7.5; 325 MG/1; MG/1
1 TABLET ORAL 2 TIMES DAILY
COMMUNITY
End: 2020-12-02

## 2020-09-01 NOTE — PROGRESS NOTES
Subjective   Chief Complaint   Patient presents with   • Hyperlipidemia     f/u      Alisa Ames is a 60 y.o. female here today for follow up on hypothyroidism, anemia, hyperlipidemia.  Last set of labs showed some anemia and she has been following a high iron diet and taking multivitamin with minerals.  Her thyroid may be overly suppressed on current dose of Synthroid due to TSH being low.  She has hyperlipidemia and has been following a heart healthy low-cholesterol and fat diet.  She has not been able to increase her physical activity.  She has some chronic pain issues including left shoulder that is having rotator cuff repair surgery on September 10.  She is going to pain management and seeing Dr. Mccauley and is prescribed hydrocodone.  Pain is more manageable.  No shortness of breath or chest pain.    I have reviewed the following portions of the patient's history and confirmed they are accurate: allergies, current medications, past family history, past medical history, past social history, past surgical history and problem list    I have personally completed the patient's review of systems.    Review of Systems   Constitutional: Positive for fatigue. Negative for activity change, appetite change, chills, diaphoresis, fever, unexpected weight gain and unexpected weight loss.   HENT: Negative for ear discharge, ear pain, mouth sores, nosebleeds, sinus pressure, sneezing and sore throat.    Eyes: Negative for pain, discharge and itching.   Respiratory: Negative for cough, chest tightness, shortness of breath and wheezing.    Cardiovascular: Negative for chest pain, palpitations and leg swelling.   Gastrointestinal: Positive for diarrhea. Negative for abdominal pain, blood in stool, constipation, nausea and vomiting.   Endocrine: Negative for heat intolerance, polydipsia and polyphagia.   Genitourinary: Negative for dysuria, flank pain, frequency, hematuria and urgency.   Musculoskeletal: Positive for arthralgias,  gait problem, joint swelling and myalgias. Negative for back pain, neck pain and neck stiffness.   Skin: Negative for color change, pallor and rash.   Allergic/Immunologic: Negative.  Negative for immunocompromised state.   Neurological: Negative for dizziness, seizures, speech difficulty, weakness, light-headedness, numbness and headache.   Hematological: Negative for adenopathy.   Psychiatric/Behavioral: Positive for depressed mood. Negative for agitation, decreased concentration, dysphoric mood, sleep disturbance and suicidal ideas. The patient is nervous/anxious.        Current Outpatient Medications on File Prior to Visit   Medication Sig   • ARIPiprazole (ABILIFY) 15 MG tablet    • atorvastatin (LIPITOR) 10 MG tablet TAKE 1 TABLET DAILY   • dicyclomine (BENTYL) 20 MG tablet Take 1/2-1 tablet up to four times daily as needed for diarrhea.   • HYDROcodone-acetaminophen (NORCO) 7.5-325 MG per tablet Take 1 tablet by mouth 2 (two) times a day.   • montelukast (SINGULAIR) 10 MG tablet Take 1 tablet by mouth Every Night.   • mupirocin (BACTROBAN) 2 % ointment    • omeprazole (priLOSEC) 20 MG capsule TAKE 1 CAPSULE DAILY   • rizatriptan (MAXALT) 10 MG tablet Take 1 tab po prn migraine. May repeat in 2 hours if needed, max 3 in 24 hr   • tiZANidine (ZANAFLEX) 4 MG tablet Take 1 tablet by mouth Every 8 (Eight) Hours As Needed for Muscle Spasms.   • TROKENDI  MG capsule sustained-release 24 hr TAKE 1 CAPSULE DAILY   • Vortioxetine HBr (Trintellix) 20 MG tablet Take 20 mg by mouth Daily.   • levothyroxine (SYNTHROID, LEVOTHROID) 88 MCG tablet TAKE 1 TABLET DAILY, FASTING AND WAIT ONE HOUR FOR FOOD OR OTHER MEDICATIONS   • [DISCONTINUED] docusate sodium 100 MG capsule Take 100 mg by mouth 2 (Two) Times a Day As Needed for Constipation.   • [DISCONTINUED] sodium-potassium-magnesium sulfates (Suprep Bowel Prep Kit) 17.5-3.13-1.6 GM/177ML solution oral solution Take 2 bottles by mouth Take As Directed. Do Not Eat The  "Day Before Your Procedure. Call [phone] for questions.     No current facility-administered medications on file prior to visit.        Objective   Vitals:    09/01/20 1055   BP: 134/74   Pulse: 66   Resp: 16   Temp: 97.2 °F (36.2 °C)   TempSrc: Temporal   SpO2: 97%   Weight: 68.9 kg (152 lb)   Height: 165.1 cm (65\")   PainSc: 0-No pain     Body mass index is 25.29 kg/m².    Physical Exam   Constitutional: She is oriented to person, place, and time. She appears well-developed and well-nourished.   HENT:   Head: Normocephalic and atraumatic.   Nose: Nose normal.   Eyes: Pupils are equal, round, and reactive to light. Conjunctivae and lids are normal.   Neck: Trachea normal. No thyromegaly present.   Cardiovascular: Normal rate, regular rhythm and normal heart sounds.   Pulmonary/Chest: Effort normal and breath sounds normal. No respiratory distress.   Abdominal: Soft. Normal appearance and bowel sounds are normal. There is no hepatosplenomegaly, splenomegaly or hepatomegaly. There is no tenderness. No hernia.   Musculoskeletal:        Left shoulder: She exhibits decreased range of motion, tenderness, pain and spasm.   Neurological: She is alert and oriented to person, place, and time. She has normal strength. GCS eye subscore is 4. GCS verbal subscore is 5. GCS motor subscore is 6.   Skin: Skin is warm and dry.   Psychiatric: She has a normal mood and affect. Her speech is normal and behavior is normal. Thought content normal. Cognition and memory are normal.   Vitals reviewed.      Assessment/Plan   Problem List Items Addressed This Visit        Cardiovascular and Mediastinum    Pure hypercholesterolemia    Overview     Chronic stable requiring medication management, lifestyle changes, and monitoring. Discussed how this being unstable increases risk of cardiovascular disease and adverse events. Will follow a heart healthy diet low in cholesterol and fat. Discussed increasing fiber intake. Suggested fish oil or " omega 3 supplement of 1200mg twice daily. Educated on how these help lower triglycerides and LDL. Will drink adequate water and increase physical activity as tolerated. Discussed importance of medication compliance.   Continue lipitor            Endocrine    Postoperative hypothyroidism    Overview     Chronic issue unstable requiring medication management and monitoring. Will eat well balanced heart healthy diet with adequate iodine.  Educated to use iodized salt and increase fish intake.  Educated patient to take Synthroid on empty stomach 30 minutes before eating.  Repeat TSH to determine if synthroid dose needs to be changed.          Relevant Orders    TSH Rfx On Abnormal To Free T4       Hematopoietic and Hemostatic    Iron deficiency anemia secondary to inadequate dietary iron intake - Primary    Overview     Chronic unstable requiring further work up, diet changes, and monitoring. Follow a high iron diet.          Relevant Orders    CBC & Differential    Comprehensive Metabolic Panel    Iron Profile    CBC Auto Differential             Current Outpatient Medications:   •  ARIPiprazole (ABILIFY) 15 MG tablet, , Disp: , Rfl:   •  atorvastatin (LIPITOR) 10 MG tablet, TAKE 1 TABLET DAILY, Disp: 90 tablet, Rfl: 3  •  dicyclomine (BENTYL) 20 MG tablet, Take 1/2-1 tablet up to four times daily as needed for diarrhea., Disp: 90 tablet, Rfl: 1  •  HYDROcodone-acetaminophen (NORCO) 7.5-325 MG per tablet, Take 1 tablet by mouth 2 (two) times a day., Disp: , Rfl:   •  montelukast (SINGULAIR) 10 MG tablet, Take 1 tablet by mouth Every Night., Disp: 90 tablet, Rfl: 1  •  mupirocin (BACTROBAN) 2 % ointment, , Disp: , Rfl:   •  omeprazole (priLOSEC) 20 MG capsule, TAKE 1 CAPSULE DAILY, Disp: 30 capsule, Rfl: 11  •  rizatriptan (MAXALT) 10 MG tablet, Take 1 tab po prn migraine. May repeat in 2 hours if needed, max 3 in 24 hr, Disp: 27 tablet, Rfl: 1  •  tiZANidine (ZANAFLEX) 4 MG tablet, Take 1 tablet by mouth Every 8  (Eight) Hours As Needed for Muscle Spasms., Disp: 180 tablet, Rfl: 1  •  TROKENDI  MG capsule sustained-release 24 hr, TAKE 1 CAPSULE DAILY, Disp: 90 capsule, Rfl: 3  •  Vortioxetine HBr (Trintellix) 20 MG tablet, Take 20 mg by mouth Daily., Disp: 90 tablet, Rfl: 1  •  levothyroxine (SYNTHROID, LEVOTHROID) 88 MCG tablet, TAKE 1 TABLET DAILY, FASTING AND WAIT ONE HOUR FOR FOOD OR OTHER MEDICATIONS, Disp: 90 tablet, Rfl: 0       Plan of care reviewed with the patient at the conclusion of today's visit.  Education was provided regarding diagnosis, management, and any prescribed or recommended OTC medications.  Patient verbalized understanding of and agreement with management plan.     Return if symptoms worsen or fail to improve.      GERSON Arellano    Please note that portions of this note were completed with a voice recognition program. Efforts were made to edit the dictations, but occasionally words are mistranscribed.

## 2020-09-02 LAB — T4 FREE SERPL-MCNC: 1.43 NG/DL (ref 0.93–1.7)

## 2020-09-03 DIAGNOSIS — E03.9 HYPOTHYROIDISM (ACQUIRED): ICD-10-CM

## 2020-09-03 RX ORDER — FERROUS SULFATE 325(65) MG
325 TABLET ORAL
Qty: 30 TABLET | Refills: 2 | Status: SHIPPED | OUTPATIENT
Start: 2020-09-03 | End: 2020-09-03 | Stop reason: SDUPTHER

## 2020-09-03 RX ORDER — FERROUS SULFATE 325(65) MG
325 TABLET ORAL
Qty: 30 TABLET | Refills: 2 | Status: SHIPPED | OUTPATIENT
Start: 2020-09-03 | End: 2021-07-01

## 2020-09-03 RX ORDER — LEVOTHYROXINE SODIUM 0.07 MG/1
TABLET ORAL
Qty: 90 TABLET | Refills: 2 | Status: SHIPPED | OUTPATIENT
Start: 2020-09-03 | End: 2020-09-03 | Stop reason: SDUPTHER

## 2020-09-03 RX ORDER — LEVOTHYROXINE SODIUM 0.07 MG/1
TABLET ORAL
Qty: 90 TABLET | Refills: 2 | Status: SHIPPED | OUTPATIENT
Start: 2020-09-03 | End: 2021-07-19

## 2020-09-03 NOTE — PROGRESS NOTES
Your thyroid hormones are still too much. I am reducing your Synthroid to 75 mcg daily.  I want to keep a close eye on this and want to repeat your labs in 6 weeks.  Anemia is a bit worse.  I want you to follow a high iron diet. I am calling in an iron supplement to take once daily with breakfast. Try and take this with vitamin C such as orange juice to increase absorption. This can cause constipation and you can add miralax once daily if needed.

## 2020-09-04 RX ORDER — MONTELUKAST SODIUM 10 MG/1
10 TABLET ORAL NIGHTLY
Qty: 90 TABLET | Refills: 1 | Status: SHIPPED | OUTPATIENT
Start: 2020-09-04 | End: 2021-03-01

## 2020-09-04 NOTE — TELEPHONE ENCOUNTER
montelukast (SINGULAIR) 10 MG tablet    Send to:  [facility] - [place], [address] [phone] [facility] [phone] FX     Patient has 10 days left of medication.

## 2020-09-07 ENCOUNTER — APPOINTMENT (OUTPATIENT)
Dept: PREADMISSION TESTING | Facility: HOSPITAL | Age: 60
End: 2020-09-07

## 2020-09-07 PROCEDURE — C9803 HOPD COVID-19 SPEC COLLECT: HCPCS

## 2020-09-07 PROCEDURE — U0004 COV-19 TEST NON-CDC HGH THRU: HCPCS

## 2020-09-08 LAB — SARS-COV-2 RNA NOSE QL NAA+PROBE: NOT DETECTED

## 2020-09-10 ENCOUNTER — OUTSIDE FACILITY SERVICE (OUTPATIENT)
Dept: ORTHOPEDIC SURGERY | Facility: CLINIC | Age: 60
End: 2020-09-10

## 2020-09-10 ENCOUNTER — TELEPHONE (OUTPATIENT)
Dept: INTERNAL MEDICINE | Facility: CLINIC | Age: 60
End: 2020-09-10

## 2020-09-10 DIAGNOSIS — Z98.890 S/P SHOULDER SURGERY: Primary | ICD-10-CM

## 2020-09-10 PROCEDURE — 29825 SHO ARTHRS SRG LSS&RESCJ ADS: CPT | Performed by: ORTHOPAEDIC SURGERY

## 2020-09-10 RX ORDER — ONDANSETRON 4 MG/1
4 TABLET, FILM COATED ORAL EVERY 8 HOURS PRN
Qty: 10 TABLET | Refills: 1 | Status: SHIPPED | OUTPATIENT
Start: 2020-09-10 | End: 2020-09-10 | Stop reason: SDUPTHER

## 2020-09-10 RX ORDER — OXYCODONE HYDROCHLORIDE AND ACETAMINOPHEN 5; 325 MG/1; MG/1
1 TABLET ORAL EVERY 6 HOURS PRN
Qty: 20 TABLET | Refills: 0 | Status: SHIPPED | OUTPATIENT
Start: 2020-09-10 | End: 2020-09-10

## 2020-09-10 RX ORDER — ONDANSETRON 4 MG/1
4 TABLET, FILM COATED ORAL EVERY 8 HOURS PRN
Qty: 10 TABLET | Refills: 1 | Status: SHIPPED | OUTPATIENT
Start: 2020-09-10 | End: 2022-08-25

## 2020-09-10 RX ORDER — OXYCODONE HYDROCHLORIDE AND ACETAMINOPHEN 5; 325 MG/1; MG/1
1 TABLET ORAL EVERY 6 HOURS PRN
Qty: 20 TABLET | Refills: 0 | Status: SHIPPED | OUTPATIENT
Start: 2020-09-10 | End: 2020-10-14

## 2020-09-10 NOTE — TELEPHONE ENCOUNTER
PATIENT CALLED IN AND STATED SHE IS HAVING ISSUES WITH INSURANCE AND NEEDS A REFERRAL TO DR. JERRY SMITH FOR ORTHOPEDICS FOR FROZEN LEFT SHOULDER.     ALSO REQUESTED REFERRAL FOR PHYSICAL THERAPY AT [facility] IN [place]. NEEDS THIS TODAY IF POSSIBLE, DR. SMITH WANTS HER TO START PT IMMEDIATELY.      PLEASE CALL PATIENT AND ADVISE AT:[phone]

## 2020-09-11 DIAGNOSIS — G89.29 CHRONIC LEFT SHOULDER PAIN: ICD-10-CM

## 2020-09-11 DIAGNOSIS — M25.512 CHRONIC LEFT SHOULDER PAIN: ICD-10-CM

## 2020-09-11 DIAGNOSIS — M75.02 ADHESIVE CAPSULITIS OF LEFT SHOULDER: Primary | ICD-10-CM

## 2020-09-14 ENCOUNTER — OFFICE VISIT (OUTPATIENT)
Dept: ORTHOPEDIC SURGERY | Facility: CLINIC | Age: 60
End: 2020-09-14

## 2020-09-14 DIAGNOSIS — M75.02 ADHESIVE CAPSULITIS OF LEFT SHOULDER: ICD-10-CM

## 2020-09-14 DIAGNOSIS — Z98.890 S/P SHOULDER SURGERY: Primary | ICD-10-CM

## 2020-09-14 PROCEDURE — 99024 POSTOP FOLLOW-UP VISIT: CPT | Performed by: ORTHOPAEDIC SURGERY

## 2020-09-14 RX ORDER — NAPROXEN 500 MG/1
500 TABLET ORAL 2 TIMES DAILY
Qty: 180 TABLET | Refills: 3 | Status: SHIPPED | OUTPATIENT
Start: 2020-09-14 | End: 2020-12-02

## 2020-09-14 NOTE — PROGRESS NOTES
Chief Complaint   Patient presents with   • Post-op     4 days status post left shoulder arthroscopy with lysis of adhesions and manipulation under anesthesia  9/10/20           HPI    She is okay.  No new complaints.  She has been in physical therapy.  She goes to [facility] in [place].    There were no vitals filed for this visit.      Physical Exam:  She gets about 90 degrees flexion abduction.  She is very weak.          ICD-10-CM ICD-9-CM   1. S/P shoulder surgery  Z98.890 V45.89   2. Adhesive capsulitis of left shoulder  M75.02 726.0       Orders Placed This Encounter   Procedures   • Ambulatory Referral to Physical Therapy     She will discontinue the sling.  She will continue physical therapy.  She will follow-up in 4 weeks.

## 2020-10-14 ENCOUNTER — OFFICE VISIT (OUTPATIENT)
Dept: ORTHOPEDIC SURGERY | Facility: CLINIC | Age: 60
End: 2020-10-14

## 2020-10-14 DIAGNOSIS — M75.02 ADHESIVE CAPSULITIS OF LEFT SHOULDER: ICD-10-CM

## 2020-10-14 DIAGNOSIS — Z98.890 S/P SHOULDER SURGERY: Primary | ICD-10-CM

## 2020-10-14 PROCEDURE — 99024 POSTOP FOLLOW-UP VISIT: CPT | Performed by: ORTHOPAEDIC SURGERY

## 2020-10-14 NOTE — PROGRESS NOTES
Chief Complaint   Patient presents with   • Post-op     1 month follow up; 5 weeks status post left shoulder arthroscopy with lysis of adhesions and manipulation under anesthesia  9/10/20           HPI  She says she is doing better.  She is going slow.  [facility] [place].      There were no vitals filed for this visit.      Physical Exam:  She gets active forward flexion to above 90.  It is very slow.  Active abduction is difficult to get to 90 degrees actively but passively gets past that        ICD-10-CM ICD-9-CM   1. S/P shoulder surgery  Z98.890 V45.89   2. Adhesive capsulitis of left shoulder  M75.02 726.0       Orders Placed This Encounter   Procedures   • Ambulatory Referral to Physical Therapy     She is improving.  She is to continue physical therapy.  Follow-up in 1 month.  She is not working currently.  She is now working out.

## 2020-10-29 ENCOUNTER — OFFICE VISIT (OUTPATIENT)
Dept: INTERNAL MEDICINE | Facility: CLINIC | Age: 60
End: 2020-10-29

## 2020-10-29 VITALS
HEIGHT: 65 IN | BODY MASS INDEX: 25.33 KG/M2 | RESPIRATION RATE: 16 BRPM | TEMPERATURE: 96.9 F | SYSTOLIC BLOOD PRESSURE: 152 MMHG | HEART RATE: 73 BPM | DIASTOLIC BLOOD PRESSURE: 90 MMHG | WEIGHT: 152 LBS | OXYGEN SATURATION: 98 %

## 2020-10-29 DIAGNOSIS — H66.93 OTITIS OF BOTH EARS: ICD-10-CM

## 2020-10-29 DIAGNOSIS — H61.23 BILATERAL IMPACTED CERUMEN: Primary | ICD-10-CM

## 2020-10-29 DIAGNOSIS — H91.93 BILATERAL HEARING LOSS, UNSPECIFIED HEARING LOSS TYPE: ICD-10-CM

## 2020-10-29 PROCEDURE — 99213 OFFICE O/P EST LOW 20 MIN: CPT | Performed by: NURSE PRACTITIONER

## 2020-10-29 PROCEDURE — 69209 REMOVE IMPACTED EAR WAX UNI: CPT | Performed by: NURSE PRACTITIONER

## 2020-10-29 RX ORDER — BUPROPION HYDROCHLORIDE 150 MG/1
1 TABLET ORAL DAILY
COMMUNITY
Start: 2020-10-20 | End: 2021-12-21

## 2020-10-29 NOTE — PROGRESS NOTES
Subjective   Chief Complaint   Patient presents with   • Hearing Problem      Alisa Ames is a 60 y.o. female here today for ear pain and hearing problem. Patient's had gradual loss of hearing. She recently began experiencing ear pain and pressure bilaterally. She's had trouble with cerumen impaction before and ears may need to be irrigated. She has upcoming appt with ENT for hearing test and eval of hearing. No sick symptoms. No fever.     I have reviewed the following portions of the patient's history and confirmed they are accurate: allergies, current medications, past family history, past medical history, past social history, past surgical history and problem list    I have personally completed the patient's review of systems.    Review of Systems   Constitutional: Negative for activity change, appetite change, chills, diaphoresis, fatigue, fever, unexpected weight gain and unexpected weight loss.   HENT: Positive for ear pain and hearing loss. Negative for ear discharge, mouth sores, nosebleeds, sinus pressure, sneezing and sore throat.    Eyes: Negative for pain, discharge and itching.   Respiratory: Negative for cough, chest tightness, shortness of breath and wheezing.    Cardiovascular: Negative for chest pain, palpitations and leg swelling.   Gastrointestinal: Negative for abdominal pain, blood in stool, constipation, diarrhea, nausea and vomiting.   Endocrine: Negative for heat intolerance, polydipsia and polyphagia.   Genitourinary: Negative for dysuria, flank pain, frequency, hematuria and urgency.   Musculoskeletal: Positive for arthralgias, gait problem, joint swelling and myalgias. Negative for back pain, neck pain and neck stiffness.   Skin: Negative for color change, pallor and rash.   Allergic/Immunologic: Negative.  Negative for immunocompromised state.   Neurological: Negative for dizziness, seizures, speech difficulty, weakness, light-headedness, numbness and headache.   Hematological:  Negative for adenopathy.   Psychiatric/Behavioral: Positive for depressed mood. Negative for agitation, decreased concentration, dysphoric mood, sleep disturbance and suicidal ideas. The patient is nervous/anxious.        Current Outpatient Medications on File Prior to Visit   Medication Sig   • ARIPiprazole (ABILIFY) 15 MG tablet    • atorvastatin (LIPITOR) 10 MG tablet TAKE 1 TABLET DAILY   • buPROPion XL (WELLBUTRIN XL) 150 MG 24 hr tablet Take 1 tablet by mouth Daily.   • dicyclomine (BENTYL) 20 MG tablet Take 1/2-1 tablet up to four times daily as needed for diarrhea.   • ferrous sulfate 325 (65 FE) MG tablet Take 1 tablet by mouth Daily With Breakfast. Take with orange juice or vitamin C   • HYDROcodone-acetaminophen (NORCO) 7.5-325 MG per tablet Take 1 tablet by mouth 2 (two) times a day.   • levothyroxine (SYNTHROID, LEVOTHROID) 75 MCG tablet TAKE 1 TABLET DAILY, FASTING AND WAIT ONE HOUR FOR FOOD OR OTHER MEDICATIONS   • montelukast (SINGULAIR) 10 MG tablet Take 1 tablet by mouth Every Night.   • mupirocin (BACTROBAN) 2 % ointment    • naproxen (NAPROSYN) 500 MG tablet Take 1 tablet by mouth 2 (Two) Times a Day.   • omeprazole (priLOSEC) 20 MG capsule TAKE 1 CAPSULE DAILY   • ondansetron (Zofran) 4 MG tablet Take 1 tablet by mouth Every 8 (Eight) Hours As Needed for Nausea.   • rizatriptan (MAXALT) 10 MG tablet Take 1 tab po prn migraine. May repeat in 2 hours if needed, max 3 in 24 hr   • tiZANidine (ZANAFLEX) 4 MG tablet Take 1 tablet by mouth Every 8 (Eight) Hours As Needed for Muscle Spasms.   • Topiramate ER (Trokendi XR) 200 MG capsule sustained-release 24 hr Take 1 capsule by mouth Daily.   • Vortioxetine HBr (Trintellix) 20 MG tablet Take 20 mg by mouth Daily.     No current facility-administered medications on file prior to visit.        Objective   Vitals:    10/29/20 1040   BP: 152/90   Pulse: 73   Resp: 16   Temp: 96.9 °F (36.1 °C)   TempSrc: Temporal   SpO2: 98%   Weight: 68.9 kg (152 lb)  "  Height: 165.1 cm (65\")     Body mass index is 25.29 kg/m².    Physical Exam  Vitals signs reviewed.   Constitutional:       Appearance: Normal appearance. She is well-developed.   HENT:      Head: Normocephalic and atraumatic.      Right Ear: Decreased hearing noted. There is impacted cerumen.      Left Ear: Decreased hearing noted. There is impacted cerumen.      Ears:      Comments: Hearing improved after irrigation of left ear. Left TM and ear canal normal. Cerumen still impacted on right. No s/s of infection.      Nose: Nose normal.   Eyes:      General: Lids are normal.      Conjunctiva/sclera: Conjunctivae normal.      Pupils: Pupils are equal, round, and reactive to light.   Neck:      Thyroid: No thyromegaly.      Trachea: Trachea normal.   Pulmonary:      Effort: Pulmonary effort is normal. No respiratory distress.   Skin:     General: Skin is warm and dry.   Neurological:      Mental Status: She is alert and oriented to person, place, and time.      GCS: GCS eye subscore is 4. GCS verbal subscore is 5. GCS motor subscore is 6.   Psychiatric:         Attention and Perception: Attention normal.         Mood and Affect: Mood normal.         Speech: Speech normal.         Behavior: Behavior normal. Behavior is cooperative.         Ear Cerumen Removal    Date/Time: 10/29/2020 10:55 AM  Performed by: Shanae Hamilton APRN  Authorized by: Shanae Hamilton APRN   Consent: Verbal consent obtained.  Risks and benefits: risks, benefits and alternatives were discussed  Consent given by: patient  Patient understanding: patient states understanding of the procedure being performed    Anesthesia:  Local Anesthetic: none  Patient tolerance: patient tolerated the procedure well with no immediate complications  Comments: Large amount of cerumen removed from left ear. Moderate amount removed from right ear but unable to remove all. Will use debrox ear drops for 5 days and attempt to loosen and irrigate " with bulb syringe at home. If no improvement will return for follow up.             Assessment/Plan   Problem List Items Addressed This Visit     None      Visit Diagnoses     Bilateral impacted cerumen    -  Primary  Recurrent issue requiring procedure, medication management, and monitoring. Discussed and educated to not clean ears with any objects internally including qtips. Will eat a well-balanced diet, drink adequate amount of water, and get adequate rest. Will use debrox ear drops in right ear for 5 days and attempt to loosen and irrigate with bulb syringe at home. If no improvement will return for follow up.       Relevant Orders    Ear Cerumen Removal    Otitis of both ears      New issue requiring medication management and monitoring. Discussed and educated to not clean ears with any objects internally including qtips. Will use debrox ear drops in right ear for 5 days and attempt to loosen and irrigate with bulb syringe at home. If no improvement will return for follow up.         Bilateral hearing loss, unspecified hearing loss type    Chronic issue requiring medication management and monitoring. Discussed and educated to not clean ears with any objects internally including qtips.     Follow up with ENT for hearing test and eval.       Relevant Orders    Ear Cerumen Removal             Current Outpatient Medications:   •  ARIPiprazole (ABILIFY) 15 MG tablet, , Disp: , Rfl:   •  atorvastatin (LIPITOR) 10 MG tablet, TAKE 1 TABLET DAILY, Disp: 90 tablet, Rfl: 3  •  buPROPion XL (WELLBUTRIN XL) 150 MG 24 hr tablet, Take 1 tablet by mouth Daily., Disp: , Rfl:   •  dicyclomine (BENTYL) 20 MG tablet, Take 1/2-1 tablet up to four times daily as needed for diarrhea., Disp: 90 tablet, Rfl: 1  •  ferrous sulfate 325 (65 FE) MG tablet, Take 1 tablet by mouth Daily With Breakfast. Take with orange juice or vitamin C, Disp: 30 tablet, Rfl: 2  •  HYDROcodone-acetaminophen (NORCO) 7.5-325 MG per tablet, Take 1 tablet by  mouth 2 (two) times a day., Disp: , Rfl:   •  levothyroxine (SYNTHROID, LEVOTHROID) 75 MCG tablet, TAKE 1 TABLET DAILY, FASTING AND WAIT ONE HOUR FOR FOOD OR OTHER MEDICATIONS, Disp: 90 tablet, Rfl: 2  •  montelukast (SINGULAIR) 10 MG tablet, Take 1 tablet by mouth Every Night., Disp: 90 tablet, Rfl: 1  •  mupirocin (BACTROBAN) 2 % ointment, , Disp: , Rfl:   •  naproxen (NAPROSYN) 500 MG tablet, Take 1 tablet by mouth 2 (Two) Times a Day., Disp: 180 tablet, Rfl: 3  •  omeprazole (priLOSEC) 20 MG capsule, TAKE 1 CAPSULE DAILY, Disp: 30 capsule, Rfl: 11  •  ondansetron (Zofran) 4 MG tablet, Take 1 tablet by mouth Every 8 (Eight) Hours As Needed for Nausea., Disp: 10 tablet, Rfl: 1  •  rizatriptan (MAXALT) 10 MG tablet, Take 1 tab po prn migraine. May repeat in 2 hours if needed, max 3 in 24 hr, Disp: 27 tablet, Rfl: 1  •  tiZANidine (ZANAFLEX) 4 MG tablet, Take 1 tablet by mouth Every 8 (Eight) Hours As Needed for Muscle Spasms., Disp: 180 tablet, Rfl: 1  •  Topiramate ER (Trokendi XR) 200 MG capsule sustained-release 24 hr, Take 1 capsule by mouth Daily., Disp: 90 capsule, Rfl: 3  •  Vortioxetine HBr (Trintellix) 20 MG tablet, Take 20 mg by mouth Daily., Disp: 90 tablet, Rfl: 1       Plan of care reviewed with the patient at the conclusion of today's visit.  Education was provided regarding diagnosis, management, and any prescribed or recommended OTC medications.  Patient verbalized understanding of and agreement with management plan.     Return if symptoms worsen or fail to improve.      Shanae Torres, APRN    Please note that portions of this note were completed with a voice recognition program. Efforts were made to edit the dictations, but occasionally words are mistranscribed.

## 2020-11-09 ENCOUNTER — OFFICE VISIT (OUTPATIENT)
Dept: INTERNAL MEDICINE | Facility: CLINIC | Age: 60
End: 2020-11-09

## 2020-11-09 VITALS
DIASTOLIC BLOOD PRESSURE: 82 MMHG | HEIGHT: 65 IN | HEART RATE: 63 BPM | BODY MASS INDEX: 25.56 KG/M2 | WEIGHT: 153.4 LBS | OXYGEN SATURATION: 99 % | SYSTOLIC BLOOD PRESSURE: 134 MMHG | TEMPERATURE: 97.3 F

## 2020-11-09 DIAGNOSIS — N28.9 FUNCTION KIDNEY DECREASED: ICD-10-CM

## 2020-11-09 DIAGNOSIS — D50.8 IRON DEFICIENCY ANEMIA SECONDARY TO INADEQUATE DIETARY IRON INTAKE: ICD-10-CM

## 2020-11-09 DIAGNOSIS — E89.0 POST-OPERATIVE HYPOTHYROIDISM: Primary | ICD-10-CM

## 2020-11-09 PROCEDURE — 99214 OFFICE O/P EST MOD 30 MIN: CPT | Performed by: NURSE PRACTITIONER

## 2020-11-09 NOTE — PROGRESS NOTES
Subjective   Chief Complaint   Patient presents with   • Follow-up      Alisa Ames is a 60 y.o. female here today for hypothyroidism, anemia, and abnormal labs.  Thyroid medication was adjusted 2 months ago when she needs repeated labs.  Last set of labs showed some iron deficiency anemia and she has been eating diet high in iron and taking daily supplement.  Kidney function was decreased on last set of labs.  She has increased her water intake.  No shortness of breath or chest pain.    I have reviewed the following portions of the patient's history and confirmed they are accurate: allergies, current medications, past family history, past medical history, past social history, past surgical history and problem list    I have personally completed the patient's review of systems.    Review of Systems   Constitutional: Negative for activity change, appetite change, chills, diaphoresis, fatigue, fever, unexpected weight gain and unexpected weight loss.   HENT: Negative for ear discharge, ear pain, hearing loss, mouth sores, nosebleeds, sinus pressure, sneezing and sore throat.    Eyes: Negative for pain, discharge and itching.   Respiratory: Negative for cough, chest tightness, shortness of breath and wheezing.    Cardiovascular: Negative for chest pain, palpitations and leg swelling.   Gastrointestinal: Negative for abdominal pain, blood in stool, constipation, diarrhea, nausea and vomiting.   Endocrine: Negative for heat intolerance, polydipsia and polyphagia.   Genitourinary: Negative for dysuria, flank pain, frequency, hematuria and urgency.   Musculoskeletal: Positive for arthralgias, gait problem, joint swelling and myalgias. Negative for back pain, neck pain and neck stiffness.   Skin: Negative for color change, pallor and rash.   Allergic/Immunologic: Negative.  Negative for immunocompromised state.   Neurological: Negative for dizziness, seizures, speech difficulty, weakness, light-headedness, numbness and  headache.   Hematological: Negative for adenopathy.   Psychiatric/Behavioral: Positive for depressed mood. Negative for agitation, decreased concentration, dysphoric mood, sleep disturbance and suicidal ideas. The patient is nervous/anxious.        Current Outpatient Medications on File Prior to Visit   Medication Sig   • ARIPiprazole (ABILIFY) 15 MG tablet    • atorvastatin (LIPITOR) 10 MG tablet TAKE 1 TABLET DAILY   • buPROPion XL (WELLBUTRIN XL) 150 MG 24 hr tablet Take 1 tablet by mouth Daily.   • dicyclomine (BENTYL) 20 MG tablet Take 1/2-1 tablet up to four times daily as needed for diarrhea.   • ferrous sulfate 325 (65 FE) MG tablet Take 1 tablet by mouth Daily With Breakfast. Take with orange juice or vitamin C   • HYDROcodone-acetaminophen (NORCO) 7.5-325 MG per tablet Take 1 tablet by mouth 2 (two) times a day.   • levothyroxine (SYNTHROID, LEVOTHROID) 75 MCG tablet TAKE 1 TABLET DAILY, FASTING AND WAIT ONE HOUR FOR FOOD OR OTHER MEDICATIONS   • montelukast (SINGULAIR) 10 MG tablet Take 1 tablet by mouth Every Night.   • mupirocin (BACTROBAN) 2 % ointment    • naproxen (NAPROSYN) 500 MG tablet Take 1 tablet by mouth 2 (Two) Times a Day.   • omeprazole (priLOSEC) 20 MG capsule TAKE 1 CAPSULE DAILY   • ondansetron (Zofran) 4 MG tablet Take 1 tablet by mouth Every 8 (Eight) Hours As Needed for Nausea.   • rizatriptan (MAXALT) 10 MG tablet Take 1 tab po prn migraine. May repeat in 2 hours if needed, max 3 in 24 hr   • tiZANidine (ZANAFLEX) 4 MG tablet Take 1 tablet by mouth Every 8 (Eight) Hours As Needed for Muscle Spasms.   • Topiramate ER (Trokendi XR) 200 MG capsule sustained-release 24 hr Take 1 capsule by mouth Daily.   • Vortioxetine HBr (Trintellix) 20 MG tablet Take 20 mg by mouth Daily.     No current facility-administered medications on file prior to visit.        Objective   Vitals:    11/09/20 1341   BP: 134/82   BP Location: Left arm   Patient Position: Sitting   Cuff Size: Large Adult   Pulse:  "63   Temp: 97.3 °F (36.3 °C)   TempSrc: Temporal   SpO2: 99%   Weight: 69.6 kg (153 lb 6.4 oz)   Height: 165.1 cm (65\")     Body mass index is 25.53 kg/m².    Physical Exam  Vitals signs reviewed.   Constitutional:       Appearance: Normal appearance. She is well-developed.   HENT:      Head: Normocephalic and atraumatic.      Nose: Nose normal.   Eyes:      General: Lids are normal.      Conjunctiva/sclera: Conjunctivae normal.      Pupils: Pupils are equal, round, and reactive to light.   Neck:      Thyroid: No thyromegaly.      Trachea: Trachea normal.   Pulmonary:      Effort: Pulmonary effort is normal. No respiratory distress.   Skin:     General: Skin is warm and dry.   Neurological:      Mental Status: She is alert and oriented to person, place, and time.      GCS: GCS eye subscore is 4. GCS verbal subscore is 5. GCS motor subscore is 6.   Psychiatric:         Attention and Perception: Attention normal.         Mood and Affect: Mood normal.         Speech: Speech normal.         Behavior: Behavior normal. Behavior is cooperative.         Assessment/Plan   Problem List Items Addressed This Visit        Endocrine    Hypothyroidism (acquired) - Primary    Overview     Chronic issue unstable requiring medication management and monitoring. Will eat well balanced heart healthy diet with adequate iodine.  Educated to use iodized salt and increase fish intake.  Educated patient to take Synthroid on empty stomach 30 minutes before eating.  Repeat TSH today. Continue synthroid.             Hematopoietic and Hemostatic    Iron deficiency anemia secondary to inadequate dietary iron intake    Overview     Chronic unstable. Follow a high iron diet. Take iron supplement once daily with breakfast. Discussed taking this with vitamin C such as orange juice to increase absorption. Discussed this will make stool dark in color and can cause constipation. Suggested miralax once daily if constipation occurs.  Will contact office " if unable to tolerate supplement.           Relevant Orders    CBC (No Diff) (Completed)    Iron Profile (Completed)      Other Visit Diagnoses     Function kidney decreased     New issue requiring further work up and monitoring. Continue with drinking adequate amount of water. Repeat labs today.        Relevant Orders    Comprehensive Metabolic Panel (Completed)             Current Outpatient Medications:   •  ARIPiprazole (ABILIFY) 15 MG tablet, , Disp: , Rfl:   •  atorvastatin (LIPITOR) 10 MG tablet, TAKE 1 TABLET DAILY, Disp: 90 tablet, Rfl: 3  •  buPROPion XL (WELLBUTRIN XL) 150 MG 24 hr tablet, Take 1 tablet by mouth Daily., Disp: , Rfl:   •  dicyclomine (BENTYL) 20 MG tablet, Take 1/2-1 tablet up to four times daily as needed for diarrhea., Disp: 90 tablet, Rfl: 1  •  ferrous sulfate 325 (65 FE) MG tablet, Take 1 tablet by mouth Daily With Breakfast. Take with orange juice or vitamin C, Disp: 30 tablet, Rfl: 2  •  HYDROcodone-acetaminophen (NORCO) 7.5-325 MG per tablet, Take 1 tablet by mouth 2 (two) times a day., Disp: , Rfl:   •  levothyroxine (SYNTHROID, LEVOTHROID) 75 MCG tablet, TAKE 1 TABLET DAILY, FASTING AND WAIT ONE HOUR FOR FOOD OR OTHER MEDICATIONS, Disp: 90 tablet, Rfl: 2  •  montelukast (SINGULAIR) 10 MG tablet, Take 1 tablet by mouth Every Night., Disp: 90 tablet, Rfl: 1  •  mupirocin (BACTROBAN) 2 % ointment, , Disp: , Rfl:   •  naproxen (NAPROSYN) 500 MG tablet, Take 1 tablet by mouth 2 (Two) Times a Day., Disp: 180 tablet, Rfl: 3  •  omeprazole (priLOSEC) 20 MG capsule, TAKE 1 CAPSULE DAILY, Disp: 30 capsule, Rfl: 11  •  ondansetron (Zofran) 4 MG tablet, Take 1 tablet by mouth Every 8 (Eight) Hours As Needed for Nausea., Disp: 10 tablet, Rfl: 1  •  rizatriptan (MAXALT) 10 MG tablet, Take 1 tab po prn migraine. May repeat in 2 hours if needed, max 3 in 24 hr, Disp: 27 tablet, Rfl: 1  •  tiZANidine (ZANAFLEX) 4 MG tablet, Take 1 tablet by mouth Every 8 (Eight) Hours As Needed for Muscle  Spasms., Disp: 180 tablet, Rfl: 1  •  Topiramate ER (Trokendi XR) 200 MG capsule sustained-release 24 hr, Take 1 capsule by mouth Daily., Disp: 90 capsule, Rfl: 3  •  Vortioxetine HBr (Trintellix) 20 MG tablet, Take 20 mg by mouth Daily., Disp: 90 tablet, Rfl: 1       Plan of care reviewed with the patient at the conclusion of today's visit.  Education was provided regarding diagnosis, management, and any prescribed or recommended OTC medications.  Patient verbalized understanding of and agreement with management plan.     Return in about 2 months (around 1/9/2021), or if symptoms worsen or fail to improve.      Shanae Torres, GERSON    Please note that portions of this note were completed with a voice recognition program. Efforts were made to edit the dictations, but occasionally words are mistranscribed.

## 2020-11-12 ENCOUNTER — LAB (OUTPATIENT)
Dept: LAB | Facility: HOSPITAL | Age: 60
End: 2020-11-12

## 2020-11-12 LAB
DEPRECATED RDW RBC AUTO: 52.7 FL (ref 37–54)
ERYTHROCYTE [DISTWIDTH] IN BLOOD BY AUTOMATED COUNT: 15.4 % (ref 12.3–15.4)
HCT VFR BLD AUTO: 36.1 % (ref 34–46.6)
HGB BLD-MCNC: 12.2 G/DL (ref 12–15.9)
MCH RBC QN AUTO: 31.6 PG (ref 26.6–33)
MCHC RBC AUTO-ENTMCNC: 33.8 G/DL (ref 31.5–35.7)
MCV RBC AUTO: 93.5 FL (ref 79–97)
PLATELET # BLD AUTO: 264 10*3/MM3 (ref 140–450)
PMV BLD AUTO: 10.7 FL (ref 6–12)
RBC # BLD AUTO: 3.86 10*6/MM3 (ref 3.77–5.28)
WBC # BLD AUTO: 4.31 10*3/MM3 (ref 3.4–10.8)

## 2020-11-12 PROCEDURE — 84443 ASSAY THYROID STIM HORMONE: CPT | Performed by: NURSE PRACTITIONER

## 2020-11-12 PROCEDURE — 83540 ASSAY OF IRON: CPT | Performed by: NURSE PRACTITIONER

## 2020-11-12 PROCEDURE — 85027 COMPLETE CBC AUTOMATED: CPT | Performed by: NURSE PRACTITIONER

## 2020-11-12 PROCEDURE — 84466 ASSAY OF TRANSFERRIN: CPT | Performed by: NURSE PRACTITIONER

## 2020-11-12 PROCEDURE — 80053 COMPREHEN METABOLIC PANEL: CPT | Performed by: NURSE PRACTITIONER

## 2020-11-13 ENCOUNTER — TELEPHONE (OUTPATIENT)
Dept: INTERNAL MEDICINE | Facility: CLINIC | Age: 60
End: 2020-11-13

## 2020-11-13 LAB
ALBUMIN SERPL-MCNC: 4.6 G/DL (ref 3.5–5.2)
ALBUMIN/GLOB SERPL: 2.7 G/DL
ALP SERPL-CCNC: 81 U/L (ref 39–117)
ALT SERPL W P-5'-P-CCNC: 12 U/L (ref 1–33)
ANION GAP SERPL CALCULATED.3IONS-SCNC: 10.1 MMOL/L (ref 5–15)
AST SERPL-CCNC: 16 U/L (ref 1–32)
BILIRUB SERPL-MCNC: 0.2 MG/DL (ref 0–1.2)
BUN SERPL-MCNC: 18 MG/DL (ref 8–23)
BUN/CREAT SERPL: 16.7 (ref 7–25)
CALCIUM SPEC-SCNC: 9.1 MG/DL (ref 8.6–10.5)
CHLORIDE SERPL-SCNC: 105 MMOL/L (ref 98–107)
CO2 SERPL-SCNC: 21.9 MMOL/L (ref 22–29)
CREAT SERPL-MCNC: 1.08 MG/DL (ref 0.57–1)
GFR SERPL CREATININE-BSD FRML MDRD: 51 ML/MIN/1.73
GLOBULIN UR ELPH-MCNC: 1.7 GM/DL
GLUCOSE SERPL-MCNC: 79 MG/DL (ref 65–99)
IRON 24H UR-MRATE: 191 MCG/DL (ref 37–145)
IRON SATN MFR SERPL: 48 % (ref 20–50)
POTASSIUM SERPL-SCNC: 3.8 MMOL/L (ref 3.5–5.2)
PROT SERPL-MCNC: 6.3 G/DL (ref 6–8.5)
SODIUM SERPL-SCNC: 137 MMOL/L (ref 136–145)
TIBC SERPL-MCNC: 396 MCG/DL (ref 298–536)
TRANSFERRIN SERPL-MCNC: 266 MG/DL (ref 200–360)
TSH SERPL DL<=0.05 MIU/L-ACNC: 3.68 UIU/ML (ref 0.27–4.2)

## 2020-12-02 ENCOUNTER — OFFICE VISIT (OUTPATIENT)
Dept: ORTHOPEDIC SURGERY | Facility: CLINIC | Age: 60
End: 2020-12-02

## 2020-12-02 DIAGNOSIS — M75.02 ADHESIVE CAPSULITIS OF LEFT SHOULDER: ICD-10-CM

## 2020-12-02 DIAGNOSIS — Z98.890 S/P SHOULDER SURGERY: Primary | ICD-10-CM

## 2020-12-02 PROCEDURE — 99024 POSTOP FOLLOW-UP VISIT: CPT | Performed by: ORTHOPAEDIC SURGERY

## 2020-12-02 RX ORDER — DOXEPIN HYDROCHLORIDE 10 MG/1
CAPSULE ORAL
COMMUNITY
Start: 2020-11-18 | End: 2021-05-25
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
## 2020-12-09 ENCOUNTER — TELEPHONE (OUTPATIENT)
Dept: INTERNAL MEDICINE | Facility: CLINIC | Age: 60
End: 2020-12-09

## 2020-12-09 NOTE — TELEPHONE ENCOUNTER
PATIENT STATED SHE WOULD LIKE A REFERRAL FOR A DERMATOLOGIST.     PT STATED SHE GROWTH ON HEAD THINKS IT MIGHT BE SKIN CANCER.  PT STATED SHE WOULD LIKE TO SEE MESFIN RILEY IN [place]     PLEASE CONTACT PT IF APPT IS NEEDED FOR THE REFERRAL.      CALL BACK:  [phone]

## 2020-12-10 DIAGNOSIS — L98.9 SKIN LESION: Primary | ICD-10-CM

## 2020-12-11 DIAGNOSIS — F41.8 DEPRESSION WITH ANXIETY: Chronic | ICD-10-CM

## 2020-12-11 RX ORDER — VORTIOXETINE 20 MG/1
TABLET, FILM COATED ORAL
Qty: 90 TABLET | Refills: 3 | Status: SHIPPED | OUTPATIENT
Start: 2020-12-11 | End: 2021-01-05

## 2020-12-18 ENCOUNTER — TELEPHONE (OUTPATIENT)
Dept: INTERNAL MEDICINE | Facility: CLINIC | Age: 60
End: 2020-12-18

## 2020-12-18 NOTE — TELEPHONE ENCOUNTER
PATIENT RETURNED MISSED CALL REGARDING DERMATOLOGY REFERRAL.    PATIENT STATED SHE IS OK WITH THE REFERRED DOCTOR IN [place],    CALL BACK:  [phone]

## 2021-01-04 ENCOUNTER — OFFICE VISIT (OUTPATIENT)
Dept: ORTHOPEDIC SURGERY | Facility: CLINIC | Age: 61
End: 2021-01-04

## 2021-01-04 VITALS — WEIGHT: 151 LBS | HEART RATE: 76 BPM | BODY MASS INDEX: 25.16 KG/M2 | HEIGHT: 65 IN | OXYGEN SATURATION: 98 %

## 2021-01-04 DIAGNOSIS — Z98.890 S/P SHOULDER SURGERY: Primary | ICD-10-CM

## 2021-01-04 DIAGNOSIS — M75.02 ADHESIVE CAPSULITIS OF LEFT SHOULDER: ICD-10-CM

## 2021-01-04 DIAGNOSIS — M75.112 NONTRAUMATIC INCOMPLETE TEAR OF LEFT ROTATOR CUFF: ICD-10-CM

## 2021-01-04 PROCEDURE — 99212 OFFICE O/P EST SF 10 MIN: CPT | Performed by: ORTHOPAEDIC SURGERY

## 2021-01-04 RX ORDER — HYDROCODONE BITARTRATE AND ACETAMINOPHEN 7.5; 325 MG/1; MG/1
TABLET ORAL
COMMUNITY
Start: 2021-01-02 | End: 2021-11-05

## 2021-01-04 NOTE — PROGRESS NOTES
[facility] [place] Orthopaedic Surgery Clinic Note    Subjective     Chief Complaint   Patient presents with   • Follow-up     4 week follow up; 16 weeks status post left shoulder arthroscopy with lysis of adhesions and manipulation under anesthesia  9/10/20        HPI  Alisa Ames is a 60 y.o. female.  She is 4 months out from left shoulder lysis of adhesions.  She says she is doing better but pain is 6 out of 10    Past Medical History:   Diagnosis Date   • COPD (chronic obstructive pulmonary disease) (CMS/HCC)    • Fibromyalgia    • Hyperlipidemia    • Malignant neoplasm (CMS/HCC)    • Migraine    • Syncope    • Thyroid nodule    • Wears dentures     UPPER AND LOWER       Past Surgical History:   Procedure Laterality Date   • APPENDECTOMY     • CERVICAL SPINE SURGERY      Fibromyalgia and DDD with h/o C spine surgery- initially on flexeril bid.   • COLONOSCOPY  2015   • NECK SURGERY     • NOSE SURGERY      r/t Skin CA   • SHOULDER SURGERY Left 09/10/2020    left shoulder arthroscopy with lysis of adhesions and manipulation under anesthesia   • SKIN CANCER EXCISION     • THYROID LOBECTOMY Right     On discussion, pt reported a h/o right thyroid lobectomy for goiter.U/S demo surgical absence of right lobe and diffuse nodularity of the left lobe with a dominant nodule in the lower pole of 1.8 x 3.5 cm.   • TONSILLECTOMY     • TOTAL HIP ARTHROPLASTY Left 1/7/2019    Procedure: TOTAL HIP ARTHROPLASTY LEFT;  Surgeon: Allen Madera MD;  Location: [facility];  Service: Orthopedics   • TOTAL THYROIDECTOMY        Family History   Problem Relation Age of Onset   • Other Mother         malignant neoplasm   • Coronary artery disease Mother         MI (60's)   • Heart attack Mother    • Other Other         malignant neoplasm   • Valvular heart disease Father    • Birth defects Son      Social History     Socioeconomic History   • Marital status:      Spouse name: Not on file   • Number of children: Not on file   • Years of  education: Not on file   • Highest education level: Not on file   Occupational History   • Occupation: [employer]   Tobacco Use   • Smoking status: Former Smoker     Packs/day: 0.00     Years: 40.00     Pack years: 0.00     Types: Cigarettes   • Smokeless tobacco: Never Used   • Tobacco comment: QUIT SMOKING WITH E CIG-1 WEEK AGO    Substance and Sexual Activity   • Alcohol use: Yes     Alcohol/week: 1.0 standard drinks     Types: 1 Cans of beer per week     Comment: 1 drink per month   • Drug use: No   • Sexual activity: Yes     Partners: Male   Social History Narrative    Caffeine:  2-4 per day      Current Outpatient Medications on File Prior to Visit   Medication Sig Dispense Refill   • ARIPiprazole (ABILIFY) 15 MG tablet      • atorvastatin (LIPITOR) 10 MG tablet TAKE 1 TABLET DAILY 90 tablet 3   • buPROPion XL (WELLBUTRIN XL) 150 MG 24 hr tablet Take 1 tablet by mouth Daily.     • Diclofenac Sodium (VOLTAREN) 1 % gel gel      • dicyclomine (BENTYL) 20 MG tablet Take 1/2-1 tablet up to four times daily as needed for diarrhea. 90 tablet 1   • doxepin (SINEquan) 10 MG capsule      • ferrous sulfate 325 (65 FE) MG tablet Take 1 tablet by mouth Daily With Breakfast. Take with orange juice or vitamin C 30 tablet 2   • HYDROcodone-acetaminophen (NORCO) 7.5-325 MG per tablet      • levothyroxine (SYNTHROID, LEVOTHROID) 75 MCG tablet TAKE 1 TABLET DAILY, FASTING AND WAIT ONE HOUR FOR FOOD OR OTHER MEDICATIONS 90 tablet 2   • montelukast (SINGULAIR) 10 MG tablet Take 1 tablet by mouth Every Night. 90 tablet 1   • mupirocin (BACTROBAN) 2 % ointment      • omeprazole (priLOSEC) 20 MG capsule TAKE 1 CAPSULE DAILY 30 capsule 11   • ondansetron (Zofran) 4 MG tablet Take 1 tablet by mouth Every 8 (Eight) Hours As Needed for Nausea. 10 tablet 1   • rizatriptan (MAXALT) 10 MG tablet Take 1 tab po prn migraine. May repeat in 2 hours if needed, max 3 in 24 hr 27 tablet 1   • tiZANidine (ZANAFLEX) 4 MG tablet Take 1 tablet by  "mouth Every 8 (Eight) Hours As Needed for Muscle Spasms. 180 tablet 1   • Topiramate ER (Trokendi XR) 200 MG capsule sustained-release 24 hr Take 1 capsule by mouth Daily. 90 capsule 3   • Trintellix 20 MG tablet TAKE 1 TABLET DAILY 90 tablet 3     No current facility-administered medications on file prior to visit.       No Known Allergies     The following portions of the patient's history were reviewed and updated as appropriate: allergies, current medications, past family history, past medical history, past social history, past surgical history and problem list.    Review of Systems   Constitutional: Negative.    HENT: Negative.    Eyes: Negative.    Respiratory: Negative.    Cardiovascular: Negative.    Gastrointestinal: Negative.    Endocrine: Negative.    Genitourinary: Negative.    Musculoskeletal: Positive for arthralgias.   Skin: Negative.    Allergic/Immunologic: Negative.    Neurological: Negative.    Hematological: Negative.    Psychiatric/Behavioral: Negative.         Objective      Physical Exam  Pulse 76   Ht 165.1 cm (65\")   Wt 68.5 kg (151 lb)   SpO2 98%   BMI 25.13 kg/m²     Body mass index is 25.13 kg/m².    Left shoulder motion is improved.  Passively he has near full flexion abduction at least 160 but actively only about 120.    Imaging/Studies  Imaging Results (Last 7 Days)     ** No results found for the last 168 hours. **          Assessment/Plan        ICD-10-CM ICD-9-CM   1. S/P shoulder surgery  Z98.890 V45.89   2. Adhesive capsulitis of left shoulder  M75.02 726.0   3. Nontraumatic incomplete tear of left rotator cuff  M75.112 726.13       Orders Placed This Encounter   Procedures   • Ambulatory Referral to Physical Therapy      She will continue physical therapy and follow-up in 1 month.  If not better we will get an MRI.  Physical therapy wants to try dry needling.  She goes to [facility] physical therapy in [place].  Medical Decision Making  Management Options : over-the-counter " medicine and physical/occupational therapy    Carroll Smith MD  01/04/21  10:06 EST         EMR Dragon/Transcription disclaimer:  Much of this encounter note is an electronic transcription of spoken language to printed text. Electronic transcription of spoken language may permit erroneous, or at times, nonsensical words or phrases to be inadvertently transcribed. Although I have reviewed the note for such errors, some may still exist.

## 2021-01-05 ENCOUNTER — HOSPITAL ENCOUNTER (OUTPATIENT)
Dept: GENERAL RADIOLOGY | Facility: HOSPITAL | Age: 61
Discharge: HOME | End: 2021-01-05
Admitting: NURSE PRACTITIONER

## 2021-01-05 ENCOUNTER — OFFICE VISIT (OUTPATIENT)
Dept: INTERNAL MEDICINE | Facility: CLINIC | Age: 61
End: 2021-01-05

## 2021-01-05 VITALS
HEIGHT: 65 IN | RESPIRATION RATE: 16 BRPM | DIASTOLIC BLOOD PRESSURE: 82 MMHG | BODY MASS INDEX: 25.99 KG/M2 | TEMPERATURE: 97.4 F | OXYGEN SATURATION: 97 % | SYSTOLIC BLOOD PRESSURE: 128 MMHG | HEART RATE: 69 BPM | WEIGHT: 156 LBS

## 2021-01-05 DIAGNOSIS — M79.642 LEFT HAND PAIN: ICD-10-CM

## 2021-01-05 DIAGNOSIS — M25.532 PAIN AND SWELLING OF LEFT WRIST: ICD-10-CM

## 2021-01-05 DIAGNOSIS — M25.432 PAIN AND SWELLING OF LEFT WRIST: ICD-10-CM

## 2021-01-05 DIAGNOSIS — G89.29 CHRONIC LEFT SHOULDER PAIN: Primary | ICD-10-CM

## 2021-01-05 DIAGNOSIS — M79.89 SWELLING OF LEFT HAND: ICD-10-CM

## 2021-01-05 DIAGNOSIS — M25.512 CHRONIC LEFT SHOULDER PAIN: Primary | ICD-10-CM

## 2021-01-05 PROCEDURE — 73130 X-RAY EXAM OF HAND: CPT

## 2021-01-05 PROCEDURE — 73110 X-RAY EXAM OF WRIST: CPT

## 2021-01-05 PROCEDURE — 99214 OFFICE O/P EST MOD 30 MIN: CPT | Performed by: NURSE PRACTITIONER

## 2021-01-05 RX ORDER — GABAPENTIN 300 MG/1
300 CAPSULE ORAL 3 TIMES DAILY
Qty: 90 CAPSULE | Refills: 1 | Status: SHIPPED | OUTPATIENT
Start: 2021-01-05 | End: 2021-04-06 | Stop reason: SDUPTHER

## 2021-01-05 NOTE — PROGRESS NOTES
"Subjective   Chief Complaint   Patient presents with   • Wrist Pain     left \"I twisted it about 2 weeks ago\"      Alisa Ames is a 60 y.o. female here today for left wrist and left shoulder pain. Left wrist pain started about 2 weeks ago. She has been going to PT for left shoulder pain and therapist has been holding and twisting her wrist to work her shoulder. She is also doing some home exercises of pulling on a band with this hand that could also be causing discomfort. Symptoms have progressed over the past 2 weeks. Having swelling in wrist and fingers about one week ago. Wearing wrist brace that is not helping. Seeing Dr. Mccauley for temporary pain management of shoulder and taking hydrocodone that is not helping. She has nerve impingement in the shoulder with numbness and tingling radiating down her arm. This is keeping her awake at night. Has seen ortho multiple times and waiting on response to therapy.     I have reviewed the following portions of the patient's history and confirmed they are accurate: allergies, current medications, past family history, past medical history, past social history, past surgical history and problem list    I have personally completed the patient's review of systems.    Review of Systems   Constitutional: Negative for activity change, appetite change, chills, diaphoresis, fatigue, fever, unexpected weight gain and unexpected weight loss.   HENT: Negative for ear discharge, ear pain, hearing loss, mouth sores, nosebleeds, sinus pressure, sneezing and sore throat.    Eyes: Negative for pain, discharge and itching.   Respiratory: Negative for cough, chest tightness, shortness of breath and wheezing.    Cardiovascular: Negative for chest pain, palpitations and leg swelling.   Gastrointestinal: Negative for abdominal pain, blood in stool, constipation, diarrhea, nausea and vomiting.   Endocrine: Negative for heat intolerance, polydipsia and polyphagia.   Genitourinary: Negative for " dysuria, flank pain, frequency, hematuria and urgency.   Musculoskeletal: Positive for arthralgias, gait problem, joint swelling and myalgias. Negative for back pain, neck pain and neck stiffness.   Skin: Negative for color change, pallor and rash.   Allergic/Immunologic: Negative.  Negative for immunocompromised state.   Neurological: Negative for dizziness, seizures, speech difficulty, weakness, light-headedness, numbness and headache.   Hematological: Negative for adenopathy.   Psychiatric/Behavioral: Positive for depressed mood. Negative for agitation, decreased concentration, dysphoric mood, sleep disturbance and suicidal ideas. The patient is nervous/anxious.        Current Outpatient Medications on File Prior to Visit   Medication Sig   • ARIPiprazole (ABILIFY) 15 MG tablet    • atorvastatin (LIPITOR) 10 MG tablet TAKE 1 TABLET DAILY   • buPROPion XL (WELLBUTRIN XL) 150 MG 24 hr tablet Take 1 tablet by mouth Daily.   • Diclofenac Sodium (VOLTAREN) 1 % gel gel    • dicyclomine (BENTYL) 20 MG tablet Take 1/2-1 tablet up to four times daily as needed for diarrhea.   • doxepin (SINEquan) 10 MG capsule    • ferrous sulfate 325 (65 FE) MG tablet Take 1 tablet by mouth Daily With Breakfast. Take with orange juice or vitamin C   • HYDROcodone-acetaminophen (NORCO) 7.5-325 MG per tablet    • levothyroxine (SYNTHROID, LEVOTHROID) 75 MCG tablet TAKE 1 TABLET DAILY, FASTING AND WAIT ONE HOUR FOR FOOD OR OTHER MEDICATIONS   • montelukast (SINGULAIR) 10 MG tablet Take 1 tablet by mouth Every Night.   • mupirocin (BACTROBAN) 2 % ointment    • omeprazole (priLOSEC) 20 MG capsule TAKE 1 CAPSULE DAILY   • ondansetron (Zofran) 4 MG tablet Take 1 tablet by mouth Every 8 (Eight) Hours As Needed for Nausea.   • rizatriptan (MAXALT) 10 MG tablet Take 1 tab po prn migraine. May repeat in 2 hours if needed, max 3 in 24 hr   • tiZANidine (ZANAFLEX) 4 MG tablet Take 1 tablet by mouth Every 8 (Eight) Hours As Needed for Muscle Spasms.  "  • Topiramate ER (Trokendi XR) 200 MG capsule sustained-release 24 hr Take 1 capsule by mouth Daily.     No current facility-administered medications on file prior to visit.        Objective   Vitals:    01/05/21 0849   BP: 128/82   Pulse: 69   Resp: 16   Temp: 97.4 °F (36.3 °C)   TempSrc: Temporal   SpO2: 97%   Weight: 70.8 kg (156 lb)   Height: 165.1 cm (65\")   PainSc:   8   PainLoc: Wrist  Comment: LEFT     Body mass index is 25.96 kg/m².    Physical Exam  Vitals signs reviewed.   Constitutional:       Appearance: Normal appearance. She is well-developed.   HENT:      Head: Normocephalic and atraumatic.      Nose: Nose normal.   Eyes:      General: Lids are normal.      Conjunctiva/sclera: Conjunctivae normal.      Pupils: Pupils are equal, round, and reactive to light.   Neck:      Thyroid: No thyromegaly.      Trachea: Trachea normal.   Pulmonary:      Effort: Pulmonary effort is normal. No respiratory distress.   Musculoskeletal:      Left shoulder: She exhibits decreased range of motion, tenderness and pain.      Left wrist: She exhibits tenderness.      Left hand: She exhibits tenderness.   Skin:     General: Skin is warm and dry.   Neurological:      Mental Status: She is alert and oriented to person, place, and time.      GCS: GCS eye subscore is 4. GCS verbal subscore is 5. GCS motor subscore is 6.   Psychiatric:         Attention and Perception: Attention normal.         Mood and Affect: Mood normal.         Speech: Speech normal.         Behavior: Behavior normal. Behavior is cooperative.         Assessment/Plan   Problem List Items Addressed This Visit        Musculoskeletal and Injuries    Chronic left shoulder pain - Primary  Chronic issue unstable requiring medication management and monitoring. Will eat well balanced diet, increase water intake, increase physical activity as tolerated, and get adequate rest. Can use warmth or ice for discomfort in this area. Discussed stretching exercises.       " Relevant Medications    gabapentin (NEURONTIN) 300 MG capsule      Other Visit Diagnoses     Left hand pain      New onset issue unstable requiring further work up and monitoring. Will eat well balanced diet, increase water intake, increase physical activity as tolerated, and get adequate rest. Can use warmth or ice for discomfort in this area. Discussed resting joint and can use supportive wrap.        Relevant Orders    XR Hand 3+ View Left (Completed)    Pain and swelling of left wrist      New onset issue unstable requiring further work up and monitoring. Will eat well balanced diet, increase water intake, increase physical activity as tolerated, and get adequate rest. Can use warmth or ice for discomfort in this area. Discussed resting joint and can use supportive wrap.        Relevant Orders    XR Wrist 3+ View Left (Completed)    Swelling of left hand      New onset issue unstable requiring further work up and monitoring. Will eat well balanced diet, increase water intake, increase physical activity as tolerated, and get adequate rest. Can use warmth or ice for discomfort in this area. Discussed resting joint and can use supportive wrap.        Relevant Orders    XR Hand 3+ View Left (Completed)             Current Outpatient Medications:   •  ARIPiprazole (ABILIFY) 15 MG tablet, , Disp: , Rfl:   •  atorvastatin (LIPITOR) 10 MG tablet, TAKE 1 TABLET DAILY, Disp: 90 tablet, Rfl: 3  •  buPROPion XL (WELLBUTRIN XL) 150 MG 24 hr tablet, Take 1 tablet by mouth Daily., Disp: , Rfl:   •  Diclofenac Sodium (VOLTAREN) 1 % gel gel, , Disp: , Rfl:   •  dicyclomine (BENTYL) 20 MG tablet, Take 1/2-1 tablet up to four times daily as needed for diarrhea., Disp: 90 tablet, Rfl: 1  •  doxepin (SINEquan) 10 MG capsule, , Disp: , Rfl:   •  ferrous sulfate 325 (65 FE) MG tablet, Take 1 tablet by mouth Daily With Breakfast. Take with orange juice or vitamin C, Disp: 30 tablet, Rfl: 2  •  HYDROcodone-acetaminophen (NORCO) 7.5-325  MG per tablet, , Disp: , Rfl:   •  levothyroxine (SYNTHROID, LEVOTHROID) 75 MCG tablet, TAKE 1 TABLET DAILY, FASTING AND WAIT ONE HOUR FOR FOOD OR OTHER MEDICATIONS, Disp: 90 tablet, Rfl: 2  •  montelukast (SINGULAIR) 10 MG tablet, Take 1 tablet by mouth Every Night., Disp: 90 tablet, Rfl: 1  •  mupirocin (BACTROBAN) 2 % ointment, , Disp: , Rfl:   •  omeprazole (priLOSEC) 20 MG capsule, TAKE 1 CAPSULE DAILY, Disp: 30 capsule, Rfl: 11  •  ondansetron (Zofran) 4 MG tablet, Take 1 tablet by mouth Every 8 (Eight) Hours As Needed for Nausea., Disp: 10 tablet, Rfl: 1  •  rizatriptan (MAXALT) 10 MG tablet, Take 1 tab po prn migraine. May repeat in 2 hours if needed, max 3 in 24 hr, Disp: 27 tablet, Rfl: 1  •  tiZANidine (ZANAFLEX) 4 MG tablet, Take 1 tablet by mouth Every 8 (Eight) Hours As Needed for Muscle Spasms., Disp: 180 tablet, Rfl: 1  •  Topiramate ER (Trokendi XR) 200 MG capsule sustained-release 24 hr, Take 1 capsule by mouth Daily., Disp: 90 capsule, Rfl: 3  •  gabapentin (NEURONTIN) 300 MG capsule, Take 1 capsule by mouth 3 (Three) Times a Day., Disp: 90 capsule, Rfl: 1       Plan of care reviewed with the patient at the conclusion of today's visit.  Education was provided regarding diagnosis, management, and any prescribed or recommended OTC medications.  Patient verbalized understanding of and agreement with management plan.     Return in about 1 month (around 2/5/2021), or if symptoms worsen or fail to improve.      Shanae Torres, GERSON    Please note that portions of this note were completed with a voice recognition program. Efforts were made to edit the dictations, but occasionally words are mistranscribed.

## 2021-01-08 ENCOUNTER — TELEPHONE (OUTPATIENT)
Dept: INTERNAL MEDICINE | Facility: CLINIC | Age: 61
End: 2021-01-08

## 2021-01-08 NOTE — TELEPHONE ENCOUNTER
Patient is calling for results on an x-ray that they had done on 01/05/21.  Patient hasn't heard anything about them and would like someone to call and let them know what they are.. please advise      Alisa Ames call back [phone]

## 2021-01-12 ENCOUNTER — TELEPHONE (OUTPATIENT)
Dept: INTERNAL MEDICINE | Facility: CLINIC | Age: 61
End: 2021-01-12

## 2021-01-12 DIAGNOSIS — F41.8 DEPRESSION WITH ANXIETY: Primary | ICD-10-CM

## 2021-01-12 NOTE — TELEPHONE ENCOUNTER
PATIENT CALLED TO REQUEST A REFERRAL FOR ZAHEER HERRERA AT THE [facility].  PATIENT STATED THAT SHE IS OUT OF NETWORK.    PATIENT'S CONTACT [phone]     PLEASE ADVISE

## 2021-01-25 ENCOUNTER — TELEPHONE (OUTPATIENT)
Dept: INTERNAL MEDICINE | Facility: CLINIC | Age: 61
End: 2021-01-25

## 2021-01-25 ENCOUNTER — OFFICE VISIT (OUTPATIENT)
Dept: INTERNAL MEDICINE | Facility: CLINIC | Age: 61
End: 2021-01-25

## 2021-01-25 DIAGNOSIS — J01.10 ACUTE NON-RECURRENT FRONTAL SINUSITIS: Primary | ICD-10-CM

## 2021-01-25 DIAGNOSIS — R42 VERTIGO: ICD-10-CM

## 2021-01-25 PROCEDURE — 99443 PR PHYS/QHP TELEPHONE EVALUATION 21-30 MIN: CPT | Performed by: INTERNAL MEDICINE

## 2021-01-25 RX ORDER — FLUTICASONE PROPIONATE 50 MCG
2 SPRAY, SUSPENSION (ML) NASAL DAILY
Qty: 11.1 ML | Refills: 0 | Status: SHIPPED | OUTPATIENT
Start: 2021-01-25 | End: 2021-01-25

## 2021-01-25 RX ORDER — AMOXICILLIN AND CLAVULANATE POTASSIUM 875; 125 MG/1; MG/1
1 TABLET, FILM COATED ORAL 2 TIMES DAILY
Qty: 20 TABLET | Refills: 0 | Status: SHIPPED | OUTPATIENT
Start: 2021-01-25 | End: 2021-02-15

## 2021-01-25 RX ORDER — FLUTICASONE PROPIONATE 50 MCG
SPRAY, SUSPENSION (ML) NASAL
Qty: 48 G | Refills: 3 | Status: SHIPPED | OUTPATIENT
Start: 2021-01-25 | End: 2021-07-01

## 2021-01-25 NOTE — TELEPHONE ENCOUNTER
PT CALLED REQUESTING A SAME DAY TELEPHONE VISIT FOR A COUGH, EAR ACHE, AND VERTIGO    PT CALL BACK NUMBER: [phone]

## 2021-01-25 NOTE — PROGRESS NOTES
Telephone visit Note      Date: 2021  Patient Name: Alisa Ames  MRN: [id number]  : 1960    CC: sinus pressure    History of Present Illness: Alisa Ames is a 60 y.o. female who presents for sinus pressure.   Has rhinorrhea and frontal sinus pressure x 1 week. Had episode of vertigo yesterday. Has developed cough.     No fever.     Subjective      Review of Systems:   Pertinent review of systems per HPI.    Review of Systems   Constitutional: Negative for activity change, appetite change, chills, diaphoresis, fatigue, fever and unexpected weight change.   HENT: Positive for rhinorrhea and sinus pressure. Negative for congestion, dental problem, drooling, ear discharge, ear pain, facial swelling, hearing loss and mouth sores.    Eyes: Negative for pain, discharge and itching.   Respiratory: Negative for apnea, cough, choking, chest tightness and shortness of breath.    Cardiovascular: Negative for chest pain, palpitations and leg swelling.   Gastrointestinal: Negative for abdominal distention, abdominal pain, blood in stool, constipation and diarrhea.   Endocrine: Negative for cold intolerance, heat intolerance, polydipsia and polyuria.   Genitourinary: Negative for difficulty urinating, dysuria, frequency and hematuria.   Skin: Negative for color change, pallor, rash and wound.   Allergic/Immunologic: Negative for environmental allergies, food allergies and immunocompromised state.   Neurological: Positive for dizziness and light-headedness. Negative for weakness.   Psychiatric/Behavioral: Negative for agitation, behavioral problems, confusion, decreased concentration and self-injury. The patient is not nervous/anxious.    All other systems reviewed and are negative.    No Known Allergies    Objective     Physical Exam:  Vital Signs: There were no vitals filed for this visit.   There is no height or weight on file to calculate BMI.    Physical Exam  No PE due to telephone visit  Assessment /  Plan      Assessment & Plan:    1. Acute non-recurrent frontal sinusitis  2. vertigo  rx for augmentin bid x 10 days for sinusitis, likely this is the cause of her vertigo. rx for flonase for supportive treatment. F/u prn if sx do not improve.     You have chosen to receive care through a telephone visit. Do you consent to use a telephone visit for your medical care today? Yes  I spent 25 minutes on this encounter    Tea Robertson MD  01/25/2021     Please note that portions of this note may have been completed with a voice recognition program. Efforts were made to edit the dictations, but occasionally words are mistranscribed.

## 2021-01-28 ENCOUNTER — TELEPHONE (OUTPATIENT)
Dept: INTERNAL MEDICINE | Facility: CLINIC | Age: 61
End: 2021-01-28

## 2021-01-28 NOTE — TELEPHONE ENCOUNTER
PT IS REQUESTING A REFERRAL TO A SPECIALIST DUE TO ARTHRITIS IN HER LEFT HAND AND WRIST.  PT IS REQUESTING A CALL BACK WHEN REFERRAL IS COMPLETE.

## 2021-01-28 NOTE — TELEPHONE ENCOUNTER
She already is seeing an orthopedic specialist, Carroll bond. She can call and make appt with him.

## 2021-01-29 ENCOUNTER — TELEPHONE (OUTPATIENT)
Dept: INTERNAL MEDICINE | Facility: CLINIC | Age: 61
End: 2021-01-29

## 2021-01-29 RX ORDER — POLYMYXIN B SULFATE AND TRIMETHOPRIM 1; 10000 MG/ML; [USP'U]/ML
1 SOLUTION OPHTHALMIC 4 TIMES DAILY
Qty: 10 ML | Refills: 0 | Status: SHIPPED | OUTPATIENT
Start: 2021-01-29 | End: 2021-02-05

## 2021-01-29 NOTE — TELEPHONE ENCOUNTER
PT STATES SHE HAS DOUBLE VISION IN LEFT EYE, LEAKING FLUID, AND ITCHING. FLUID IS STICKY. PT STATES THAT SHE DOESN'T KNOW IF IT'S FROM THE SINUS INFECTION OR WHAT.    PT WOULD LIKE TO BE ADVISED. PT SATES THAT SHE IS ON DAY 4 OF ANTIBIOTICS    PLEASE ADVISE  [phone]

## 2021-01-29 NOTE — TELEPHONE ENCOUNTER
This is most likely due to the sinus infection. Augmentin is a good choice so continue and finish this. I will call in eye drop for her just to be safe. If symptoms do not improve follow up.

## 2021-01-29 NOTE — TELEPHONE ENCOUNTER
Does she mean eye discharge? Dr. Griffin saw her for sinus infection and started her on augmentin which should help an eye infection. I can call her in eye drops if she feels like eye is infected. I'm concerned about the double vision. Ask if her eye is red and if she has eye discharge. Thanks

## 2021-02-05 ENCOUNTER — OFFICE VISIT (OUTPATIENT)
Dept: ORTHOPEDIC SURGERY | Facility: CLINIC | Age: 61
End: 2021-02-05

## 2021-02-05 VITALS — HEART RATE: 83 BPM | HEIGHT: 65 IN | BODY MASS INDEX: 24.79 KG/M2 | WEIGHT: 148.8 LBS | OXYGEN SATURATION: 99 %

## 2021-02-05 DIAGNOSIS — M75.02 ADHESIVE CAPSULITIS OF LEFT SHOULDER: ICD-10-CM

## 2021-02-05 DIAGNOSIS — Z98.890 S/P SHOULDER SURGERY: Primary | ICD-10-CM

## 2021-02-05 DIAGNOSIS — M19.049 HAND ARTHRITIS: ICD-10-CM

## 2021-02-05 PROCEDURE — 99213 OFFICE O/P EST LOW 20 MIN: CPT | Performed by: ORTHOPAEDIC SURGERY

## 2021-02-05 NOTE — PROGRESS NOTES
"      [facility] Orthopaedic Surgery Clinic Note    Subjective     CC: Follow-up (4 week follow up; 5 months status post left shoulder arthroscopy with lysis of adhesions and manipulation under anesthesia  9/10/20)      HPI    Alisa Ames is a 60 y.o. female.  She says her shoulder is doing better.  Dry needling was helping.  She had to stop because she had sinusitis.  She has been told she has hand arthritis and would like to know what treatment would be best for that.  Physical therapy is helping.    Review of Systems   Constitutional: Negative.  Negative for chills, fatigue and fever.   HENT: Negative.  Negative for congestion and dental problem.    Eyes: Negative.  Negative for blurred vision.   Respiratory: Negative.  Negative for shortness of breath.    Cardiovascular: Negative.  Negative for leg swelling.   Gastrointestinal: Negative.  Negative for abdominal pain.   Endocrine: Negative.  Negative for polyuria.   Genitourinary: Negative.  Negative for difficulty urinating.   Musculoskeletal: Positive for arthralgias.   Skin: Negative.    Allergic/Immunologic: Negative.    Neurological: Negative.    Hematological: Negative.  Negative for adenopathy.   Psychiatric/Behavioral: Negative.  Negative for behavioral problems.       ROS:    Constiutional:Pt denies fever, chills, nausea, or vomiting.  MSK:as above      Objective      Past Medical History  Past Medical History:   Diagnosis Date   • COPD (chronic obstructive pulmonary disease) (CMS/HCC)    • Fibromyalgia    • Hyperlipidemia    • Malignant neoplasm (CMS/HCC)    • Migraine    • Syncope    • Thyroid nodule    • Wears dentures     UPPER AND LOWER          Physical Exam  Pulse 83   Ht 165.1 cm (65\")   Wt 67.5 kg (148 lb 12.8 oz)   SpO2 99%   BMI 24.76 kg/m²     Body mass index is 24.76 kg/m².    Patient is well nourished and well developed.        Ortho Exam  Left shoulder has improved for flexion abduction.  Left hand has multiple joints with swelling and " stiffness.  No redness.    Imaging/Labs/EMG Reviewed:  Imaging Results (Last 24 Hours)     ** No results found for the last 24 hours. **          Assessment:  1. S/P shoulder surgery    2. Adhesive capsulitis of left shoulder    3. Hand arthritis        Plan:  1. Recommend over the counter anti-inflammatories for pain and/or swelling  2. I will refer her to rheumatology regarding her hand arthritis.  She will continue physical therapy.  Follow-up in 1 month.  She is retired.    Follow Up:   No follow-ups on file.      Medical Decision Making  Management Options : Low - OT or PT Therapy         Carroll Smith M.D., FAAOS  Orthopedic Surgeon  Fellowship Trained Sports Medicine  [facility]  Orthopedics and Sports Medicine  [address], [address]  [place] [address]

## 2021-02-10 ENCOUNTER — TELEPHONE (OUTPATIENT)
Dept: INTERNAL MEDICINE | Facility: CLINIC | Age: 61
End: 2021-02-10

## 2021-02-10 NOTE — TELEPHONE ENCOUNTER
PT STATED THAT SHE HAD A TELEPHONE VISIT  DR CARMICHAEL ON 1-25-21 AND WAS PRESCRIBED AN ANTIBIOTIC AND EYE DROPS. PT STATED THAT SHE FINISHED BOTH AND IS STILL HAVING AN EAR ACHE AND REALLY BAD DOUBLE VISION. PT STATED THE DOUBLE VISION  IS  GETTING  BETTER BUT STILL IS HAPPENING. PT WOULD LIKE TO REQUEST ANOTHER ANTIBIOTIC OR SOMETHING THAT WILL HELP WITH HER SYMPTOMS.     CALL BACK:[phone]    [id number] [place] [address] [address] AT [facility] [address] - [phone] [facility] [phone]   [phone]

## 2021-02-10 NOTE — TELEPHONE ENCOUNTER
If sx have not improved she will need an in person office visit for physical exam.     Dr. Griffin

## 2021-02-15 ENCOUNTER — OFFICE VISIT (OUTPATIENT)
Dept: INTERNAL MEDICINE | Facility: CLINIC | Age: 61
End: 2021-02-15

## 2021-02-15 DIAGNOSIS — H53.2 DOUBLE VISION: ICD-10-CM

## 2021-02-15 DIAGNOSIS — J01.01 ACUTE RECURRENT MAXILLARY SINUSITIS: Primary | ICD-10-CM

## 2021-02-15 PROCEDURE — 99443 PR PHYS/QHP TELEPHONE EVALUATION 21-30 MIN: CPT | Performed by: INTERNAL MEDICINE

## 2021-02-15 RX ORDER — AMOXICILLIN AND CLAVULANATE POTASSIUM 875; 125 MG/1; MG/1
1 TABLET, FILM COATED ORAL 2 TIMES DAILY
Qty: 20 TABLET | Refills: 0 | Status: SHIPPED | OUTPATIENT
Start: 2021-02-15 | End: 2021-02-17 | Stop reason: SDUPTHER

## 2021-02-15 RX ORDER — AMOXICILLIN AND CLAVULANATE POTASSIUM 875; 125 MG/1; MG/1
1 TABLET, FILM COATED ORAL 2 TIMES DAILY
Qty: 20 TABLET | Refills: 0 | OUTPATIENT
Start: 2021-02-15

## 2021-02-15 NOTE — PROGRESS NOTES
"     Telephone visit Note      Date: 02/15/2021  Patient Name: Alisa Ames  MRN: [id number]  : 1960    CC: sinus congestion  History of Present Illness: Alisa Ames is a 60 y.o. female who presents for sinus congestion. Finished course of augmentin that was prescribed . Afterwards she had recurrent sinus pressure with hard/bloody mucous.     Still has double vision that has improved after abx for sinusitis.     Feels like she is walking around \"like a drunk\"  Subjective      Review of Systems:   Pertinent review of systems per HPI.    Review of Systems   Constitutional: Negative for activity change, appetite change, chills, diaphoresis, fatigue, fever and unexpected weight change.   HENT: Positive for sinus pressure. Negative for congestion, dental problem, drooling, ear discharge, ear pain, facial swelling, hearing loss and mouth sores.    Eyes: Negative for pain, discharge and itching.   Respiratory: Negative for apnea, cough, choking, chest tightness and shortness of breath.    Cardiovascular: Negative for chest pain, palpitations and leg swelling.   Gastrointestinal: Negative for abdominal distention, abdominal pain, blood in stool, constipation and diarrhea.   Endocrine: Negative for cold intolerance, heat intolerance, polydipsia and polyuria.   Genitourinary: Negative for difficulty urinating, dysuria, frequency and hematuria.   Skin: Negative for color change, pallor, rash and wound.   Allergic/Immunologic: Negative for environmental allergies, food allergies and immunocompromised state.   Neurological: Negative for dizziness, weakness and light-headedness.   Psychiatric/Behavioral: Negative for agitation, behavioral problems, confusion, decreased concentration and self-injury. The patient is not nervous/anxious.    All other systems reviewed and are negative.    No Known Allergies    Objective     Physical Exam:  Vital Signs: There were no vitals filed for this visit.   There is no height or " weight on file to calculate BMI.    Physical Exam    Assessment / Plan      Assessment & Plan:    1. Acute recurrent maxillary sinusitis  Repeat course of abx. Advised stop flonase and use nasal saline spray and humidifier at night.     2. Double vision- advised to get an eye exam.     You have chosen to receive care through a telephone visit. Do you consent to use a telephone visit for your medical care today? Yes  I spent 25minutes on this encounter.   Tea Griffin MD  02/15/2021     Please note that portions of this note may have been completed with a voice recognition program. Efforts were made to edit the dictations, but occasionally words are mistranscribed.

## 2021-02-15 NOTE — TELEPHONE ENCOUNTER
Caller: Alias Ames    Relationship: Self    Best call back number: [phone]  Medication needed:   Requested Prescriptions     Pending Prescriptions Disp Refills   • amoxicillin-clavulanate (Augmentin) 875-125 MG per tablet 20 tablet 0     Sig: Take 1 tablet by mouth 2 (Two) Times a Day.       When do you need the refill by:ASAP    What details did the patient provide when requesting the medication: CALLED INTO WRONG PHARMACY      Does the patient have less than a 3 day supply:  [] Yes  [] No    What is the patient's preferred pharmacy:      [id number] [address] AT [facility] & [address] - [phone] [facility] [phone] FX

## 2021-02-17 ENCOUNTER — DOCUMENTATION (OUTPATIENT)
Dept: INTERNAL MEDICINE | Facility: CLINIC | Age: 61
End: 2021-02-17

## 2021-02-17 ENCOUNTER — TELEPHONE (OUTPATIENT)
Dept: INTERNAL MEDICINE | Facility: CLINIC | Age: 61
End: 2021-02-17

## 2021-02-17 RX ORDER — AMOXICILLIN AND CLAVULANATE POTASSIUM 875; 125 MG/1; MG/1
1 TABLET, FILM COATED ORAL 2 TIMES DAILY
Qty: 20 TABLET | Refills: 0 | Status: SHIPPED | OUTPATIENT
Start: 2021-02-17 | End: 2021-03-09

## 2021-02-22 ENCOUNTER — TELEPHONE (OUTPATIENT)
Dept: INTERNAL MEDICINE | Facility: CLINIC | Age: 61
End: 2021-02-22

## 2021-02-22 DIAGNOSIS — M50.30 DDD (DEGENERATIVE DISC DISEASE), CERVICAL: ICD-10-CM

## 2021-02-22 RX ORDER — TIZANIDINE 4 MG/1
4 TABLET ORAL EVERY 8 HOURS PRN
Qty: 180 TABLET | Refills: 1 | Status: SHIPPED | OUTPATIENT
Start: 2021-02-22 | End: 2021-06-24 | Stop reason: SDUPTHER

## 2021-02-22 NOTE — TELEPHONE ENCOUNTER
Caller: Alisa Ames    Relationship: Self    Best call back number: [phone]    Medication needed:   Requested Prescriptions     Pending Prescriptions Disp Refills   • tiZANidine (ZANAFLEX) 4 MG tablet 180 tablet 1     Sig: Take 1 tablet by mouth Every 8 (Eight) Hours As Needed for Muscle Spasms.       Does the patient have less than a 3 day supply:  [] Yes  [x] No    What is the patient's preferred pharmacy: [facility] - [address] [phone] [facility] [phone]

## 2021-02-22 NOTE — TELEPHONE ENCOUNTER
PATIENT WOULD LIKE REFERRAL TO EYE DOCTOR FOR DOUBLE VISION, [facility] [phone].     WOULD ALSO LIKE A REFERRAL FOR BLUEGRASS EAR, NOSE & THROAT, LOSS OF HEARING.      WOULD ALSO LIKE A REFERRAL FOR RHEUMATOID ARTHRITIS.  HER CURRENT ORTHOSURGEON STATES HE CANNOT TREAT THIS.      PLEASE CALL [phone]

## 2021-02-24 DIAGNOSIS — K21.9 GASTROESOPHAGEAL REFLUX DISEASE WITHOUT ESOPHAGITIS: ICD-10-CM

## 2021-02-24 RX ORDER — OMEPRAZOLE 20 MG/1
20 CAPSULE, DELAYED RELEASE ORAL DAILY
Qty: 30 CAPSULE | Refills: 11 | Status: SHIPPED | OUTPATIENT
Start: 2021-02-24 | End: 2021-03-01 | Stop reason: SDUPTHER

## 2021-02-24 NOTE — TELEPHONE ENCOUNTER
PT CALLED TO REQUEST REFILL FOR RX   omeprazole (priLOSEC) 20 MG capsule.    PLEASE ADVISE.  CALL BACK:[phone]    [facility] [place] [place]  [address] [place] [address]

## 2021-02-24 NOTE — TELEPHONE ENCOUNTER
Patient needs appt for referrals so insurance and referring offices can see office note and need for these.

## 2021-02-26 ENCOUNTER — TELEPHONE (OUTPATIENT)
Dept: INTERNAL MEDICINE | Facility: CLINIC | Age: 61
End: 2021-02-26

## 2021-02-26 NOTE — TELEPHONE ENCOUNTER
PT IS REQUESTING A REFERRAL TO GET EARS HER CLEANED OUT.      DR.WILLIAM COHEN  [place], [place]  [phone]      PLEASE ADVISE  [phone]

## 2021-02-26 NOTE — TELEPHONE ENCOUNTER
Pt was advised yesterday that she needed an appt for her referral requests. Appt was made on 3/3/2021. Provider will discuss this at that time.

## 2021-03-01 DIAGNOSIS — K21.9 GASTROESOPHAGEAL REFLUX DISEASE WITHOUT ESOPHAGITIS: ICD-10-CM

## 2021-03-01 RX ORDER — MONTELUKAST SODIUM 10 MG/1
TABLET ORAL
Qty: 90 TABLET | Refills: 3 | Status: SHIPPED | OUTPATIENT
Start: 2021-03-01 | End: 2022-02-24

## 2021-03-01 RX ORDER — OMEPRAZOLE 20 MG/1
20 CAPSULE, DELAYED RELEASE ORAL DAILY
Qty: 30 CAPSULE | Refills: 11 | Status: SHIPPED | OUTPATIENT
Start: 2021-03-01 | End: 2022-03-08

## 2021-03-01 NOTE — TELEPHONE ENCOUNTER
PT CALLED TO REQUEST REFILL FOR RX   omeprazole (priLOSEC) 20 MG capsule     PLEASE ADVISE.  CALL BACK:[phone]      [facility] [place]  [address] [place] [address]

## 2021-03-03 ENCOUNTER — LAB (OUTPATIENT)
Dept: LAB | Facility: HOSPITAL | Age: 61
End: 2021-03-03

## 2021-03-03 ENCOUNTER — TELEPHONE (OUTPATIENT)
Dept: INTERNAL MEDICINE | Facility: CLINIC | Age: 61
End: 2021-03-03

## 2021-03-03 ENCOUNTER — OFFICE VISIT (OUTPATIENT)
Dept: INTERNAL MEDICINE | Facility: CLINIC | Age: 61
End: 2021-03-03

## 2021-03-03 VITALS
WEIGHT: 155 LBS | DIASTOLIC BLOOD PRESSURE: 80 MMHG | TEMPERATURE: 96.9 F | BODY MASS INDEX: 25.83 KG/M2 | HEART RATE: 76 BPM | RESPIRATION RATE: 16 BRPM | HEIGHT: 65 IN | SYSTOLIC BLOOD PRESSURE: 124 MMHG | OXYGEN SATURATION: 98 %

## 2021-03-03 DIAGNOSIS — M19.032 ARTHRITIS OF LEFT WRIST: ICD-10-CM

## 2021-03-03 DIAGNOSIS — R42 VERTIGO: ICD-10-CM

## 2021-03-03 DIAGNOSIS — E55.9 VITAMIN D DEFICIENCY: ICD-10-CM

## 2021-03-03 DIAGNOSIS — M25.50 ARTHRALGIA, UNSPECIFIED JOINT: ICD-10-CM

## 2021-03-03 DIAGNOSIS — N28.9 LOW KIDNEY FUNCTION: ICD-10-CM

## 2021-03-03 DIAGNOSIS — D50.8 IRON DEFICIENCY ANEMIA SECONDARY TO INADEQUATE DIETARY IRON INTAKE: ICD-10-CM

## 2021-03-03 DIAGNOSIS — E89.0 POST-OPERATIVE HYPOTHYROIDISM: Primary | ICD-10-CM

## 2021-03-03 DIAGNOSIS — R25.1 TREMORS OF NERVOUS SYSTEM: ICD-10-CM

## 2021-03-03 DIAGNOSIS — H91.93 BILATERAL HEARING LOSS, UNSPECIFIED HEARING LOSS TYPE: ICD-10-CM

## 2021-03-03 DIAGNOSIS — E53.9 VITAMIN B DEFICIENCY: ICD-10-CM

## 2021-03-03 DIAGNOSIS — H53.2 DOUBLE VISION: ICD-10-CM

## 2021-03-03 DIAGNOSIS — M19.042 ARTHRITIS OF LEFT HAND: ICD-10-CM

## 2021-03-03 DIAGNOSIS — H93.8X3 SENSATION OF FULLNESS IN BOTH EARS: ICD-10-CM

## 2021-03-03 LAB
25(OH)D3 SERPL-MCNC: 28.8 NG/ML
BASOPHILS # BLD AUTO: 0.06 10*3/MM3 (ref 0–0.2)
BASOPHILS NFR BLD AUTO: 1.1 % (ref 0–1.5)
CHROMATIN AB SERPL-ACNC: 18.8 IU/ML (ref 0–14)
CRP SERPL-MCNC: <0.3 MG/DL (ref 0–0.5)
DEPRECATED RDW RBC AUTO: 41.2 FL (ref 37–54)
EOSINOPHIL # BLD AUTO: 0.11 10*3/MM3 (ref 0–0.4)
EOSINOPHIL NFR BLD AUTO: 2.1 % (ref 0.3–6.2)
ERYTHROCYTE [DISTWIDTH] IN BLOOD BY AUTOMATED COUNT: 12 % (ref 12.3–15.4)
ERYTHROCYTE [SEDIMENTATION RATE] IN BLOOD: 38 MM/HR (ref 0–30)
FOLATE SERPL-MCNC: 8.89 NG/ML (ref 4.78–24.2)
HCT VFR BLD AUTO: 39.9 % (ref 34–46.6)
HGB BLD-MCNC: 13.6 G/DL (ref 12–15.9)
IMM GRANULOCYTES # BLD AUTO: 0.01 10*3/MM3 (ref 0–0.05)
IMM GRANULOCYTES NFR BLD AUTO: 0.2 % (ref 0–0.5)
LYMPHOCYTES # BLD AUTO: 2.16 10*3/MM3 (ref 0.7–3.1)
LYMPHOCYTES NFR BLD AUTO: 40.8 % (ref 19.6–45.3)
MCH RBC QN AUTO: 32.4 PG (ref 26.6–33)
MCHC RBC AUTO-ENTMCNC: 34.1 G/DL (ref 31.5–35.7)
MCV RBC AUTO: 95 FL (ref 79–97)
MONOCYTES # BLD AUTO: 0.5 10*3/MM3 (ref 0.1–0.9)
MONOCYTES NFR BLD AUTO: 9.4 % (ref 5–12)
NEUTROPHILS NFR BLD AUTO: 2.46 10*3/MM3 (ref 1.7–7)
NEUTROPHILS NFR BLD AUTO: 46.4 % (ref 42.7–76)
NRBC BLD AUTO-RTO: 0 /100 WBC (ref 0–0.2)
PLATELET # BLD AUTO: 332 10*3/MM3 (ref 140–450)
PMV BLD AUTO: 11 FL (ref 6–12)
RBC # BLD AUTO: 4.2 10*6/MM3 (ref 3.77–5.28)
TSH SERPL DL<=0.05 MIU/L-ACNC: 3.72 UIU/ML (ref 0.27–4.2)
VIT B12 BLD-MCNC: 346 PG/ML (ref 211–946)
WBC # BLD AUTO: 5.3 10*3/MM3 (ref 3.4–10.8)

## 2021-03-03 PROCEDURE — 82746 ASSAY OF FOLIC ACID SERUM: CPT | Performed by: NURSE PRACTITIONER

## 2021-03-03 PROCEDURE — 86431 RHEUMATOID FACTOR QUANT: CPT | Performed by: NURSE PRACTITIONER

## 2021-03-03 PROCEDURE — 84443 ASSAY THYROID STIM HORMONE: CPT | Performed by: NURSE PRACTITIONER

## 2021-03-03 PROCEDURE — 86140 C-REACTIVE PROTEIN: CPT | Performed by: NURSE PRACTITIONER

## 2021-03-03 PROCEDURE — 82306 VITAMIN D 25 HYDROXY: CPT | Performed by: NURSE PRACTITIONER

## 2021-03-03 PROCEDURE — 85652 RBC SED RATE AUTOMATED: CPT | Performed by: NURSE PRACTITIONER

## 2021-03-03 PROCEDURE — 85025 COMPLETE CBC W/AUTO DIFF WBC: CPT | Performed by: NURSE PRACTITIONER

## 2021-03-03 PROCEDURE — 99214 OFFICE O/P EST MOD 30 MIN: CPT | Performed by: NURSE PRACTITIONER

## 2021-03-03 PROCEDURE — 82607 VITAMIN B-12: CPT | Performed by: NURSE PRACTITIONER

## 2021-03-03 PROCEDURE — 86038 ANTINUCLEAR ANTIBODIES: CPT | Performed by: NURSE PRACTITIONER

## 2021-03-03 NOTE — PROGRESS NOTES
"Subjective   Chief Complaint   Patient presents with   • Ear Fullness     \"I was wanting to get a referral to ENT Dr. Smith, I had my ears cleaned a month ago and theyre already full again\"   • Eye Problem     \"I'd like a referral to an eye doctor\"   • Arthritis     referral to rheumatology      Alisa Ames is a 60 y.o. female here today for ***.    Ear referral cleaning and hearing test    douple vision -opthal     Arthritis for [id number]    I have reviewed the following portions of the patient's history and confirmed they are accurate: allergies, current medications, past family history, past medical history, past social history, past surgical history and problem list    I have personally completed the patient's review of systems.    Review of Systems    Current Outpatient Medications on File Prior to Visit   Medication Sig   • ARIPiprazole (ABILIFY) 15 MG tablet    • atorvastatin (LIPITOR) 10 MG tablet TAKE 1 TABLET DAILY   • buPROPion XL (WELLBUTRIN XL) 150 MG 24 hr tablet Take 1 tablet by mouth Daily.   • Diclofenac Sodium (VOLTAREN) 1 % gel gel    • dicyclomine (BENTYL) 20 MG tablet Take 1/2-1 tablet up to four times daily as needed for diarrhea.   • doxepin (SINEquan) 10 MG capsule    • ferrous sulfate 325 (65 FE) MG tablet Take 1 tablet by mouth Daily With Breakfast. Take with orange juice or vitamin C   • fluticasone (FLONASE) 50 MCG/ACT nasal spray USE 2 SPRAYS IN EACH NOSTRIL ONCE A DAY AS DIRECTED   • gabapentin (NEURONTIN) 300 MG capsule Take 1 capsule by mouth 3 (Three) Times a Day.   • HYDROcodone-acetaminophen (NORCO) 7.5-325 MG per tablet    • levothyroxine (SYNTHROID, LEVOTHROID) 75 MCG tablet TAKE 1 TABLET DAILY, FASTING AND WAIT ONE HOUR FOR FOOD OR OTHER MEDICATIONS   • montelukast (SINGULAIR) 10 MG tablet TAKE 1 TABLET EVERY NIGHT   • mupirocin (BACTROBAN) 2 % ointment    • omeprazole (priLOSEC) 20 MG capsule Take 1 capsule by mouth Daily.   • ondansetron (Zofran) 4 MG tablet Take 1 " "tablet by mouth Every 8 (Eight) Hours As Needed for Nausea.   • rizatriptan (MAXALT) 10 MG tablet Take 1 tab po prn migraine. May repeat in 2 hours if needed, max 3 in 24 hr   • tiZANidine (ZANAFLEX) 4 MG tablet Take 1 tablet by mouth Every 8 (Eight) Hours As Needed for Muscle Spasms.   • Topiramate ER (Trokendi XR) 200 MG capsule sustained-release 24 hr Take 1 capsule by mouth Daily.   • amoxicillin-clavulanate (Augmentin) 875-125 MG per tablet Take 1 tablet by mouth 2 (Two) Times a Day.     No current facility-administered medications on file prior to visit.        Objective   Vitals:    03/03/21 1216   BP: 124/80   Pulse: 76   Resp: 16   Temp: 96.9 °F (36.1 °C)   TempSrc: Temporal   SpO2: 98%   Weight: 70.3 kg (155 lb)   Height: 165.1 cm (65\")     Body mass index is 25.79 kg/m².    Physical Exam    Assessment/Plan   Problem List Items Addressed This Visit     None             Current Outpatient Medications:   •  ARIPiprazole (ABILIFY) 15 MG tablet, , Disp: , Rfl:   •  atorvastatin (LIPITOR) 10 MG tablet, TAKE 1 TABLET DAILY, Disp: 90 tablet, Rfl: 3  •  buPROPion XL (WELLBUTRIN XL) 150 MG 24 hr tablet, Take 1 tablet by mouth Daily., Disp: , Rfl:   •  Diclofenac Sodium (VOLTAREN) 1 % gel gel, , Disp: , Rfl:   •  dicyclomine (BENTYL) 20 MG tablet, Take 1/2-1 tablet up to four times daily as needed for diarrhea., Disp: 90 tablet, Rfl: 1  •  doxepin (SINEquan) 10 MG capsule, , Disp: , Rfl:   •  ferrous sulfate 325 (65 FE) MG tablet, Take 1 tablet by mouth Daily With Breakfast. Take with orange juice or vitamin C, Disp: 30 tablet, Rfl: 2  •  fluticasone (FLONASE) 50 MCG/ACT nasal spray, USE 2 SPRAYS IN EACH NOSTRIL ONCE A DAY AS DIRECTED, Disp: 48 g, Rfl: 3  •  gabapentin (NEURONTIN) 300 MG capsule, Take 1 capsule by mouth 3 (Three) Times a Day., Disp: 90 capsule, Rfl: 1  •  HYDROcodone-acetaminophen (NORCO) 7.5-325 MG per tablet, , Disp: , Rfl:   •  levothyroxine (SYNTHROID, LEVOTHROID) 75 MCG tablet, TAKE 1 TABLET " DAILY, FASTING AND WAIT ONE HOUR FOR FOOD OR OTHER MEDICATIONS, Disp: 90 tablet, Rfl: 2  •  montelukast (SINGULAIR) 10 MG tablet, TAKE 1 TABLET EVERY NIGHT, Disp: 90 tablet, Rfl: 3  •  mupirocin (BACTROBAN) 2 % ointment, , Disp: , Rfl:   •  omeprazole (priLOSEC) 20 MG capsule, Take 1 capsule by mouth Daily., Disp: 30 capsule, Rfl: 11  •  ondansetron (Zofran) 4 MG tablet, Take 1 tablet by mouth Every 8 (Eight) Hours As Needed for Nausea., Disp: 10 tablet, Rfl: 1  •  rizatriptan (MAXALT) 10 MG tablet, Take 1 tab po prn migraine. May repeat in 2 hours if needed, max 3 in 24 hr, Disp: 27 tablet, Rfl: 1  •  tiZANidine (ZANAFLEX) 4 MG tablet, Take 1 tablet by mouth Every 8 (Eight) Hours As Needed for Muscle Spasms., Disp: 180 tablet, Rfl: 1  •  Topiramate ER (Trokendi XR) 200 MG capsule sustained-release 24 hr, Take 1 capsule by mouth Daily., Disp: 90 capsule, Rfl: 3  •  amoxicillin-clavulanate (Augmentin) 875-125 MG per tablet, Take 1 tablet by mouth 2 (Two) Times a Day., Disp: 20 tablet, Rfl: 0       Plan of care reviewed with the patient at the conclusion of today's visit.  Education was provided regarding diagnosis, management, and any prescribed or recommended OTC medications.  Patient verbalized understanding of and agreement with management plan.     No follow-ups on file.      Shanae Torres, [place]    Please note that portions of this note were completed with a voice recognition program. Efforts were made to edit the dictations, but occasionally words are mistranscribed.

## 2021-03-03 NOTE — TELEPHONE ENCOUNTER
PATIENT WOULD LIKE TO REFERRED TO [facility] DR. JOVEL IN [facility].       CALL BACK NUMBER: [phone]
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
## 2021-03-04 ENCOUNTER — TELEPHONE (OUTPATIENT)
Dept: INTERNAL MEDICINE | Facility: CLINIC | Age: 61
End: 2021-03-04

## 2021-03-05 ENCOUNTER — OFFICE VISIT (OUTPATIENT)
Dept: ORTHOPEDIC SURGERY | Facility: CLINIC | Age: 61
End: 2021-03-05

## 2021-03-05 VITALS — HEIGHT: 65 IN | BODY MASS INDEX: 25.82 KG/M2 | OXYGEN SATURATION: 99 % | HEART RATE: 84 BPM | WEIGHT: 154.98 LBS

## 2021-03-05 DIAGNOSIS — M75.02 ADHESIVE CAPSULITIS OF LEFT SHOULDER: ICD-10-CM

## 2021-03-05 DIAGNOSIS — M19.049 HAND ARTHRITIS: ICD-10-CM

## 2021-03-05 DIAGNOSIS — Z98.890 S/P SHOULDER SURGERY: Primary | ICD-10-CM

## 2021-03-05 LAB — ANA SER QL: NEGATIVE

## 2021-03-05 PROCEDURE — 99213 OFFICE O/P EST LOW 20 MIN: CPT | Performed by: ORTHOPAEDIC SURGERY

## 2021-03-05 NOTE — PROGRESS NOTES
"      [facility] Orthopaedic Surgery Clinic Note    Subjective     CC: Follow-up of the Left Shoulder (1 month f/u, 6 months status post left shoulder arthroscopy with lysis of adhesions and manipulation under anesthesia  9/10/20))      NED Ames is a 60 y.o. female.  She goes to [facility] [place].  Shoulder doing better.  She has better motion.  She has not yet seen rheumatologist.  She has some hand shaking.  She might want a referral for that.  Review of Systems   Constitutional: Negative.  Negative for chills, fatigue and fever.   HENT: Positive for hearing loss. Negative for congestion and dental problem.    Eyes: Positive for visual disturbance. Negative for blurred vision.   Respiratory: Negative.  Negative for shortness of breath.    Cardiovascular: Negative.  Negative for leg swelling.   Gastrointestinal: Negative.  Negative for abdominal pain.   Endocrine: Negative.  Negative for polyuria.   Genitourinary: Negative.  Negative for difficulty urinating.   Musculoskeletal: Positive for arthralgias.   Skin: Negative.    Allergic/Immunologic: Negative.    Neurological: Negative.    Hematological: Negative.  Negative for adenopathy.   Psychiatric/Behavioral: Negative.  Negative for behavioral problems.       ROS:    Constiutional:Pt denies fever, chills, nausea, or vomiting.  MSK:as above      Objective      Past Medical History  Past Medical History:   Diagnosis Date   • COPD (chronic obstructive pulmonary disease) (CMS/HCC)    • Fibromyalgia    • Hyperlipidemia    • Malignant neoplasm (CMS/HCC)    • Migraine    • Syncope    • Thyroid nodule    • Wears dentures     UPPER AND LOWER          Physical Exam  Pulse 84   Ht 165.1 cm (65\")   Wt 70.3 kg (154 lb 15.7 oz)   SpO2 99%   BMI 25.79 kg/m²     Body mass index is 25.79 kg/m².    Patient is well nourished and well developed.        Ortho Exam  Shoulder has improved motion and improved strength.  She has a slight tremor in her left " hand.    Imaging/Labs/EMG Reviewed:  Imaging Results (Last 24 Hours)     ** No results found for the last 24 hours. **          Assessment:  1. S/P shoulder surgery    2. Adhesive capsulitis of left shoulder    3. Hand arthritis        Plan:  1. Recommend over the counter anti-inflammatories for pain and/or swelling  2. She will see a rheumatologist I recommend that she had a referral to neurology for work-up of her hand tremor.    Follow Up:   Return in about 1 month (around 4/5/2021).      Medical Decision Making  Management Options : Low - OTC Drugs and OT or PT Therapy         Carroll Smith M.D., FAAOS  Orthopedic Surgeon  Fellowship Trained Sports Medicine  [facility]  Orthopedics and Sports Medicine  [address], [address]  [place] [address]

## 2021-03-09 ENCOUNTER — TELEPHONE (OUTPATIENT)
Dept: INTERNAL MEDICINE | Facility: CLINIC | Age: 61
End: 2021-03-09

## 2021-03-09 DIAGNOSIS — R70.0 ELEVATED SED RATE: ICD-10-CM

## 2021-03-09 DIAGNOSIS — R76.8 RHEUMATOID FACTOR POSITIVE: ICD-10-CM

## 2021-03-09 DIAGNOSIS — M25.50 ARTHRALGIA, UNSPECIFIED JOINT: Primary | ICD-10-CM

## 2021-03-09 NOTE — PROGRESS NOTES
Your labs show one of your inflammatory markers is elevated and your rheumatoid factor is elevated meaning you could possibly have rheumatoid arthritis. I put in your ortho referral with the providers requested but I'm also putting in a referral to rheumatology for consult on your labs. Your vitamin D is still low so make sure to be taking an over the counter multi-vitamin and vitamin D supplement of 2,000 IU daily. I received your note about the tremors and noticed this in your appt. With tremors, double vision, and vertigo I have ordered an MRI of your brain. You will get a call with appt for this.

## 2021-03-09 NOTE — TELEPHONE ENCOUNTER
Caller: Alisa Ames    Relationship: Self    Best call back number: [phone]    Caller requesting test results: yes    What test was performed: labs    When was the test performed: a week ago     Where was the test performed: in office     Additional notes: patient recived a phone call to schedule a MRI- patient wants to know what her lab work said for her to need a MRI

## 2021-03-23 ENCOUNTER — TELEPHONE (OUTPATIENT)
Dept: INTERNAL MEDICINE | Facility: CLINIC | Age: 61
End: 2021-03-23

## 2021-03-23 NOTE — TELEPHONE ENCOUNTER
Caller: Alisa Ames    Relationship: Self    Best call back number: [phone]    What is the medical concern/diagnosis: ARTHRITIS IN LT HAND    Any additional details: ALISA NEEDS THE REFERRAL TO THE HAND SPECIALIST SENT TO HER INSURANCE COMPANY.  FAX # [phone].

## 2021-03-23 NOTE — TELEPHONE ENCOUNTER
Referral ordered on 3/9. I guess she is just needing a copy of this. Just print her a copy and she can .

## 2021-03-30 ENCOUNTER — HOSPITAL ENCOUNTER (OUTPATIENT)
Dept: MRI IMAGING | Facility: HOSPITAL | Age: 61
Discharge: HOME | End: 2021-03-30
Admitting: NURSE PRACTITIONER

## 2021-03-30 DIAGNOSIS — R25.1 TREMORS OF NERVOUS SYSTEM: ICD-10-CM

## 2021-03-30 DIAGNOSIS — R42 VERTIGO: ICD-10-CM

## 2021-03-30 DIAGNOSIS — H53.2 DOUBLE VISION: ICD-10-CM

## 2021-03-30 PROCEDURE — 70551 MRI BRAIN STEM W/O DYE: CPT

## 2021-04-05 ENCOUNTER — OFFICE VISIT (OUTPATIENT)
Dept: ORTHOPEDIC SURGERY | Facility: CLINIC | Age: 61
End: 2021-04-05

## 2021-04-05 VITALS
SYSTOLIC BLOOD PRESSURE: 148 MMHG | BODY MASS INDEX: 25.43 KG/M2 | DIASTOLIC BLOOD PRESSURE: 70 MMHG | HEIGHT: 65 IN | WEIGHT: 152.6 LBS

## 2021-04-05 DIAGNOSIS — Z98.890 S/P SHOULDER SURGERY: Primary | ICD-10-CM

## 2021-04-05 DIAGNOSIS — M19.049 HAND ARTHRITIS: ICD-10-CM

## 2021-04-05 DIAGNOSIS — M75.02 ADHESIVE CAPSULITIS OF LEFT SHOULDER: ICD-10-CM

## 2021-04-05 PROCEDURE — 99213 OFFICE O/P EST LOW 20 MIN: CPT | Performed by: ORTHOPAEDIC SURGERY

## 2021-04-05 RX ORDER — VORTIOXETINE 20 MG/1
TABLET, FILM COATED ORAL
COMMUNITY
Start: 2021-03-11 | End: 2021-05-25

## 2021-04-05 NOTE — PROGRESS NOTES
"      [facility] Note    Subjective     CC: Follow-up (1 month f/u, 7 months status post left shoulder arthroscopy with lysis of adhesions and manipulation under anesthesia  9/10/20)      HPI    Alisa Ames is a 60 y.o. female.  She sees a doctor on the 12th regarding her left wrist and hand arthritis.  She is going to [facility] physical therapy.  She says her shoulder is getting better    Review of Systems   Constitutional: Negative.  Negative for chills, fatigue and fever.   HENT: Negative.  Negative for congestion and dental problem.    Eyes: Negative.  Negative for blurred vision.   Respiratory: Negative.  Negative for shortness of breath.    Cardiovascular: Negative.  Negative for leg swelling.   Gastrointestinal: Negative.  Negative for abdominal pain.   Endocrine: Negative.  Negative for polyuria.   Genitourinary: Negative.  Negative for difficulty urinating.   Musculoskeletal: Positive for arthralgias.   Skin: Negative.    Allergic/Immunologic: Negative.    Neurological: Negative.    Hematological: Negative.  Negative for adenopathy.   Psychiatric/Behavioral: Negative.  Negative for behavioral problems.       ROS:    Constiutional:Pt denies fever, chills, nausea, or vomiting.  MSK:as above      Objective      Past Medical History  Past Medical History:   Diagnosis Date   • COPD (chronic obstructive pulmonary disease) (CMS/HCC)    • Fibromyalgia    • Hyperlipidemia    • Malignant neoplasm (CMS/HCC)    • Migraine    • Syncope    • Thyroid nodule    • Wears dentures     UPPER AND LOWER          Physical Exam  /70   Ht 165.1 cm (65\")   Wt 69.2 kg (152 lb 9.6 oz)   BMI 25.39 kg/m²     Body mass index is 25.39 kg/m².    Patient is well nourished and well developed.        Ortho Exam  Left shoulder has improved motion but still has some stiffness and weakness.    Imaging/Labs/EMG Reviewed:  Imaging Results (Last 24 Hours)     ** No results found for the last 24 hours. **    "       Assessment:  1. S/P shoulder surgery    2. Adhesive capsulitis of left shoulder    3. Hand arthritis        Plan:  1. Recommend over the counter anti-inflammatories for pain and/or swelling  2. She will continue physical therapy and [facility].  Follow-up in 6 weeks.    Follow Up:   Return in about 6 weeks (around 5/17/2021).      Medical Decision Making  Management Options : Low - OT or PT Therapy         Carroll Smith M.D., FAAOS  Orthopedic Surgeon  Fellowship Trained Sports Medicine  [facility]  Orthopedics and Sports Medicine  [address] [place] [address], [address]  [place] [address]

## 2021-04-06 DIAGNOSIS — M25.512 CHRONIC LEFT SHOULDER PAIN: ICD-10-CM

## 2021-04-06 DIAGNOSIS — G89.29 CHRONIC LEFT SHOULDER PAIN: ICD-10-CM

## 2021-04-06 NOTE — TELEPHONE ENCOUNTER
Caller: Alisa Ames    Relationship: Self    Best call back number: [phone]    Medication needed:   Requested Prescriptions     Pending Prescriptions Disp Refills   • gabapentin (NEURONTIN) 300 MG capsule 90 capsule 1     Sig: Take 1 capsule by mouth 3 (Three) Times a Day.       When do you need the refill by: ASAP    What additional details did the patient provide when requesting the medication: PATIENT STATES THAT SHE WOULD LIKE THE DOSAGE INCREASED  MG    Does the patient have less than a 3 day supply:  [x] Yes  [] No    What is the patient's preferred pharmacy: [facility] #[id number] [facility] [address] AT [facility] & [place] - [phone] [facility] [phone] FX

## 2021-04-07 RX ORDER — GABAPENTIN 300 MG/1
300 CAPSULE ORAL 3 TIMES DAILY PRN
Qty: 90 CAPSULE | Refills: 1 | Status: SHIPPED | OUTPATIENT
Start: 2021-04-07 | End: 2021-04-16 | Stop reason: SDUPTHER

## 2021-04-15 ENCOUNTER — TRANSCRIBE ORDERS (OUTPATIENT)
Dept: ADMINISTRATIVE | Facility: HOSPITAL | Age: 61
End: 2021-04-15

## 2021-04-15 DIAGNOSIS — M25.532 LEFT WRIST PAIN: Primary | ICD-10-CM

## 2021-04-15 DIAGNOSIS — G56.32 LESION OF LEFT RADIAL NERVE: ICD-10-CM

## 2021-04-16 DIAGNOSIS — M25.512 CHRONIC LEFT SHOULDER PAIN: ICD-10-CM

## 2021-04-16 DIAGNOSIS — G89.29 CHRONIC LEFT SHOULDER PAIN: ICD-10-CM

## 2021-04-16 RX ORDER — GABAPENTIN 300 MG/1
300 CAPSULE ORAL 3 TIMES DAILY PRN
Qty: 90 CAPSULE | Refills: 1 | Status: SHIPPED | OUTPATIENT
Start: 2021-04-16 | End: 2021-04-19

## 2021-04-16 NOTE — TELEPHONE ENCOUNTER
Caller: Alisa Ames    Relationship: Self    Best call back number:     Medication needed:   Requested Prescriptions     Pending Prescriptions Disp Refills   • gabapentin (NEURONTIN) 300 MG capsule 90 capsule 1     Sig: Take 1 capsule by mouth 3 (Three) Times a Day As Needed (shoulder pain).       When do you need the refill by: ASAP    What additional details did the patient provide when requesting the medication: PATIENT HAS BEEN OUT OF TWO WEEKS.    Does the patient have less than a 3 day supply:  [x] Yes  [] No    What is the patient's preferred pharmacy: [facility] #[id number] [facility] [address] AT [facility] [address] [place] - [phone] [facility] [phone] FX

## 2021-04-19 ENCOUNTER — OFFICE VISIT (OUTPATIENT)
Dept: INTERNAL MEDICINE | Facility: CLINIC | Age: 61
End: 2021-04-19

## 2021-04-19 ENCOUNTER — TELEPHONE (OUTPATIENT)
Dept: INTERNAL MEDICINE | Facility: CLINIC | Age: 61
End: 2021-04-19

## 2021-04-19 VITALS
OXYGEN SATURATION: 98 % | RESPIRATION RATE: 16 BRPM | TEMPERATURE: 96.9 F | HEART RATE: 63 BPM | BODY MASS INDEX: 25.66 KG/M2 | WEIGHT: 154 LBS | SYSTOLIC BLOOD PRESSURE: 134 MMHG | DIASTOLIC BLOOD PRESSURE: 70 MMHG | HEIGHT: 65 IN

## 2021-04-19 DIAGNOSIS — G89.29 CHRONIC LEFT SHOULDER PAIN: Primary | ICD-10-CM

## 2021-04-19 DIAGNOSIS — H53.2 DOUBLE VISION: ICD-10-CM

## 2021-04-19 DIAGNOSIS — M25.512 CHRONIC LEFT SHOULDER PAIN: Primary | ICD-10-CM

## 2021-04-19 DIAGNOSIS — R25.1 TREMORS OF NERVOUS SYSTEM: ICD-10-CM

## 2021-04-19 PROCEDURE — 99214 OFFICE O/P EST MOD 30 MIN: CPT | Performed by: NURSE PRACTITIONER

## 2021-04-19 RX ORDER — GABAPENTIN 600 MG/1
600 TABLET ORAL 3 TIMES DAILY
Qty: 90 TABLET | Refills: 2 | Status: SHIPPED | OUTPATIENT
Start: 2021-04-19 | End: 2021-11-05

## 2021-04-19 NOTE — TELEPHONE ENCOUNTER
Patient was referred to rheumatology in march. i'm not seeing anything done referral. Do you know what happened with it?

## 2021-04-19 NOTE — PROGRESS NOTES
"Subjective   Chief Complaint   Patient presents with   • Cloudy Vision     \"I would like a referral to neurology\"      Alisa Ames is a 61 y.o. female here today for vision problems, tremors, arm weakness, and joint pain. Reports having double vision. Has seen ophthalmology with no cause for visual disturbance. Having bilateral hand tremors worse on left hand. Having left arm weakness. Seeing orthopedics for left shoulder and left wrist pain. Shoulder has partial thickness tendon tear with tendinopathy. While going to PT for shoulder reports physical therapist was holding onto her wrist to manipulate shoulder and damaged tendon or ligament in wrist. She has upcoming left wrist MRI. Gabapentin has helped some with shoulder pain and low back pain but would like to increase dose. No sedation or side effects reported from gabapentin. States vision problem and tremors started before taking gabapentin. Patient is concerned about having MS and wants referral to neurologist Dr. Conner. Recent brain MRI was normal. Has positive rheumatoid factor and elevated sed rate. Referred to rheumatology but has not seen them yet.       I have reviewed the following portions of the patient's history and confirmed they are accurate: allergies, current medications, past family history, past medical history, past social history, past surgical history and problem list    I have personally completed the patient's review of systems.    Review of Systems   Constitutional: Positive for fatigue. Negative for activity change, appetite change, chills, diaphoresis, fever, unexpected weight gain and unexpected weight loss.   HENT: Positive for hearing loss. Negative for ear discharge, ear pain, mouth sores, nosebleeds, sinus pressure, sneezing and sore throat.    Eyes: Positive for double vision and visual disturbance. Negative for pain, discharge and itching.   Respiratory: Negative for cough, chest tightness, shortness of breath and wheezing.  "   Cardiovascular: Negative for chest pain, palpitations and leg swelling.   Gastrointestinal: Negative for abdominal pain, blood in stool, constipation, diarrhea, nausea and vomiting.   Endocrine: Negative for heat intolerance, polydipsia and polyphagia.   Genitourinary: Negative for dysuria, flank pain, frequency, hematuria and urgency.   Musculoskeletal: Positive for arthralgias, joint swelling and myalgias. Negative for back pain, gait problem, neck pain and neck stiffness.   Skin: Negative for color change, pallor and rash.   Allergic/Immunologic: Negative.  Negative for immunocompromised state.   Neurological: Positive for tremors and weakness. Negative for dizziness, seizures, speech difficulty, light-headedness, numbness and headache.   Hematological: Negative for adenopathy.   Psychiatric/Behavioral: Positive for depressed mood. Negative for agitation, decreased concentration, dysphoric mood, sleep disturbance and suicidal ideas. The patient is nervous/anxious.        Current Outpatient Medications on File Prior to Visit   Medication Sig   • ARIPiprazole (ABILIFY) 15 MG tablet    • atorvastatin (LIPITOR) 10 MG tablet TAKE 1 TABLET DAILY   • buPROPion XL (WELLBUTRIN XL) 150 MG 24 hr tablet Take 1 tablet by mouth Daily.   • Diclofenac Sodium (VOLTAREN) 1 % gel gel    • dicyclomine (BENTYL) 20 MG tablet Take 1/2-1 tablet up to four times daily as needed for diarrhea.   • doxepin (SINEquan) 10 MG capsule    • ferrous sulfate 325 (65 FE) MG tablet Take 1 tablet by mouth Daily With Breakfast. Take with orange juice or vitamin C   • fluticasone (FLONASE) 50 MCG/ACT nasal spray USE 2 SPRAYS IN EACH NOSTRIL ONCE A DAY AS DIRECTED   • HYDROcodone-acetaminophen (NORCO) 7.5-325 MG per tablet    • levothyroxine (SYNTHROID, LEVOTHROID) 75 MCG tablet TAKE 1 TABLET DAILY, FASTING AND WAIT ONE HOUR FOR FOOD OR OTHER MEDICATIONS   • montelukast (SINGULAIR) 10 MG tablet TAKE 1 TABLET EVERY NIGHT   • mupirocin (BACTROBAN) 2 %  "ointment    • omeprazole (priLOSEC) 20 MG capsule Take 1 capsule by mouth Daily.   • ondansetron (Zofran) 4 MG tablet Take 1 tablet by mouth Every 8 (Eight) Hours As Needed for Nausea.   • rizatriptan (MAXALT) 10 MG tablet Take 1 tab po prn migraine. May repeat in 2 hours if needed, max 3 in 24 hr   • tiZANidine (ZANAFLEX) 4 MG tablet Take 1 tablet by mouth Every 8 (Eight) Hours As Needed for Muscle Spasms.   • Topiramate ER (Trokendi XR) 200 MG capsule sustained-release 24 hr Take 1 capsule by mouth Daily.   • Trintellix 20 MG tablet    • [DISCONTINUED] gabapentin (NEURONTIN) 300 MG capsule Take 1 capsule by mouth 3 (Three) Times a Day As Needed (shoulder pain).     No current facility-administered medications on file prior to visit.       Objective   Vitals:    04/19/21 1041   BP: 134/70   Pulse: 63   Resp: 16   Temp: 96.9 °F (36.1 °C)   TempSrc: Temporal   SpO2: 98%   Weight: 69.9 kg (154 lb)   Height: 165.1 cm (65\")     Body mass index is 25.63 kg/m².    Physical Exam  Vitals reviewed.   Constitutional:       Appearance: Normal appearance. She is well-developed.   HENT:      Head: Normocephalic and atraumatic.      Nose: Nose normal.   Eyes:      General: Lids are normal.      Conjunctiva/sclera: Conjunctivae normal.      Pupils: Pupils are equal, round, and reactive to light.   Neck:      Thyroid: No thyromegaly.      Trachea: Trachea normal.   Pulmonary:      Effort: Pulmonary effort is normal. No respiratory distress.   Musculoskeletal:      Left shoulder: Tenderness present. Decreased range of motion.      Left wrist: Tenderness present.      Left hand: Tenderness present.   Skin:     General: Skin is warm and dry.   Neurological:      Mental Status: She is alert and oriented to person, place, and time.      GCS: GCS eye subscore is 4. GCS verbal subscore is 5. GCS motor subscore is 6.      Motor: Tremor present.      Comments: Mild tremor of left hand.    Psychiatric:         Attention and Perception: " Attention normal.         Mood and Affect: Mood normal.         Speech: Speech normal.         Behavior: Behavior normal. Behavior is cooperative.         Thought Content: Thought content normal.         Assessment/Plan   Problem List Items Addressed This Visit        Musculoskeletal and Injuries    Chronic left shoulder pain - Primary  Chronic issue unstable requiring medication management and monitoring. Will eat well balanced diet, increase water intake, increase physical activity as tolerated, and get adequate rest. Can use warmth or ice for discomfort in this area. Discussed stretching exercises.   Increase gabapentin. Continue orthopedic follow ups. Will speak with referral coordinator about rheumatology appt getting scheduled for positive rheumatoid factor.      Relevant Medications    gabapentin (NEURONTIN) 600 MG tablet      Other Visit Diagnoses     Tremors of nervous system      New issue requiring referral to speciality. Normal brain MRI.      Relevant Orders    Ambulatory Referral to Neurology    Double vision      New issue requiring referral to speciality. Normal brain MRI.      Relevant Orders    Ambulatory Referral to Neurology             Current Outpatient Medications:   •  ARIPiprazole (ABILIFY) 15 MG tablet, , Disp: , Rfl:   •  atorvastatin (LIPITOR) 10 MG tablet, TAKE 1 TABLET DAILY, Disp: 90 tablet, Rfl: 3  •  buPROPion XL (WELLBUTRIN XL) 150 MG 24 hr tablet, Take 1 tablet by mouth Daily., Disp: , Rfl:   •  Diclofenac Sodium (VOLTAREN) 1 % gel gel, , Disp: , Rfl:   •  dicyclomine (BENTYL) 20 MG tablet, Take 1/2-1 tablet up to four times daily as needed for diarrhea., Disp: 90 tablet, Rfl: 1  •  doxepin (SINEquan) 10 MG capsule, , Disp: , Rfl:   •  ferrous sulfate 325 (65 FE) MG tablet, Take 1 tablet by mouth Daily With Breakfast. Take with orange juice or vitamin C, Disp: 30 tablet, Rfl: 2  •  fluticasone (FLONASE) 50 MCG/ACT nasal spray, USE 2 SPRAYS IN EACH NOSTRIL ONCE A DAY AS DIRECTED,  Disp: 48 g, Rfl: 3  •  HYDROcodone-acetaminophen (NORCO) 7.5-325 MG per tablet, , Disp: , Rfl:   •  levothyroxine (SYNTHROID, LEVOTHROID) 75 MCG tablet, TAKE 1 TABLET DAILY, FASTING AND WAIT ONE HOUR FOR FOOD OR OTHER MEDICATIONS, Disp: 90 tablet, Rfl: 2  •  montelukast (SINGULAIR) 10 MG tablet, TAKE 1 TABLET EVERY NIGHT, Disp: 90 tablet, Rfl: 3  •  mupirocin (BACTROBAN) 2 % ointment, , Disp: , Rfl:   •  omeprazole (priLOSEC) 20 MG capsule, Take 1 capsule by mouth Daily., Disp: 30 capsule, Rfl: 11  •  ondansetron (Zofran) 4 MG tablet, Take 1 tablet by mouth Every 8 (Eight) Hours As Needed for Nausea., Disp: 10 tablet, Rfl: 1  •  rizatriptan (MAXALT) 10 MG tablet, Take 1 tab po prn migraine. May repeat in 2 hours if needed, max 3 in 24 hr, Disp: 27 tablet, Rfl: 1  •  tiZANidine (ZANAFLEX) 4 MG tablet, Take 1 tablet by mouth Every 8 (Eight) Hours As Needed for Muscle Spasms., Disp: 180 tablet, Rfl: 1  •  Topiramate ER (Trokendi XR) 200 MG capsule sustained-release 24 hr, Take 1 capsule by mouth Daily., Disp: 90 capsule, Rfl: 3  •  Trintellix 20 MG tablet, , Disp: , Rfl:   •  gabapentin (NEURONTIN) 600 MG tablet, Take 1 tablet by mouth 3 (Three) Times a Day., Disp: 90 tablet, Rfl: 2       Plan of care reviewed with the patient at the conclusion of today's visit.  Education was provided regarding diagnosis, management, and any prescribed or recommended OTC medications.  Patient verbalized understanding of and agreement with management plan.     Return in about 3 months (around 7/19/2021), or if symptoms worsen or fail to improve.      GERSON Arellano    Please note that portions of this note were completed with a voice recognition program. Efforts were made to edit the dictations, but occasionally words are mistranscribed.

## 2021-05-10 ENCOUNTER — HOSPITAL ENCOUNTER (OUTPATIENT)
Dept: MRI IMAGING | Facility: HOSPITAL | Age: 61
Discharge: HOME | End: 2021-05-10
Admitting: PLASTIC SURGERY

## 2021-05-10 DIAGNOSIS — G56.32 LESION OF LEFT RADIAL NERVE: ICD-10-CM

## 2021-05-10 DIAGNOSIS — M25.532 LEFT WRIST PAIN: ICD-10-CM

## 2021-05-10 PROCEDURE — 73221 MRI JOINT UPR EXTREM W/O DYE: CPT

## 2021-05-18 ENCOUNTER — TELEPHONE (OUTPATIENT)
Dept: ORTHOPEDIC SURGERY | Facility: CLINIC | Age: 61
End: 2021-05-18

## 2021-05-18 NOTE — TELEPHONE ENCOUNTER
CALLED PATIENT ABOUT 5/17/21 NO SHOW APPOINTMENT WITH DR. SMITH. PATIENT STATED THAT SHE DIDN'T KNOW SHE HAD AN APPOINTMENT, RESCHEDULED PATIENT

## 2021-05-25 ENCOUNTER — LAB (OUTPATIENT)
Dept: LAB | Facility: HOSPITAL | Age: 61
End: 2021-05-25

## 2021-05-25 ENCOUNTER — OFFICE VISIT (OUTPATIENT)
Dept: NEUROLOGY | Facility: CLINIC | Age: 61
End: 2021-05-25

## 2021-05-25 VITALS
DIASTOLIC BLOOD PRESSURE: 72 MMHG | HEART RATE: 68 BPM | BODY MASS INDEX: 25.33 KG/M2 | HEIGHT: 65 IN | WEIGHT: 152 LBS | OXYGEN SATURATION: 98 % | SYSTOLIC BLOOD PRESSURE: 126 MMHG

## 2021-05-25 DIAGNOSIS — G21.19 DRUG-INDUCED PARKINSONISM (HCC): ICD-10-CM

## 2021-05-25 DIAGNOSIS — H53.2 DIPLOPIA: ICD-10-CM

## 2021-05-25 DIAGNOSIS — G43.909 MIGRAINE SYNDROME: ICD-10-CM

## 2021-05-25 DIAGNOSIS — E78.00 PURE HYPERCHOLESTEROLEMIA: ICD-10-CM

## 2021-05-25 DIAGNOSIS — H53.2 DIPLOPIA: Primary | ICD-10-CM

## 2021-05-25 PROCEDURE — 99244 OFF/OP CNSLTJ NEW/EST MOD 40: CPT | Performed by: PSYCHIATRY & NEUROLOGY

## 2021-05-25 PROCEDURE — 83519 RIA NONANTIBODY: CPT

## 2021-05-25 PROCEDURE — 36415 COLL VENOUS BLD VENIPUNCTURE: CPT

## 2021-05-25 PROCEDURE — 86255 FLUORESCENT ANTIBODY SCREEN: CPT

## 2021-05-25 RX ORDER — ATORVASTATIN CALCIUM 10 MG/1
TABLET, FILM COATED ORAL
Qty: 90 TABLET | Refills: 3 | Status: SHIPPED | OUTPATIENT
Start: 2021-05-25 | End: 2021-08-31 | Stop reason: SDUPTHER

## 2021-05-25 RX ORDER — ARIPIPRAZOLE 5 MG/1
5 TABLET ORAL DAILY
Qty: 30 TABLET | Refills: 6 | Status: SHIPPED | OUTPATIENT
Start: 2021-05-25 | End: 2021-07-02 | Stop reason: SINTOL

## 2021-05-25 NOTE — PROGRESS NOTES
"Chief Complaint  Tremors    Subjective          Alisa Ames presents to [facility] NEUROLOGY     History of Present Illness    61 y.o. female referred by Shanae Thompson for tremors.     Reports horizontal diplopia when reading or watching TV.  Sx for the last three months.  Left arm has tremor and walking is unsteady.     Diplopia will last for hours.  Denies trouble chewing or swallowing.      Started on GBP on 4/19/21 for pain in left shoulder.      Taking Abilify for     Reviewed medical records:    Pt c/o vision problems, tremors, arm weakness.  Hand tremors L > R.  Diplopia.      MRI brain, my review of films, 3/30/21 mild atrophy     Objective   Vital Signs:   /72   Pulse 68   Ht 165.1 cm (65\")   Wt 68.9 kg (152 lb)   SpO2 98%   BMI 25.29 kg/m²       Physical Exam  Eyes:      Extraocular Movements: EOM normal.      Pupils: Pupils are equal, round, and reactive to light.   Neurological:      Mental Status: She is oriented to person, place, and time.      Coordination: Finger-Nose-Finger Test, Heel to Shin Test and Romberg Test normal.      Gait: Gait is intact. Tandem walk normal.      Deep Tendon Reflexes: Strength normal.   Psychiatric:         Speech: Speech normal.          Neurologic Exam     Mental Status   Oriented to person, place, and time.   Registration: recalls 3 of 3 objects. Recall at 5 minutes: recalls 3 of 3 objects. Follows 3 step commands.   Attention: normal. Concentration: normal.   Speech: speech is normal   Level of consciousness: alert  Knowledge: good and consistent with education.   Able to name object. Able to read. Able to repeat. Able to write. Normal comprehension.     Cranial Nerves     CN II   Visual fields full to confrontation.   Visual acuity: normal  Right visual field deficit: none  Left visual field deficit: none     CN III, IV, VI   Pupils are equal, round, and reactive to light.  Extraocular motions are normal.   Nystagmus: none "   Diplopia: none  Ophthalmoparesis: none  Upgaze: normal  Downgaze: normal  Conjugate gaze: present  Vestibulo-ocular reflex: present    CN V   Facial sensation intact.   Right corneal reflex: normal  Left corneal reflex: normal    CN VII   Right facial weakness: none  Left facial weakness: none    CN VIII   Hearing: intact    CN IX, X   Palate: symmetric  Right gag reflex: normal  Left gag reflex: normal    CN XI   Right sternocleidomastoid strength: normal  Left sternocleidomastoid strength: normal    CN XII   Tongue: not atrophic  Fasciculations: absent  Tongue deviation: none  Masked face      Motor Exam   Muscle bulk: normal  Overall muscle tone: normal  Right arm tone: normal  Left arm tone: normal  Right leg tone: normal  Left leg tone: normal    Strength   Strength 5/5 throughout.     Sensory Exam   Light touch normal.   Pinprick normal.     Gait, Coordination, and Reflexes     Gait  Gait: normal    Coordination   Romberg: negative  Finger to nose coordination: normal  Heel to shin coordination: normal  Tandem walking coordination: normal    Tremor   Resting tremor: absent  Intention tremor: absent  Action tremor: absent    Reflexes   Reflexes 2+ except as noted. Decreased left arm swing        Result Review :   The following data was reviewed by: Jagjit Conner MD on 05/25/2021:  Common labs    Common Labsle 9/1/20 9/1/20 11/12/20 11/12/20 3/3/21    1119 1119 1028 1028    Glucose  92  79    BUN  15  18    Creatinine  0.97  1.08 (A)    eGFR Non  Am  58 (A)  51 (A)    Sodium  142  137    Potassium  3.6  3.8    Chloride  109 (A)  105    Calcium  9.4  9.1    Albumin  4.00  4.60    Total Bilirubin  0.2  0.2    Alkaline Phosphatase  105  81    AST (SGOT)  16  16    ALT (SGPT)  13  12    WBC 4.24  4.31  5.30   Hemoglobin 11.1 (A)  12.2  13.6   Hematocrit 33.4 (A)  36.1  39.9   Platelets 279  264  332   (A) Abnormal value            Data reviewed: Radiologic studies MRI Brain          Assessment and Plan     Diagnoses and all orders for this visit:    1. Diplopia (Primary)  Assessment & Plan:  Suspect due to medications    Decrease Abilify     MG and MUSK ab     Orders:  -     Myasthenia Gravis Full Panel w/MuSK Reflex; Future  -     Musk Antibody; Future    2. Drug-induced Parkinsonism (CMS/HCC)  Assessment & Plan:  Decrease Abilify 5 mg daily       3. Migraine syndrome  Assessment & Plan:  Headaches are improving with treatment.  Continue current treatment regimen.            Follow Up   No follow-ups on file.  Patient was given instructions and counseling regarding her condition or for health maintenance advice. Please see specific information pulled into the AVS if appropriate.

## 2021-05-26 ENCOUNTER — OFFICE VISIT (OUTPATIENT)
Dept: ORTHOPEDIC SURGERY | Facility: CLINIC | Age: 61
End: 2021-05-26

## 2021-05-26 VITALS
WEIGHT: 151.9 LBS | BODY MASS INDEX: 25.31 KG/M2 | HEART RATE: 79 BPM | DIASTOLIC BLOOD PRESSURE: 74 MMHG | SYSTOLIC BLOOD PRESSURE: 191 MMHG | HEIGHT: 65 IN

## 2021-05-26 DIAGNOSIS — Z98.890 S/P SHOULDER SURGERY: ICD-10-CM

## 2021-05-26 DIAGNOSIS — M75.02 ADHESIVE CAPSULITIS OF LEFT SHOULDER: Primary | ICD-10-CM

## 2021-05-26 PROCEDURE — 99212 OFFICE O/P EST SF 10 MIN: CPT | Performed by: ORTHOPAEDIC SURGERY

## 2021-05-26 NOTE — PROGRESS NOTES
"      [facility]    Subjective     CC: Follow-up (6 week follow up; 8 months status post left shoulder arthroscopy with lysis of adhesions and manipulation under anesthesia  9/10/20)      NED Ames is a 61 y.o. female.  She is doing better.  She feels she needs restrictions on the left arm but no long-term disability    Review of Systems   Constitutional: Negative.    HENT: Negative.    Eyes: Negative.    Respiratory: Negative.    Cardiovascular: Negative.    Gastrointestinal: Negative.    Endocrine: Negative.    Genitourinary: Negative.    Musculoskeletal: Positive for arthralgias.   Skin: Negative.    Allergic/Immunologic: Negative.    Neurological: Negative.    Hematological: Negative.    Psychiatric/Behavioral: Negative.        ROS:    Constiutional:Pt denies fever, chills, nausea, or vomiting.  MSK:as above      Objective      Past Medical History  Past Medical History:   Diagnosis Date   • COPD (chronic obstructive pulmonary disease) (CMS/HCC)    • Fibromyalgia    • Hyperlipidemia    • Malignant neoplasm (CMS/[facility])    • Migraine    • Syncope    • Thyroid nodule    • Wears dentures     UPPER AND LOWER          Physical Exam  BP (!) 191/74   Pulse 79   Ht 165.1 cm (65\")   Wt 68.9 kg (151 lb 14.4 oz)   BMI 25.28 kg/m²     Body mass index is 25.28 kg/m².    Patient is well nourished and well developed.        Ortho Exam  Shoulders improved motion and strength.    Imaging/Labs/EMG Reviewed:  Imaging Results (Last 24 Hours)     ** No results found for the last 24 hours. **          Assessment:  1. Adhesive capsulitis of left shoulder    2. S/P shoulder surgery        Plan:  1. Recommend over the counter anti-inflammatories for pain and/or swelling  2. She will continue physical therapy and follow-up in 2 months.  3. Her restrictions are no lifting more than 10 pounds left arm or off work.    Follow Up:   Return in about 2 months (around 7/26/2021).      Medical Decision " Making  Management Options : Low - OTC Drugs and OT or PT Therapy         Carroll Smith M.D., FAAOS  Orthopedic Surgeon  Fellowship Trained Sports Medicine  [facility]  Orthopedics and Sports Medicine  [address], [address]  [place] [address]

## 2021-06-18 ENCOUNTER — OFFICE VISIT (OUTPATIENT)
Dept: INTERNAL MEDICINE | Facility: CLINIC | Age: 61
End: 2021-06-18

## 2021-06-18 VITALS
SYSTOLIC BLOOD PRESSURE: 122 MMHG | WEIGHT: 150 LBS | OXYGEN SATURATION: 98 % | HEIGHT: 66 IN | TEMPERATURE: 97.2 F | RESPIRATION RATE: 18 BRPM | DIASTOLIC BLOOD PRESSURE: 64 MMHG | BODY MASS INDEX: 24.11 KG/M2 | HEART RATE: 86 BPM

## 2021-06-18 DIAGNOSIS — J44.9 CHRONIC OBSTRUCTIVE PULMONARY DISEASE, UNSPECIFIED COPD TYPE (HCC): ICD-10-CM

## 2021-06-18 DIAGNOSIS — R03.0 ELEVATED BLOOD-PRESSURE READING WITHOUT DIAGNOSIS OF HYPERTENSION: ICD-10-CM

## 2021-06-18 DIAGNOSIS — R07.89 ATYPICAL CHEST PAIN: Primary | ICD-10-CM

## 2021-06-18 PROCEDURE — 99212 OFFICE O/P EST SF 10 MIN: CPT | Performed by: NURSE PRACTITIONER

## 2021-06-18 NOTE — PROGRESS NOTES
"Chief Complaint   Patient presents with   • Hospital Follow Up Visit       HPI  Alisa Ames is a 61 y.o. female presents for an ER follow-up.  On 6/16/21 pt went to [facility] ER for chest pain.  Pt states she has had two episodes of chest pain this week and that's what took her to the ER.  She denies having any SOB.  She has not had any CP since her ER visit.  Pt's BP was high while in the ER for several hours but it resolved before she left.  She feels good at this time and without any complaints.  She states she has some SOB on occasion if she does anything strenuous but she states that is due to having COPD.  Pt reports no cardiac history and has never had to see cardiology.     The following portions of the patient's history were reviewed and updated as appropriate: allergies, current medications, past family history, past medical history, past social history, past surgical history and problem list.    Subjective  Review of Systems   Constitutional: Negative for activity change, appetite change and fatigue.   HENT: Negative for congestion.    Respiratory: Negative for cough, chest tightness, shortness of breath and wheezing.    Cardiovascular: Negative for chest pain and leg swelling.   Gastrointestinal: Negative for abdominal pain.   Neurological: Negative for dizziness, weakness and confusion.   Psychiatric/Behavioral: Negative for behavioral problems and decreased concentration.       Objective  Visit Vitals  /64   Pulse 86   Temp 97.2 °F (36.2 °C) (Temporal)   Resp 18   Ht 167.6 cm (66\")   Wt 68 kg (150 lb)   SpO2 98%   BMI 24.21 kg/m²        Physical Exam  Vitals and nursing note reviewed.   HENT:      Head: Normocephalic.   Eyes:      Pupils: Pupils are equal, round, and reactive to light.   Cardiovascular:      Rate and Rhythm: Normal rate and regular rhythm.      Pulses: Normal pulses.      Heart sounds: Normal heart sounds.   Pulmonary:      Effort: Pulmonary effort is normal.      Breath " sounds: Normal breath sounds.   Skin:     General: Skin is warm and dry.      Capillary Refill: Capillary refill takes less than 2 seconds.   Neurological:      General: No focal deficit present.      Mental Status: She is alert and oriented to person, place, and time.   Psychiatric:         Mood and Affect: Mood normal.         Behavior: Behavior normal.         Thought Content: Thought content normal.          Procedures     Assessment and Plan  Diagnoses and all orders for this visit:    1. Atypical chest pain (Primary)    2. Elevated blood-pressure reading without diagnosis of hypertension    3. Chronic obstructive pulmonary disease, unspecified COPD type (CMS/HCC)    [facility] ER record reviewed  Pt has not had no further elevated blood pressure or chest pain  Pt does not wish to see cardiology at this time, if any further episodes of CP occur she will  Instructed to monitor BP daily and notify office if starts running high again  COPD stable    Return in about 4 months (around 10/18/2021) for Recheck.      GERSON Barr

## 2021-06-24 DIAGNOSIS — M50.30 DDD (DEGENERATIVE DISC DISEASE), CERVICAL: ICD-10-CM

## 2021-06-24 RX ORDER — TIZANIDINE 4 MG/1
4 TABLET ORAL EVERY 8 HOURS PRN
Qty: 180 TABLET | Refills: 1 | Status: SHIPPED | OUTPATIENT
Start: 2021-06-24 | End: 2021-09-09

## 2021-06-24 NOTE — TELEPHONE ENCOUNTER
Caller: Alisa Ames    Relationship: Self    Best call back number: [phone]    Medication needed:   Requested Prescriptions     Pending Prescriptions Disp Refills   • tiZANidine (ZANAFLEX) 4 MG tablet 180 tablet 1     Sig: Take 1 tablet by mouth Every 8 (Eight) Hours As Needed for Muscle Spasms.       When do you need the refill by:     What additional details did the patient provide when requesting the medication:   Does the patient have less than a 3 day supply:  [] Yes  [x] No    What is the patient's preferred pharmacy: [facility] - [address] [phone] [facility] [phone]

## 2021-06-30 ENCOUNTER — TELEPHONE (OUTPATIENT)
Dept: INTERNAL MEDICINE | Facility: CLINIC | Age: 61
End: 2021-06-30

## 2021-06-30 NOTE — TELEPHONE ENCOUNTER
Provider: ESTHER SEYMOUR  Caller: NICOLÁS HURST  Relationship to Patient: SELF   Pharmacy: [facility]   Phone Number: [phone]  Reason for Call: PATIENT CALLED AND STATED THAT SHE WAS TOLD THAT ESTHER HADN'T BEEN RECEIVING HER MESSAGES. SHE PUT IN FOR A REQUEST FOR A MED REFILL ON 6/24/2021 AND STILL HASN'T RECEIVED HER MEDICATION. PLEASE GIVE PATIENT A CALL BACK ASAP

## 2021-06-30 NOTE — TELEPHONE ENCOUNTER
PATIENT CALLED AND STATED THAT SHE CALLED THE PHARMACY AND HER tiZANidine (ZANAFLEX) 4 MG tablet HAS BEEN FILLED AND TO DISREGARD PREVIOUS MESSAGE.

## 2021-07-01 ENCOUNTER — OFFICE VISIT (OUTPATIENT)
Dept: NEUROLOGY | Facility: CLINIC | Age: 61
End: 2021-07-01

## 2021-07-01 VITALS
HEIGHT: 66 IN | SYSTOLIC BLOOD PRESSURE: 150 MMHG | WEIGHT: 154.2 LBS | OXYGEN SATURATION: 99 % | BODY MASS INDEX: 24.78 KG/M2 | DIASTOLIC BLOOD PRESSURE: 66 MMHG | RESPIRATION RATE: 16 BRPM | HEART RATE: 72 BPM

## 2021-07-01 DIAGNOSIS — H53.2 DIPLOPIA: Primary | ICD-10-CM

## 2021-07-01 DIAGNOSIS — M05.80 POLYARTHRITIS WITH POSITIVE RHEUMATOID FACTOR (HCC): ICD-10-CM

## 2021-07-01 DIAGNOSIS — R25.1 TREMOR: ICD-10-CM

## 2021-07-01 DIAGNOSIS — R29.898 WEAKNESS OF LEFT UPPER EXTREMITY: ICD-10-CM

## 2021-07-01 PROCEDURE — 99214 OFFICE O/P EST MOD 30 MIN: CPT | Performed by: NURSE PRACTITIONER

## 2021-07-01 NOTE — PROGRESS NOTES
Subjective:     Patient ID: Alisa Ames is a 61 y.o. female.    CC:   Chief Complaint   Patient presents with   • Tremors     4 week f/u-overall tremors are still the same       HPI:   History of Present Illness   P Shanae NIETO.  Here to follow up on tremor, diplopia, arm weakness  Last visit 5/25/21 w Dr Conner-Labs ordered. Decreased Abilify    Mg panel from 5/25/21 is still pending. Called the lab and was told there was a nationwide issue w the reagent.    She continues to have diplopia but the tremors resolved w decreased dose of Abilify.    Double vision is horizontal and intermittent only w both eyes and can last minutes to hours.  No eye pain. No headche. No slurred speech or dysphagia. Does have left UE weakness but is being treated for adhesive capsulitis s/p surg.    Seen in ED for achest pain w neg troponins. Declined fu w Cardiology.    She is happy with resolution of tremor but the diplopia is keeping her from driving. Has had eye exam.    MRI Brain Without Contrast (03/30/2021 09:57)    Rheumatoid Factor (03/03/2021 13:01)    The following portions of the patient's history were reviewed and updated as appropriate: allergies, current medications, past family history, past medical history, past social history, past surgical history and problem list.      Current Outpatient Medications:   •  ARIPiprazole (ABILIFY) 5 MG tablet, Take 1 tablet by mouth Daily., Disp: 30 tablet, Rfl: 6  •  atorvastatin (LIPITOR) 10 MG tablet, TAKE 1 TABLET DAILY, Disp: 90 tablet, Rfl: 3  •  buPROPion XL (WELLBUTRIN XL) 150 MG 24 hr tablet, Take 1 tablet by mouth Daily., Disp: , Rfl:   •  Diclofenac Sodium (VOLTAREN) 1 % gel gel, , Disp: , Rfl:   •  dicyclomine (BENTYL) 20 MG tablet, Take 1/2-1 tablet up to four times daily as needed for diarrhea., Disp: 90 tablet, Rfl: 1  •  gabapentin (NEURONTIN) 600 MG tablet, Take 1 tablet by mouth 3 (Three) Times a Day., Disp: 90 tablet, Rfl: 2  •  HYDROcodone-acetaminophen  (NORCO) 7.5-325 MG per tablet, , Disp: , Rfl:   •  levothyroxine (SYNTHROID, LEVOTHROID) 75 MCG tablet, TAKE 1 TABLET DAILY, FASTING AND WAIT ONE HOUR FOR FOOD OR OTHER MEDICATIONS, Disp: 90 tablet, Rfl: 2  •  montelukast (SINGULAIR) 10 MG tablet, TAKE 1 TABLET EVERY NIGHT, Disp: 90 tablet, Rfl: 3  •  mupirocin (BACTROBAN) 2 % ointment, , Disp: , Rfl:   •  omeprazole (priLOSEC) 20 MG capsule, Take 1 capsule by mouth Daily., Disp: 30 capsule, Rfl: 11  •  ondansetron (Zofran) 4 MG tablet, Take 1 tablet by mouth Every 8 (Eight) Hours As Needed for Nausea., Disp: 10 tablet, Rfl: 1  •  rizatriptan (MAXALT) 10 MG tablet, Take 1 tab po prn migraine. May repeat in 2 hours if needed, max 3 in 24 hr, Disp: 27 tablet, Rfl: 1  •  tiZANidine (ZANAFLEX) 4 MG tablet, Take 1 tablet by mouth Every 8 (Eight) Hours As Needed for Muscle Spasms., Disp: 180 tablet, Rfl: 1     Past Medical History:   Diagnosis Date   • COPD (chronic obstructive pulmonary disease) (CMS/HCC)    • Fibromyalgia    • Hyperlipidemia    • Malignant neoplasm (CMS/HCC)    • Migraine    • Syncope    • Thyroid nodule    • Wears dentures     UPPER AND LOWER        Past Surgical History:   Procedure Laterality Date   • APPENDECTOMY     • CERVICAL SPINE SURGERY      Fibromyalgia and DDD with h/o C spine surgery- initially on flexeril bid.   • COLONOSCOPY  2015   • NECK SURGERY     • NOSE SURGERY      r/t Skin CA   • SHOULDER SURGERY Left 09/10/2020    left shoulder arthroscopy with lysis of adhesions and manipulation under anesthesia   • SKIN CANCER EXCISION     • THYROID LOBECTOMY Right     On discussion, pt reported a h/o right thyroid lobectomy for goiter.U/S demo surgical absence of right lobe and diffuse nodularity of the left lobe with a dominant nodule in the lower pole of 1.8 x 3.5 cm.   • TONSILLECTOMY     • TOTAL HIP ARTHROPLASTY Left 1/7/2019    Procedure: TOTAL HIP ARTHROPLASTY LEFT;  Surgeon: Allen Madera MD;  Location[facility] OR;  Service:  "Orthopedics   • TOTAL THYROIDECTOMY         Social History     Socioeconomic History   • Marital status:      Spouse name: Not on file   • Number of children: Not on file   • Years of education: Not on file   • Highest education level: Not on file   Tobacco Use   • Smoking status: Former Smoker     Packs/day: 0.00     Years: 40.00     Pack years: 0.00     Types: Cigarettes   • Smokeless tobacco: Never Used   • Tobacco comment: QUIT SMOKING WITH E C[employer]-1 WEEK AGO    Vaping Use   • Vaping Use: Former   • Quit date: 5/4/2020   Substance and Sexual Activity   • Alcohol use: Yes     Alcohol/week: 1.0 standard drinks     Types: 1 Cans of beer per week     Comment: 1 drink per month   • Drug use: No   • Sexual activity: Yes     Partners: Male       Family History   Problem Relation Age of Onset   • Other Mother         malignant neoplasm   • Coronary artery disease Mother         MI (60's)   • Heart attack Mother    • Other Other         malignant neoplasm   • Valvular heart disease Father    • Birth defects Son           Objective:  /66   Pulse 72   Resp 16   Ht 167.6 cm (65.98\")   Wt 69.9 kg (154 lb 3.2 oz)   SpO2 99%   BMI 24.90 kg/m²     Neurologic Exam     Mental Status   Oriented to person, place, and time.   Follows 3 step commands.   Attention: normal. Concentration: normal.   Speech: speech is normal   Level of consciousness: alert  Knowledge: consistent with education.   Normal comprehension.     Cranial Nerves     CN III, IV, VI   Pupils are equal, round, and reactive to light.  Right pupil: Accommodation: intact.   Left pupil: Accommodation: intact.   CN III: no CN III palsy  CN VI: no CN VI palsy  Nystagmus: none   Diplopia: none  Upgaze: normal  Downgaze: normal  Conjugate gaze: present    CN VII   Facial expression full, symmetric.     CN VIII   Hearing: intact    CN XII   CN XII normal.     Motor Exam   Muscle bulk: normal  Overall muscle tone: normal    Strength   Right biceps: 5/5  Left " biceps: 3/5  Right triceps: 5/5  Left triceps: 3/5  Right interossei: 5/5  Left interossei: 3/5  Right quadriceps: 5/5  Left quadriceps: 5/5  Right anterior tibial: 5/5  Left anterior tibial: 5/5  Right posterior tibial: 5/5  Left posterior tibial: 5/5    Sensory Exam   Light touch normal.     Gait, Coordination, and Reflexes     Gait  Gait: normal    Coordination   Romberg: negative  Finger to nose coordination: normal  Heel to shin coordination: normal    Tremor   Resting tremor: absent  Action tremor: absent    Reflexes   Right brachioradialis: 2+  Left brachioradialis: 2+  Right biceps: 2+  Left biceps: 2+  Right triceps: 2+  Left triceps: 2+  Right patellar: 2+  Left patellar: 2+  Right achilles: 2+  Left achilles: 2+  Right : 2+  Left : 2+      Physical Exam  Eyes:      Pupils: Pupils are equal, round, and reactive to light.   Neurological:      Mental Status: She is oriented to person, place, and time.      Coordination: Finger-Nose-Finger Test, Heel to Shin Test and Romberg Test normal.      Gait: Gait is intact.      Deep Tendon Reflexes:      Reflex Scores:       Tricep reflexes are 2+ on the right side and 2+ on the left side.       Bicep reflexes are 2+ on the right side and 2+ on the left side.       Brachioradialis reflexes are 2+ on the right side and 2+ on the left side.       Patellar reflexes are 2+ on the right side and 2+ on the left side.       Achilles reflexes are 2+ on the right side and 2+ on the left side.  Psychiatric:         Speech: Speech normal.            Assessment/Plan:       Diagnoses and all orders for this visit:    1. Diplopia (Primary)  Comments:  re-draw MG panel when reagent available.    2. Weakness of left upper extremity  Comments:  likely due to recent surg and need for rehab    3. Polyarthritis with positive rheumatoid factor (CMS/[facility])  -     Ambulatory Referral to Rheumatology    4. Tremor  Comments:  resolved w decreased dose of Abilify.         See  rheumatology for RF+.  If diplopia persists redraw MG panel. FU sx persist or worsen.    Reviewed medications, potential side effects and signs and symptoms to report. Discussed risk versus benefits of treatment plan with patient and/or family-including medications, labs and radiology that may be ordered. Addressed questions and concerns during visit. Patient and/or family verbalized understanding and agree with plan. Instructed to call the office with any questions and report to ER with any life-threatening symptoms.    During this visit the following were done:  Labs Reviewed [x]    Labs Ordered []    Radiology Reports Reviewed [x]    Radiology Ordered []    PCP Records Reviewed []    Referring Provider Records Reviewed []    ER Records Reviewed []    Hospital Records Reviewed []    History Obtained From Family []    Radiology Images Reviewed [x]    Other Reviewed []    Records Requested []      Rasheeda Caceres, KAITLIN, APRN  7/1/2021

## 2021-07-02 ENCOUNTER — TELEPHONE (OUTPATIENT)
Dept: NEUROLOGY | Facility: CLINIC | Age: 61
End: 2021-07-02

## 2021-07-02 NOTE — TELEPHONE ENCOUNTER
I called and spoke to Kelin at the lab who called the reference lab. States MG triggered a reflex and result can take an additional 2 weeks. Called and spoke to pt. Will have her wean off Abilify.

## 2021-07-05 LAB
ACHR BIND AB SER-SCNC: 0.06 NMOL/L (ref 0–0.24)
ACHR BLOCK AB SER-ACNC: 16 % (ref 0–25)
ACHR MOD AB/ACHR TOTAL SFR SER: <12 % (ref 0–20)
MUSK AB SER-SCNC: <1 U/ML
REFLEX INFORMATION: NORMAL
STRIA MUS AB TITR SER IF: NEGATIVE {TITER}

## 2021-07-17 DIAGNOSIS — E03.9 HYPOTHYROIDISM (ACQUIRED): ICD-10-CM

## 2021-07-19 RX ORDER — LEVOTHYROXINE SODIUM 0.07 MG/1
TABLET ORAL
Qty: 90 TABLET | Refills: 3 | Status: SHIPPED | OUTPATIENT
Start: 2021-07-19 | End: 2022-07-11

## 2021-07-26 ENCOUNTER — TELEPHONE (OUTPATIENT)
Dept: INTERNAL MEDICINE | Facility: CLINIC | Age: 61
End: 2021-07-26

## 2021-07-26 ENCOUNTER — OFFICE VISIT (OUTPATIENT)
Dept: ORTHOPEDIC SURGERY | Facility: CLINIC | Age: 61
End: 2021-07-26

## 2021-07-26 VITALS
DIASTOLIC BLOOD PRESSURE: 72 MMHG | SYSTOLIC BLOOD PRESSURE: 194 MMHG | WEIGHT: 154 LBS | BODY MASS INDEX: 24.75 KG/M2 | HEIGHT: 66 IN | HEART RATE: 75 BPM

## 2021-07-26 DIAGNOSIS — Z98.890 S/P SHOULDER SURGERY: Primary | ICD-10-CM

## 2021-07-26 DIAGNOSIS — M75.02 ADHESIVE CAPSULITIS OF LEFT SHOULDER: ICD-10-CM

## 2021-07-26 PROCEDURE — 99212 OFFICE O/P EST SF 10 MIN: CPT | Performed by: ORTHOPAEDIC SURGERY

## 2021-07-26 NOTE — TELEPHONE ENCOUNTER
Caller: Alisa Ames    Relationship: Self    Best call back number: [phone] (HUSBANDS PHONE ) [phone] (H)    What is the medical concern/diagnosis:   DOUBLE VISION/ POSSIBLE AUTO IMMUNE DISEASE    What specialty or service is being requested: RHEUMATOLOGIST    What is the provider, practice or medical service name:  [facility]    What is the office location: [place], [place]     What is the office phone number: UNKNOWN    Any additional details:   PATIENT STATES THAT SHE HAS SEEN THIS PROVIDER BEFORE AND SHE WILL LIKE TO GO BACK AND SEE HER FOR THE MORE RECENT MEDICAL CONCERN.       PATIENT HAS BEEN ADVISED TO ALLOW 48 HOURS FOR THE CLINICAL TEAM TO FOLLOW UP ON THIS REQUEST,  IF SYMPTOMS WORSENS TO SEEK OUT EMERGENT CARE. PATIENT  FULLY UNDERSTANDS.

## 2021-07-26 NOTE — PROGRESS NOTES
"      [facility] Note    Subjective     CC: Follow-up (8.5 week recheck - Adhesive capsulitis of left shoulder & status post left shoulder arthroscopy with lysis of adhesions and manipulation under anesthesia  9/10/20)      NED Ames is a 61 y.o. female.  She is doing better.  She believes she is ready to be released.  It is not bothering her enough for a steroid shot    Review of Systems   Constitutional: Negative.  Negative for chills, fatigue and fever.   HENT: Negative.  Negative for congestion and dental problem.    Eyes: Negative.  Negative for blurred vision.   Respiratory: Negative.  Negative for shortness of breath.    Cardiovascular: Negative.  Negative for leg swelling.   Gastrointestinal: Negative.  Negative for abdominal pain.   Endocrine: Negative.  Negative for polyuria.   Genitourinary: Negative.  Negative for difficulty urinating.   Musculoskeletal: Positive for arthralgias.   Skin: Negative.    Allergic/Immunologic: Negative.    Neurological: Negative.    Hematological: Negative.  Negative for adenopathy.   Psychiatric/Behavioral: Negative.  Negative for behavioral problems.       ROS:    Constiutional:Pt denies fever, chills, nausea, or vomiting.  MSK:as above      Objective      Past Medical History  Past Medical History:   Diagnosis Date   • COPD (chronic obstructive pulmonary disease) (CMS/HCC)    • Fibromyalgia    • Hyperlipidemia    • Malignant neoplasm (CMS/HCC)    • Migraine    • Syncope    • Thyroid nodule    • Wears dentures     UPPER AND LOWER          Physical Exam  BP (!) 194/72   Pulse 75   Ht 167.6 cm (65.98\")   Wt 69.9 kg (154 lb)   BMI 24.87 kg/m²     Body mass index is 24.87 kg/m².    Patient is well nourished and well developed.        Ortho Exam  Left shoulder full motion full-strength    Imaging/Labs/EMG Reviewed:  Imaging Results (Last 24 Hours)     ** No results found for the last 24 hours. **          Assessment:  1. S/P shoulder surgery  "   2. Adhesive capsulitis of left shoulder        Plan:  1. Recommend over the counter anti-inflammatories for pain and/or swelling  2. She is doing well and will follow-up as needed.    Follow Up:   Return if symptoms worsen or fail to improve.      Medical Decision Making  Management Options : Low - OTC Drugs        Carroll Smith M.D., [facility]  Orthopedic Surgeon  Fellowship Trained Sports Medicine  [facility]  Orthopedics and Sports Medicine  [address], [address]  [place] [address]    EMR Dragon/Transcription disclaimer:  Much of this encounter note is an electronic transcription of spoken language to printed text. Electronic transcription of spoken language may permit erroneous, or at times, nonsensical words or phrases to be inadvertently transcribed. Although I have reviewed the note for such errors, some may still exist.

## 2021-07-26 NOTE — TELEPHONE ENCOUNTER
Tell her to go down to 300mg 1/2 tablet three times a day for a week. Then go down to 300mg twice a day for about 3 days and then once daily for about 3 days and then stop. There is no harm in stopping this suddenly except it may make her feel bad. She can wean off quicker if she wants.

## 2021-07-26 NOTE — TELEPHONE ENCOUNTER
Caller: Alisa Ames    Relationship: Self    Best call back number: [phone] (HUSBANDS PHONE)      What medications are you currently taking:   Current Outpatient Medications on File Prior to Visit   Medication Sig Dispense Refill   • atorvastatin (LIPITOR) 10 MG tablet TAKE 1 TABLET DAILY 90 tablet 3   • buPROPion XL (WELLBUTRIN XL) 150 MG 24 hr tablet Take 1 tablet by mouth Daily.     • Diclofenac Sodium (VOLTAREN) 1 % gel gel      • dicyclomine (BENTYL) 20 MG tablet Take 1/2-1 tablet up to four times daily as needed for diarrhea. 90 tablet 1   • gabapentin (NEURONTIN) 600 MG tablet Take 1 tablet by mouth 3 (Three) Times a Day. 90 tablet 2   • HYDROcodone-acetaminophen (NORCO) 7.5-325 MG per tablet      • levothyroxine (Synthroid) 75 MCG tablet TAKE 1 TABLET DAILY, FASTING AND WAIT 1 HOUR FOR FOOD OR OTHER MEDICATIONS (DISCONTINUE SYNTHROID 88 MCG) 90 tablet 3   • montelukast (SINGULAIR) 10 MG tablet TAKE 1 TABLET EVERY NIGHT 90 tablet 3   • mupirocin (BACTROBAN) 2 % ointment      • omeprazole (priLOSEC) 20 MG capsule Take 1 capsule by mouth Daily. 30 capsule 11   • ondansetron (Zofran) 4 MG tablet Take 1 tablet by mouth Every 8 (Eight) Hours As Needed for Nausea. 10 tablet 1   • rizatriptan (MAXALT) 10 MG tablet Take 1 tab po prn migraine. May repeat in 2 hours if needed, max 3 in 24 hr 27 tablet 1   • tiZANidine (ZANAFLEX) 4 MG tablet Take 1 tablet by mouth Every 8 (Eight) Hours As Needed for Muscle Spasms. 180 tablet 1     No current facility-administered medications on file prior to visit.        When did you start taking these medications:   3 MONTHS    Which medication are you concerned about:   gabapentin (NEURONTIN) 600 MG tablet  600 mg, 3 Times Daily 2 ordered         Summary: Take 1 tablet by mouth 3 (Three) Times a Day., Starting Mon 4/19/2021, Normal   Dose, Route, Frequency: 600 mg, Oral, 3 Times Daily            Who prescribed you this medication: GERSON CHINO    What are your  concerns:   PATIENT STATES THAT SHE WILL LIKE TO DISCONTINUE USE OF THE MEDICATION DUE TO THE FACT THAT SHE HAS BEEN RELEASED FROM HER ORTHOPEDIC SURGEON. PATIENT WILL LIKE TO KNOW IF SHE WILL NEED TO DISCONTINUE ON A STEP DOWN REGIMEN BY TAKING HALF TABLET OR CAN SHE JUST STOP TAKING IT ALL AT ONCE?    How long have you been taking these medications: 3 MONTHS    How long have you had these concerns:   AS OF TODAY AFTER HER APPOINTMENT WITH ORTHOPEDIC SURGEON.       PATIENT HAS BEEN ADVISED TO ALLOW 48 HOURS FOR THE CLINICAL TEAM TO FOLLOW UP ON THIS REQUEST,  IF SYMPTOMS WORSENS TO SEEK OUT EMERGENT CARE. PATIENT  FULLY UNDERSTANDS.

## 2021-07-28 DIAGNOSIS — H53.2 DOUBLE VISION: ICD-10-CM

## 2021-07-28 DIAGNOSIS — M25.50 GENERALIZED JOINT PAIN: Primary | ICD-10-CM

## 2021-08-06 ENCOUNTER — TELEPHONE (OUTPATIENT)
Dept: INTERNAL MEDICINE | Facility: CLINIC | Age: 61
End: 2021-08-06

## 2021-08-06 NOTE — TELEPHONE ENCOUNTER
PATIENT SAID SHE NEEDS A REFERRAL TO [facility] LOCATED AT [address] [place] [address] [phone]. PATIENT SAID THE REFERRAL THAT WAS PLACED FOR HER TO SEE [facility] DOESN'T TREAT FOR WHAT SHE HAS GOING ON. PATIENT STATES SHE WAS TOLD SHE NEEDED TO BE TESTED FOR ARTHRITIS

## 2021-08-09 ENCOUNTER — TELEPHONE (OUTPATIENT)
Dept: INTERNAL MEDICINE | Facility: CLINIC | Age: 61
End: 2021-08-09

## 2021-08-09 NOTE — TELEPHONE ENCOUNTER
Pt called and stated she doesn't have a smart phone and is unable to do a video visit. Pt wants a phone call, but was informed the office doesn't do phone calls. I asked if she wanted to loyd, but she told me no, she wants her phone call, please advise.

## 2021-08-10 NOTE — TELEPHONE ENCOUNTER
Patient has positive Rheumatoid Factor and I referred her to rheumatology on 7/1. See her message below. Do I need to do a new referral?

## 2021-08-27 ENCOUNTER — TELEPHONE (OUTPATIENT)
Dept: INTERNAL MEDICINE | Facility: CLINIC | Age: 61
End: 2021-08-27

## 2021-08-27 NOTE — TELEPHONE ENCOUNTER
Caller: Alisa Ames    Relationship: Self    Best call back number: [phone]    What is the best time to reach you: ANYTIME    Who are you requesting to speak with (clinical staff, provider,  specific staff member): CLINICAL STAFF    Do you know the name of the person who called:     What was the call regarding: PATIENT HAS BEEN DIAGNOSED WITH COVID. WHAT MEDICATION CAN SHE TAKE IN CONJUNCTION WITH PRESCRIBED MEDICATIONS SHE IS TAKING    Do you require a callback: YES

## 2021-08-27 NOTE — TELEPHONE ENCOUNTER
Caller: Alisa Ames    Relationship to patient: Self    Best call back number: [phone] (H)    Date of exposure: UNKNOWN     Date of positive COVID19 test: 08/27/21    Date if possible COVID19 exposure: UNKNOWN     COVID19 symptoms: HEADACHE, COUGHING, HEAD CONGESTION    Date of initial quarantine: 08/27/21-    Additional information or concerns: PATIENT WANTS TO KNOW IF SHE CAN TAKE CORICIDIN WITH HER THYROID MEDICATION( SYNTHROID)- PATIENT WAS TOLD THAT CORICIDIN WAS GOOD FOR COVID- ALSO WANTS TO KNOW IF THERE IS ANYTHING THAT GERSON SEYMOUR COULD PRESCRIBE TO HELP HER WITH COVID     What is the patients preferred pharmacy: [facility] #[id number] [phone]

## 2021-08-27 NOTE — TELEPHONE ENCOUNTER
Yes, pt can take Coricidin for her cough and congestion.  Conservative treatment is typically the best treatment for COVID (Tylenol, Motrin, nasal decongestants)

## 2021-08-31 DIAGNOSIS — E78.00 PURE HYPERCHOLESTEROLEMIA: ICD-10-CM

## 2021-08-31 RX ORDER — ATORVASTATIN CALCIUM 10 MG/1
10 TABLET, FILM COATED ORAL DAILY
Qty: 90 TABLET | Refills: 3 | Status: SHIPPED | OUTPATIENT
Start: 2021-08-31 | End: 2022-04-28

## 2021-08-31 NOTE — TELEPHONE ENCOUNTER
Caller: Alisa Ames    Relationship: Self    Best call back number: [phone]   Medication needed:   Requested Prescriptions     Pending Prescriptions Disp Refills   • atorvastatin (LIPITOR) 10 MG tablet 90 tablet 3     Sig: Take 1 tablet by mouth Daily.       When do you need the refill by: SOON    What additional details did the patient provide when requesting the medication: ONLY HAS 3 LEFT AND IT WILL TAKE ABOUT 10 DAYS. PATIENT WILL NEED A PARTIAL REFILL TO THIS PHARMACY  Does the patient have less than a 3 day supply:  [] Yes  [x] No    What is the patient's preferred pharmacy: [facility] #[id number] [address] AT [facility] [address] - [phone] [facility] [phone]

## 2021-08-31 NOTE — TELEPHONE ENCOUNTER
Caller: Alisa Ames    Relationship: Self    Best call back number: [phone]     Medication needed:   Requested Prescriptions     Pending Prescriptions Disp Refills   • atorvastatin (LIPITOR) 10 MG tablet 90 tablet 3     Sig: Take 1 tablet by mouth Daily.       When do you need the refill by: SOON    What additional details did the patient provide when requesting the medication: ONLY HAS 3 LEFT     Does the patient have less than a 3 day supply:  [] Yes  [x] No    What is the patient's preferred pharmacy: [facility] - [address] [phone] [facility] [phone]

## 2021-09-02 ENCOUNTER — TELEPHONE (OUTPATIENT)
Dept: INTERNAL MEDICINE | Facility: CLINIC | Age: 61
End: 2021-09-02

## 2021-09-02 RX ORDER — PREDNISONE 1 MG/1
TABLET ORAL
Qty: 21 TABLET | Refills: 0 | Status: SHIPPED | OUTPATIENT
Start: 2021-09-02 | End: 2021-11-05

## 2021-09-02 RX ORDER — AZITHROMYCIN 250 MG/1
TABLET, FILM COATED ORAL
Qty: 6 TABLET | Refills: 0 | Status: SHIPPED | OUTPATIENT
Start: 2021-09-02 | End: 2021-11-05

## 2021-09-02 NOTE — TELEPHONE ENCOUNTER
DCaller: Alisa Ames    Relationship: Self    Best call back number:     [phone]     What medication are you requesting:     PATIENT REQUESTED AN ANTIBIOTIC TO HELP WITH COVID POSITIVE SYMPTOMS SHE HAS BEEN HAVING FOR (9) DAYS    What are your current symptoms:     STUFFY HEAD  HEADACHE  COUGH  SNEEZING  LOW GRADE FEVER  CHILLS    How long have you been experiencing symptoms:     9 DAYS    Have you had these symptoms before:    [] Yes  [x] No    Have you been treated for these symptoms before:   [] Yes  [x] No    If a prescription is needed, what is your preferred pharmacy and phone number:      [facility] [place], [place]    TELEPHONE CONTACT:    [phone]    Additional notes:    N/A    ESTHER SEYMOUR

## 2021-09-08 DIAGNOSIS — M50.30 DDD (DEGENERATIVE DISC DISEASE), CERVICAL: ICD-10-CM

## 2021-09-09 RX ORDER — TIZANIDINE 4 MG/1
TABLET ORAL
Qty: 180 TABLET | Refills: 5 | Status: SHIPPED | OUTPATIENT
Start: 2021-09-09 | End: 2021-11-15 | Stop reason: SDUPTHER

## 2021-11-05 ENCOUNTER — OFFICE VISIT (OUTPATIENT)
Dept: INTERNAL MEDICINE | Facility: CLINIC | Age: 61
End: 2021-11-05

## 2021-11-05 VITALS
OXYGEN SATURATION: 99 % | HEART RATE: 80 BPM | SYSTOLIC BLOOD PRESSURE: 146 MMHG | WEIGHT: 151.8 LBS | BODY MASS INDEX: 25.29 KG/M2 | TEMPERATURE: 97.2 F | DIASTOLIC BLOOD PRESSURE: 80 MMHG | HEIGHT: 65 IN

## 2021-11-05 DIAGNOSIS — M79.672 PAIN OF LEFT HEEL: ICD-10-CM

## 2021-11-05 DIAGNOSIS — Z12.4 CERVICAL CANCER SCREENING: ICD-10-CM

## 2021-11-05 DIAGNOSIS — H93.8X2 EAR FULLNESS, LEFT: Primary | ICD-10-CM

## 2021-11-05 PROCEDURE — 99213 OFFICE O/P EST LOW 20 MIN: CPT | Performed by: NURSE PRACTITIONER

## 2021-11-05 NOTE — PROGRESS NOTES
"Chief Complaint   Patient presents with   • Ear Fullness     left ear        HPI  Alisa Ames is a 61 y.o. female presents for left ear fullness.  She states it started a couple months ago.  She states about a month ago her ENT cleaned the ear out but she thinks he didn't clean it all out.  She is requesting her ear be cleaned out today.  Denies any ear pain.  Wears hearing aids but didn't wear them today  Requesting referral for a pap smear.  Pt also requesting referral to a foot specialist.  Pt complains of left heel pain.  She states the heel is very painful when she stands to walk on it. After walking for a while the pain goes away.  Denies any swelling of the foot or heel.    Awaiting to see rheumatology to see if she has RA      The following portions of the patient's history were reviewed and updated as appropriate: allergies, current medications, past family history, past medical history, past social history, past surgical history and problem list.    Subjective  Review of Systems   Constitutional: Negative for activity change, appetite change and fatigue.   HENT: Positive for ear pain (\"fullness\" left ear). Negative for congestion.    Respiratory: Negative for cough and shortness of breath.    Cardiovascular: Negative for chest pain and leg swelling.   Gastrointestinal: Negative for abdominal pain.   Musculoskeletal: Positive for arthralgias (left heel).   Neurological: Negative for dizziness, weakness and confusion.   Psychiatric/Behavioral: Negative for behavioral problems and decreased concentration.       Objective  Visit Vitals  /80   Pulse 80   Temp 97.2 °F (36.2 °C) (Temporal)   Ht 165.1 cm (65\")   Wt 68.9 kg (151 lb 12.8 oz)   SpO2 99%   BMI 25.26 kg/m²        Physical Exam  Vitals and nursing note reviewed.   HENT:      Head: Normocephalic.      Ears:      Comments: Very mild amount of dry flaky wax in both canals, TMs normal  Eyes:      Pupils: Pupils are equal, round, and reactive to " light.   Cardiovascular:      Rate and Rhythm: Normal rate and regular rhythm.      Pulses: Normal pulses.      Heart sounds: Normal heart sounds.   Pulmonary:      Effort: Pulmonary effort is normal.      Breath sounds: Normal breath sounds.   Skin:     General: Skin is warm and dry.      Capillary Refill: Capillary refill takes less than 2 seconds.   Neurological:      General: No focal deficit present.      Mental Status: She is alert and oriented to person, place, and time.      Gait: Gait is intact.   Psychiatric:         Attention and Perception: Attention normal.         Mood and Affect: Mood normal.         Behavior: Behavior normal.          Procedures     Assessment and Plan  Diagnoses and all orders for this visit:    1. Ear fullness, left (Primary)    2. Pain of left heel  -     Ambulatory Referral to Podiatry    3. Cervical cancer screening  -     Ambulatory Referral to Obstetrics / Gynecology    no wax to flush from ear  Refer to podiatry per pt request  Refer for pap per pt request  Schedule physical    Return in about 4 weeks (around 12/3/2021) for Annual.        GERSON Barr

## 2021-11-15 ENCOUNTER — TELEPHONE (OUTPATIENT)
Dept: INTERNAL MEDICINE | Facility: CLINIC | Age: 61
End: 2021-11-15

## 2021-11-15 DIAGNOSIS — M50.30 DDD (DEGENERATIVE DISC DISEASE), CERVICAL: ICD-10-CM

## 2021-11-15 RX ORDER — TIZANIDINE 4 MG/1
4 TABLET ORAL EVERY 8 HOURS PRN
Qty: 180 TABLET | Refills: 5 | Status: SHIPPED | OUTPATIENT
Start: 2021-11-15 | End: 2021-11-18 | Stop reason: SDUPTHER

## 2021-11-16 NOTE — PROGRESS NOTES
Problem: At Risk for Falls  Goal: # Patient does not fall  Outcome: Outcome Met, Continue evaluating goal progress toward completion  Goal: # Takes action to control fall-related risks  Outcome: Outcome Met, Continue evaluating goal progress toward completion     Problem: At Risk for Injury Due to Fall  Goal: # Patient does not fall  Outcome: Outcome Met, Continue evaluating goal progress toward completion  Goal: # Takes action to control condition specific risks  Outcome: Outcome Met, Continue evaluating goal progress toward completion     Problem: Breathing Pattern Ineffective  Goal: Air exchange is effective, demonstrated by Sp02 sat of greater then or = 92% (or as ordered)  Outcome: Outcome Met, Continue evaluating goal progress toward completion  Goal: Respiratory pattern is quiet and regular without report of SOB  Outcome: Outcome Met, Continue evaluating goal progress toward completion      Shoulder MRI shows that you have some tearing of the tendon and some inflammation.  I have referred you to orthopedic surgery for this.

## 2021-11-18 DIAGNOSIS — M50.30 DDD (DEGENERATIVE DISC DISEASE), CERVICAL: ICD-10-CM

## 2021-11-18 RX ORDER — TIZANIDINE 4 MG/1
4 TABLET ORAL EVERY 8 HOURS PRN
Qty: 180 TABLET | Refills: 2 | Status: SHIPPED | OUTPATIENT
Start: 2021-11-18 | End: 2022-02-09 | Stop reason: SDUPTHER

## 2021-12-03 ENCOUNTER — LAB (OUTPATIENT)
Dept: LAB | Facility: HOSPITAL | Age: 61
End: 2021-12-03

## 2021-12-03 ENCOUNTER — OFFICE VISIT (OUTPATIENT)
Dept: INTERNAL MEDICINE | Facility: CLINIC | Age: 61
End: 2021-12-03

## 2021-12-03 VITALS
HEIGHT: 65 IN | SYSTOLIC BLOOD PRESSURE: 134 MMHG | TEMPERATURE: 96.6 F | RESPIRATION RATE: 16 BRPM | DIASTOLIC BLOOD PRESSURE: 76 MMHG | OXYGEN SATURATION: 98 % | BODY MASS INDEX: 24.99 KG/M2 | WEIGHT: 150 LBS | HEART RATE: 75 BPM

## 2021-12-03 DIAGNOSIS — Z12.4 SCREENING FOR CERVICAL CANCER: ICD-10-CM

## 2021-12-03 DIAGNOSIS — E78.00 PURE HYPERCHOLESTEROLEMIA: ICD-10-CM

## 2021-12-03 DIAGNOSIS — Z13.21 ENCOUNTER FOR VITAMIN DEFICIENCY SCREENING: ICD-10-CM

## 2021-12-03 DIAGNOSIS — J44.9 CHRONIC OBSTRUCTIVE PULMONARY DISEASE, UNSPECIFIED COPD TYPE (HCC): ICD-10-CM

## 2021-12-03 DIAGNOSIS — D50.8 IRON DEFICIENCY ANEMIA SECONDARY TO INADEQUATE DIETARY IRON INTAKE: ICD-10-CM

## 2021-12-03 DIAGNOSIS — E53.9 VITAMIN B DEFICIENCY: ICD-10-CM

## 2021-12-03 DIAGNOSIS — Z13.0 SCREENING FOR BLOOD DISEASE: ICD-10-CM

## 2021-12-03 DIAGNOSIS — Z13.29 THYROID DISORDER SCREENING: ICD-10-CM

## 2021-12-03 DIAGNOSIS — Z00.00 WELLNESS EXAMINATION: Primary | ICD-10-CM

## 2021-12-03 DIAGNOSIS — E55.9 VITAMIN D DEFICIENCY: ICD-10-CM

## 2021-12-03 DIAGNOSIS — Z11.59 ENCOUNTER FOR HEPATITIS C SCREENING TEST FOR LOW RISK PATIENT: ICD-10-CM

## 2021-12-03 DIAGNOSIS — Z13.220 LIPID SCREENING: ICD-10-CM

## 2021-12-03 DIAGNOSIS — Z13.1 SCREENING FOR DIABETES MELLITUS: ICD-10-CM

## 2021-12-03 LAB
25(OH)D3 SERPL-MCNC: 34.2 NG/ML (ref 30–100)
ALBUMIN SERPL-MCNC: 4.6 G/DL (ref 3.5–5.2)
ALBUMIN/GLOB SERPL: 1.4 G/DL
ALP SERPL-CCNC: 128 U/L (ref 39–117)
ALT SERPL W P-5'-P-CCNC: 17 U/L (ref 1–33)
ANION GAP SERPL CALCULATED.3IONS-SCNC: 11.6 MMOL/L (ref 5–15)
AST SERPL-CCNC: 20 U/L (ref 1–32)
BILIRUB SERPL-MCNC: 0.3 MG/DL (ref 0–1.2)
BUN SERPL-MCNC: 12 MG/DL (ref 8–23)
BUN/CREAT SERPL: 10.7 (ref 7–25)
CALCIUM SPEC-SCNC: 9.6 MG/DL (ref 8.6–10.5)
CHLORIDE SERPL-SCNC: 102 MMOL/L (ref 98–107)
CHOLEST SERPL-MCNC: 234 MG/DL (ref 0–200)
CO2 SERPL-SCNC: 25.4 MMOL/L (ref 22–29)
CREAT SERPL-MCNC: 1.12 MG/DL (ref 0.57–1)
DEPRECATED RDW RBC AUTO: 45.3 FL (ref 37–54)
ERYTHROCYTE [DISTWIDTH] IN BLOOD BY AUTOMATED COUNT: 13.4 % (ref 12.3–15.4)
FOLATE SERPL-MCNC: 7.54 NG/ML (ref 4.78–24.2)
GFR SERPL CREATININE-BSD FRML MDRD: 49 ML/MIN/1.73
GLOBULIN UR ELPH-MCNC: 3.3 GM/DL
GLUCOSE SERPL-MCNC: 86 MG/DL (ref 65–99)
HBA1C MFR BLD: 5.93 % (ref 4.8–5.6)
HCT VFR BLD AUTO: 36.7 % (ref 34–46.6)
HCV AB SER DONR QL: NORMAL
HDLC SERPL-MCNC: 87 MG/DL (ref 40–60)
HGB BLD-MCNC: 12.2 G/DL (ref 12–15.9)
LDLC SERPL CALC-MCNC: 136 MG/DL (ref 0–100)
LDLC/HDLC SERPL: 1.54 {RATIO}
MCH RBC QN AUTO: 30.3 PG (ref 26.6–33)
MCHC RBC AUTO-ENTMCNC: 33.2 G/DL (ref 31.5–35.7)
MCV RBC AUTO: 91.3 FL (ref 79–97)
PLATELET # BLD AUTO: 279 10*3/MM3 (ref 140–450)
PMV BLD AUTO: 11 FL (ref 6–12)
POTASSIUM SERPL-SCNC: 4.2 MMOL/L (ref 3.5–5.2)
PROT SERPL-MCNC: 7.9 G/DL (ref 6–8.5)
RBC # BLD AUTO: 4.02 10*6/MM3 (ref 3.77–5.28)
SODIUM SERPL-SCNC: 139 MMOL/L (ref 136–145)
TRIGL SERPL-MCNC: 64 MG/DL (ref 0–150)
TSH SERPL DL<=0.05 MIU/L-ACNC: 1.78 UIU/ML (ref 0.27–4.2)
VIT B12 BLD-MCNC: 298 PG/ML (ref 211–946)
VLDLC SERPL-MCNC: 11 MG/DL (ref 5–40)
WBC NRBC COR # BLD: 7.57 10*3/MM3 (ref 3.4–10.8)

## 2021-12-03 PROCEDURE — 86803 HEPATITIS C AB TEST: CPT | Performed by: NURSE PRACTITIONER

## 2021-12-03 PROCEDURE — 80061 LIPID PANEL: CPT | Performed by: NURSE PRACTITIONER

## 2021-12-03 PROCEDURE — 82306 VITAMIN D 25 HYDROXY: CPT | Performed by: NURSE PRACTITIONER

## 2021-12-03 PROCEDURE — 82607 VITAMIN B-12: CPT | Performed by: NURSE PRACTITIONER

## 2021-12-03 PROCEDURE — 83036 HEMOGLOBIN GLYCOSYLATED A1C: CPT | Performed by: NURSE PRACTITIONER

## 2021-12-03 PROCEDURE — 80053 COMPREHEN METABOLIC PANEL: CPT | Performed by: NURSE PRACTITIONER

## 2021-12-03 PROCEDURE — 84443 ASSAY THYROID STIM HORMONE: CPT | Performed by: NURSE PRACTITIONER

## 2021-12-03 PROCEDURE — 85027 COMPLETE CBC AUTOMATED: CPT | Performed by: NURSE PRACTITIONER

## 2021-12-03 PROCEDURE — 99396 PREV VISIT EST AGE 40-64: CPT | Performed by: NURSE PRACTITIONER

## 2021-12-03 PROCEDURE — 82746 ASSAY OF FOLIC ACID SERUM: CPT | Performed by: NURSE PRACTITIONER

## 2021-12-03 RX ORDER — LURASIDONE HYDROCHLORIDE 40 MG/1
1 TABLET, FILM COATED ORAL DAILY
COMMUNITY
Start: 2021-11-02 | End: 2022-02-09 | Stop reason: DRUGHIGH

## 2021-12-08 DIAGNOSIS — Z12.31 ENCOUNTER FOR SCREENING MAMMOGRAM FOR MALIGNANT NEOPLASM OF BREAST: Primary | ICD-10-CM

## 2021-12-21 ENCOUNTER — OFFICE VISIT (OUTPATIENT)
Dept: INTERNAL MEDICINE | Facility: CLINIC | Age: 61
End: 2021-12-21

## 2021-12-21 VITALS
HEART RATE: 85 BPM | HEIGHT: 65 IN | BODY MASS INDEX: 24.96 KG/M2 | DIASTOLIC BLOOD PRESSURE: 80 MMHG | TEMPERATURE: 96.9 F | RESPIRATION RATE: 18 BRPM | SYSTOLIC BLOOD PRESSURE: 124 MMHG

## 2021-12-21 DIAGNOSIS — M25.551 CHRONIC RIGHT HIP PAIN: Primary | ICD-10-CM

## 2021-12-21 DIAGNOSIS — G89.29 CHRONIC RIGHT HIP PAIN: Primary | ICD-10-CM

## 2021-12-21 PROCEDURE — 99213 OFFICE O/P EST LOW 20 MIN: CPT | Performed by: PHYSICIAN ASSISTANT

## 2021-12-21 RX ORDER — PREDNISONE 20 MG/1
TABLET ORAL
COMMUNITY
Start: 2021-12-13 | End: 2022-01-26

## 2021-12-21 RX ORDER — BUPROPION HYDROCHLORIDE 300 MG/1
TABLET ORAL
COMMUNITY
Start: 2021-12-13 | End: 2022-08-25

## 2021-12-21 NOTE — PROGRESS NOTES
[facility]  [address]  [place] [address]  Dept: [phone]  Dept Fax: [phone]  Loc: [phone]  Loc Fax: [phone]    Alisa Ames  1960    Follow Up Office Visit Note    History of Present Illness:  Patient is a 61-year-old female in today for chronic right hip pain.  Patient would like referral to Dr. Madera in orthopedics for further evaluation.  Patient continues to have chronic right hip pain.  Patient has already had total hip replacement of the left hip.      The following portions of the patient's history were reviewed and updated as appropriate: allergies, current medications, past family history, past medical history, past social history, past surgical history, and problem list.    Medications:    Current Outpatient Medications:   •  atorvastatin (LIPITOR) 10 MG tablet, Take 1 tablet by mouth Daily., Disp: 90 tablet, Rfl: 3  •  buPROPion XL (WELLBUTRIN XL) 300 MG 24 hr tablet, , Disp: , Rfl:   •  Latuda 40 MG tablet tablet, Take 1 tablet by mouth Daily., Disp: , Rfl:   •  levothyroxine (Synthroid) 75 MCG tablet, TAKE 1 TABLET DAILY, FASTING AND WAIT 1 HOUR FOR FOOD OR OTHER MEDICATIONS (DISCONTINUE SYNTHROID 88 MCG), Disp: 90 tablet, Rfl: 3  •  montelukast (SINGULAIR) 10 MG tablet, TAKE 1 TABLET EVERY NIGHT, Disp: 90 tablet, Rfl: 3  •  mupirocin (BACTROBAN) 2 % ointment, , Disp: , Rfl:   •  omeprazole (priLOSEC) 20 MG capsule, Take 1 capsule by mouth Daily., Disp: 30 capsule, Rfl: 11  •  ondansetron (Zofran) 4 MG tablet, Take 1 tablet by mouth Every 8 (Eight) Hours As Needed for Nausea., Disp: 10 tablet, Rfl: 1  •  predniSONE (DELTASONE) 20 MG tablet, , Disp: , Rfl:   •  tiZANidine (ZANAFLEX) 4 MG tablet, Take 1 tablet by mouth Every 8 (Eight) Hours As Needed for Muscle Spasms., Disp: 180 tablet, Rfl: 2    Subjective  No Known Allergies     Past Medical History:   Diagnosis Date   • COPD (chronic obstructive pulmonary  disease) (HCC)    • Fibromyalgia    • Hyperlipidemia    • Malignant neoplasm (HCC)    • Migraine    • Syncope    • Thyroid nodule    • Wears dentures     UPPER AND LOWER        Past Surgical History:   Procedure Laterality Date   • APPENDECTOMY     • CERVICAL SPINE SURGERY      Fibromyalgia and DDD with h/o C spine surgery- initially on flexeril bid.   • COLONOSCOPY  2015   • NECK SURGERY     • NOSE SURGERY      r/t Skin CA   • SHOULDER SURGERY Left 09/10/2020    left shoulder arthroscopy with lysis of adhesions and manipulation under anesthesia   • SKIN CANCER EXCISION     • THYROID LOBECTOMY Right     On discussion, pt reported a h/o right thyroid lobectomy for goiter.U/S demo surgical absence of right lobe and diffuse nodularity of the left lobe with a dominant nodule in the lower pole of 1.8 x 3.5 cm.   • TONSILLECTOMY     • TOTAL HIP ARTHROPLASTY Left 1/7/2019    Procedure: TOTAL HIP ARTHROPLASTY LEFT;  Surgeon: Allen Madera MD;  Location: [facility];  Service: Orthopedics   • TOTAL THYROIDECTOMY         Family History   Problem Relation Age of Onset   • Other Mother         malignant neoplasm   • Coronary artery disease Mother         MI (60's)   • Heart attack Mother    • Other Other         malignant neoplasm   • Valvular heart disease Father    • Birth defects Son         Social History     Socioeconomic History   • Marital status:    Tobacco Use   • Smoking status: Former Smoker     Packs/day: 0.00     Years: 40.00     Pack years: 0.00     Types: Cigarettes   • Smokeless tobacco: Never Used   • Tobacco comment: QUIT SMOKING WITH E C[employer]-1 WEEK AGO    Vaping Use   • Vaping Use: Former   • Quit date: 5/4/2020   Substance and Sexual Activity   • Alcohol use: Yes     Alcohol/week: 1.0 standard drink     Types: 1 Cans of beer per week     Comment: 1 drink per month   • Drug use: No   • Sexual activity: Yes     Partners: Male       Review of Systems   Constitutional: Negative for activity change,  "chills, fatigue, fever and unexpected weight change.   HENT: Negative for congestion, ear pain, postnasal drip, sinus pressure and sore throat.    Eyes: Negative for pain, discharge and redness.   Respiratory: Negative for cough, shortness of breath and wheezing.    Cardiovascular: Negative for chest pain, palpitations and leg swelling.   Gastrointestinal: Negative for diarrhea, nausea and vomiting.   Endocrine: Negative for cold intolerance and heat intolerance.   Genitourinary: Negative for decreased urine volume and dysuria.   Musculoskeletal: Positive for arthralgias. Negative for myalgias.   Skin: Negative for rash and wound.   Neurological: Negative for dizziness, light-headedness and headaches.   Hematological: Does not bruise/bleed easily.   Psychiatric/Behavioral: Negative for confusion, dysphoric mood and sleep disturbance. The patient is not nervous/anxious.          Objective  Vitals:    12/21/21 1047   BP: 124/80   Pulse: 85   Resp: 18   Temp: 96.9 °F (36.1 °C)   TempSrc: Temporal   Weight: Comment: n/a   Height: 165.1 cm (65\")     Body mass index is 24.96 kg/m².     Physical Exam  Physical Exam  Vitals and nursing note reviewed.   Constitutional:       General: She is not in acute distress.     Appearance: She is not ill-appearing.   HENT:      Head: Normocephalic.      Right Ear: Tympanic membrane, ear canal and external ear normal. There is no impacted cerumen.      Left Ear: Tympanic membrane, ear canal and external ear normal. There is no impacted cerumen.      Nose: No congestion or rhinorrhea.      Mouth/Throat:      Mouth: Mucous membranes are moist.      Pharynx: Oropharynx is clear. No oropharyngeal exudate or posterior oropharyngeal erythema.   Eyes:      General:         Right eye: No discharge.         Left eye: No discharge.      Extraocular Movements: Extraocular movements intact.      Conjunctiva/sclera: Conjunctivae normal.      Pupils: Pupils are equal, round, and reactive to light. "   Cardiovascular:      Rate and Rhythm: Normal rate and regular rhythm.      Heart sounds: Normal heart sounds. No murmur heard.  No friction rub. No gallop.    Pulmonary:      Effort: Pulmonary effort is normal. No respiratory distress.      Breath sounds: Normal breath sounds. No wheezing.   Abdominal:      General: Bowel sounds are normal. There is no distension.      Palpations: Abdomen is soft. There is no mass.      Tenderness: There is no abdominal tenderness.   Musculoskeletal:         General: No swelling. Normal range of motion.      Cervical back: Normal range of motion. No tenderness.      Right lower leg: No edema.      Left lower leg: No edema.   Lymphadenopathy:      Cervical: No cervical adenopathy.   Skin:     Findings: No bruising, erythema or rash.   Neurological:      Mental Status: She is oriented to person, place, and time.      Gait: Gait normal.   Psychiatric:         Mood and Affect: Mood normal.         Behavior: Behavior normal.         Thought Content: Thought content normal.         Judgment: Judgment normal.         Diagnostic Data  Procedures    Assessment  Diagnoses and all orders for this visit:    1. Chronic right hip pain (Primary)  -     Ambulatory Referral to Orthopedic Surgery        Plan    1. Chronic right hip pain (Primary)- worse, referred to ortho.      Return for Next scheduled follow up.    Giovani Carrillo PA-C  12/21/2021

## 2021-12-28 ENCOUNTER — OFFICE VISIT (OUTPATIENT)
Dept: ORTHOPEDIC SURGERY | Facility: CLINIC | Age: 61
End: 2021-12-28

## 2021-12-28 VITALS
BODY MASS INDEX: 24.98 KG/M2 | DIASTOLIC BLOOD PRESSURE: 80 MMHG | HEIGHT: 65 IN | SYSTOLIC BLOOD PRESSURE: 160 MMHG | WEIGHT: 149.91 LBS

## 2021-12-28 DIAGNOSIS — M16.11 PRIMARY OSTEOARTHRITIS OF RIGHT HIP: Primary | ICD-10-CM

## 2021-12-28 DIAGNOSIS — M25.551 RIGHT HIP PAIN: ICD-10-CM

## 2021-12-28 PROCEDURE — 99214 OFFICE O/P EST MOD 30 MIN: CPT | Performed by: ORTHOPAEDIC SURGERY

## 2021-12-28 RX ORDER — MELOXICAM 15 MG/1
TABLET ORAL
Qty: 90 TABLET | Refills: 1 | Status: SHIPPED | OUTPATIENT
Start: 2021-12-28 | End: 2022-06-30

## 2021-12-28 NOTE — PROGRESS NOTES
Orthopaedic Clinic Note: Hip New Patient    Chief Complaint   Patient presents with   • Right Hip - Initial Evaluation, Pain        HPI    Alisa Ames is a 61 y.o. female who presents with right hip pain for 2 week(s). Onset atraumatic and gradual in nature.  She was recently placed in a boot for plantar fasciitis and has noticed onset of this pain after ambulating with the boot.  Pain is localized to groin and lateral trochanter and is a 5/10 on the pain scale.Pain is described as aching. Associated symptoms include pain.  The pain is worse with walking and standing and rising from seated position; lying down and elevating the extremity improve the pain. Previous treatments have included: oral steroids since symptom onset. Although some transient relief was reported with these interventions, these conservative measures have failed and symptoms have persisted. The patient is limited in daily activities and has had a significant decrease in quality of life as a result. She denies fevers, chills, or constitutional symptoms.    I have reviewed the following portions of the patient's history:History of Present Illness    Past Medical History:   Diagnosis Date   • COPD (chronic obstructive pulmonary disease) (HCC)    • Fibromyalgia    • Hyperlipidemia    • Malignant neoplasm (HCC)    • Migraine    • Syncope    • Thyroid nodule    • Wears dentures     UPPER AND LOWER       Past Surgical History:   Procedure Laterality Date   • APPENDECTOMY     • CERVICAL SPINE SURGERY      Fibromyalgia and DDD with h/o C spine surgery- initially on flexeril bid.   • COLONOSCOPY  2015   • NECK SURGERY     • NOSE SURGERY      r/t Skin CA   • SHOULDER SURGERY Left 09/10/2020    left shoulder arthroscopy with lysis of adhesions and manipulation under anesthesia   • SKIN CANCER EXCISION     • THYROID LOBECTOMY Right     On discussion, pt reported a h/o right thyroid lobectomy for goiter.U/S demo surgical absence of right lobe and diffuse  nodularity of the left lobe with a dominant nodule in the lower pole of 1.8 x 3.5 cm.   • TONSILLECTOMY     • TOTAL HIP ARTHROPLASTY Left 1/7/2019    Procedure: TOTAL HIP ARTHROPLASTY LEFT;  Surgeon: Allen Madera MD;  Location: [facility];  Service: Orthopedics   • TOTAL THYROIDECTOMY        Family History   Problem Relation Age of Onset   • Other Mother         malignant neoplasm   • Coronary artery disease Mother         MI (60's)   • Heart attack Mother    • Other Other         malignant neoplasm   • Valvular heart disease Father    • Birth defects Son      Social History     Socioeconomic History   • Marital status:    Tobacco Use   • Smoking status: Former Smoker     Packs/day: 0.00     Years: 40.00     Pack years: 0.00     Types: Cigarettes   • Smokeless tobacco: Never Used   • Tobacco comment: QUIT SMOKING WITH E C[employer]-1 WEEK AGO    Vaping Use   • Vaping Use: Former   • Quit date: 5/4/2020   Substance and Sexual Activity   • Alcohol use: Yes     Alcohol/week: 1.0 standard drink     Types: 1 Cans of beer per week     Comment: 1 drink per month   • Drug use: No   • Sexual activity: Yes     Partners: Male      Current Outpatient Medications on File Prior to Visit   Medication Sig Dispense Refill   • atorvastatin (LIPITOR) 10 MG tablet Take 1 tablet by mouth Daily. 90 tablet 3   • buPROPion XL (WELLBUTRIN XL) 300 MG 24 hr tablet      • Latuda 40 MG tablet tablet Take 1 tablet by mouth Daily.     • levothyroxine (Synthroid) 75 MCG tablet TAKE 1 TABLET DAILY, FASTING AND WAIT 1 HOUR FOR FOOD OR OTHER MEDICATIONS (DISCONTINUE SYNTHROID 88 MCG) 90 tablet 3   • montelukast (SINGULAIR) 10 MG tablet TAKE 1 TABLET EVERY NIGHT 90 tablet 3   • mupirocin (BACTROBAN) 2 % ointment      • omeprazole (priLOSEC) 20 MG capsule Take 1 capsule by mouth Daily. 30 capsule 11   • ondansetron (Zofran) 4 MG tablet Take 1 tablet by mouth Every 8 (Eight) Hours As Needed for Nausea. 10 tablet 1   • predniSONE (DELTASONE) 20 MG  "tablet      • tiZANidine (ZANAFLEX) 4 MG tablet Take 1 tablet by mouth Every 8 (Eight) Hours As Needed for Muscle Spasms. 180 tablet 2     No current facility-administered medications on file prior to visit.      No Known Allergies     Review of Systems   Constitutional: Negative.    HENT: Negative.    Eyes: Negative.    Respiratory: Negative.    Cardiovascular: Negative.    Gastrointestinal: Negative.    Endocrine: Negative.    Genitourinary: Negative.    Musculoskeletal: Positive for arthralgias.   Skin: Negative.    Allergic/Immunologic: Negative.    Neurological: Negative.    Hematological: Negative.    Psychiatric/Behavioral: Negative.         The patient's Review of Systems was personally reviewed and confirmed as accurate.    The following portions of the patient's history were reviewed and updated as appropriate: allergies, current medications, past family history, past medical history, past social history, past surgical history and problem list.    Physical Exam  Blood pressure 160/80, height 165.1 cm (65\"), weight 68 kg (149 lb 14.6 oz), not currently breastfeeding.    Body mass index is 24.95 kg/m².    GENERAL APPEARANCE: awake, alert & oriented x 3, in no acute distress and well developed, well nourished  PSYCH: normal affect  LUNGS:  breathing nonlabored  EYES: sclera anicteric  CARDIOVASCULAR: palpable dorsalis pedis, palpable posterior tibial bilaterally. Capillary refill less than 2 seconds  EXTREMITIES: no clubbing, cyanosis  GAIT:  Antalgic           Right Hip Exam:  RANGE OF MOTION:   FLEXION CONTRACTURE: None   FLEXION: 110 degrees   INTERNAL ROTATION: 20 degrees at 90 degrees of flexion   EXTERNAL ROTATION: 40 degrees at 90 degrees of flexion    PAIN WITH HIP MOTION: Trace groin pain with terminal extensive motion  PAIN WITH LOGROLL: no  STINCHFIELD TEST: negative    KNEE EXAM: full knee ROM (0-120 degrees), stable to varus and valgus stress at terminal extension and 30 degrees flexion "     STRENGTH:  5/5 hip adduction, abduction, flexion. 5/5 strength knee flexion, extension. 5/5 strength ankle dorsiflexion and plantarflexion.     GREATER TROCHANTER BURSAL PAIN:  no     REFLEXES:   PATELLAR 2+/4   ACHILLES 2+/4    CLONUS: negative  STRAIGHT LEG TEST:   negative    SENSATION TO LIGHT TOUCH:  DEEP PERONEAL/SUPERFICIAL PERONEAL/SURAL/SAPHENOUS/TIBIAL:  intact    EDEMA:   no  ERYTHEMA:  no  WOUNDS/INCISIONS: no overlying skin problems.      Left Hip Exam:   RANGE OF MOTION:   FLEXION CONTRACTURE: None   FLEXION: 110 degrees   INTERNAL ROTATION: 20 degrees at 90 degrees of flexion   EXTERNAL ROTATION: 40 degrees at 90 degrees of flexion    PAIN WITH HIP MOTION: no  PAIN WITH LOGROLL: no  STINCHFIELD TEST: negative    KNEE EXAM: full knee ROM (0-120 degrees), stable to varus and valgus stress at terminal extension and 30 degrees flexion     STRENGTH:  5/5 hip adduction, abduction, flexion. 5/5 strength knee flexion, extension. 5/5 strength ankle dorsiflexion and plantarflexion.     GREATER TROCHANTER BURSAL PAIN:  no     REFLEXES:   PATELLAR 2+/4   ACHILLES 2+/4    CLONUS: negative  STRAIGHT LEG TEST:   negative    SENSATION TO LIGHT TOUCH:  DEEP PERONEAL/SUPERFICIAL PERONEAL/SURAL/SAPHENOUS/TIBIAL:  intact    EDEMA:   no  ERYTHEMA:  no  WOUNDS/INCISIONS: no overlying skin problems.  Well-healed hip incision      ------------------------------------------------------------------    LEG LENGTHS:  equal  _____________________________________________________  _____________________________________________________    RADIOGRAPHIC FINDINGS:   Indication: Right hip pain    Comparison: Todays xrays were compared to previous xrays from 4/18/2019    AP pelvis, hip 2 views: Right: mild joint space narrowing, there are marginal osteophytes visualized at the femoral head-neck junction and No significant changes compared to prior radiographs.; Left: Demonstrate a well positioned total hip without evidence of wear,  loosening, fracture or osteolysis, femoral head is concentrically reduced within the acetabulum and No significant changes compared to prior radiographs.    Assessment/Plan:   Diagnosis Plan   1. Primary osteoarthritis of right hip  meloxicam (MOBIC) 15 MG tablet   2. Right hip pain  XR Hip With or Without Pelvis 2 - 3 View Right     Patient is suffering from an arthritic flareup of the right hip.  I discussed treatment options with her regarding her hip pain.  Given the recent onset coordinating with the boot wear, this is likely due to altered gait pattern.  I discussed treatment options.  She is agreeable to prescription anti-inflammatory.  She will follow-up as needed.    Allen Madera MD  12/28/21  10:25 EST

## 2021-12-29 ENCOUNTER — PATIENT ROUNDING (BHMG ONLY) (OUTPATIENT)
Dept: ORTHOPEDIC SURGERY | Facility: CLINIC | Age: 61
End: 2021-12-29

## 2021-12-29 NOTE — PROGRESS NOTES
December 29, 2021    Hello, may I speak with Alisa Ames?    My name is Re      I am  with [facility]  [address]  [facility] [place] [facility] [address].    Before we get started may I verify your date of birth? 1960    I am calling to officially welcome you to our practice and ask about your recent visit. Is this a good time to talk? No.  Voicemail left      Thank you, and have a great day.

## 2022-01-05 RX ORDER — ARIPIPRAZOLE 5 MG/1
5 TABLET ORAL DAILY
Qty: 30 TABLET | Refills: 6 | OUTPATIENT
Start: 2022-01-05

## 2022-01-24 ENCOUNTER — TELEPHONE (OUTPATIENT)
Dept: INTERNAL MEDICINE | Facility: CLINIC | Age: 62
End: 2022-01-24

## 2022-01-24 NOTE — TELEPHONE ENCOUNTER
Pt has an appt on 1/26/2022, she wants other telehealth options, but was informed we don't do visits over telephone only video in office, which are covered by insurance. Well pt states she has done plenty of phone calls with giancarlo before that were covered by her insurance, then stated her last visit she wasn't seen b/c she couldn't do a video visit. I told the pt I'd put a message to the back for her, but that she would have to a video visit or office visit, which she responded she has high bp and needs to be called and if anything happens its our fault.

## 2022-01-26 ENCOUNTER — TELEMEDICINE (OUTPATIENT)
Dept: INTERNAL MEDICINE | Facility: CLINIC | Age: 62
End: 2022-01-26

## 2022-01-26 VITALS
DIASTOLIC BLOOD PRESSURE: 74 MMHG | SYSTOLIC BLOOD PRESSURE: 148 MMHG | WEIGHT: 156.2 LBS | HEIGHT: 65 IN | HEART RATE: 52 BPM | TEMPERATURE: 97.1 F | OXYGEN SATURATION: 97 % | BODY MASS INDEX: 26.02 KG/M2

## 2022-01-26 DIAGNOSIS — I10 ESSENTIAL HYPERTENSION: Primary | ICD-10-CM

## 2022-01-26 DIAGNOSIS — R94.31 ABNORMAL EKG: ICD-10-CM

## 2022-01-26 DIAGNOSIS — I49.3 VENTRICULAR PREMATURE COMPLEXES: ICD-10-CM

## 2022-01-26 DIAGNOSIS — I51.7 LEFT ATRIAL ENLARGEMENT: ICD-10-CM

## 2022-01-26 PROCEDURE — 99214 OFFICE O/P EST MOD 30 MIN: CPT | Performed by: NURSE PRACTITIONER

## 2022-01-26 RX ORDER — CLONIDINE HYDROCHLORIDE 0.1 MG/1
0.1 TABLET ORAL DAILY PRN
Qty: 30 TABLET | Refills: 2 | Status: SHIPPED | OUTPATIENT
Start: 2022-01-26 | End: 2022-01-28 | Stop reason: SDUPTHER

## 2022-01-26 RX ORDER — AMLODIPINE BESYLATE 5 MG/1
5 TABLET ORAL DAILY
Qty: 30 TABLET | Refills: 2 | Status: SHIPPED | OUTPATIENT
Start: 2022-01-26 | End: 2022-01-28 | Stop reason: SDUPTHER

## 2022-01-26 RX ORDER — CLONIDINE HYDROCHLORIDE 0.1 MG/1
TABLET ORAL
COMMUNITY
Start: 2022-01-24 | End: 2022-01-26 | Stop reason: SDUPTHER

## 2022-01-26 NOTE — PROGRESS NOTES
"Chief Complaint   Patient presents with   • Hypertension     ER [facility] Monday        HPI  Alisa Ames is a 61 y.o. female presents for an ER follow-up.  Pt was seen at [facility] on Monday because her blood pressure was elevated.   Pt states that her SBP was over 200s on Monday. She was given Clonidine to use on a prn basis.  She states that she had a negative CXR and EKG.  Denies CP/SOB.  She drinks 1 cup of caffeine daily and she states \"I probable should go lighter on the salt\".  Pt reports that her BP has been intermittently high \"for a while\".  Denies any swelling of extremities.  Pt has been monitoring her BP at home and has log with her.    The following portions of the patient's history were reviewed and updated as appropriate: allergies, current medications, past family history, past medical history, past social history, past surgical history and problem list.    Subjective  Review of Systems   Constitutional: Negative for activity change, appetite change and fatigue.   HENT: Negative for congestion.    Respiratory: Negative for cough and shortness of breath.    Cardiovascular: Negative for chest pain and leg swelling.   Gastrointestinal: Negative for abdominal pain.   Neurological: Negative for dizziness, weakness and confusion.   Psychiatric/Behavioral: Negative for behavioral problems and decreased concentration.       Objective  Visit Vitals  /74   Pulse 52   Temp 97.1 °F (36.2 °C) (Temporal)   Ht 165.1 cm (65\")   Wt 70.9 kg (156 lb 3.2 oz)   SpO2 97%   BMI 25.99 kg/m²        Physical Exam  Vitals and nursing note reviewed.   HENT:      Head: Normocephalic.   Eyes:      Pupils: Pupils are equal, round, and reactive to light.   Cardiovascular:      Rate and Rhythm: Normal rate and regular rhythm. Frequent extrasystoles are present.     Pulses: Normal pulses.      Heart sounds: Normal heart sounds.   Pulmonary:      Effort: Pulmonary effort is normal.      Breath sounds: Normal breath sounds. "   Musculoskeletal:      Right lower leg: No edema.      Left lower leg: No edema.   Skin:     General: Skin is warm and dry.      Capillary Refill: Capillary refill takes less than 2 seconds.   Neurological:      General: No focal deficit present.      Mental Status: She is alert and oriented to person, place, and time.      Gait: Gait is intact.   Psychiatric:         Attention and Perception: Attention normal.         Mood and Affect: Mood normal.         Behavior: Behavior normal.          Procedures     Assessment and Plan  Diagnoses and all orders for this visit:    1. Essential hypertension (Primary)  -     amLODIPine (Norvasc) 5 MG tablet; Take 1 tablet by mouth Daily.  Dispense: 30 tablet; Refill: 2  -     cloNIDine (CATAPRES) 0.1 MG tablet; Take 1 tablet by mouth Daily As Needed for High Blood Pressure. Take for BP >160/90  Dispense: 30 tablet; Refill: 2  -     Cancel: Adult Transthoracic Echo Complete W/ Cont if Necessary Per Protocol; Future  -     Adult Transthoracic Echo Complete W/ Cont if Necessary Per Protocol; Future    2. Abnormal EKG  -     Cancel: Adult Transthoracic Echo Complete W/ Cont if Necessary Per Protocol; Future  -     Adult Transthoracic Echo Complete W/ Cont if Necessary Per Protocol; Future    3. Left atrial enlargement    4. Ventricular premature complexes    Will request ER visit  BP log reviewed, mostly high readings  d/w pt that Meloxicam could raise her BP  Start Amlodipine  Cont prn Clonidine  EKG done - possible left atrial enlargement, frequent supraventricular premature complexes  Schedule ECHO - if abnormal will likely need cardio referral    Return in about 4 weeks (around 2/23/2022) for Recheck HTN.        GERSON Barr

## 2022-01-27 ENCOUNTER — TELEPHONE (OUTPATIENT)
Dept: INTERNAL MEDICINE | Facility: CLINIC | Age: 62
End: 2022-01-27

## 2022-01-27 NOTE — TELEPHONE ENCOUNTER
Caller: Alisa Ames    Relationship: Self    Best call back number: [phone]    What medication are you requesting: BLOOD PRESSURE MEDS FROM  [id number]    What are your current symptoms: HIGH BLOOD PRESSURE     If a prescription is needed, what is your preferred pharmacy and phone number: [id number] [address] AT [facility] [address] - [phone]  - [phone]     Additional notes:  ALISA IS STILL WAITING FOR PEDRO TO CALL IN HER RX.     PLEASE CALL ALISA WHEN SED NT O PHARMACY

## 2022-01-28 ENCOUNTER — TELEPHONE (OUTPATIENT)
Dept: INTERNAL MEDICINE | Facility: CLINIC | Age: 62
End: 2022-01-28

## 2022-01-28 DIAGNOSIS — I10 ESSENTIAL HYPERTENSION: ICD-10-CM

## 2022-01-28 RX ORDER — CLONIDINE HYDROCHLORIDE 0.1 MG/1
0.1 TABLET ORAL DAILY PRN
Qty: 30 TABLET | Refills: 2 | Status: SHIPPED | OUTPATIENT
Start: 2022-01-28 | End: 2022-04-08

## 2022-01-28 RX ORDER — AMLODIPINE BESYLATE 5 MG/1
5 TABLET ORAL DAILY
Qty: 30 TABLET | Refills: 2 | Status: SHIPPED | OUTPATIENT
Start: 2022-01-28 | End: 2022-04-08

## 2022-01-28 NOTE — TELEPHONE ENCOUNTER
Patient called back and said she needs to talk to someone today about her blood pressure being high and her medication sent into the wrong pharmacy. Please give pt a call

## 2022-01-28 NOTE — TELEPHONE ENCOUNTER
Patient was returning a call she received yesterday about her bp medication. Please give pt a call back.

## 2022-01-28 NOTE — TELEPHONE ENCOUNTER
Pt called the office and said since her amlodipine is coming in [facility], can  an emergency 5 day refill be called in to the [facility] pharmacy in [place] , Please advise.

## 2022-01-31 ENCOUNTER — TELEPHONE (OUTPATIENT)
Dept: INTERNAL MEDICINE | Facility: CLINIC | Age: 62
End: 2022-01-31

## 2022-01-31 NOTE — TELEPHONE ENCOUNTER
Pt states her bp is 195/92 right now and would like to speak with someone about changing her meds again, please advise

## 2022-01-31 NOTE — TELEPHONE ENCOUNTER
Advised Pt to give the b/p more time to regulate. She has only taken the new medication for 2 days. She will call back at the end of the week with readings.

## 2022-02-04 ENCOUNTER — TELEPHONE (OUTPATIENT)
Dept: INTERNAL MEDICINE | Facility: CLINIC | Age: 62
End: 2022-02-04

## 2022-02-07 ENCOUNTER — APPOINTMENT (OUTPATIENT)
Dept: OTHER | Facility: HOSPITAL | Age: 62
End: 2022-02-07

## 2022-02-07 ENCOUNTER — HOSPITAL ENCOUNTER (OUTPATIENT)
Dept: MAMMOGRAPHY | Facility: HOSPITAL | Age: 62
Discharge: HOME | End: 2022-02-07
Admitting: NURSE PRACTITIONER

## 2022-02-07 DIAGNOSIS — Z92.89 HISTORY OF MAMMOGRAM: ICD-10-CM

## 2022-02-07 DIAGNOSIS — Z12.31 ENCOUNTER FOR SCREENING MAMMOGRAM FOR MALIGNANT NEOPLASM OF BREAST: ICD-10-CM

## 2022-02-07 PROCEDURE — 77067 SCR MAMMO BI INCL CAD: CPT

## 2022-02-07 PROCEDURE — 77067 SCR MAMMO BI INCL CAD: CPT | Performed by: RADIOLOGY

## 2022-02-07 PROCEDURE — 77063 BREAST TOMOSYNTHESIS BI: CPT | Performed by: RADIOLOGY

## 2022-02-07 PROCEDURE — 77063 BREAST TOMOSYNTHESIS BI: CPT

## 2022-02-09 ENCOUNTER — OFFICE VISIT (OUTPATIENT)
Dept: INTERNAL MEDICINE | Facility: CLINIC | Age: 62
End: 2022-02-09

## 2022-02-09 VITALS
HEART RATE: 84 BPM | WEIGHT: 154 LBS | SYSTOLIC BLOOD PRESSURE: 136 MMHG | TEMPERATURE: 97.1 F | HEIGHT: 65 IN | OXYGEN SATURATION: 98 % | DIASTOLIC BLOOD PRESSURE: 74 MMHG | BODY MASS INDEX: 25.66 KG/M2 | RESPIRATION RATE: 18 BRPM

## 2022-02-09 DIAGNOSIS — R00.2 PALPITATIONS: ICD-10-CM

## 2022-02-09 DIAGNOSIS — M50.30 DDD (DEGENERATIVE DISC DISEASE), CERVICAL: ICD-10-CM

## 2022-02-09 DIAGNOSIS — I10 PRIMARY HYPERTENSION: Primary | ICD-10-CM

## 2022-02-09 PROCEDURE — 99213 OFFICE O/P EST LOW 20 MIN: CPT | Performed by: PHYSICIAN ASSISTANT

## 2022-02-09 RX ORDER — TIZANIDINE 4 MG/1
4 TABLET ORAL EVERY 8 HOURS PRN
Qty: 180 TABLET | Refills: 2 | Status: SHIPPED | OUTPATIENT
Start: 2022-02-09 | End: 2022-02-09

## 2022-02-09 RX ORDER — LURASIDONE HYDROCHLORIDE 60 MG/1
80 TABLET, FILM COATED ORAL
COMMUNITY
Start: 2022-01-26 | End: 2022-07-05

## 2022-02-09 RX ORDER — LOSARTAN POTASSIUM 25 MG/1
25 TABLET ORAL DAILY
Qty: 30 TABLET | Refills: 5 | Status: SHIPPED | OUTPATIENT
Start: 2022-02-09 | End: 2022-05-25 | Stop reason: SDUPTHER

## 2022-02-09 RX ORDER — TIZANIDINE 4 MG/1
4 TABLET ORAL EVERY 8 HOURS PRN
Qty: 180 TABLET | Refills: 2 | Status: SHIPPED | OUTPATIENT
Start: 2022-02-09 | End: 2022-09-16

## 2022-02-09 NOTE — PROGRESS NOTES
[facility]  [address]  [place] [address]  Dept: [phone]  Dept Fax: [phone]  Loc: [phone]  Loc Fax: [phone]    Alisa Ames  1960    Follow Up Office Visit Note    History of Present Illness:  Patient is a 61-year-old female in today for high blood pressure.  Patient on Norvasc 5 mg daily and Catapres 0.1 mg twice daily as needed if blood pressure higher than 160/90.  Patient taking medication as directed but states blood pressure has been trending higher recently around 160-180/95 on average.  Patient denies any chest pain or headache.  When asked about heart rate skipping beats patient states that she does not have a history of this but had an EKG last week which showed she had some skipped beats.  Patient does report a history of some palpitations in the past. Patient was supposed to have a test done on her heart, unclear as to what this is.  However, she would like referred to cardiology at this point for further evaluation.  Recent EKG showed sinus rhythm with frequent PVCs.    Hypertension  Pertinent negatives include no chest pain, headaches, palpitations or shortness of breath.       The following portions of the patient's history were reviewed and updated as appropriate: allergies, current medications, past family history, past medical history, past social history, past surgical history, and problem list.    Medications:    Current Outpatient Medications:   •  amLODIPine (Norvasc) 5 MG tablet, Take 1 tablet by mouth Daily., Disp: 30 tablet, Rfl: 2  •  atorvastatin (LIPITOR) 10 MG tablet, Take 1 tablet by mouth Daily., Disp: 90 tablet, Rfl: 3  •  buPROPion XL (WELLBUTRIN XL) 300 MG 24 hr tablet, , Disp: , Rfl:   •  cloNIDine (CATAPRES) 0.1 MG tablet, Take 1 tablet by mouth Daily As Needed for High Blood Pressure. Take for BP >160/90, Disp: 30 tablet, Rfl: 2  •  Latuda 60 MG tablet tablet, , Disp: , Rfl:   •   levothyroxine (Synthroid) 75 MCG tablet, TAKE 1 TABLET DAILY, FASTING AND WAIT 1 HOUR FOR FOOD OR OTHER MEDICATIONS (DISCONTINUE SYNTHROID 88 MCG), Disp: 90 tablet, Rfl: 3  •  meloxicam (MOBIC) 15 MG tablet, 1 PO Daily with food., Disp: 90 tablet, Rfl: 1  •  montelukast (SINGULAIR) 10 MG tablet, TAKE 1 TABLET EVERY NIGHT, Disp: 90 tablet, Rfl: 3  •  omeprazole (priLOSEC) 20 MG capsule, Take 1 capsule by mouth Daily., Disp: 30 capsule, Rfl: 11  •  ondansetron (Zofran) 4 MG tablet, Take 1 tablet by mouth Every 8 (Eight) Hours As Needed for Nausea., Disp: 10 tablet, Rfl: 1  •  tiZANidine (ZANAFLEX) 4 MG tablet, Take 1 tablet by mouth Every 8 (Eight) Hours As Needed for Muscle Spasms., Disp: 180 tablet, Rfl: 2  •  losartan (COZAAR) 25 MG tablet, Take 1 tablet by mouth Daily., Disp: 30 tablet, Rfl: 5    Subjective  No Known Allergies     Past Medical History:   Diagnosis Date   • COPD (chronic obstructive pulmonary disease) (HCC)    • Fibromyalgia    • Hyperlipidemia    • Malignant neoplasm (HCC)    • Migraine    • Syncope    • Thyroid nodule    • Wears dentures     UPPER AND LOWER        Past Surgical History:   Procedure Laterality Date   • APPENDECTOMY     • BREAST BIOPSY Right    • CERVICAL SPINE SURGERY      Fibromyalgia and DDD with h/o C spine surgery- initially on flexeril bid.   • COLONOSCOPY  2015   • NECK SURGERY     • NOSE SURGERY      r/t Skin CA   • SHOULDER SURGERY Left 09/10/2020    left shoulder arthroscopy with lysis of adhesions and manipulation under anesthesia   • SKIN CANCER EXCISION     • THYROID LOBECTOMY Right     On discussion, pt reported a h/o right thyroid lobectomy for goiter.U/S demo surgical absence of right lobe and diffuse nodularity of the left lobe with a dominant nodule in the lower pole of 1.8 x 3.5 cm.   • TONSILLECTOMY     • TOTAL HIP ARTHROPLASTY Left 1/7/2019    Procedure: TOTAL HIP ARTHROPLASTY LEFT;  Surgeon: Allen Madera MD;  Location: [facility] OR;  Service: Orthopedics    • TOTAL THYROIDECTOMY         Family History   Problem Relation Age of Onset   • Other Mother         malignant neoplasm   • Coronary artery disease Mother         MI (60's)   • Heart attack Mother    • Other Other         malignant neoplasm   • Valvular heart disease Father    • Birth defects Son    • Breast cancer Daughter 40   • Breast cancer Paternal Grandmother         DX AGE MID 80'S   • Breast cancer Maternal Cousin         DX AGE 40'S   • Ovarian cancer Neg Hx         Social History     Socioeconomic History   • Marital status:    Tobacco Use   • Smoking status: Former Smoker     Packs/day: 0.00     Years: 40.00     Pack years: 0.00     Types: Cigarettes   • Smokeless tobacco: Never Used   • Tobacco comment: QUIT SMOKING WITH E C[employer]-1 WEEK AGO    Vaping Use   • Vaping Use: Former   • Quit date: 5/4/2020   Substance and Sexual Activity   • Alcohol use: Yes     Alcohol/week: 1.0 standard drink     Types: 1 Cans of beer per week     Comment: 1 drink per month   • Drug use: No   • Sexual activity: Yes     Partners: Male       Review of Systems   Constitutional: Negative for activity change, chills, fatigue, fever and unexpected weight change.   HENT: Negative for congestion, ear pain, postnasal drip, sinus pressure and sore throat.    Eyes: Negative for pain, discharge and redness.   Respiratory: Negative for cough, shortness of breath and wheezing.    Cardiovascular: Negative for chest pain, palpitations and leg swelling.   Gastrointestinal: Negative for diarrhea, nausea and vomiting.   Endocrine: Negative for cold intolerance and heat intolerance.   Genitourinary: Negative for decreased urine volume and dysuria.   Musculoskeletal: Negative for arthralgias and myalgias.   Skin: Negative for rash and wound.   Neurological: Negative for dizziness, light-headedness and headaches.   Hematological: Does not bruise/bleed easily.   Psychiatric/Behavioral: Negative for confusion, dysphoric mood and sleep  "disturbance. The patient is not nervous/anxious.          Objective  Vitals:    02/09/22 0917   BP: 136/74   Pulse: 84   Resp: 18   Temp: 97.1 °F (36.2 °C)   TempSrc: Temporal   SpO2: 98%   Weight: 69.9 kg (154 lb)   Height: 165.1 cm (65\")     Body mass index is 25.63 kg/m².     Physical Exam  Physical Exam  Vitals and nursing note reviewed.   Constitutional:       General: She is not in acute distress.     Appearance: She is not ill-appearing.   HENT:      Head: Normocephalic.      Right Ear: Tympanic membrane, ear canal and external ear normal. There is no impacted cerumen.      Left Ear: Tympanic membrane, ear canal and external ear normal. There is no impacted cerumen.      Nose: No congestion or rhinorrhea.      Mouth/Throat:      Mouth: Mucous membranes are moist.      Pharynx: Oropharynx is clear. No oropharyngeal exudate or posterior oropharyngeal erythema.   Eyes:      General:         Right eye: No discharge.         Left eye: No discharge.      Extraocular Movements: Extraocular movements intact.      Conjunctiva/sclera: Conjunctivae normal.      Pupils: Pupils are equal, round, and reactive to light.   Cardiovascular:      Rate and Rhythm: Normal rate. Rhythm irregular.      Heart sounds: Normal heart sounds. No murmur heard.  No friction rub. No gallop.    Pulmonary:      Effort: Pulmonary effort is normal. No respiratory distress.      Breath sounds: Normal breath sounds. No wheezing.   Abdominal:      General: Bowel sounds are normal. There is no distension.      Palpations: Abdomen is soft. There is no mass.      Tenderness: There is no abdominal tenderness.   Musculoskeletal:         General: No swelling. Normal range of motion.      Cervical back: Normal range of motion. No tenderness.      Right lower leg: No edema.      Left lower leg: No edema.   Lymphadenopathy:      Cervical: No cervical adenopathy.   Skin:     Findings: No bruising, erythema or rash.   Neurological:      Mental Status: She is " oriented to person, place, and time.      Gait: Gait normal.   Psychiatric:         Mood and Affect: Mood normal.         Behavior: Behavior normal.         Thought Content: Thought content normal.         Judgment: Judgment normal.         Diagnostic Data  Procedures    Assessment  Diagnoses and all orders for this visit:    1. Primary hypertension (Primary)  -     losartan (COZAAR) 25 MG tablet; Take 1 tablet by mouth Daily.  Dispense: 30 tablet; Refill: 5    2. DDD (degenerative disc disease), cervical  -     Discontinue: tiZANidine (ZANAFLEX) 4 MG tablet; Take 1 tablet by mouth Every 8 (Eight) Hours As Needed for Muscle Spasms.  Dispense: 180 tablet; Refill: 2  -     tiZANidine (ZANAFLEX) 4 MG tablet; Take 1 tablet by mouth Every 8 (Eight) Hours As Needed for Muscle Spasms.  Dispense: 180 tablet; Refill: 2    3. Palpitations  -     Ambulatory Referral to Cardiology        Plan    1. Primary hypertension (Primary)-uncontrolled on Norvasc 5 mg daily and clonidine 0.1 mg twice daily as needed.  Will add losartan 25 mg daily to current regimen. Advised patient to take blood pressure readings at home 2-3 times daily. Advised if blood pressure higher than 160/100 or lower than 100/60 to call the office immediately. If symptomatic, go to the ER. Patient verbalized understanding of all instructions given and complied.     2. DDD (degenerative disc disease), cervical- refilled zanaflex.    3. Palpitations- referred to cardiology. Advised for cardiac sx to go to ER.      Return for Next scheduled follow up.    Giovani Carrillo PA-C  02/09/2022

## 2022-02-23 ENCOUNTER — TELEPHONE (OUTPATIENT)
Dept: INTERNAL MEDICINE | Facility: CLINIC | Age: 62
End: 2022-02-23

## 2022-02-24 RX ORDER — MONTELUKAST SODIUM 10 MG/1
TABLET ORAL
Qty: 90 TABLET | Refills: 3 | Status: SHIPPED | OUTPATIENT
Start: 2022-02-24 | End: 2023-02-20

## 2022-02-24 NOTE — PROGRESS NOTES
Labs show kidney function has dropped back down so make sure to be drinking adequate amount of water. This could also be due to taking naproxen so decrease this if possible. All other labs look good.      Patient : Galina Olivarez Age: 21 month old Sex: female   MRN: [id number] Encounter Date: 2022      History     Chief Complaint   Patient presents with   • Fever 9 Weeks to 74 years     woke up with fever at 2215, tmax 104.1, tylenol given. Mom called PMD and was advised to come to ED     HPI  Galina is a 21 mo F with pulmonary stenosis, who presents with fever and congestion. She started having a true fever  at 2200 to 104.1 per mom. She started having congestion  pm with low grade fever, but not >100F. She also had a cough that was infrequent, and nonproductive. Tonight, with the fever, she had some congesiton and difficulty breathing, so mom called PCP, who recommended they go to the ED. Mom did give tylenol at 2220 before coming here. Denies vomiting, diarrhea, sick contacts. She does not go to day care. She has had some decreased PO and UOP, and has been stooling less frequently, and a little less energy. She did go to the library and zoo over the weekend. Her family is vaccinated for covid. She had a AOM a few months ago, but parents did not give abx at that time because symptoms improved in 1 day.        No Known Allergies    Discharge Medication List as of 2022  3:25 AM      Prior to Admission Medications    Details   VITAMIN D, CHOLECALCIFEROL, PO Take 1 drop by mouth daily.Historical Med             Past Medical History:   Diagnosis Date   • Congenital pulmonary valve abnormality 2021    Peak gradient ~ 60 mmHg pre balloon valvulplasty   • Family history of major depression 2020    mom is on zoloft   • Fissure, anal 2021   • Gastroesophageal reflux disease 2020   • H/O valvuloplasty    • Murmur 2020   • [place] screening tests negative 2020   • Other constipation 2021   • Pulmonary stenosis    • Slow weight gain of  2020   • Status post transcatheter balloon dilatation of pulmonary valve 2021       Past Surgical History:   Procedure  Laterality Date   • PULMONARY VALVULOPLASTY  02/26/2021    Dr Manjarrez       Family History   Problem Relation Age of Onset   • Depression Mother         birth mom, fartun has 2 moms, zoloft       Social History     Tobacco Use   • Smoking status: Never Smoker   • Smokeless tobacco: Never Used   Substance Use Topics   • Alcohol use: Not on file   • Drug use: Never   She lives at home with 2 moms.     Imm: UTD    Review of Systems  All other ROS negative unless noted above.     Physical Exam     ED Triage Vitals   ED Triage Vitals Group      Temp 02/23/22 2331 (!) 101.3 °F (38.5 °C)      Heart Rate 02/23/22 2331 146      Resp 02/23/22 2331 34      BP 02/24/22 0239 (!) 88/64      SpO2 02/23/22 2331 99 %      EtCO2 mmHg --       Height --       Weight 02/23/22 2331 23 lb 2.4 oz (10.5 kg)      Weight Scale Used 02/23/22 2331 Infant scale      BMI (Calculated) --       IBW/kg (Calculated) --        Physical Exam  Vitals and nursing note reviewed.   Constitutional:       General: She is not in acute distress.     Appearance: Normal appearance. She is normal weight.   HENT:      Head: Normocephalic and atraumatic.      Right Ear: Ear canal and external ear normal. There is impacted cerumen.      Left Ear: Ear canal and external ear normal. Tympanic membrane is erythematous.      Nose: Rhinorrhea present.      Mouth/Throat:      Mouth: Mucous membranes are moist.      Pharynx: Oropharynx is clear. No posterior oropharyngeal erythema.      Neck: Normal range of motion.   Eyes:      Conjunctiva/sclera: Conjunctivae normal.      Pupils: Pupils are equal, round, and reactive to light.   Cardiovascular:      Rate and Rhythm: Normal rate and regular rhythm.      Pulses: Normal pulses.      Heart sounds: Normal heart sounds. No murmur heard.  Pulmonary:      Effort: Pulmonary effort is normal. No respiratory distress.      Breath sounds: Normal breath sounds. No wheezing or rhonchi.   Abdominal:      General: Abdomen is flat.  Bowel sounds are normal.      Palpations: Abdomen is soft.      Tenderness: There is no abdominal tenderness.   Musculoskeletal:         General: Normal range of motion.   Lymphadenopathy:      Cervical: No cervical adenopathy.   Skin:     General: Skin is warm.      Capillary Refill: Capillary refill takes less than 2 seconds.      Coloration: Skin is not pale.      Findings: No erythema or rash.   Neurological:      General: No focal deficit present.      Mental Status: She is alert.         ED Course     Procedures    Lab Results     Results for orders placed or performed during the hospital encounter of 02/23/22   COVID/Flu/RSV panel   Result Value Ref Range    Rapid SARS-COV-2 by PCR Not Detected Not Detected / Detected / Presumptive Positive / Inhibitors present    Influenza A by PCR Not Detected Not Detected    Influenza B by PCR Not Detected Not Detected    RSV BY PCR Not Detected Not Detected    Isolation Guidelines      Procedural Comment     Urinalysis With Microscopy Exam W/O C/S   Result Value Ref Range    COLOR, URINALYSIS Yellow     APPEARANCE, URINALYSIS Hazy     GLUCOSE, URINALYSIS Negative Negative mg/dL    BILIRUBIN, URINALYSIS Negative Negative    KETONES, URINALYSIS 20  (A) Negative mg/dL    SPECIFIC GRAVITY, URINALYSIS >1.030 (H) 1.005 - 1.030    OCCULT BLOOD, URINALYSIS Negative Negative    PH, URINALYSIS 5.0 5.0 - 7.0    PROTEIN, URINALYSIS 100  (A) Negative mg/dL    UROBILINOGEN, URINALYSIS 0.2 0.2, 1.0 mg/dL    NITRITE, URINALYSIS Negative Negative    LEUKOCYTE ESTERASE, URINALYSIS Negative Negative    SQUAMOUS EPITHELIAL, URINALYSIS 1 to 5 None Seen, 1 to 5 /hpf    ERYTHROCYTES, URINALYSIS 1 to 2 None Seen, 1 to 2 /hpf    LEUKOCYTES, URINALYSIS 1 to 5 None Seen, 1 to 5 /hpf    BACTERIA, URINALYSIS None Seen None Seen /hpf    HYALINE CASTS, URINALYSIS None Seen None Seen, 1 to 5 /lpf    MUCUS Present        EKG Results     None    Radiology Results     Imaging Results    None         ED  Medication Orders (From admission, onward)    Ordered Start     Status Ordering Provider    02/23/22 2333 02/23/22 6792  ibuprofen (CHILDRENS ADVIL) 100 MG/5ML suspension 106 mg  ONCE         Last MAR action: Given AYO HURST          ED Course as of 02/24/22 0627   Thu Feb 24, 2022   0332 Quad negative. UA negative [SW]      ED Course User Index  [SW] Ailyn Kim DO       MDM   Galina is a 21 mo F with pulmonic stenosis s/p repair, who presents with fever and congestion, concerning for viral URI vs UTI vs AOM.    Plan:  1. COVID/Flu/RSV  2. UA and cx    Clinical Impression     ED Diagnosis   1. Fever, unspecified fever cause         Disposition        Discharge 2/24/2022  3:24 AM  Galina Olivarez discharge to home/self care.                         Ailyn Kim DO  Resident  02/24/22 0696

## 2022-02-28 ENCOUNTER — TELEPHONE (OUTPATIENT)
Dept: INTERNAL MEDICINE | Facility: CLINIC | Age: 62
End: 2022-02-28

## 2022-02-28 ENCOUNTER — OFFICE VISIT (OUTPATIENT)
Dept: INTERNAL MEDICINE | Facility: CLINIC | Age: 62
End: 2022-02-28

## 2022-02-28 ENCOUNTER — LAB (OUTPATIENT)
Dept: LAB | Facility: HOSPITAL | Age: 62
End: 2022-02-28

## 2022-02-28 VITALS
SYSTOLIC BLOOD PRESSURE: 124 MMHG | HEART RATE: 68 BPM | BODY MASS INDEX: 25.66 KG/M2 | HEIGHT: 65 IN | WEIGHT: 154 LBS | TEMPERATURE: 98.2 F | OXYGEN SATURATION: 98 % | DIASTOLIC BLOOD PRESSURE: 72 MMHG | RESPIRATION RATE: 16 BRPM

## 2022-02-28 DIAGNOSIS — N28.9 FUNCTION KIDNEY DECREASED: ICD-10-CM

## 2022-02-28 DIAGNOSIS — R73.03 BORDERLINE DIABETES: ICD-10-CM

## 2022-02-28 DIAGNOSIS — M25.50 GENERALIZED JOINT PAIN: ICD-10-CM

## 2022-02-28 DIAGNOSIS — I10 ESSENTIAL HYPERTENSION: Primary | ICD-10-CM

## 2022-02-28 DIAGNOSIS — R76.8 ELEVATED RHEUMATOID FACTOR: ICD-10-CM

## 2022-02-28 LAB
ANION GAP SERPL CALCULATED.3IONS-SCNC: 12 MMOL/L (ref 5–15)
BUN SERPL-MCNC: 13 MG/DL (ref 8–23)
BUN/CREAT SERPL: 12 (ref 7–25)
CALCIUM SPEC-SCNC: 9.9 MG/DL (ref 8.6–10.5)
CHLORIDE SERPL-SCNC: 99 MMOL/L (ref 98–107)
CHROMATIN AB SERPL-ACNC: 22 IU/ML (ref 0–14)
CO2 SERPL-SCNC: 26 MMOL/L (ref 22–29)
CREAT SERPL-MCNC: 1.08 MG/DL (ref 0.57–1)
CRP SERPL-MCNC: <0.3 MG/DL (ref 0–0.5)
EGFRCR SERPLBLD CKD-EPI 2021: 57.8 ML/MIN/1.73
ERYTHROCYTE [SEDIMENTATION RATE] IN BLOOD: 48 MM/HR (ref 0–30)
GLUCOSE SERPL-MCNC: 61 MG/DL (ref 65–99)
HBA1C MFR BLD: 5.8 % (ref 4.8–5.6)
POTASSIUM SERPL-SCNC: 4 MMOL/L (ref 3.5–5.2)
SODIUM SERPL-SCNC: 137 MMOL/L (ref 136–145)

## 2022-02-28 PROCEDURE — 80048 BASIC METABOLIC PNL TOTAL CA: CPT | Performed by: NURSE PRACTITIONER

## 2022-02-28 PROCEDURE — 99214 OFFICE O/P EST MOD 30 MIN: CPT | Performed by: NURSE PRACTITIONER

## 2022-02-28 PROCEDURE — 83036 HEMOGLOBIN GLYCOSYLATED A1C: CPT | Performed by: NURSE PRACTITIONER

## 2022-02-28 PROCEDURE — 86140 C-REACTIVE PROTEIN: CPT | Performed by: NURSE PRACTITIONER

## 2022-02-28 PROCEDURE — 86431 RHEUMATOID FACTOR QUANT: CPT | Performed by: NURSE PRACTITIONER

## 2022-02-28 PROCEDURE — 86038 ANTINUCLEAR ANTIBODIES: CPT | Performed by: NURSE PRACTITIONER

## 2022-02-28 PROCEDURE — 85652 RBC SED RATE AUTOMATED: CPT | Performed by: NURSE PRACTITIONER

## 2022-02-28 NOTE — TELEPHONE ENCOUNTER
Patient states she was told that she would receive a call from [facility] about an appt and has not received this. I did a referral for her last July. Do you know anything about [facility] referral? Do I need to put a new one in?

## 2022-02-28 NOTE — PROGRESS NOTES
"Subjective   Chief Complaint   Patient presents with   • Hypertension     f/u   • Arthritis     \"I was suppose to have a referral for rheumatology at  4 months ago\"      Alisa Ames is a 61 y.o. female here today for hypertension, joint pain, and borderline diabetes. Recently went to ER for elevated BP and given clonidine. Since then had several blood pressure medication changes. BP has been stable most days and usually stable in the morning. Had several days of BP becoming elevated in the afternoon and having to take clonidine. Has been under large amount of stress with enviornmental factors. She attributes some of her elevated BP to this. Stressors are improving. EKG showed left atrial enlargement. Has appt with cardiology, Dr. Rose, on 8th for consult and ECHO. Following a heart healthy diet low in cholesterol and carbs/sugar. Last set of labs showed decreased kidney function. Reports she is drinking adequate water. Had prior elevated rheumatoid factor with generalized joint pain and body aches. Having chronic double vision that could be related to autoimmune disease. Was referred to rheumatology in July 2021 but provider did not take her insurance. Was supposed to get phone call from  rheumatology but has not heard back.       I have reviewed the following portions of the patient's history and confirmed they are accurate: allergies, current medications, past family history, past medical history, past social history, past surgical history and problem list    I have personally completed the patient's review of systems.    Review of Systems   Constitutional: Positive for fatigue. Negative for activity change, appetite change, chills, diaphoresis, fever, unexpected weight gain and unexpected weight loss.   HENT: Positive for hearing loss. Negative for ear discharge, ear pain, mouth sores, nosebleeds, sinus pressure, sneezing and sore throat.    Eyes: Positive for double vision and visual disturbance. " Negative for pain, discharge and itching.   Respiratory: Negative for cough, chest tightness, shortness of breath and wheezing.    Cardiovascular: Negative for chest pain, palpitations and leg swelling.   Gastrointestinal: Negative for abdominal pain, blood in stool, constipation, diarrhea, nausea and vomiting.   Endocrine: Negative for heat intolerance, polydipsia and polyphagia.   Genitourinary: Negative for dysuria, flank pain, frequency, hematuria and urgency.   Musculoskeletal: Positive for arthralgias, joint swelling and myalgias. Negative for back pain, gait problem, neck pain and neck stiffness.   Skin: Negative for color change, pallor and rash.   Allergic/Immunologic: Negative.  Negative for immunocompromised state.   Neurological: Positive for tremors. Negative for dizziness, seizures, speech difficulty, weakness, light-headedness, numbness and headache.   Hematological: Negative for adenopathy.   Psychiatric/Behavioral: Positive for depressed mood. Negative for agitation, decreased concentration, dysphoric mood, sleep disturbance and suicidal ideas. The patient is nervous/anxious.        Current Outpatient Medications on File Prior to Visit   Medication Sig   • amLODIPine (Norvasc) 5 MG tablet Take 1 tablet by mouth Daily.   • atorvastatin (LIPITOR) 10 MG tablet Take 1 tablet by mouth Daily.   • buPROPion XL (WELLBUTRIN XL) 300 MG 24 hr tablet    • cloNIDine (CATAPRES) 0.1 MG tablet Take 1 tablet by mouth Daily As Needed for High Blood Pressure. Take for BP >160/90   • Latuda 60 MG tablet tablet    • levothyroxine (Synthroid) 75 MCG tablet TAKE 1 TABLET DAILY, FASTING AND WAIT 1 HOUR FOR FOOD OR OTHER MEDICATIONS (DISCONTINUE SYNTHROID 88 MCG)   • losartan (COZAAR) 25 MG tablet Take 1 tablet by mouth Daily.   • meloxicam (MOBIC) 15 MG tablet 1 PO Daily with food.   • montelukast (SINGULAIR) 10 MG tablet TAKE 1 TABLET EVERY NIGHT   • omeprazole (priLOSEC) 20 MG capsule Take 1 capsule by mouth Daily.   •  "ondansetron (Zofran) 4 MG tablet Take 1 tablet by mouth Every 8 (Eight) Hours As Needed for Nausea.   • tiZANidine (ZANAFLEX) 4 MG tablet Take 1 tablet by mouth Every 8 (Eight) Hours As Needed for Muscle Spasms.     No current facility-administered medications on file prior to visit.       Objective   Vitals:    02/28/22 0828   BP: 124/72   Pulse: 68   Resp: 16   Temp: 98.2 °F (36.8 °C)   SpO2: 98%   Weight: 69.9 kg (154 lb)   Height: 165.1 cm (65\")     Body mass index is 25.63 kg/m².    Physical Exam  Vitals reviewed.   Constitutional:       Appearance: Normal appearance. She is well-developed.   HENT:      Head: Normocephalic and atraumatic.      Nose: Nose normal.   Eyes:      General: Lids are normal.      Conjunctiva/sclera: Conjunctivae normal.      Pupils: Pupils are equal, round, and reactive to light.   Neck:      Thyroid: No thyromegaly.      Trachea: Trachea normal.   Cardiovascular:      Rate and Rhythm: Normal rate and regular rhythm.      Heart sounds: Normal heart sounds.   Pulmonary:      Effort: Pulmonary effort is normal. No respiratory distress.      Breath sounds: Normal breath sounds.   Skin:     General: Skin is warm and dry.   Neurological:      Mental Status: She is alert and oriented to person, place, and time.      GCS: GCS eye subscore is 4. GCS verbal subscore is 5. GCS motor subscore is 6.   Psychiatric:         Attention and Perception: Attention normal.         Mood and Affect: Mood normal.         Speech: Speech normal.         Behavior: Behavior normal. Behavior is cooperative.         Thought Content: Thought content normal.         Assessment/Plan   Problem List Items Addressed This Visit     None      Visit Diagnoses     Essential hypertension    -  Primary  Chronic issue unstable requiring medication management and monitoring. Will eat well balanced heart healthy diet, drink adequate water, increase physical activity, and get adequate rest. Monitor blood pressures daily and " contact office for any readings consistently above 140/90. Patient will report any associated symptoms such as headaches, blurry vision, or nausea. Patient will go to ER for any chest pressure or chest pain.   Continue amlodipine and losartan. Continue clonidine PRN. Will keep log of BP and how often taking clonidine. Discussed importance of stress reduction. Discussed she can switch times of taking meds (instead of taking both in morning, take one in morning and one in afternoon to see if this improves BP). Follow up with cardiologist, Dr. Rose for ECHO.      Relevant Orders    Basic metabolic panel    Borderline diabetes      Chronic issue unstable requiring medication management and monitoring. Will eat well balanced heart healthy diabetic diet, drink adequate water, increase physical activity, and get adequate rest. Suggest diabetic education referral.       Relevant Orders    Hemoglobin A1c    Generalized joint pain      Chronic issue unstable requiring medication management and monitoring. Will eat well balanced diet, increase water intake, increase physical activity as tolerated, and get adequate rest. Can use warmth or ice for discomfort in this area. Discussed stretching exercises.   Repeat autoimmune panel and will follow up on referral to [facility]      Relevant Orders    Rheumatoid Factor    KHADRA With / DsDNA, RNP, Sjogrens A / B, Smith    Sedimentation Rate    C-reactive Protein    Elevated rheumatoid factor        Relevant Orders    Rheumatoid Factor    KHADRA With / DsDNA, RNP, Sjogrens A / B, Smith    Sedimentation Rate    C-reactive Protein    Function kidney decreased        Relevant Orders    Basic metabolic panel             Current Outpatient Medications:   •  amLODIPine (Norvasc) 5 MG tablet, Take 1 tablet by mouth Daily., Disp: 30 tablet, Rfl: 2  •  atorvastatin (LIPITOR) 10 MG tablet, Take 1 tablet by mouth Daily., Disp: 90 tablet, Rfl: 3  •  buPROPion XL (WELLBUTRIN XL) 300 MG 24 hr  tablet, , Disp: , Rfl:   •  cloNIDine (CATAPRES) 0.1 MG tablet, Take 1 tablet by mouth Daily As Needed for High Blood Pressure. Take for BP >160/90, Disp: 30 tablet, Rfl: 2  •  Latuda 60 MG tablet tablet, , Disp: , Rfl:   •  levothyroxine (Synthroid) 75 MCG tablet, TAKE 1 TABLET DAILY, FASTING AND WAIT 1 HOUR FOR FOOD OR OTHER MEDICATIONS (DISCONTINUE SYNTHROID 88 MCG), Disp: 90 tablet, Rfl: 3  •  losartan (COZAAR) 25 MG tablet, Take 1 tablet by mouth Daily., Disp: 30 tablet, Rfl: 5  •  meloxicam (MOBIC) 15 MG tablet, 1 PO Daily with food., Disp: 90 tablet, Rfl: 1  •  montelukast (SINGULAIR) 10 MG tablet, TAKE 1 TABLET EVERY NIGHT, Disp: 90 tablet, Rfl: 3  •  omeprazole (priLOSEC) 20 MG capsule, Take 1 capsule by mouth Daily., Disp: 30 capsule, Rfl: 11  •  ondansetron (Zofran) 4 MG tablet, Take 1 tablet by mouth Every 8 (Eight) Hours As Needed for Nausea., Disp: 10 tablet, Rfl: 1  •  tiZANidine (ZANAFLEX) 4 MG tablet, Take 1 tablet by mouth Every 8 (Eight) Hours As Needed for Muscle Spasms., Disp: 180 tablet, Rfl: 2       Plan of care reviewed with the patient at the conclusion of today's visit.  Education was provided regarding diagnosis, management, and any prescribed or recommended OTC medications.  Patient verbalized understanding of and agreement with management plan.     Return in about 1 month (around 3/28/2022), or if symptoms worsen or fail to improve.      Shanae Torres, APRN

## 2022-03-01 ENCOUNTER — TELEPHONE (OUTPATIENT)
Dept: INTERNAL MEDICINE | Facility: CLINIC | Age: 62
End: 2022-03-01

## 2022-03-01 LAB — ANA SER QL: NEGATIVE

## 2022-03-01 NOTE — TELEPHONE ENCOUNTER
Pt called the office stating she has high BP and her ankles are now swelling, she knows that's a sign of heart failure and wonders if she needs to go to the er or if she should just wait for her appt on the 8th with the cardiologist, please advise.

## 2022-03-08 DIAGNOSIS — K21.9 GASTROESOPHAGEAL REFLUX DISEASE WITHOUT ESOPHAGITIS: ICD-10-CM

## 2022-03-08 RX ORDER — OMEPRAZOLE 20 MG/1
CAPSULE, DELAYED RELEASE ORAL
Qty: 30 CAPSULE | Refills: 11 | Status: SHIPPED | OUTPATIENT
Start: 2022-03-08

## 2022-03-22 DIAGNOSIS — M05.80 POLYARTHRITIS WITH POSITIVE RHEUMATOID FACTOR: ICD-10-CM

## 2022-03-22 DIAGNOSIS — M25.50 GENERALIZED JOINT PAIN: Primary | ICD-10-CM

## 2022-03-22 DIAGNOSIS — R70.0 ELEVATED SED RATE: ICD-10-CM

## 2022-03-29 ENCOUNTER — OFFICE VISIT (OUTPATIENT)
Dept: INTERNAL MEDICINE | Facility: CLINIC | Age: 62
End: 2022-03-29

## 2022-03-29 VITALS
SYSTOLIC BLOOD PRESSURE: 122 MMHG | BODY MASS INDEX: 25.83 KG/M2 | WEIGHT: 155 LBS | HEIGHT: 65 IN | TEMPERATURE: 98.2 F | HEART RATE: 76 BPM | OXYGEN SATURATION: 98 % | DIASTOLIC BLOOD PRESSURE: 78 MMHG | RESPIRATION RATE: 16 BRPM

## 2022-03-29 DIAGNOSIS — L98.9 SKIN LESION: ICD-10-CM

## 2022-03-29 DIAGNOSIS — I10 ESSENTIAL HYPERTENSION: Primary | ICD-10-CM

## 2022-03-29 DIAGNOSIS — M25.50 GENERALIZED JOINT PAIN: ICD-10-CM

## 2022-03-29 DIAGNOSIS — E03.9 HYPOTHYROIDISM (ACQUIRED): ICD-10-CM

## 2022-03-29 PROCEDURE — 99214 OFFICE O/P EST MOD 30 MIN: CPT | Performed by: NURSE PRACTITIONER

## 2022-04-08 DIAGNOSIS — I10 ESSENTIAL HYPERTENSION: ICD-10-CM

## 2022-04-08 RX ORDER — AMLODIPINE BESYLATE 5 MG/1
TABLET ORAL
Qty: 30 TABLET | Refills: 11 | Status: SHIPPED | OUTPATIENT
Start: 2022-04-08

## 2022-04-08 RX ORDER — CLONIDINE HYDROCHLORIDE 0.1 MG/1
TABLET ORAL
Qty: 30 TABLET | Refills: 11 | Status: SHIPPED | OUTPATIENT
Start: 2022-04-08

## 2022-04-18 ENCOUNTER — TELEPHONE (OUTPATIENT)
Dept: INTERNAL MEDICINE | Facility: CLINIC | Age: 62
End: 2022-04-18

## 2022-04-18 DIAGNOSIS — N63.20 MASS OF LEFT BREAST, UNSPECIFIED QUADRANT: Primary | ICD-10-CM

## 2022-04-28 DIAGNOSIS — E78.00 PURE HYPERCHOLESTEROLEMIA: ICD-10-CM

## 2022-04-28 RX ORDER — ATORVASTATIN CALCIUM 10 MG/1
TABLET, FILM COATED ORAL
Qty: 90 TABLET | Refills: 3 | Status: SHIPPED | OUTPATIENT
Start: 2022-04-28 | End: 2022-07-21

## 2022-05-25 ENCOUNTER — TELEPHONE (OUTPATIENT)
Dept: INTERNAL MEDICINE | Facility: CLINIC | Age: 62
End: 2022-05-25

## 2022-05-25 DIAGNOSIS — I10 PRIMARY HYPERTENSION: ICD-10-CM

## 2022-05-25 RX ORDER — LOSARTAN POTASSIUM 25 MG/1
25 TABLET ORAL DAILY
Qty: 90 TABLET | Refills: 3 | Status: SHIPPED | OUTPATIENT
Start: 2022-05-25 | End: 2022-06-02

## 2022-05-25 NOTE — TELEPHONE ENCOUNTER
PT STATES SHE NEEDS LOSARTAN CALLED INTO [facility] BECAUSE SHE ONLY HAS 4 LEFT. THE PT WOULD ALSO LIKE A COUPLE OF LOSARTAN'S CALLED INTO THE [facility] JUST TO LAST HER UNTIL THE PRESCRIPTION FROM [facility] ARRIVES, PLEASE ADVISE.

## 2022-06-02 ENCOUNTER — TELEPHONE (OUTPATIENT)
Dept: INTERNAL MEDICINE | Facility: CLINIC | Age: 62
End: 2022-06-02

## 2022-06-02 DIAGNOSIS — I10 PRIMARY HYPERTENSION: ICD-10-CM

## 2022-06-02 RX ORDER — LOSARTAN POTASSIUM 25 MG/1
25 TABLET ORAL DAILY
Qty: 90 TABLET | Refills: 1 | Status: SHIPPED | OUTPATIENT
Start: 2022-06-02 | End: 2022-06-02

## 2022-06-02 RX ORDER — VALSARTAN 40 MG/1
40 TABLET ORAL DAILY
Qty: 30 TABLET | Refills: 5 | Status: SHIPPED | OUTPATIENT
Start: 2022-06-02 | End: 2022-07-05

## 2022-06-02 NOTE — TELEPHONE ENCOUNTER
Pt called and said that insurance will not losartan 25mg . Pt would like to know if she will be ok  just take amlodipine 5mg or if she could have something else prescribed to her temporarily. Pt number is [phone]

## 2022-06-02 NOTE — TELEPHONE ENCOUNTER
No she needs to take the amlodipine and I sent valsartan in place of the losartan. I [facility] to [facility]. [facility] If she does please resend. Thanks.

## 2022-06-02 NOTE — TELEPHONE ENCOUNTER
Pt called needing a refill of losartan 25 mg. Pt said the insurance is not covering it and pt has been out of it for 4 days. Pt phone number is [phone]

## 2022-06-30 DIAGNOSIS — M16.11 PRIMARY OSTEOARTHRITIS OF RIGHT HIP: ICD-10-CM

## 2022-06-30 RX ORDER — MELOXICAM 15 MG/1
TABLET ORAL
Qty: 30 TABLET | Refills: 0 | Status: SHIPPED | OUTPATIENT
Start: 2022-06-30 | End: 2023-01-17

## 2022-07-05 ENCOUNTER — OFFICE VISIT (OUTPATIENT)
Dept: INTERNAL MEDICINE | Facility: CLINIC | Age: 62
End: 2022-07-05

## 2022-07-05 VITALS
OXYGEN SATURATION: 98 % | HEIGHT: 65 IN | BODY MASS INDEX: 26.19 KG/M2 | RESPIRATION RATE: 18 BRPM | DIASTOLIC BLOOD PRESSURE: 80 MMHG | SYSTOLIC BLOOD PRESSURE: 142 MMHG | HEART RATE: 91 BPM | WEIGHT: 157.2 LBS

## 2022-07-05 DIAGNOSIS — I10 PRIMARY HYPERTENSION: Primary | ICD-10-CM

## 2022-07-05 DIAGNOSIS — R73.03 PREDIABETES: ICD-10-CM

## 2022-07-05 DIAGNOSIS — E78.00 PURE HYPERCHOLESTEROLEMIA: ICD-10-CM

## 2022-07-05 DIAGNOSIS — E03.9 HYPOTHYROIDISM (ACQUIRED): ICD-10-CM

## 2022-07-05 PROCEDURE — 99214 OFFICE O/P EST MOD 30 MIN: CPT | Performed by: NURSE PRACTITIONER

## 2022-07-05 RX ORDER — LURASIDONE HYDROCHLORIDE 80 MG/1
TABLET, FILM COATED ORAL
COMMUNITY
Start: 2022-06-06 | End: 2023-01-09 | Stop reason: DRUGHIGH

## 2022-07-05 RX ORDER — LOSARTAN POTASSIUM 50 MG/1
50 TABLET ORAL DAILY
Qty: 90 TABLET | Refills: 1 | Status: SHIPPED | OUTPATIENT
Start: 2022-07-05 | End: 2022-08-08

## 2022-07-05 NOTE — PROGRESS NOTES
"Subjective   Chief Complaint   Patient presents with   • Hypertension     3m f/up      Alisa Ames is a 62 y.o. female.     The patient is here today for hypertension follow up with an adult male. Her blood pressure  is slightly elevated in office today. She reports that she forgot to take her medication this morning. The patient reports that her blood pressure bounces around, ranging from 160-166/80. She does have clonidine to take if it is needed, but reports that she has only taken it 3 to 4 times since getting diagnoses with hypertension. The patient states that when she is regularly taking her blood pressure medication she does not have many high episodes. She also mentions that her blood pressure cuff will tell her two different readings back to back. The patient reports that she has losartan on hand if she runs out of her current medication. She is wondering if she is able to take this medication?    She is wondering if not having her thyroid can cause her to have double vision. She states that she has been seen by a rheumatologist and neurologist who have not found anything wrong. The patient reports that she had an appointment with her ophthalmologist and he did not find anything wrong. He last appointment was 6 months ago. The patient's last thyroid labs was in December 2022. She reports that her rheumatologist wants to check more labs on 07/12/2022, but she states she may cancel this appointment.     She reports that her depression and anxiety have been, \"okay\". The patient states that when her blood pressure is high her anxiety will kick in and she will check her blood pressure right away.     The patient reports that she is able  to fast and come back to complete blood work next week. She has to go by the adult male in the rooms schedule. Patient's last A1c 4 months ago was 5.8 percent. She also has hypercholesterolemia, with last LDL was 136 around 7 months ago.     The patient reports that she has " seen a cardiologist that did a cardiac stress test and he did not find anything wrong with her heart. She drinks 1 cup of coffee per day. The patient has had extra heart beats on previous EKG's. Her cardiologist concluded that she has PVCs and PACs that were not concerning. She reports that sometimes it will be all day and other times it will be every once in awhile.        I have reviewed the following portions of the patient's history and confirmed they are accurate: allergies, current medications, past family history, past medical history, past social history, past surgical history, and problem list    I have personally completed the patient's review of systems.    Review of Systems   Constitutional: Positive for fatigue. Negative for activity change, appetite change, chills, diaphoresis, fever, unexpected weight gain and unexpected weight loss.   HENT: Positive for hearing loss. Negative for ear discharge, ear pain, mouth sores, nosebleeds, sinus pressure, sneezing and sore throat.    Eyes: Positive for double vision and visual disturbance. Negative for pain, discharge and itching.   Respiratory: Negative for cough, chest tightness, shortness of breath and wheezing.    Cardiovascular: Negative for chest pain, palpitations and leg swelling.   Gastrointestinal: Negative for abdominal pain, blood in stool, constipation, diarrhea, nausea and vomiting.   Endocrine: Negative for heat intolerance, polydipsia and polyphagia.   Genitourinary: Negative for dysuria, flank pain, frequency, hematuria and urgency.   Musculoskeletal: Positive for arthralgias, back pain, joint swelling and myalgias. Negative for gait problem, neck pain and neck stiffness.   Skin: Negative for color change, pallor, rash and skin lesions.   Allergic/Immunologic: Negative.  Negative for immunocompromised state.   Neurological: Positive for tremors. Negative for dizziness, seizures, speech difficulty, weakness, light-headedness, numbness and  "headache.   Hematological: Negative for adenopathy.   Psychiatric/Behavioral: Positive for depressed mood. Negative for agitation, decreased concentration, dysphoric mood, sleep disturbance and suicidal ideas. The patient is nervous/anxious.        Current Outpatient Medications on File Prior to Visit   Medication Sig   • amLODIPine (NORVASC) 5 MG tablet TAKE 1 TABLET DAILY   • atorvastatin (LIPITOR) 10 MG tablet TAKE 1 TABLET DAILY   • buPROPion XL (WELLBUTRIN XL) 300 MG 24 hr tablet    • cloNIDine (CATAPRES) 0.1 MG tablet TAKE 1 TABLET DAILY AS NEEDED FOR HIGH BLOOD PRESSURE. TAKE FOR BLOOD PRESSURE GREATER THAN 160/90   • Latuda 80 MG tablet tablet    • levothyroxine (Synthroid) 75 MCG tablet TAKE 1 TABLET DAILY, FASTING AND WAIT 1 HOUR FOR FOOD OR OTHER MEDICATIONS (DISCONTINUE SYNTHROID 88 MCG)   • meloxicam (MOBIC) 15 MG tablet TAKE 1 TABLET BY MOUTH DAILY WITH FOOD   • montelukast (SINGULAIR) 10 MG tablet TAKE 1 TABLET EVERY NIGHT   • omeprazole (priLOSEC) 20 MG capsule TAKE 1 CAPSULE DAILY   • ondansetron (Zofran) 4 MG tablet Take 1 tablet by mouth Every 8 (Eight) Hours As Needed for Nausea.   • tiZANidine (ZANAFLEX) 4 MG tablet Take 1 tablet by mouth Every 8 (Eight) Hours As Needed for Muscle Spasms.   • [DISCONTINUED] valsartan (DIOVAN) 40 MG tablet Take 1 tablet by mouth Daily.   • [DISCONTINUED] Latuda 60 MG tablet tablet 80 mg.     No current facility-administered medications on file prior to visit.       Objective   Vitals:    07/05/22 1109   BP: 142/80   Pulse: 91   Resp: 18   SpO2: 98%   Weight: 71.3 kg (157 lb 3.2 oz)   Height: 165.1 cm (65\")     Body mass index is 26.16 kg/m².    Physical Exam  Vitals reviewed.   Constitutional:       Appearance: Normal appearance. She is well-developed.   HENT:      Head: Normocephalic and atraumatic.      Nose: Nose normal.   Eyes:      General: Lids are normal.      Conjunctiva/sclera: Conjunctivae normal.      Pupils: Pupils are equal, round, and reactive to " light.   Neck:      Thyroid: No thyromegaly.      Trachea: Trachea normal.   Cardiovascular:      Rate and Rhythm: Normal rate. Rhythm irregular.      Heart sounds: Normal heart sounds.   Pulmonary:      Effort: Pulmonary effort is normal. No respiratory distress.      Breath sounds: Normal breath sounds.   Skin:     General: Skin is warm and dry.   Neurological:      Mental Status: She is alert and oriented to person, place, and time.      GCS: GCS eye subscore is 4. GCS verbal subscore is 5. GCS motor subscore is 6.   Psychiatric:         Attention and Perception: Attention normal.         Mood and Affect: Mood normal.         Speech: Speech normal.         Behavior: Behavior normal. Behavior is cooperative.         Thought Content: Thought content normal.         Assessment & Plan   Problem List Items Addressed This Visit        Cardiac and Vasculature    Pure hypercholesterolemia    Relevant Orders    CBC (No Diff)    Comprehensive Metabolic Panel    Lipid Panel       Endocrine and Metabolic    Hypothyroidism (acquired)    Relevant Orders    TSH Rfx On Abnormal To Free T4    Prediabetes    Relevant Orders    CBC (No Diff)    Comprehensive Metabolic Panel    Hemoglobin A1c      Other Visit Diagnoses     Primary hypertension    -  Primary    Relevant Medications    losartan (COZAAR) 50 MG tablet    Other Relevant Orders    CBC (No Diff)    Comprehensive Metabolic Panel    Lipid Panel             Current Outpatient Medications:   •  amLODIPine (NORVASC) 5 MG tablet, TAKE 1 TABLET DAILY, Disp: 30 tablet, Rfl: 11  •  atorvastatin (LIPITOR) 10 MG tablet, TAKE 1 TABLET DAILY, Disp: 90 tablet, Rfl: 3  •  buPROPion XL (WELLBUTRIN XL) 300 MG 24 hr tablet, , Disp: , Rfl:   •  cloNIDine (CATAPRES) 0.1 MG tablet, TAKE 1 TABLET DAILY AS NEEDED FOR HIGH BLOOD PRESSURE. TAKE FOR BLOOD PRESSURE GREATER THAN 160/90, Disp: 30 tablet, Rfl: 11  •  Latuda 80 MG tablet tablet, , Disp: , Rfl:   •  levothyroxine (Synthroid) 75 MCG  tablet, TAKE 1 TABLET DAILY, FASTING AND WAIT 1 HOUR FOR FOOD OR OTHER MEDICATIONS (DISCONTINUE SYNTHROID 88 MCG), Disp: 90 tablet, Rfl: 3  •  meloxicam (MOBIC) 15 MG tablet, TAKE 1 TABLET BY MOUTH DAILY WITH FOOD, Disp: 30 tablet, Rfl: 0  •  montelukast (SINGULAIR) 10 MG tablet, TAKE 1 TABLET EVERY NIGHT, Disp: 90 tablet, Rfl: 3  •  omeprazole (priLOSEC) 20 MG capsule, TAKE 1 CAPSULE DAILY, Disp: 30 capsule, Rfl: 11  •  ondansetron (Zofran) 4 MG tablet, Take 1 tablet by mouth Every 8 (Eight) Hours As Needed for Nausea., Disp: 10 tablet, Rfl: 1  •  tiZANidine (ZANAFLEX) 4 MG tablet, Take 1 tablet by mouth Every 8 (Eight) Hours As Needed for Muscle Spasms., Disp: 180 tablet, Rfl: 2  •  losartan (COZAAR) 50 MG tablet, Take 1 tablet by mouth Daily., Disp: 90 tablet, Rfl: 1       Assessment and Plan:  1. HYPERTENSION, CHRONIC, UNSTABLE.   Continue losartan and amlodipine.  Continue clonidine for blood pressures greater than 160/90. Due to instability, patient will closely monitor blood pressures at home. Will follow up if blood pressure is averaging greater than 140/90. Consider starting a beta blocker such as metoprolol due to heart palpitations and blood pressure instability.     2. HYPERCHOLESTEROLEMIA, CHRONIC, UNSTABLE.  Continue atorvastatin, follow heart healthy diet. Patient will come by next week for fasting lipid panel.    3. HYPOTHYROIDISM, CHRONIC, STABLE.    Continue Synthroid. Patient will come by for TSH next week.     4. PREDIABETES, CHRONIC, UNSTABLE.   Patient will come by next week for A1c. Continue diabetic diet.    Plan of care reviewed with the patient at the conclusion of today's visit.  Education was provided regarding diagnosis, management, and any prescribed or recommended OTC medications.  Patient verbalized understanding of and agreement with management plan.     Return in about 6 months (around 1/5/2023), or if symptoms worsen or fail to improve.      Transcribed from ambient dictation for  GERSON Arellano by GERSON Arellano.  07/05/22   11:25 EDT    Patient verbalized consent to the visit recording.     Transcribed from ambient dictation for GERSON Arellano by CRYS MENARD.  07/05/22   13:49 EDT    Patient verbalized consent to the visit recording.

## 2022-07-11 DIAGNOSIS — E03.9 HYPOTHYROIDISM (ACQUIRED): ICD-10-CM

## 2022-07-11 RX ORDER — LEVOTHYROXINE SODIUM 0.07 MG/1
TABLET ORAL
Qty: 90 TABLET | Refills: 3 | Status: SHIPPED | OUTPATIENT
Start: 2022-07-11

## 2022-07-18 ENCOUNTER — LAB (OUTPATIENT)
Dept: LAB | Facility: HOSPITAL | Age: 62
End: 2022-07-18

## 2022-07-18 DIAGNOSIS — E03.9 HYPOTHYROIDISM (ACQUIRED): ICD-10-CM

## 2022-07-18 DIAGNOSIS — R73.03 PREDIABETES: ICD-10-CM

## 2022-07-18 DIAGNOSIS — E78.00 PURE HYPERCHOLESTEROLEMIA: ICD-10-CM

## 2022-07-18 DIAGNOSIS — I10 PRIMARY HYPERTENSION: ICD-10-CM

## 2022-07-18 LAB
ALBUMIN SERPL-MCNC: 4 G/DL (ref 3.5–5.2)
ALBUMIN/GLOB SERPL: 1.7 G/DL
ALP SERPL-CCNC: 87 U/L (ref 39–117)
ALT SERPL W P-5'-P-CCNC: 21 U/L (ref 1–33)
ANION GAP SERPL CALCULATED.3IONS-SCNC: 10 MMOL/L (ref 5–15)
AST SERPL-CCNC: 13 U/L (ref 1–32)
BILIRUB SERPL-MCNC: 0.3 MG/DL (ref 0–1.2)
BUN SERPL-MCNC: 13 MG/DL (ref 8–23)
BUN/CREAT SERPL: 11.8 (ref 7–25)
CALCIUM SPEC-SCNC: 9 MG/DL (ref 8.6–10.5)
CHLORIDE SERPL-SCNC: 104 MMOL/L (ref 98–107)
CHOLEST SERPL-MCNC: 237 MG/DL (ref 0–200)
CO2 SERPL-SCNC: 25 MMOL/L (ref 22–29)
CREAT SERPL-MCNC: 1.1 MG/DL (ref 0.57–1)
DEPRECATED RDW RBC AUTO: 47.2 FL (ref 37–54)
EGFRCR SERPLBLD CKD-EPI 2021: 56.2 ML/MIN/1.73
ERYTHROCYTE [DISTWIDTH] IN BLOOD BY AUTOMATED COUNT: 14.8 % (ref 12.3–15.4)
GLOBULIN UR ELPH-MCNC: 2.4 GM/DL
GLUCOSE SERPL-MCNC: 92 MG/DL (ref 65–99)
HBA1C MFR BLD: 6 % (ref 4.8–5.6)
HCT VFR BLD AUTO: 35.4 % (ref 34–46.6)
HDLC SERPL-MCNC: 82 MG/DL (ref 40–60)
HGB BLD-MCNC: 11.9 G/DL (ref 12–15.9)
LDLC SERPL CALC-MCNC: 139 MG/DL (ref 0–100)
LDLC/HDLC SERPL: 1.66 {RATIO}
MCH RBC QN AUTO: 30.4 PG (ref 26.6–33)
MCHC RBC AUTO-ENTMCNC: 33.6 G/DL (ref 31.5–35.7)
MCV RBC AUTO: 90.3 FL (ref 79–97)
PLATELET # BLD AUTO: 253 10*3/MM3 (ref 140–450)
PMV BLD AUTO: 10.3 FL (ref 6–12)
POTASSIUM SERPL-SCNC: 3.6 MMOL/L (ref 3.5–5.2)
PROT SERPL-MCNC: 6.4 G/DL (ref 6–8.5)
RBC # BLD AUTO: 3.92 10*6/MM3 (ref 3.77–5.28)
SODIUM SERPL-SCNC: 139 MMOL/L (ref 136–145)
TRIGL SERPL-MCNC: 94 MG/DL (ref 0–150)
TSH SERPL DL<=0.05 MIU/L-ACNC: 9.11 UIU/ML (ref 0.27–4.2)
VLDLC SERPL-MCNC: 16 MG/DL (ref 5–40)
WBC NRBC COR # BLD: 7.11 10*3/MM3 (ref 3.4–10.8)

## 2022-07-18 PROCEDURE — 80053 COMPREHEN METABOLIC PANEL: CPT

## 2022-07-18 PROCEDURE — 84443 ASSAY THYROID STIM HORMONE: CPT

## 2022-07-18 PROCEDURE — 83036 HEMOGLOBIN GLYCOSYLATED A1C: CPT

## 2022-07-18 PROCEDURE — 84439 ASSAY OF FREE THYROXINE: CPT

## 2022-07-18 PROCEDURE — 85027 COMPLETE CBC AUTOMATED: CPT

## 2022-07-18 PROCEDURE — 80061 LIPID PANEL: CPT

## 2022-07-19 LAB — T4 FREE SERPL-MCNC: 1.34 NG/DL (ref 0.93–1.7)

## 2022-07-21 ENCOUNTER — TELEPHONE (OUTPATIENT)
Dept: INTERNAL MEDICINE | Facility: CLINIC | Age: 62
End: 2022-07-21

## 2022-07-21 RX ORDER — ATORVASTATIN CALCIUM 20 MG/1
20 TABLET, FILM COATED ORAL DAILY
Qty: 90 TABLET | Refills: 1 | Status: SHIPPED | OUTPATIENT
Start: 2022-07-21 | End: 2022-12-30

## 2022-07-21 NOTE — TELEPHONE ENCOUNTER
Called patient to let her know of results.   Patient verbalized an understanding of results with no further questions

## 2022-07-21 NOTE — TELEPHONE ENCOUNTER
----- Message from GERSON Neri sent at 7/21/2022  8:38 AM EDT -----  Please contact patient and advise:    You are anemic but this is mild. Follow a high iron diet. Monitor stools for any darkness or blood and follow up with me if this is occurring. Kidney function is low but about the same. Do not take any nsaids (advil, motrin, aleve, naproxen, ibuprofen). Drink adequate amount of water. You can take tylenol arthritis for pain if needed. Will continue to monitor. Your cholesterol has increased a bit. I am increasing your cholesterol pill, lipitor. Your thyroid is hypoactive. Are you taking your synthroid or missed any doses?

## 2022-07-29 DIAGNOSIS — M16.11 PRIMARY OSTEOARTHRITIS OF RIGHT HIP: ICD-10-CM

## 2022-07-29 RX ORDER — MELOXICAM 15 MG/1
TABLET ORAL
Qty: 30 TABLET | Refills: 0 | OUTPATIENT
Start: 2022-07-29

## 2022-08-04 ENCOUNTER — TELEPHONE (OUTPATIENT)
Dept: INTERNAL MEDICINE | Facility: CLINIC | Age: 62
End: 2022-08-04

## 2022-08-04 NOTE — TELEPHONE ENCOUNTER
She will need to be seen. Schedule her an appt as soon as possible. Ask her to bring in log of blood pressures.

## 2022-08-04 NOTE — TELEPHONE ENCOUNTER
Pt is having issues with blood pressure. Pt does not think that amlodipine and  losartan are working and pt takes clonidine when her bloop pressure goes really high to bring it down I do not see any appointments today please call pt back at [phone]

## 2022-08-08 ENCOUNTER — OFFICE VISIT (OUTPATIENT)
Dept: INTERNAL MEDICINE | Facility: CLINIC | Age: 62
End: 2022-08-08

## 2022-08-08 VITALS
OXYGEN SATURATION: 99 % | DIASTOLIC BLOOD PRESSURE: 56 MMHG | HEIGHT: 66 IN | HEART RATE: 70 BPM | RESPIRATION RATE: 20 BRPM | TEMPERATURE: 96.8 F | WEIGHT: 153.2 LBS | SYSTOLIC BLOOD PRESSURE: 124 MMHG | BODY MASS INDEX: 24.62 KG/M2

## 2022-08-08 DIAGNOSIS — R25.1 TREMORS OF NERVOUS SYSTEM: ICD-10-CM

## 2022-08-08 DIAGNOSIS — I10 PRIMARY HYPERTENSION: Primary | ICD-10-CM

## 2022-08-08 DIAGNOSIS — H53.2 DIPLOPIA: ICD-10-CM

## 2022-08-08 PROCEDURE — 99214 OFFICE O/P EST MOD 30 MIN: CPT | Performed by: NURSE PRACTITIONER

## 2022-08-08 RX ORDER — LOSARTAN POTASSIUM 100 MG/1
100 TABLET ORAL DAILY
Qty: 30 TABLET | Refills: 1 | Status: SHIPPED | OUTPATIENT
Start: 2022-08-08 | End: 2022-08-08

## 2022-08-08 RX ORDER — LOSARTAN POTASSIUM 100 MG/1
100 TABLET ORAL DAILY
Qty: 90 TABLET | Refills: 1 | Status: SHIPPED | OUTPATIENT
Start: 2022-08-08 | End: 2022-11-10

## 2022-08-08 NOTE — PROGRESS NOTES
Subjective   Chief Complaint   Patient presents with   • Hypertension      Alisa Ames is a 62 y.o. female.     The patient is here today for follow-up of hypertension.     Hypertension  The patient states that after taking her medication this morning, her blood pressure went up to 184/94 mmHg, so she took clonidine. She reports that the only day in the last 9 days that she did not require an as needed clonidine was Saturday, 08/06/2022. She has come to rely on clonidine, requiring it pretty much daily. She denies that the clonidine causes drowsiness or other negative side effects. She states that she does not think her medications are working anymore. She questions whether she should begin taking valsartan that she has at home. She does report some ankle swelling from the amlodipine. The patient has been checking her blood pressures at home and has elevated readings, some up to 180/90 mm/Hg that comes down when she takes clonidine. According to her notes, she has taken clonidine twice before today's appointment, and she has normal blood pressure in the office.     Diplopia/Hand Tremors  The patient has history of generalized joint pain, double vision, and extremity weakness with hand tremors. She was concerned about multiple sclerosis and saw neurology who could not give a definitive diagnosis. She then saw rheumatology who does not feel that she has autoimmune disorder. She is still experiencing the double vision and some mild hand tremors. She has also seen an ophthalmologist who could not find any cause for her visual disturbance. The patient states that rheumatology recommended that she seek a second opinion from another neurologist. She has not done this. She requests another referral for that today. She thinks the hand tremors and weakness has improved.     I have reviewed the following portions of the patient's history and confirmed they are accurate: allergies, current medications, past family history,  past medical history, past social history, past surgical history, and problem list    I have personally completed the patient's review of systems.    Review of Systems   Constitutional: Positive for fatigue. Negative for activity change, appetite change, chills, diaphoresis, fever, unexpected weight gain and unexpected weight loss.   HENT: Positive for hearing loss. Negative for ear discharge, ear pain, mouth sores, nosebleeds, sinus pressure, sneezing and sore throat.    Eyes: Positive for double vision and visual disturbance. Negative for pain, discharge and itching.   Respiratory: Negative for cough, chest tightness, shortness of breath and wheezing.    Cardiovascular: Negative for chest pain, palpitations and leg swelling.   Gastrointestinal: Negative for abdominal pain, blood in stool, constipation, diarrhea, nausea and vomiting.   Endocrine: Negative for heat intolerance, polydipsia and polyphagia.   Genitourinary: Negative for dysuria, flank pain, frequency, hematuria and urgency.   Musculoskeletal: Positive for arthralgias, back pain, joint swelling and myalgias. Negative for gait problem, neck pain and neck stiffness.   Skin: Negative for color change, pallor, rash and skin lesions.   Allergic/Immunologic: Negative.  Negative for immunocompromised state.   Neurological: Positive for tremors. Negative for dizziness, seizures, speech difficulty, weakness, light-headedness, numbness and headache.   Hematological: Negative for adenopathy.   Psychiatric/Behavioral: Negative for agitation, decreased concentration, dysphoric mood, sleep disturbance, suicidal ideas and depressed mood. The patient is nervous/anxious.        Current Outpatient Medications on File Prior to Visit   Medication Sig   • amLODIPine (NORVASC) 5 MG tablet TAKE 1 TABLET DAILY   • atorvastatin (Lipitor) 20 MG tablet Take 1 tablet by mouth Daily.   • buPROPion XL (WELLBUTRIN XL) 300 MG 24 hr tablet    • cloNIDine (CATAPRES) 0.1 MG tablet TAKE 1  "TABLET DAILY AS NEEDED FOR HIGH BLOOD PRESSURE. TAKE FOR BLOOD PRESSURE GREATER THAN 160/90   • Latuda 80 MG tablet tablet    • levothyroxine (SYNTHROID, LEVOTHROID) 75 MCG tablet TAKE 1 TABLET DAILY, FASTING AND WAIT 1 HOUR FOR FOOD OR OTHER MEDICATIONS (DISCONTINUE SYNTHROID 88 MCG)   • montelukast (SINGULAIR) 10 MG tablet TAKE 1 TABLET EVERY NIGHT   • omeprazole (priLOSEC) 20 MG capsule TAKE 1 CAPSULE DAILY   • ondansetron (Zofran) 4 MG tablet Take 1 tablet by mouth Every 8 (Eight) Hours As Needed for Nausea.   • tiZANidine (ZANAFLEX) 4 MG tablet Take 1 tablet by mouth Every 8 (Eight) Hours As Needed for Muscle Spasms.   • meloxicam (MOBIC) 15 MG tablet TAKE 1 TABLET BY MOUTH DAILY WITH FOOD     No current facility-administered medications on file prior to visit.       Objective   Vitals:    08/08/22 1410   BP: 124/56   BP Location: Left arm   Patient Position: Sitting   Cuff Size: Adult   Pulse: 70   Resp: 20   Temp: 96.8 °F (36 °C)   SpO2: 99%   Weight: 69.5 kg (153 lb 3.2 oz)   Height: 167.6 cm (66\")   PainSc: 0-No pain     Body mass index is 24.73 kg/m².    Physical Exam  Vitals reviewed.   Constitutional:       Appearance: Normal appearance. She is well-developed.   HENT:      Head: Normocephalic and atraumatic.      Nose: Nose normal.   Eyes:      General: Lids are normal.      Conjunctiva/sclera: Conjunctivae normal.      Pupils: Pupils are equal, round, and reactive to light.   Neck:      Thyroid: No thyromegaly.      Trachea: Trachea normal.   Cardiovascular:      Rate and Rhythm: Normal rate and regular rhythm.      Heart sounds: Normal heart sounds.   Pulmonary:      Effort: Pulmonary effort is normal. No respiratory distress.      Breath sounds: Normal breath sounds.   Skin:     General: Skin is warm and dry.   Neurological:      Mental Status: She is alert and oriented to person, place, and time.      GCS: GCS eye subscore is 4. GCS verbal subscore is 5. GCS motor subscore is 6.      Motor: Tremor " present.      Comments: Mild hand tremors bilaterally.    Psychiatric:         Attention and Perception: Attention normal.         Mood and Affect: Mood normal.         Speech: Speech normal.         Behavior: Behavior normal. Behavior is cooperative.         Thought Content: Thought content normal.         Assessment & Plan   Problem List Items Addressed This Visit        Eye    Diplopia    Relevant Orders    Ambulatory Referral to Neurology (Completed)      Other Visit Diagnoses     Primary hypertension    -  Primary    Tremors of nervous system        Relevant Orders    Ambulatory Referral to Neurology (Completed)         Primary hypertension  - Chronic, unstable.  - Increase losartan. Continue amlodipine.   - Patient was given some blood pressure parameters. Iff blood pressure drops below 110/60 mm/Hg, she will discontinue the amlodipine and follow up. If blood pressure continues to exceed 140/90 mm/Hg, she will follow up sooner.   - Encouraged patient to do a regular follow-up in 2 weeks.   - Suggested heart healthy low cholesterol diet.     Tremors of nervous system  - Chronic, unstable.   - Referring to neurology for consult.     Diplopia  - Chronic, unstable.  - Referring to neurology for consult.    Current Outpatient Medications:   •  amLODIPine (NORVASC) 5 MG tablet, TAKE 1 TABLET DAILY, Disp: 30 tablet, Rfl: 11  •  atorvastatin (Lipitor) 20 MG tablet, Take 1 tablet by mouth Daily., Disp: 90 tablet, Rfl: 1  •  buPROPion XL (WELLBUTRIN XL) 300 MG 24 hr tablet, , Disp: , Rfl:   •  cloNIDine (CATAPRES) 0.1 MG tablet, TAKE 1 TABLET DAILY AS NEEDED FOR HIGH BLOOD PRESSURE. TAKE FOR BLOOD PRESSURE GREATER THAN 160/90, Disp: 30 tablet, Rfl: 11  •  Latuda 80 MG tablet tablet, , Disp: , Rfl:   •  levothyroxine (SYNTHROID, LEVOTHROID) 75 MCG tablet, TAKE 1 TABLET DAILY, FASTING AND WAIT 1 HOUR FOR FOOD OR OTHER MEDICATIONS (DISCONTINUE SYNTHROID 88 MCG), Disp: 90 tablet, Rfl: 3  •  montelukast (SINGULAIR) 10 MG  tablet, TAKE 1 TABLET EVERY NIGHT, Disp: 90 tablet, Rfl: 3  •  omeprazole (priLOSEC) 20 MG capsule, TAKE 1 CAPSULE DAILY, Disp: 30 capsule, Rfl: 11  •  ondansetron (Zofran) 4 MG tablet, Take 1 tablet by mouth Every 8 (Eight) Hours As Needed for Nausea., Disp: 10 tablet, Rfl: 1  •  tiZANidine (ZANAFLEX) 4 MG tablet, Take 1 tablet by mouth Every 8 (Eight) Hours As Needed for Muscle Spasms., Disp: 180 tablet, Rfl: 2  •  losartan (COZAAR) 100 MG tablet, TAKE 1 TABLET BY MOUTH DAILY, Disp: 90 tablet, Rfl: 1  •  meloxicam (MOBIC) 15 MG tablet, TAKE 1 TABLET BY MOUTH DAILY WITH FOOD, Disp: 30 tablet, Rfl: 0       Plan of care reviewed with the patient at the conclusion of today's visit.  Education was provided regarding diagnosis, management, and any prescribed or recommended OTC medications.  Patient verbalized understanding of and agreement with management plan.     Return in about 2 weeks (around 8/22/2022), or if symptoms worsen or fail to improve.    Transcribed from ambient dictation for GERSON Arellano by Brenna Zamudio.  08/08/22   15:55 EDT    Patient verbalized consent to the visit recording.

## 2022-08-19 ENCOUNTER — TELEPHONE (OUTPATIENT)
Dept: INTERNAL MEDICINE | Facility: CLINIC | Age: 62
End: 2022-08-19

## 2022-08-19 NOTE — TELEPHONE ENCOUNTER
Caller: Alisa Ames    Relationship to patient: Self    Best call back number: [phone]    Chief complaint: BLOOD PRESSURE    Type of visit: FOLLOW UP APPT FOR BLOOD PRESSURE    Requested date: 8/25/22    If rescheduling, when is the original appointment: 8/23/22    Additional notes: PATIENT WAS RESCHEDULED FOR THE 8/25 EKATERINA/ PEDRO MARTINEZ DUE TO AVAILABILITY

## 2022-08-25 ENCOUNTER — OFFICE VISIT (OUTPATIENT)
Dept: INTERNAL MEDICINE | Facility: CLINIC | Age: 62
End: 2022-08-25

## 2022-08-25 VITALS
SYSTOLIC BLOOD PRESSURE: 108 MMHG | OXYGEN SATURATION: 96 % | TEMPERATURE: 96.8 F | WEIGHT: 151.4 LBS | BODY MASS INDEX: 24.33 KG/M2 | HEART RATE: 86 BPM | RESPIRATION RATE: 20 BRPM | HEIGHT: 66 IN | DIASTOLIC BLOOD PRESSURE: 70 MMHG

## 2022-08-25 DIAGNOSIS — K21.9 GASTROESOPHAGEAL REFLUX DISEASE WITHOUT ESOPHAGITIS: ICD-10-CM

## 2022-08-25 DIAGNOSIS — F41.8 DEPRESSION WITH ANXIETY: ICD-10-CM

## 2022-08-25 DIAGNOSIS — I10 ESSENTIAL HYPERTENSION: Primary | ICD-10-CM

## 2022-08-25 PROCEDURE — 99214 OFFICE O/P EST MOD 30 MIN: CPT | Performed by: NURSE PRACTITIONER

## 2022-08-25 RX ORDER — VILAZODONE HYDROCHLORIDE 10 MG/1
10 TABLET ORAL DAILY
COMMUNITY
Start: 2022-08-18

## 2022-08-25 NOTE — PROGRESS NOTES
"Chief Complaint   Patient presents with   • Hypertension     2 wk follow up       HPI  Alisa Ames is a 62 y.o. female presents for follow-up on HTN.  Pt's Losartan was increased at her last appointment.  Today her blood pressure is well controlled.  She has been taking the medication as prescribed.  She has been checking her BP daily at home and has had some fluctuating readings.  One day she had readings of 68/44 and 67/46 but she states that she didn't have any symptoms.  She states she laid down on the couch to help raise the BP.  Her cuff is about 4 years old.  She did not bring it with her today.    The following portions of the patient's history were reviewed and updated as appropriate: allergies, current medications, past family history, past medical history, past social history, past surgical history and problem list.    Subjective  Review of Systems   Constitutional: Negative for activity change, appetite change and fatigue.   HENT: Negative for congestion.    Respiratory: Negative for cough and shortness of breath.    Cardiovascular: Negative for chest pain and leg swelling.   Gastrointestinal: Negative for abdominal pain.   Neurological: Negative for dizziness, weakness and confusion.   Psychiatric/Behavioral: Negative for behavioral problems and decreased concentration.       Objective  Visit Vitals  /70 (BP Location: Left arm, Patient Position: Sitting, Cuff Size: Adult)   Pulse 86   Temp 96.8 °F (36 °C)   Resp 20   Ht 167.6 cm (66\")   Wt 68.7 kg (151 lb 6.4 oz)   SpO2 96%   BMI 24.44 kg/m²        Physical Exam  Vitals and nursing note reviewed.   HENT:      Head: Normocephalic.   Eyes:      Pupils: Pupils are equal, round, and reactive to light.   Cardiovascular:      Rate and Rhythm: Normal rate and regular rhythm.      Pulses: Normal pulses.      Heart sounds: Normal heart sounds.   Pulmonary:      Effort: Pulmonary effort is normal.      Breath sounds: Normal breath sounds.   Skin:     " General: Skin is warm and dry.      Capillary Refill: Capillary refill takes less than 2 seconds.   Neurological:      General: No focal deficit present.      Mental Status: She is alert and oriented to person, place, and time.      Gait: Gait is intact.      Comments: Both hands shaking   Psychiatric:         Attention and Perception: Attention normal.         Mood and Affect: Mood normal.         Behavior: Behavior normal.          Procedures     Assessment and Plan  Diagnoses and all orders for this visit:    1. Essential hypertension (Primary)    2. Gastroesophageal reflux disease without esophagitis    3. Depression with anxiety    BP log reviewed, very scattered readings - instructed to bring cuff to next appt  Reassured pt that her BP is perfect today and no changes needed with BP meds  Cont Losartan and Amlodipine at current doses  GERD well controlled with Omeprazole  Viibryd/Latuda working well for depression with anxiety     Return for Next scheduled follow up.        GERSON Barr

## 2022-09-16 ENCOUNTER — TELEPHONE (OUTPATIENT)
Dept: INTERNAL MEDICINE | Facility: CLINIC | Age: 62
End: 2022-09-16

## 2022-09-16 DIAGNOSIS — M50.30 DDD (DEGENERATIVE DISC DISEASE), CERVICAL: ICD-10-CM

## 2022-09-16 RX ORDER — TIZANIDINE 4 MG/1
TABLET ORAL
Qty: 180 TABLET | Refills: 2 | Status: SHIPPED | OUTPATIENT
Start: 2022-09-16 | End: 2022-11-11 | Stop reason: SDUPTHER

## 2022-09-16 NOTE — TELEPHONE ENCOUNTER
Caller: Alisa Ames    Relationship: Self    Best call back number: [phone]    What is the medical concern/diagnosis: PATIENT HAS AN A-FIB    What specialty or service is being requested: CARDIOLOGIST    What is the provider, practice or medical service name: DR AGUSTIN JUÁREZ    What is the office location: [address] [place] [address]     What is the office phone number: [phone]    Any additional details:

## 2022-09-19 NOTE — TELEPHONE ENCOUNTER
Please contact patient and ask when and where she was diagnosed with afib and I thought she was already seeing dr. Rose. Giovani referred her in Feb for palpations and he did a stress test on her but no mention of afib.

## 2022-09-20 DIAGNOSIS — R94.30 ABNORMAL CARDIAC FUNCTION TEST: ICD-10-CM

## 2022-09-20 DIAGNOSIS — I10 ESSENTIAL HYPERTENSION: Primary | ICD-10-CM

## 2022-09-20 NOTE — TELEPHONE ENCOUNTER
"She says she had a \"life scan\" at [facility]. She called Dr. Rose's office and waiting on a call back with an apt to follow up there.  "

## 2022-11-10 RX ORDER — LOSARTAN POTASSIUM 100 MG/1
100 TABLET ORAL DAILY
Qty: 90 TABLET | Refills: 1 | Status: SHIPPED | OUTPATIENT
Start: 2022-11-10 | End: 2022-11-10 | Stop reason: SDUPTHER

## 2022-11-10 NOTE — TELEPHONE ENCOUNTER
Caller: Alisa Ames    Relationship: Self    Best call back number : [phone]    Requested Prescriptions:   Requested Prescriptions     Pending Prescriptions Disp Refills   • losartan (COZAAR) 100 MG tablet 90 tablet 1     Sig: Take 1 tablet by mouth Daily.        Pharmacy where request should be sent: [facility] - [address] [phone] [facility] [phone] [facility]     Additional details provided by patient: PATIENT HAS 5 DAYS LEFT BUT THIS IS A MAIL IN     Does the patient have less than a 3 day supply:  [] Yes  [x] No    Additional information: PATIENT LABS CAME BACK WITH KIDNEYS NOT WORKING 100% AND SHE WAS CONCERNED ABOUT THE  amLODIPine (NORVASC) 5 MG tablet and if she should change the medication     Ahmet Oconnell Rep   11/10/22 13:06 EST

## 2022-11-11 DIAGNOSIS — M50.30 DDD (DEGENERATIVE DISC DISEASE), CERVICAL: ICD-10-CM

## 2022-11-11 RX ORDER — TIZANIDINE 4 MG/1
4 TABLET ORAL EVERY 8 HOURS PRN
Qty: 180 TABLET | Refills: 2 | Status: SHIPPED | OUTPATIENT
Start: 2022-11-11

## 2022-11-11 RX ORDER — LOSARTAN POTASSIUM 100 MG/1
100 TABLET ORAL DAILY
Qty: 90 TABLET | Refills: 1 | Status: SHIPPED | OUTPATIENT
Start: 2022-11-11

## 2022-11-11 NOTE — TELEPHONE ENCOUNTER
Caller: Alisa Ames    Relationship: Self    Best call back number: [phone]    Requested Prescriptions:   Requested Prescriptions     Pending Prescriptions Disp Refills   • tiZANidine (ZANAFLEX) 4 MG tablet 180 tablet 2     Sig: Take 1 tablet by mouth Every 8 (Eight) Hours As Needed for Muscle Spasms.        Pharmacy where request should be sent: [facility] - [address] [phone] [facility] [phone] [facility]     Does the patient have less than a 3 day supply:  [] Yes  [x] No    Ahmet Dugan Rep   11/11/22 15:45 EST

## 2022-12-30 RX ORDER — ATORVASTATIN CALCIUM 20 MG/1
TABLET, FILM COATED ORAL
Qty: 90 TABLET | Refills: 3 | Status: SHIPPED | OUTPATIENT
Start: 2022-12-30 | End: 2023-01-17

## 2023-01-09 ENCOUNTER — LAB (OUTPATIENT)
Dept: LAB | Facility: HOSPITAL | Age: 63
End: 2023-01-09
Payer: OTHER GOVERNMENT

## 2023-01-09 ENCOUNTER — OFFICE VISIT (OUTPATIENT)
Dept: INTERNAL MEDICINE | Facility: CLINIC | Age: 63
End: 2023-01-09
Payer: OTHER GOVERNMENT

## 2023-01-09 VITALS
OXYGEN SATURATION: 98 % | TEMPERATURE: 98.2 F | HEIGHT: 66 IN | HEART RATE: 62 BPM | DIASTOLIC BLOOD PRESSURE: 72 MMHG | RESPIRATION RATE: 16 BRPM | BODY MASS INDEX: 24.11 KG/M2 | SYSTOLIC BLOOD PRESSURE: 112 MMHG | WEIGHT: 150 LBS

## 2023-01-09 DIAGNOSIS — E78.00 PURE HYPERCHOLESTEROLEMIA: ICD-10-CM

## 2023-01-09 DIAGNOSIS — E55.9 VITAMIN D DEFICIENCY: ICD-10-CM

## 2023-01-09 DIAGNOSIS — Z13.21 ENCOUNTER FOR VITAMIN DEFICIENCY SCREENING: ICD-10-CM

## 2023-01-09 DIAGNOSIS — R73.03 PREDIABETES: ICD-10-CM

## 2023-01-09 DIAGNOSIS — M79.674 TOE PAIN, RIGHT: Primary | ICD-10-CM

## 2023-01-09 DIAGNOSIS — E03.9 HYPOTHYROIDISM (ACQUIRED): ICD-10-CM

## 2023-01-09 DIAGNOSIS — H61.303 ACQUIRED STENOSIS OF BOTH EXTERNAL EAR CANALS: ICD-10-CM

## 2023-01-09 DIAGNOSIS — Z13.0 SCREENING FOR BLOOD DISEASE: ICD-10-CM

## 2023-01-09 DIAGNOSIS — I10 ESSENTIAL HYPERTENSION: ICD-10-CM

## 2023-01-09 LAB
25(OH)D3 SERPL-MCNC: 31.6 NG/ML (ref 30–100)
CHOLEST SERPL-MCNC: 214 MG/DL (ref 0–200)
DEPRECATED RDW RBC AUTO: 43.4 FL (ref 37–54)
ERYTHROCYTE [DISTWIDTH] IN BLOOD BY AUTOMATED COUNT: 13.2 % (ref 12.3–15.4)
FOLATE SERPL-MCNC: 6.18 NG/ML (ref 4.78–24.2)
HBA1C MFR BLD: 6.3 % (ref 4.8–5.6)
HCT VFR BLD AUTO: 33.2 % (ref 34–46.6)
HDLC SERPL-MCNC: 83 MG/DL (ref 40–60)
HGB BLD-MCNC: 11 G/DL (ref 12–15.9)
LDLC SERPL CALC-MCNC: 117 MG/DL (ref 0–100)
LDLC/HDLC SERPL: 1.39 {RATIO}
MCH RBC QN AUTO: 30.2 PG (ref 26.6–33)
MCHC RBC AUTO-ENTMCNC: 33.1 G/DL (ref 31.5–35.7)
MCV RBC AUTO: 91.2 FL (ref 79–97)
PLATELET # BLD AUTO: 322 10*3/MM3 (ref 140–450)
PMV BLD AUTO: 9.8 FL (ref 6–12)
RBC # BLD AUTO: 3.64 10*6/MM3 (ref 3.77–5.28)
TRIGL SERPL-MCNC: 79 MG/DL (ref 0–150)
TSH SERPL DL<=0.05 MIU/L-ACNC: 0.61 UIU/ML (ref 0.27–4.2)
VIT B12 BLD-MCNC: 526 PG/ML (ref 211–946)
VLDLC SERPL-MCNC: 14 MG/DL (ref 5–40)
WBC NRBC COR # BLD: 7.32 10*3/MM3 (ref 3.4–10.8)

## 2023-01-09 PROCEDURE — 80061 LIPID PANEL: CPT | Performed by: NURSE PRACTITIONER

## 2023-01-09 PROCEDURE — 80053 COMPREHEN METABOLIC PANEL: CPT | Performed by: NURSE PRACTITIONER

## 2023-01-09 PROCEDURE — 82607 VITAMIN B-12: CPT | Performed by: NURSE PRACTITIONER

## 2023-01-09 PROCEDURE — 82306 VITAMIN D 25 HYDROXY: CPT | Performed by: NURSE PRACTITIONER

## 2023-01-09 PROCEDURE — 99214 OFFICE O/P EST MOD 30 MIN: CPT | Performed by: NURSE PRACTITIONER

## 2023-01-09 PROCEDURE — 84443 ASSAY THYROID STIM HORMONE: CPT | Performed by: NURSE PRACTITIONER

## 2023-01-09 PROCEDURE — 82746 ASSAY OF FOLIC ACID SERUM: CPT | Performed by: NURSE PRACTITIONER

## 2023-01-09 PROCEDURE — 83036 HEMOGLOBIN GLYCOSYLATED A1C: CPT | Performed by: NURSE PRACTITIONER

## 2023-01-09 PROCEDURE — 85027 COMPLETE CBC AUTOMATED: CPT | Performed by: NURSE PRACTITIONER

## 2023-01-09 RX ORDER — LURASIDONE HYDROCHLORIDE 120 MG/1
TABLET, FILM COATED ORAL
COMMUNITY
Start: 2022-10-28

## 2023-01-09 NOTE — PROGRESS NOTES
Subjective   Chief Complaint   Patient presents with   • Hypertension          • Earache   • Toe Pain     Right foot        Alisa Ames is a 62 y.o. female. She is here today for a 6-month follow-up on hypertension, earache, and right toe pain.    Right toe pain  The patient reports that her right second toe nail is sensitive, and it is turning purple. She is wondering if it is due to lack of circulation. She states that it is not painful to ambulate on it or wear her shoes. She states that she has a good feeling about it.    Rib cage pain  The patient reports that when she bends over too far or does something, it feels like there is something there. She states that she has to sit up quickly because it is excruciating and then it goes away. She states that it does not bother her any other time. She states that there is nothing protruding like a hernia. She states that it only happens when she bends over a certain way. She denies there are any problems with bowel movement.    Ear canal eczema  The patient reports that her ears are plugged up slightly. She states that she has been treated for ear canal eczema with an ointment before. She states that it has been a while since she saw the ear doctor.     The patient feels like her ears are occluded, but upon exam there appears to be ear canal swelling and dryness. She has been treated for ear canal eczema with an ointment before, but this interfered with the use of her hearing aids. She will need to be referred back to ENT.    Hypothyroidism  The patient's prior labs, she has hypothyroidism. The patient's prior labs showed elevated TSH that was unstable. She reports she has been taking her Synthroid daily on an empty stomach.    Prediabetes  The patient has prediabetes and last A1c was 6 percent. She is trying to eat a low carb diet.     Hypercholesterolemia  The patient's last lipid panel showed an LDL of 139 mg/dL with normal triglycerides and she is taking  atorvastatin.    Hypertension  The patient's blood pressure is stable and at goal. Patient seeing cardiologist, Dr. Rose. She had Lif[facility] screening that showed some possible occlusion in one of her carotid arteries that could be causing her visual disturbance. She was also told her screening showed afib. She had carotid doppler and wore holter monitor per Dr. Rose. Has upcoming follow up to discuss results.             I have reviewed the following portions of the patient's history and confirmed they are accurate: allergies, current medications, past family history, past medical history, past social history, past surgical history, and problem list    I have personally completed the patient's review of systems.    Review of Systems   Constitutional: Positive for fatigue. Negative for activity change, appetite change, chills, diaphoresis, fever, unexpected weight gain and unexpected weight loss.   HENT: Positive for hearing loss. Negative for ear discharge, ear pain, mouth sores, nosebleeds, sinus pressure, sneezing and sore throat.    Eyes: Positive for double vision and visual disturbance. Negative for pain, discharge and itching.   Respiratory: Negative for cough, chest tightness, shortness of breath and wheezing.    Cardiovascular: Negative for chest pain, palpitations and leg swelling.   Gastrointestinal: Negative for abdominal pain, blood in stool, constipation, diarrhea, nausea and vomiting.   Endocrine: Negative for heat intolerance, polydipsia and polyphagia.   Genitourinary: Negative for dysuria, flank pain, frequency, hematuria and urgency.   Musculoskeletal: Positive for arthralgias, back pain, joint swelling and myalgias. Negative for gait problem, neck pain and neck stiffness.   Skin: Negative for color change, pallor, rash and skin lesions.   Allergic/Immunologic: Negative.  Negative for immunocompromised state.   Neurological: Positive for tremors. Negative for dizziness, seizures, speech  difficulty, weakness, light-headedness, numbness and headache.   Hematological: Negative for adenopathy.   Psychiatric/Behavioral: Negative for agitation, decreased concentration, dysphoric mood, sleep disturbance, suicidal ideas and depressed mood. The patient is nervous/anxious.        Current Outpatient Medications on File Prior to Visit   Medication Sig   • amLODIPine (NORVASC) 5 MG tablet TAKE 1 TABLET DAILY   • atorvastatin (LIPITOR) 20 MG tablet TAKE 1 TABLET DAILY   • cloNIDine (CATAPRES) 0.1 MG tablet TAKE 1 TABLET DAILY AS NEEDED FOR HIGH BLOOD PRESSURE. TAKE FOR BLOOD PRESSURE GREATER THAN 160/90   • Latuda 120 MG tablet tablet    • levothyroxine (SYNTHROID, LEVOTHROID) 75 MCG tablet TAKE 1 TABLET DAILY, FASTING AND WAIT 1 HOUR FOR FOOD OR OTHER MEDICATIONS (DISCONTINUE SYNTHROID 88 MCG)   • losartan (COZAAR) 100 MG tablet Take 1 tablet by mouth Daily.   • meloxicam (MOBIC) 15 MG tablet TAKE 1 TABLET BY MOUTH DAILY WITH FOOD   • montelukast (SINGULAIR) 10 MG tablet TAKE 1 TABLET EVERY NIGHT   • omeprazole (priLOSEC) 20 MG capsule TAKE 1 CAPSULE DAILY   • tiZANidine (ZANAFLEX) 4 MG tablet Take 1 tablet by mouth Every 8 (Eight) Hours As Needed for Muscle Spasms.   • vilazodone (VIIBRYD) 10 MG tablet tablet Take 10 mg by mouth Daily. with food   • [DISCONTINUED] Latuda 80 MG tablet tablet      No current facility-administered medications on file prior to visit.       Objective   Vitals:    01/09/23 1051   BP: 112/72   Pulse: 62   Resp: 16   Temp: 98.2 °F (36.8 °C)   TempSrc: Tympanic   SpO2: 98%   Weight: 68 kg (150 lb)   Height: 167.6 cm (66\")     Body mass index is 24.21 kg/m².    Physical Exam  Vitals reviewed.   Constitutional:       Appearance: Normal appearance. She is well-developed.   HENT:      Head: Normocephalic and atraumatic.      Nose: Nose normal.   Eyes:      General: Lids are normal.      Conjunctiva/sclera: Conjunctivae normal.      Pupils: Pupils are equal, round, and reactive to light.    Neck:      Thyroid: No thyromegaly.      Trachea: Trachea normal.   Cardiovascular:      Rate and Rhythm: Normal rate and regular rhythm.      Pulses:           Dorsalis pedis pulses are 2+ on the right side.      Heart sounds: Normal heart sounds.      Comments: Normal cap refill of right toenails.   Pulmonary:      Effort: Pulmonary effort is normal. No respiratory distress.      Breath sounds: Normal breath sounds.   Skin:     General: Skin is warm and dry.      Comments: Right second toe - redness around nail bed.    Neurological:      Mental Status: She is alert and oriented to person, place, and time.      GCS: GCS eye subscore is 4. GCS verbal subscore is 5. GCS motor subscore is 6.      Motor: Tremor present.      Comments: Hand and leg tremors bilaterally.    Psychiatric:         Attention and Perception: Attention normal.         Mood and Affect: Mood normal.         Speech: Speech normal.         Behavior: Behavior normal. Behavior is cooperative.         Thought Content: Thought content normal.         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        Relevant Orders    Ambulatory Referral to ENT (Otolaryngology) (Completed)    Essential hypertension        Screening for blood disease        Relevant Orders    CBC (No Diff)    Comprehensive Metabolic Panel    Lipid Panel    Hemoglobin A1c    TSH Rfx On Abnormal To Free T4    Vitamin B12 & Folate    Vitamin D,25-Hydroxy    Encounter for vitamin deficiency screening         Relevant Orders    Vitamin B12 & Folate    Vitamin D,25-Hydroxy    Vitamin D deficiency        Relevant Orders    Vitamin D,25-Hydroxy             Current Outpatient Medications:   •  amLODIPine (NORVASC) 5 MG tablet, TAKE 1 TABLET DAILY, Disp: 30 tablet, Rfl: 11  •  atorvastatin (LIPITOR) 20 MG tablet, TAKE 1 TABLET DAILY, Disp: 90 tablet, Rfl: 3  •  cloNIDine (CATAPRES) 0.1 MG tablet, TAKE 1 TABLET DAILY AS NEEDED FOR HIGH BLOOD PRESSURE. TAKE FOR BLOOD PRESSURE GREATER THAN 160/90, Disp: 30 tablet, Rfl: 11  •  Latuda 120 MG tablet tablet, , Disp: , Rfl:   •  levothyroxine (SYNTHROID, LEVOTHROID) 75 MCG tablet, TAKE 1 TABLET DAILY, FASTING AND WAIT 1 HOUR FOR FOOD OR OTHER MEDICATIONS (DISCONTINUE SYNTHROID 88 MCG), Disp: 90 tablet, Rfl: 3  •  losartan (COZAAR) 100 MG tablet, Take 1 tablet by mouth Daily., Disp: 90 tablet, Rfl: 1  •  meloxicam (MOBIC) 15 MG tablet, TAKE 1 TABLET BY MOUTH DAILY WITH FOOD, Disp: 30 tablet, Rfl: 0  •  montelukast (SINGULAIR) 10 MG tablet, TAKE 1 TABLET EVERY NIGHT, Disp: 90 tablet, Rfl: 3  •  omeprazole (priLOSEC) 20 MG capsule, TAKE 1 CAPSULE DAILY, Disp: 30 capsule, Rfl: 11  •  tiZANidine (ZANAFLEX) 4 MG tablet, Take 1 tablet by mouth Every 8 (Eight) Hours As Needed for Muscle Spasms., Disp: 180 tablet, Rfl: 2  •  vilazodone (VIIBRYD) 10 MG tablet tablet, Take 10 mg by mouth Daily. with food, Disp: , Rfl:      Plan:    Right toe pain, new, unstable, referring to Podiatry.    Acquired stenosis of both ear canals, new, unstable. Referring to ENT for consult. Patient may benefit from a steroid eardrop that ENT can get covered by her insurance.     Hypertension, chronic, stable. Continue losartan, amlodipine, and can take clonidine as needed for blood pressures greater than 160/90 mmHg.    Hypercholesterolemia, chronic, unstable. Continue atorvastatin. Completing fasting lipid panel today.    Prediabetes, chronic, unstable. Repeating A1c today. Continue low carb diet.      Hypothyroidism, chronic, unstable. Continue Synthroid. Repeating TSH today.      Plan of care reviewed with the patient at the conclusion of today's visit.  Education was provided regarding diagnosis, management, and any prescribed or recommended OTC medications.  Patient verbalized understanding of and agreement with management plan.     Return in about 3 months (around 4/9/2023), or if symptoms worsen or fail to improve.      Transcribed from ambient dictation for GERSON Arellano by GERSON Arellano.  01/09/23   11:42 EST    Transcribed from ambient dictation for GERSON Arellano by Sandra Markham.  01/09/23   13:15 EST    Patient or patient representative verbalized consent to the visit recording.  I have personally performed the services described in this document as transcribed by the above individual, and it is both accurate and complete.

## 2023-01-10 LAB
ALBUMIN SERPL-MCNC: 4.7 G/DL (ref 3.5–5.2)
ALBUMIN/GLOB SERPL: 1.6 G/DL
ALP SERPL-CCNC: 120 U/L (ref 39–117)
ALT SERPL W P-5'-P-CCNC: 14 U/L (ref 1–33)
ANION GAP SERPL CALCULATED.3IONS-SCNC: 9.9 MMOL/L (ref 5–15)
AST SERPL-CCNC: 21 U/L (ref 1–32)
BILIRUB SERPL-MCNC: 0.3 MG/DL (ref 0–1.2)
BUN SERPL-MCNC: 20 MG/DL (ref 8–23)
BUN/CREAT SERPL: 13.9 (ref 7–25)
CALCIUM SPEC-SCNC: 9.8 MG/DL (ref 8.6–10.5)
CHLORIDE SERPL-SCNC: 99 MMOL/L (ref 98–107)
CO2 SERPL-SCNC: 24.1 MMOL/L (ref 22–29)
CREAT SERPL-MCNC: 1.44 MG/DL (ref 0.57–1)
EGFRCR SERPLBLD CKD-EPI 2021: 40.7 ML/MIN/1.73
GLOBULIN UR ELPH-MCNC: 3 GM/DL
GLUCOSE SERPL-MCNC: 93 MG/DL (ref 65–99)
POTASSIUM SERPL-SCNC: 4.6 MMOL/L (ref 3.5–5.2)
PROT SERPL-MCNC: 7.7 G/DL (ref 6–8.5)
SODIUM SERPL-SCNC: 133 MMOL/L (ref 136–145)

## 2023-01-12 ENCOUNTER — TELEPHONE (OUTPATIENT)
Dept: INTERNAL MEDICINE | Facility: CLINIC | Age: 63
End: 2023-01-12
Payer: OTHER GOVERNMENT

## 2023-01-12 DIAGNOSIS — R10.12 LEFT UPPER QUADRANT PAIN: Primary | ICD-10-CM

## 2023-01-12 DIAGNOSIS — R07.81 RIB PAIN: ICD-10-CM

## 2023-01-12 NOTE — TELEPHONE ENCOUNTER
Pt called the office b/c she needs a u/s to check beneath her rib cage, she believes she has a possible hernia,she thought one was going to be entered into the chart,but there wasn't anything, please advise

## 2023-01-12 NOTE — TELEPHONE ENCOUNTER
Got to speak to the pt and got the phone number to  office and their fax number. I faxed the referral and ov notes to the  office with nancy brown. I also informed the pt her US has been ordered

## 2023-01-12 NOTE — TELEPHONE ENCOUNTER
Caller: Alisa Ames    Relationship: Self    Best call back number: [phone]    What is the medical concern/diagnosis:     What specialty or service is being requested: ENT    What is the provider, practice or medical service name: DR GIMENEZ    What is the office location: [facility]    What is the office phone number: [phone]    Any additional details: CHANGE FROM  THE PREVIOUS REFERRAL TO THIS PROVIDER

## 2023-01-17 ENCOUNTER — TELEPHONE (OUTPATIENT)
Dept: INTERNAL MEDICINE | Facility: CLINIC | Age: 63
End: 2023-01-17
Payer: OTHER GOVERNMENT

## 2023-01-17 RX ORDER — ATORVASTATIN CALCIUM 40 MG/1
40 TABLET, FILM COATED ORAL DAILY
Qty: 90 TABLET | Refills: 1 | Status: SHIPPED | OUTPATIENT
Start: 2023-01-17 | End: 2023-01-18 | Stop reason: SDUPTHER

## 2023-01-17 NOTE — TELEPHONE ENCOUNTER
Caller: Alisa Ames    Relationship: Self    Best call back number: [phone]    What was the call regarding: PATIENT STATES MEDICATION WAS SENT TODAY AND PATIENT IS WANTING TO KNOW IF IT WENT TO [facility] OR [facility]. PATIENT DID NOT KNOW MEDICATION NAME.     PATIENT PHARMACY SHE WANTED IT TO GO TO: [id number] [address] AT [facility] [address] - [phone] [facility] [phone]   [phone]    Do you require a callback: YES

## 2023-01-17 NOTE — TELEPHONE ENCOUNTER
----- Message from GERSON Neri sent at 1/17/2023 12:56 PM EST -----  Contact patient and advise her kidney function has dropped down more. This is most likely related to the meloxicam and can cause kidney failure. Stop the meloxicam. Do not take any nsaids (advil, motrin, aleve, naproxen, ibuprofen). She can take tylenol as needed for pain. If tylenol doesn't help then I can give her some tramadol. I want her to drink adequate amount of water. Follow up in one month. If no improvement in labs then I want her to see a kidney doctor. Cholesterol is still elevated. I am increasing her atorvastatin to 40mg to reduce risk of cardiovascular disease. Blood sugar is elevated and close to diabetes. Follow a low carb and sugar diabetic diet. Anemia is worse. I want you to eat a high iron diet. I'm sending iron supplement to the pharmacy. Try and take this with a bit of orange juice or vitamin C pill. All other labs look ok. We can go over them all in one month. Please schedule appt.

## 2023-01-18 ENCOUNTER — TELEPHONE (OUTPATIENT)
Dept: INTERNAL MEDICINE | Facility: CLINIC | Age: 63
End: 2023-01-18
Payer: OTHER GOVERNMENT

## 2023-01-18 RX ORDER — ATORVASTATIN CALCIUM 40 MG/1
40 TABLET, FILM COATED ORAL DAILY
Qty: 90 TABLET | Refills: 1 | Status: SHIPPED | OUTPATIENT
Start: 2023-01-18

## 2023-01-18 NOTE — TELEPHONE ENCOUNTER
[facility] called to inform that they have scheduled the PT on 1/23/2023 at 10:30am. They state that they have already informed the PT

## 2023-01-24 ENCOUNTER — HOSPITAL ENCOUNTER (OUTPATIENT)
Dept: ULTRASOUND IMAGING | Facility: HOSPITAL | Age: 63
Discharge: HOME | End: 2023-01-24
Admitting: NURSE PRACTITIONER
Payer: OTHER GOVERNMENT

## 2023-01-24 DIAGNOSIS — R10.12 LEFT UPPER QUADRANT PAIN: ICD-10-CM

## 2023-01-24 PROCEDURE — 76705 ECHO EXAM OF ABDOMEN: CPT

## 2023-01-26 ENCOUNTER — TELEPHONE (OUTPATIENT)
Dept: INTERNAL MEDICINE | Facility: CLINIC | Age: 63
End: 2023-01-26
Payer: OTHER GOVERNMENT

## 2023-01-26 NOTE — TELEPHONE ENCOUNTER
----- Message from GERSON Neri sent at 1/25/2023  4:17 PM EST -----  Abdominal ultrasound is normal. Nothing of concern in this area.

## 2023-02-14 ENCOUNTER — LAB (OUTPATIENT)
Dept: LAB | Facility: HOSPITAL | Age: 63
End: 2023-02-14
Payer: OTHER GOVERNMENT

## 2023-02-14 ENCOUNTER — OFFICE VISIT (OUTPATIENT)
Dept: INTERNAL MEDICINE | Facility: CLINIC | Age: 63
End: 2023-02-14
Payer: OTHER GOVERNMENT

## 2023-02-14 VITALS
DIASTOLIC BLOOD PRESSURE: 80 MMHG | SYSTOLIC BLOOD PRESSURE: 160 MMHG | RESPIRATION RATE: 18 BRPM | BODY MASS INDEX: 23.63 KG/M2 | HEIGHT: 66 IN | TEMPERATURE: 97.4 F | HEART RATE: 74 BPM | OXYGEN SATURATION: 98 % | WEIGHT: 147 LBS

## 2023-02-14 DIAGNOSIS — R73.03 BORDERLINE DIABETES: ICD-10-CM

## 2023-02-14 DIAGNOSIS — E53.9 VITAMIN B DEFICIENCY: ICD-10-CM

## 2023-02-14 DIAGNOSIS — D50.8 IRON DEFICIENCY ANEMIA SECONDARY TO INADEQUATE DIETARY IRON INTAKE: ICD-10-CM

## 2023-02-14 DIAGNOSIS — I10 ESSENTIAL HYPERTENSION: ICD-10-CM

## 2023-02-14 DIAGNOSIS — E55.9 VITAMIN D DEFICIENCY: ICD-10-CM

## 2023-02-14 DIAGNOSIS — N18.32 STAGE 3B CHRONIC KIDNEY DISEASE: ICD-10-CM

## 2023-02-14 DIAGNOSIS — G47.09 OTHER INSOMNIA: Primary | ICD-10-CM

## 2023-02-14 DIAGNOSIS — E78.2 MIXED HYPERLIPIDEMIA: ICD-10-CM

## 2023-02-14 LAB
BASOPHILS # BLD AUTO: 0.06 10*3/MM3 (ref 0–0.2)
BASOPHILS NFR BLD AUTO: 0.9 % (ref 0–1.5)
DEPRECATED RDW RBC AUTO: 45.7 FL (ref 37–54)
EOSINOPHIL # BLD AUTO: 0.12 10*3/MM3 (ref 0–0.4)
EOSINOPHIL NFR BLD AUTO: 1.9 % (ref 0.3–6.2)
ERYTHROCYTE [DISTWIDTH] IN BLOOD BY AUTOMATED COUNT: 13.7 % (ref 12.3–15.4)
HCT VFR BLD AUTO: 35.7 % (ref 34–46.6)
HGB BLD-MCNC: 11.8 G/DL (ref 12–15.9)
IMM GRANULOCYTES # BLD AUTO: 0.01 10*3/MM3 (ref 0–0.05)
IMM GRANULOCYTES NFR BLD AUTO: 0.2 % (ref 0–0.5)
LYMPHOCYTES # BLD AUTO: 1.84 10*3/MM3 (ref 0.7–3.1)
LYMPHOCYTES NFR BLD AUTO: 28.8 % (ref 19.6–45.3)
MCH RBC QN AUTO: 30.1 PG (ref 26.6–33)
MCHC RBC AUTO-ENTMCNC: 33.1 G/DL (ref 31.5–35.7)
MCV RBC AUTO: 91.1 FL (ref 79–97)
MONOCYTES # BLD AUTO: 0.6 10*3/MM3 (ref 0.1–0.9)
MONOCYTES NFR BLD AUTO: 9.4 % (ref 5–12)
NEUTROPHILS NFR BLD AUTO: 3.76 10*3/MM3 (ref 1.7–7)
NEUTROPHILS NFR BLD AUTO: 58.8 % (ref 42.7–76)
NRBC BLD AUTO-RTO: 0 /100 WBC (ref 0–0.2)
PLATELET # BLD AUTO: 323 10*3/MM3 (ref 140–450)
PMV BLD AUTO: 10.2 FL (ref 6–12)
RBC # BLD AUTO: 3.92 10*6/MM3 (ref 3.77–5.28)
WBC NRBC COR # BLD: 6.39 10*3/MM3 (ref 3.4–10.8)

## 2023-02-14 PROCEDURE — 83540 ASSAY OF IRON: CPT | Performed by: NURSE PRACTITIONER

## 2023-02-14 PROCEDURE — 85025 COMPLETE CBC W/AUTO DIFF WBC: CPT | Performed by: NURSE PRACTITIONER

## 2023-02-14 PROCEDURE — 82306 VITAMIN D 25 HYDROXY: CPT | Performed by: NURSE PRACTITIONER

## 2023-02-14 PROCEDURE — 99214 OFFICE O/P EST MOD 30 MIN: CPT | Performed by: NURSE PRACTITIONER

## 2023-02-14 PROCEDURE — 80061 LIPID PANEL: CPT | Performed by: NURSE PRACTITIONER

## 2023-02-14 PROCEDURE — 82607 VITAMIN B-12: CPT | Performed by: NURSE PRACTITIONER

## 2023-02-14 PROCEDURE — 82746 ASSAY OF FOLIC ACID SERUM: CPT | Performed by: NURSE PRACTITIONER

## 2023-02-14 PROCEDURE — 84466 ASSAY OF TRANSFERRIN: CPT | Performed by: NURSE PRACTITIONER

## 2023-02-14 PROCEDURE — 80053 COMPREHEN METABOLIC PANEL: CPT | Performed by: NURSE PRACTITIONER

## 2023-02-14 PROCEDURE — 82728 ASSAY OF FERRITIN: CPT | Performed by: NURSE PRACTITIONER

## 2023-02-14 RX ORDER — FERROUS SULFATE 325(65) MG
325 TABLET ORAL
Qty: 90 TABLET | Refills: 1 | Status: SHIPPED | OUTPATIENT
Start: 2023-02-14 | End: 2023-02-27 | Stop reason: SDUPTHER

## 2023-02-14 RX ORDER — TRAZODONE HYDROCHLORIDE 50 MG/1
TABLET ORAL
Qty: 45 TABLET | Refills: 2 | Status: SHIPPED | OUTPATIENT
Start: 2023-02-14 | End: 2023-03-08 | Stop reason: SDUPTHER

## 2023-02-15 LAB
25(OH)D3 SERPL-MCNC: 36.6 NG/ML (ref 30–100)
ALBUMIN SERPL-MCNC: 4.8 G/DL (ref 3.5–5.2)
ALBUMIN/GLOB SERPL: 1.6 G/DL
ALP SERPL-CCNC: 98 U/L (ref 39–117)
ALT SERPL W P-5'-P-CCNC: 20 U/L (ref 1–33)
ANION GAP SERPL CALCULATED.3IONS-SCNC: 10.3 MMOL/L (ref 5–15)
AST SERPL-CCNC: 27 U/L (ref 1–32)
BILIRUB SERPL-MCNC: 0.4 MG/DL (ref 0–1.2)
BUN SERPL-MCNC: 14 MG/DL (ref 8–23)
BUN/CREAT SERPL: 10.8 (ref 7–25)
CALCIUM SPEC-SCNC: 10 MG/DL (ref 8.6–10.5)
CHLORIDE SERPL-SCNC: 103 MMOL/L (ref 98–107)
CHOLEST SERPL-MCNC: 204 MG/DL (ref 0–200)
CO2 SERPL-SCNC: 24.7 MMOL/L (ref 22–29)
CREAT SERPL-MCNC: 1.3 MG/DL (ref 0.57–1)
EGFRCR SERPLBLD CKD-EPI 2021: 46 ML/MIN/1.73
FERRITIN SERPL-MCNC: 18.6 NG/ML (ref 13–150)
FOLATE SERPL-MCNC: 13.2 NG/ML (ref 4.78–24.2)
GLOBULIN UR ELPH-MCNC: 3 GM/DL
GLUCOSE SERPL-MCNC: 109 MG/DL (ref 65–99)
HDLC SERPL-MCNC: 82 MG/DL (ref 40–60)
IRON 24H UR-MRATE: 82 MCG/DL (ref 37–145)
IRON SATN MFR SERPL: 17 % (ref 20–50)
LDLC SERPL CALC-MCNC: 108 MG/DL (ref 0–100)
LDLC/HDLC SERPL: 1.3 {RATIO}
POTASSIUM SERPL-SCNC: 4 MMOL/L (ref 3.5–5.2)
PROT SERPL-MCNC: 7.8 G/DL (ref 6–8.5)
SODIUM SERPL-SCNC: 138 MMOL/L (ref 136–145)
TIBC SERPL-MCNC: 478 MCG/DL (ref 298–536)
TRANSFERRIN SERPL-MCNC: 321 MG/DL (ref 200–360)
TRIGL SERPL-MCNC: 78 MG/DL (ref 0–150)
VIT B12 BLD-MCNC: 519 PG/ML (ref 211–946)
VLDLC SERPL-MCNC: 14 MG/DL (ref 5–40)

## 2023-02-16 ENCOUNTER — TELEPHONE (OUTPATIENT)
Dept: INTERNAL MEDICINE | Facility: CLINIC | Age: 63
End: 2023-02-16
Payer: OTHER GOVERNMENT

## 2023-02-16 NOTE — TELEPHONE ENCOUNTER
NICOLÁS NEEDS A ON-GOING REF. TO [facility] IT JUST RAN OUT. AND SHE NEEDS TO GO SEE THEM. SHE DID NOT HAVE THE FAX NUMBER,

## 2023-02-17 NOTE — TELEPHONE ENCOUNTER
Caller: Alisa Ames    Relationship: Self    Best call back number: [phone]    What is the medical concern/diagnosis: NA    What specialty or service is being requested: PODIATRY    What is the provider, practice or medical service name: [facility]    What is the office location: NA    What is the office phone number: [facility]  OFFICE FAX NUMBER: [phone]    Any additional details: PATIENT STATES SHE NEEDS THE REFERRAL SENT OVER TO [facility]    
Caller: Cayden Amesevan LITTLE    Relationship: Self    Best call back number: [phone]     What is the medical concern/diagnosis: PODIATRY    What specialty or service is being requested: PODIATRY    What is the provider, practice or medical service name: WOULD LIKE ONE IN [place] BUT THE [facility] PHONE NUMBER IS DISCONNECTED    Any additional details: IF ANOTHER ONE ISN'T AVAILABLE IN [place] PATIENT WILL GO TO [place].    Requested Prescriptions:   Requested Prescriptions     Pending Prescriptions Disp Refills   • tiZANidine (ZANAFLEX) 4 MG tablet 180 tablet 5     Sig: Take 1 tablet by mouth Every 8 (Eight) Hours As Needed for Muscle Spasms.        Pharmacy where request should be sent: [facility] [place] [phone] [facility] [phone]      Additional details provided by patient: HAS PLENTY LEFT    Does the patient have less than a 3 day supply:  [] Yes  [x] No    Ahmet Steven Rep   11/15/21 09:44 EST           
NICOLÁS IS CALLING TO LET YOU KNOW THE PHONE NUMBER FOR THE [facility] IS DISCONNECTED.    
SHE IS ALSO WAITING FOR THE TIZANIDINE REFILL.  SHE HAS A FEW DAYS LEFT.  PLEASE SEND TO     [facility] - [place], [place] - [address] [place] [address] [phone] [facility] [phone] FX   
no

## 2023-02-20 RX ORDER — MONTELUKAST SODIUM 10 MG/1
TABLET ORAL
Qty: 90 TABLET | Refills: 3 | Status: SHIPPED | OUTPATIENT
Start: 2023-02-20

## 2023-02-20 NOTE — TELEPHONE ENCOUNTER
Patient wants an updated podiatry referral sent to [facility]. I put one in on 1/9/23. Not sure if patient knew it was already done. Can you fax it over to them. thanks

## 2023-02-27 ENCOUNTER — TELEPHONE (OUTPATIENT)
Dept: INTERNAL MEDICINE | Facility: CLINIC | Age: 63
End: 2023-02-27
Payer: OTHER GOVERNMENT

## 2023-02-27 ENCOUNTER — TELEPHONE (OUTPATIENT)
Dept: INTERNAL MEDICINE | Facility: CLINIC | Age: 63
End: 2023-02-27

## 2023-02-27 DIAGNOSIS — N18.32 STAGE 3B CHRONIC KIDNEY DISEASE: Primary | ICD-10-CM

## 2023-02-27 RX ORDER — FERROUS SULFATE 325(65) MG
325 TABLET ORAL
Qty: 90 TABLET | Refills: 1 | Status: SHIPPED | OUTPATIENT
Start: 2023-02-27

## 2023-02-27 NOTE — TELEPHONE ENCOUNTER
----- Message from GERSON Neri sent at 2/27/2023  1:15 PM EST -----  Labs show kidney function has picked up some but still on lower end. Your anemia is a bit better but the anemia is most likely due to your decreased kidney function. Continue the iron supplement ([facility] I am referring you to kidney doctor for a consult. Cholesterol is doing some better. All other results are normal.

## 2023-02-27 NOTE — TELEPHONE ENCOUNTER
Caller: Alisa Ames    Relationship: Self    Best call back number: [phone]    Requested Prescriptions:   IRON SUPPLEMENT     Pharmacy where request should be sent: [id number] [address] AT [facility] [address] - [phone] [facility] [phone] FX     Additional details provided by patient: PATIENT IS REQUESTING THE PRESCRIPTION BE SENT TO [facility] BECAUSE SHE HAS YET TO RECEIVE IT FROM THE OTHER PHARMACY. PLEASE ADVISE       Does the patient have less than a 3 day supply:  [x] Yes  [] No    Would you like a call back once the refill request has been completed: [x] Yes [] No    If the office needs to give you a call back, can they leave a voicemail: [x] Yes [] No    Ahmet Rodriguez Rep   02/27/23 15:19 EST

## 2023-02-27 NOTE — TELEPHONE ENCOUNTER
Caller: Alisa Ames    Relationship: Self    Best call back number: [phone]    What medication are you requesting: IRON SUPPLEMENT     What are your current symptoms: LOW IRON     How long have you been experiencing symptoms:    Have you had these symptoms before:    [] Yes  [] No    Have you been treated for these symptoms before:   [] Yes  [] No    If a prescription is needed, what is your preferred pharmacy and phone number: [facility] [address] [phone] [facility] [phone]      Additional notes: PATIENT STATES AT HER LAST APPOINTMENT 2/14/23 ESTHER SEYMOUR WAS SUPPOSED TO SEND IN AN IRON SUPPLEMENT TO [facility] BUT THE PATIENT HAS NOT HEARD ANYTHING ABOUT THIS.

## 2023-02-27 NOTE — TELEPHONE ENCOUNTER
Caller: Alisa Ames    Relationship: Self    Best call back number: [phone]    Caller requesting test results: ALISA AMES     What test was performed: BLOOD WORK     When was the test performed: 2/14/23     Where was the test performed: IN OFFICE     Additional notes: PATIENT HAS NOT RECEIVED THE RESULTS TO THESE TESTS YET SO SHE WOULD LIKE A CALL BACK WITH THE RESULTS

## 2023-03-08 DIAGNOSIS — G47.09 OTHER INSOMNIA: ICD-10-CM

## 2023-03-08 RX ORDER — TRAZODONE HYDROCHLORIDE 50 MG/1
TABLET ORAL
Qty: 45 TABLET | Refills: 2 | Status: SHIPPED | OUTPATIENT
Start: 2023-03-08

## 2023-03-08 NOTE — TELEPHONE ENCOUNTER
Rx Refill Note  Requested Prescriptions     Pending Prescriptions Disp Refills   • traZODone (DESYREL) 50 MG tablet 45 tablet 2     Sig: Take 1-2 tablets one hour before bedtime as needed for sleep.      Last office visit with prescribing clinician: 2/14/2023   Last telemedicine visit with prescribing clinician: 4/12/2023   Next office visit with prescribing clinician: 4/12/2023                           Fidelina Tate MA  03/08/23, 14:06 EST

## 2023-03-08 NOTE — TELEPHONE ENCOUNTER
Caller: Alisa Ames    Relationship: Self    Best call back number: [phone]  Requested Prescriptions:   Requested Prescriptions     Pending Prescriptions Disp Refills   • traZODone (DESYREL) 50 MG tablet 45 tablet 2     Sig: Take 1-2 tablets one hour before bedtime as needed for sleep.        Pharmacy where request should be sent:      [facility] [address] [phone] [facility] [phone]         Additional details provided by patient: SINCE SHE TAKES 2 TABLETS A DAY, SHE IS REQUESTING A REFILL FOR 60  TABLETS    Does the patient have less than a 3 day supply:  [x] Yes  [] No    Would you like a call back once the refill request has been completed: [x] Yes [] No    If the office needs to give you a call back, can they leave a voicemail: [x] Yes [] No    Ahmet Buchanan Rep   03/08/23 13:44 EST

## 2023-03-21 ENCOUNTER — OFFICE VISIT (OUTPATIENT)
Dept: INTERNAL MEDICINE | Facility: CLINIC | Age: 63
End: 2023-03-21
Payer: OTHER GOVERNMENT

## 2023-03-21 VITALS
RESPIRATION RATE: 18 BRPM | HEIGHT: 66 IN | SYSTOLIC BLOOD PRESSURE: 126 MMHG | WEIGHT: 145 LBS | BODY MASS INDEX: 23.3 KG/M2 | TEMPERATURE: 96.8 F | DIASTOLIC BLOOD PRESSURE: 64 MMHG | HEART RATE: 68 BPM | OXYGEN SATURATION: 98 %

## 2023-03-21 DIAGNOSIS — R10.30 LOWER ABDOMINAL PAIN: ICD-10-CM

## 2023-03-21 DIAGNOSIS — D50.8 IRON DEFICIENCY ANEMIA SECONDARY TO INADEQUATE DIETARY IRON INTAKE: ICD-10-CM

## 2023-03-21 DIAGNOSIS — R19.5 DARK STOOLS: Primary | ICD-10-CM

## 2023-03-21 DIAGNOSIS — I10 ESSENTIAL HYPERTENSION: ICD-10-CM

## 2023-03-21 LAB
EXPIRATION DATE: NORMAL
HEMOCCULT STL QL IA: NEGATIVE
Lab: NORMAL

## 2023-03-21 PROCEDURE — 99214 OFFICE O/P EST MOD 30 MIN: CPT | Performed by: NURSE PRACTITIONER

## 2023-03-21 PROCEDURE — 82274 ASSAY TEST FOR BLOOD FECAL: CPT | Performed by: NURSE PRACTITIONER

## 2023-03-21 NOTE — PROGRESS NOTES
"Chief Complaint   Patient presents with   • Black or Bloody Stool     Black stool, x1mo   • Abdominal Pain     Bilateral lower abdomen \"sharp\" pain, x1mo       HPI  Alisa Ames is a 62 y.o. female presents with black bowel movements.  This started 4-6 weeks ago.  She has pain across her lower stomach, and occasional \"sharp pain\" but she states the pain is very mild.   She states that every time she has a bowel movement she has black stools.  She states this started about 2 hours after she started taking an iron supplement.  But she does not feel this is related to the iron.  She has never had this happen before.    The following portions of the patient's history were reviewed and updated as appropriate: allergies, current medications, past family history, past medical history, past social history, past surgical history and problem list.    Subjective  Review of Systems   Constitutional: Negative for activity change, appetite change and fatigue.   HENT: Negative for congestion.    Respiratory: Negative for cough and shortness of breath.    Cardiovascular: Negative for chest pain and leg swelling.   Gastrointestinal: Positive for abdominal pain (lower abdomen), blood in stool and diarrhea.   Neurological: Negative for dizziness, weakness and confusion.   Psychiatric/Behavioral: Negative for behavioral problems and decreased concentration.       Objective  Visit Vitals  /64   Pulse 68   Temp 96.8 °F (36 °C) (Infrared)   Resp 18   Ht 167.6 cm (66\")   Wt 65.8 kg (145 lb)   SpO2 98%   BMI 23.40 kg/m²        Physical Exam  Vitals and nursing note reviewed. Exam conducted with a chaperone present.   HENT:      Head: Normocephalic.   Eyes:      Pupils: Pupils are equal, round, and reactive to light.   Pulmonary:      Effort: Pulmonary effort is normal.   Abdominal:      General: Bowel sounds are normal. There is no distension.      Palpations: Abdomen is soft.      Tenderness: There is no abdominal tenderness. "   Genitourinary:     Rectum: Guaiac result negative. No tenderness.   Skin:     General: Skin is warm and dry.      Capillary Refill: Capillary refill takes less than 2 seconds.   Neurological:      General: No focal deficit present.      Mental Status: She is alert and oriented to person, place, and time.      Gait: Gait is intact.   Psychiatric:         Attention and Perception: Attention normal.         Mood and Affect: Mood normal.         Behavior: Behavior normal.          Procedures       Results for orders placed or performed in visit on 03/21/23   POCT FECAL OCCULT BLOOD BY IMMUNOASSAY    Specimen: Stool   Result Value Ref Range    POC OCCULT BLOOD BY IMMUNOASSAY Negative Negative    Lot Number [id number]     Expiration Date 06/30/2024        Assessment and Plan  Diagnoses and all orders for this visit:    1. Dark stools (Primary)  -     POCT FECAL OCCULT BLOOD BY IMMUNOASSAY    2. Iron deficiency anemia secondary to inadequate dietary iron intake    3. Lower abdominal pain    4. Essential hypertension      Occult blood negative  D/w pt the likely cause of black stools is her iron intake, recommend she stop the iron for 3-4 days to see if her abdominal pain resolves  Instructed to call back if worsens for CT scan  BP stable on Amlodipine/Losartan        Return for Next scheduled follow up.        GERSON Luciano

## 2023-03-27 ENCOUNTER — TELEPHONE (OUTPATIENT)
Dept: INTERNAL MEDICINE | Facility: CLINIC | Age: 63
End: 2023-03-27
Payer: OTHER GOVERNMENT

## 2023-03-27 DIAGNOSIS — G89.29 CHRONIC PAIN OF RIGHT KNEE: Primary | ICD-10-CM

## 2023-03-27 DIAGNOSIS — M25.561 CHRONIC PAIN OF RIGHT KNEE: Primary | ICD-10-CM

## 2023-03-27 NOTE — TELEPHONE ENCOUNTER
PT CALLED AND STATED THAT HER RIGHT KNEE IS SWOLLEN AND PT IS IN PAIN. PT WOULD LIKE TO SEE DR BARLOW, PT HAS PREVIOUSLY SEEN THIS DOCTOR. PT STATED THAT SHE HAS AN APPOINTMENT ON 04/12/2023 AND DOES NOT THINK SHE WILL BE ABLE TO WAIT THAT LONG TO GET A REFERRAL.PLEASE CALL PT WHEN THIS REFERRAL IS PUT IN.

## 2023-04-04 ENCOUNTER — OFFICE VISIT (OUTPATIENT)
Dept: ORTHOPEDIC SURGERY | Facility: CLINIC | Age: 63
End: 2023-04-04
Payer: MEDICARE

## 2023-04-04 VITALS
WEIGHT: 145 LBS | BODY MASS INDEX: 23.3 KG/M2 | SYSTOLIC BLOOD PRESSURE: 122 MMHG | DIASTOLIC BLOOD PRESSURE: 68 MMHG | HEIGHT: 66 IN

## 2023-04-04 DIAGNOSIS — M25.561 RIGHT KNEE PAIN, UNSPECIFIED CHRONICITY: ICD-10-CM

## 2023-04-04 DIAGNOSIS — M17.11 PRIMARY OSTEOARTHRITIS OF RIGHT KNEE: Primary | ICD-10-CM

## 2023-04-04 PROCEDURE — 20610 DRAIN/INJ JOINT/BURSA W/O US: CPT | Performed by: ORTHOPAEDIC SURGERY

## 2023-04-04 PROCEDURE — 99214 OFFICE O/P EST MOD 30 MIN: CPT | Performed by: ORTHOPAEDIC SURGERY

## 2023-04-04 RX ORDER — BUPIVACAINE HYDROCHLORIDE 2.5 MG/ML
3 INJECTION, SOLUTION EPIDURAL; INFILTRATION; INTRACAUDAL
Status: COMPLETED | OUTPATIENT
Start: 2023-04-04 | End: 2023-04-04

## 2023-04-04 RX ORDER — TRIAMCINOLONE ACETONIDE 40 MG/ML
80 INJECTION, SUSPENSION INTRA-ARTICULAR; INTRAMUSCULAR
Status: COMPLETED | OUTPATIENT
Start: 2023-04-04 | End: 2023-04-04

## 2023-04-04 RX ORDER — LIDOCAINE HYDROCHLORIDE 10 MG/ML
3 INJECTION, SOLUTION EPIDURAL; INFILTRATION; INTRACAUDAL; PERINEURAL
Status: COMPLETED | OUTPATIENT
Start: 2023-04-04 | End: 2023-04-04

## 2023-04-04 RX ADMIN — LIDOCAINE HYDROCHLORIDE 3 ML: 10 INJECTION, SOLUTION EPIDURAL; INFILTRATION; INTRACAUDAL; PERINEURAL at 09:29

## 2023-04-04 RX ADMIN — BUPIVACAINE HYDROCHLORIDE 3 ML: 2.5 INJECTION, SOLUTION EPIDURAL; INFILTRATION; INTRACAUDAL at 09:29

## 2023-04-04 RX ADMIN — TRIAMCINOLONE ACETONIDE 80 MG: 40 INJECTION, SUSPENSION INTRA-ARTICULAR; INTRAMUSCULAR at 09:29

## 2023-04-04 NOTE — PROGRESS NOTES
Orthopaedic Clinic Note: Knee New Patient    Chief Complaint   Patient presents with   • Right Knee - Pain        HPI    Alisa Ames is a 62 y.o. female who presents with new complaint of right knee pain for 3 month(s). Onset atraumatic and gradual in nature. Pain is localized to the medial joint line, anterior knee and is a 8/10 on the pain scale. Pain is described as dull and aching. Associated symptoms include pain, swelling and stiffness. The pain is worse with walking, standing, sitting, climbing stairs, sleeping and rising from seated position; resting, sitting and assistive device (cane/walker) make it better. Previous treatments have included: cane/walker since symptom onset. Although some transient relief was reported with these interventions, these conservative measures have failed and symptoms have persisted. The patient is limited in daily activities and has had a significant decrease in quality of life as a result. She denies fevers, chills, or constitutional symptoms.    I have reviewed the following portions of the patient's history:History of Present Illness    Past Medical History:   Diagnosis Date   • COPD (chronic obstructive pulmonary disease)    • Fibromyalgia    • Hyperlipidemia    • Malignant neoplasm    • Migraine    • Syncope    • Thyroid nodule    • Wears dentures     UPPER AND LOWER       Past Surgical History:   Procedure Laterality Date   • APPENDECTOMY     • BREAST BIOPSY Right    • CERVICAL SPINE SURGERY      Fibromyalgia and DDD with h/o C spine surgery- initially on flexeril bid.   • COLONOSCOPY  2015   • NECK SURGERY     • NOSE SURGERY      r/t Skin CA   • SHOULDER SURGERY Left 09/10/2020    left shoulder arthroscopy with lysis of adhesions and manipulation under anesthesia   • SKIN CANCER EXCISION     • THYROID LOBECTOMY Right     On discussion, pt reported a h/o right thyroid lobectomy for goiter.U/S demo surgical absence of right lobe and diffuse nodularity of the left lobe  with a dominant nodule in the lower pole of 1.8 x 3.5 cm.   • TONSILLECTOMY     • TOTAL HIP ARTHROPLASTY Left 2019    Procedure: TOTAL HIP ARTHROPLASTY LEFT;  Surgeon: Allen Madera MD;  Location: [facility];  Service: Orthopedics   • TOTAL THYROIDECTOMY        Family History   Problem Relation Age of Onset   • Other Mother         malignant neoplasm   • Coronary artery disease Mother         MI (60's)   • Heart attack Mother    • Other Other         malignant neoplasm   • Valvular heart disease Father    • Birth defects Son    • Breast cancer Daughter 40   • Breast cancer Paternal Grandmother         DX AGE MID 80'S   • Breast cancer Maternal Cousin         DX AGE 40'S   • Ovarian cancer Neg Hx      Social History     Socioeconomic History   • Marital status:    Tobacco Use   • Smoking status: Former     Packs/day: 1.00     Years: 40.00     Pack years: 40.00     Types: Cigarettes     Quit date:      Years since quittin.2   • Smokeless tobacco: Never   • Tobacco comments:     QUIT SMOKING WITH E C[employer]-1 WEEK AGO    Vaping Use   • Vaping Use: Former   • Quit date: 2020   • Substances: Nicotine, Flavoring   • Devices: Buyooble tank   Substance and Sexual Activity   • Alcohol use: Yes     Alcohol/week: 1.0 standard drink     Types: 1 Cans of beer per week     Comment: 1 drink per month   • Drug use: No   • Sexual activity: Yes     Partners: Male      Current Outpatient Medications on File Prior to Visit   Medication Sig Dispense Refill   • amLODIPine (NORVASC) 5 MG tablet TAKE 1 TABLET DAILY 30 tablet 11   • atorvastatin (Lipitor) 40 MG tablet Take 1 tablet by mouth Daily. 90 tablet 1   • cloNIDine (CATAPRES) 0.1 MG tablet TAKE 1 TABLET DAILY AS NEEDED FOR HIGH BLOOD PRESSURE. TAKE FOR BLOOD PRESSURE GREATER THAN 160/90 30 tablet 11   • ferrous sulfate 325 (65 FE) MG tablet Take 1 tablet by mouth Daily With Breakfast. Take with orange juice or vitamin C 90 tablet 1   • Latuda 120 MG tablet  "tablet      • levothyroxine (SYNTHROID, LEVOTHROID) 75 MCG tablet TAKE 1 TABLET DAILY, FASTING AND WAIT 1 HOUR FOR FOOD OR OTHER MEDICATIONS (DISCONTINUE SYNTHROID 88 MCG) 90 tablet 3   • losartan (COZAAR) 100 MG tablet Take 1 tablet by mouth Daily. 90 tablet 1   • montelukast (SINGULAIR) 10 MG tablet TAKE 1 TABLET EVERY NIGHT 90 tablet 3   • omeprazole (priLOSEC) 20 MG capsule TAKE 1 CAPSULE DAILY 30 capsule 11   • tiZANidine (ZANAFLEX) 4 MG tablet Take 1 tablet by mouth Every 8 (Eight) Hours As Needed for Muscle Spasms. 180 tablet 2   • traZODone (DESYREL) 50 MG tablet Take 1-2 tablets one hour before bedtime as needed for sleep. 45 tablet 2   • vilazodone (VIIBRYD) 10 MG tablet tablet Take 1 tablet by mouth Daily. with food       No current facility-administered medications on file prior to visit.      No Known Allergies     Review of Systems   Constitutional: Negative.    HENT: Negative.    Eyes: Negative.    Respiratory: Negative.    Cardiovascular: Negative.    Gastrointestinal: Negative.    Endocrine: Negative.    Genitourinary: Negative.    Musculoskeletal: Positive for arthralgias.   Skin: Negative.    Allergic/Immunologic: Negative.    Neurological: Negative.    Hematological: Negative.    Psychiatric/Behavioral: Negative.         The patient's Review of Systems was personally reviewed and confirmed as accurate.    The following portions of the patient's history were reviewed and updated as appropriate: allergies, current medications, past family history, past medical history, past social history, past surgical history and problem list.    Physical Exam  Blood pressure 122/68, height 167.6 cm (66\"), weight 65.8 kg (145 lb), not currently breastfeeding.    Body mass index is 23.4 kg/m².    GENERAL APPEARANCE: awake, alert & oriented x 3, in no acute distress and well developed, well nourished  PSYCH: normal affect  LUNGS:  breathing nonlabored  EYES: sclera anicteric  CARDIOVASCULAR: palpable dorsalis " pedis, palpable posterior tibial bilaterally. Capillary refill less than 2 seconds  EXTREMITIES: no clubbing, cyanosis  GAIT:  Antalgic            Right knee exam:   ----------  Hip Exam  ----------  FLEXION CONTRACTURE: None  FLEXION: 110 degrees  INTERNAL ROTATION: 20 degrees at 90 degrees of flexion   EXTERNAL ROTATION: 40 degrees at 90 degrees of flexion    PAIN WITH HIP MOTION: no  ----------  Knee Exam  ----------  ALIGNMENT: moderate varus, correctible to neutral    RANGE OF MOTION:  Decreased (5 - 115 degrees) with no extensor lag  LIGAMENTOUS STABILITY:   stable to varus and valgus stress at terminal extension and 30 degrees; retensioning of the MCL is appreciated with valgus stress at 30 degrees consistent with medial compartment degeneration     STRENGTH:  4/5 knee flexion, extension. 5/5 ankle dorsiflexion and plantarflexion.     PAIN WITH PALPATION: global  KNEE EFFUSION: yes, mild effusion  PAIN WITH KNEE ROM: yes, global  PATELLAR CREPITUS: yes, painful and symptomatic  SPECIAL EXAM FINDINGS:  none    REFLEXES:  PATELLAR 2+/4  ACHILLES 2+/4    CLONUS: no  STRAIGHT LEG TEST:   negative    SENSATION TO LIGHT TOUCH:  DEEP PERONEAL/SUPERFICIAL PERONEAL/SURAL/SAPHENOUS/TIBIAL:   intact    EDEMA:  no  ERYTHEMA:  no  WOUNDS/INCISIONS:  no    ______________________________________________________________________  ______________________________________________________________________    RADIOGRAPHIC FINDINGS:   Indication: Right knee pain    Comparison: No prior xrays are available for comparison    Right knee(s) 4 views: Mild to moderate tricompartmental arthritis with genu varum alignment, periarticular osteophytes visualized in all compartments.  No acute bony injury or fracture      Assessment/Plan:   Diagnosis Plan   1. Primary osteoarthritis of right knee        2. Right knee pain, unspecified chronicity  XR Knee 4+ View Right        Patient suffering from arthritic flareup of the right knee.  I  discussed treatment options with her.  She is agreeable to cortisone injection the right knee today.  She will follow-up as needed.    Procedure Note:  I discussed with the patient the potential benefits of performing a therapeutic injection of the right knee as well as potential risks including but not limited to infection, swelling, pain, bleeding, bruising, nerve/vessel damage, skin color changes, transient elevation in blood glucose levels, and fat atrophy. After informed consent and verifying correct patient, procedure site, and type of procedure, the area was prepped with alcohol, ethyl chloride was used to numb the skin. Via the superolateral approach, 3 cc of 1% lidocaine, 3 cc of 0.25% Marcaine and 2 cc of 40mg/ml of Kenalog were injected into the right knee. The patient tolerated the procedure well. There were no complications. A sterile dressing was placed over the injection site.      Allen Madera MD  04/04/23  09:29 EDT

## 2023-04-12 ENCOUNTER — OFFICE VISIT (OUTPATIENT)
Dept: INTERNAL MEDICINE | Facility: CLINIC | Age: 63
End: 2023-04-12
Payer: MEDICARE

## 2023-04-12 ENCOUNTER — TELEPHONE (OUTPATIENT)
Dept: INTERNAL MEDICINE | Facility: CLINIC | Age: 63
End: 2023-04-12

## 2023-04-12 VITALS
OXYGEN SATURATION: 98 % | SYSTOLIC BLOOD PRESSURE: 118 MMHG | DIASTOLIC BLOOD PRESSURE: 70 MMHG | BODY MASS INDEX: 23.3 KG/M2 | HEIGHT: 66 IN | RESPIRATION RATE: 16 BRPM | WEIGHT: 145 LBS | TEMPERATURE: 97.4 F | HEART RATE: 83 BPM

## 2023-04-12 DIAGNOSIS — I10 ESSENTIAL HYPERTENSION: Primary | ICD-10-CM

## 2023-04-12 DIAGNOSIS — M19.91 PRIMARY OSTEOARTHRITIS, UNSPECIFIED SITE: ICD-10-CM

## 2023-04-12 DIAGNOSIS — E03.9 HYPOTHYROIDISM (ACQUIRED): ICD-10-CM

## 2023-04-12 DIAGNOSIS — R73.03 BORDERLINE DIABETES: ICD-10-CM

## 2023-04-12 LAB
EXPIRATION DATE: NORMAL
HBA1C MFR BLD: 5.6 %
Lab: NORMAL

## 2023-04-12 RX ORDER — HYDROCODONE BITARTRATE AND ACETAMINOPHEN 7.5; 325 MG/1; MG/1
1 TABLET ORAL EVERY 6 HOURS PRN
Qty: 28 TABLET | Refills: 0 | Status: SHIPPED | OUTPATIENT
Start: 2023-04-12

## 2023-04-13 ENCOUNTER — TELEPHONE (OUTPATIENT)
Dept: INTERNAL MEDICINE | Facility: CLINIC | Age: 63
End: 2023-04-13
Payer: MEDICARE

## 2023-04-13 NOTE — TELEPHONE ENCOUNTER
----- Message from GERSON Neri sent at 4/12/2023  5:16 PM EDT -----  Contact patient and advise:    A1C has greatly improved at 5.6. continue low carb/sugar diet. Recheck in 3 months.

## 2023-04-13 NOTE — TELEPHONE ENCOUNTER
Patient has attempted to reach [facility] and left messages but unable to get call back to schedule an appt. Can you see if they will let you schedule an appt for her? Thank you!

## 2023-04-14 NOTE — TELEPHONE ENCOUNTER
I also called [facility] and no answer. I lvm for them to call me back but also refaxed the referral to their office

## 2023-04-24 ENCOUNTER — OFFICE VISIT (OUTPATIENT)
Dept: INTERNAL MEDICINE | Facility: CLINIC | Age: 63
End: 2023-04-24
Payer: MEDICARE

## 2023-04-24 ENCOUNTER — TELEPHONE (OUTPATIENT)
Dept: INTERNAL MEDICINE | Facility: CLINIC | Age: 63
End: 2023-04-24

## 2023-04-24 ENCOUNTER — LAB (OUTPATIENT)
Dept: LAB | Facility: HOSPITAL | Age: 63
End: 2023-04-24
Payer: MEDICARE

## 2023-04-24 VITALS
RESPIRATION RATE: 16 BRPM | BODY MASS INDEX: 23.4 KG/M2 | SYSTOLIC BLOOD PRESSURE: 124 MMHG | OXYGEN SATURATION: 98 % | HEART RATE: 101 BPM | HEIGHT: 66 IN | DIASTOLIC BLOOD PRESSURE: 68 MMHG | TEMPERATURE: 98.4 F

## 2023-04-24 DIAGNOSIS — R35.0 FREQUENT URINATION: ICD-10-CM

## 2023-04-24 DIAGNOSIS — N30.01 ACUTE CYSTITIS WITH HEMATURIA: Primary | ICD-10-CM

## 2023-04-24 DIAGNOSIS — Z79.899 MEDICATION MANAGEMENT: ICD-10-CM

## 2023-04-24 LAB
BILIRUB BLD-MCNC: NEGATIVE MG/DL
CLARITY, POC: ABNORMAL
COLOR UR: YELLOW
EXPIRATION DATE: ABNORMAL
GLUCOSE UR STRIP-MCNC: NEGATIVE MG/DL
KETONES UR QL: ABNORMAL
LEUKOCYTE EST, POC: ABNORMAL
Lab: ABNORMAL
NITRITE UR-MCNC: NEGATIVE MG/ML
PH UR: 6 [PH] (ref 5–8)
PROT UR STRIP-MCNC: ABNORMAL MG/DL
RBC # UR STRIP: ABNORMAL /UL
SP GR UR: 1.02 (ref 1–1.03)
UROBILINOGEN UR QL: ABNORMAL

## 2023-04-24 PROCEDURE — 87186 SC STD MICRODIL/AGAR DIL: CPT | Performed by: NURSE PRACTITIONER

## 2023-04-24 PROCEDURE — 1160F RVW MEDS BY RX/DR IN RCRD: CPT | Performed by: NURSE PRACTITIONER

## 2023-04-24 PROCEDURE — 87077 CULTURE AEROBIC IDENTIFY: CPT | Performed by: NURSE PRACTITIONER

## 2023-04-24 PROCEDURE — 1159F MED LIST DOCD IN RCRD: CPT | Performed by: NURSE PRACTITIONER

## 2023-04-24 PROCEDURE — 87086 URINE CULTURE/COLONY COUNT: CPT | Performed by: NURSE PRACTITIONER

## 2023-04-24 PROCEDURE — 81003 URINALYSIS AUTO W/O SCOPE: CPT | Performed by: NURSE PRACTITIONER

## 2023-04-24 PROCEDURE — 99213 OFFICE O/P EST LOW 20 MIN: CPT | Performed by: NURSE PRACTITIONER

## 2023-04-24 RX ORDER — NITROFURANTOIN 25; 75 MG/1; MG/1
100 CAPSULE ORAL EVERY 12 HOURS SCHEDULED
Qty: 14 CAPSULE | Refills: 0 | Status: SHIPPED | OUTPATIENT
Start: 2023-04-24 | End: 2023-05-01

## 2023-04-24 NOTE — TELEPHONE ENCOUNTER
Caller: Alisa Ames    Relationship: Self    Best call back number: [phone]    What medication are you requesting: ANTIBIOTIC    What are your current symptoms: FREQUENT DONALD TO URINATE    How long have you been experiencing symptoms: WEEK    Have you had these symptoms before:    [x] Yes  [] No    Have you been treated for these symptoms before:   [x] Yes  [] No    If a prescription is needed, what is your preferred pharmacy and phone number: [facility] #[id number] [address] AT [facility] & [address] - [phone] [facility] [phone]      Additional notes:CALLBACK PATIENT TO ADVISE

## 2023-04-24 NOTE — PROGRESS NOTES
Subjective   Chief Complaint   Patient presents with   • Dysuria      Alisa Ames is a 63 y.o. female.     The patient is here today for dysuria. She is accompanied by an adult male.    The patient denies being allergic to Macrobid. The adult male states the patient has an appointment on Wednesday, 04/26/2022. She adds that she has had to urinate every 5 minutes. She admits that cranberry juice alleviates her symptoms.    Brief Urine Lab Results  (Last result in the past 365 days)      Color   Clarity   Blood   Leuk Est   Nitrite   Protein   CREAT   Urine HCG        04/24/23 1132 Yellow   Cloudy   2+   500 Teresa/ul   Negative   2+                   I have reviewed the following portions of the patient's history and confirmed they are accurate: allergies, current medications, past family history, past medical history, past social history, past surgical history, and problem list    I have personally completed the patient's review of systems.    Review of Systems   Constitutional: Positive for fatigue. Negative for activity change, appetite change, chills, diaphoresis, fever, unexpected weight gain and unexpected weight loss.   HENT: Negative for ear discharge, ear pain, mouth sores, nosebleeds, sinus pressure, sneezing and sore throat.    Eyes: Negative for pain, discharge and itching.   Respiratory: Negative for cough, chest tightness, shortness of breath and wheezing.    Cardiovascular: Negative for chest pain, palpitations and leg swelling.   Gastrointestinal: Negative for abdominal pain, constipation, diarrhea, nausea and vomiting.   Endocrine: Negative for heat intolerance, polydipsia and polyphagia.   Genitourinary: Positive for dysuria, frequency and urgency. Negative for flank pain and hematuria.   Musculoskeletal: Negative for arthralgias, back pain, gait problem, joint swelling, myalgias, neck pain and neck stiffness.   Skin: Negative for color change, pallor and rash.   Allergic/Immunologic: Negative for  "immunocompromised state.   Neurological: Negative for seizures, speech difficulty, weakness and numbness.   Hematological: Negative for adenopathy.   Psychiatric/Behavioral: Negative for agitation, decreased concentration, sleep disturbance, suicidal ideas and depressed mood. The patient is not nervous/anxious.        Current Outpatient Medications on File Prior to Visit   Medication Sig   • amLODIPine (NORVASC) 5 MG tablet TAKE 1 TABLET DAILY   • atorvastatin (Lipitor) 40 MG tablet Take 1 tablet by mouth Daily.   • cloNIDine (CATAPRES) 0.1 MG tablet TAKE 1 TABLET DAILY AS NEEDED FOR HIGH BLOOD PRESSURE. TAKE FOR BLOOD PRESSURE GREATER THAN 160/90   • ferrous sulfate 325 (65 FE) MG tablet Take 1 tablet by mouth Daily With Breakfast. Take with orange juice or vitamin C   • HYDROcodone-acetaminophen (NORCO) 7.5-325 MG per tablet Take 1 tablet by mouth Every 6 (Six) Hours As Needed for Moderate Pain.   • Latuda 120 MG tablet tablet    • levothyroxine (SYNTHROID, LEVOTHROID) 75 MCG tablet TAKE 1 TABLET DAILY, FASTING AND WAIT 1 HOUR FOR FOOD OR OTHER MEDICATIONS (DISCONTINUE SYNTHROID 88 MCG)   • losartan (COZAAR) 100 MG tablet Take 1 tablet by mouth Daily.   • montelukast (SINGULAIR) 10 MG tablet TAKE 1 TABLET EVERY NIGHT   • omeprazole (priLOSEC) 20 MG capsule TAKE 1 CAPSULE DAILY   • tiZANidine (ZANAFLEX) 4 MG tablet Take 1 tablet by mouth Every 8 (Eight) Hours As Needed for Muscle Spasms.   • traZODone (DESYREL) 50 MG tablet Take 1-2 tablets one hour before bedtime as needed for sleep.   • vilazodone (VIIBRYD) 10 MG tablet tablet Take 1 tablet by mouth Daily. with food     No current facility-administered medications on file prior to visit.       Objective   Vitals:    04/24/23 1100   BP: 124/68   Pulse: 101   Resp: 16   Temp: 98.4 °F (36.9 °C)   TempSrc: Tympanic   SpO2: 98%   Height: 167.6 cm (66\")     Body mass index is 23.4 kg/m².    Physical Exam  Vitals reviewed.   Constitutional:       Appearance: Normal " appearance. She is well-developed.   HENT:      Head: Normocephalic and atraumatic.      Nose: Nose normal.   Eyes:      General: Lids are normal.      Conjunctiva/sclera: Conjunctivae normal.      Pupils: Pupils are equal, round, and reactive to light.   Neck:      Thyroid: No thyromegaly.      Trachea: Trachea normal.   Pulmonary:      Effort: Pulmonary effort is normal. No respiratory distress.   Skin:     General: Skin is warm and dry.   Neurological:      Mental Status: She is alert and oriented to person, place, and time.      GCS: GCS eye subscore is 4. GCS verbal subscore is 5. GCS motor subscore is 6.   Psychiatric:         Attention and Perception: Attention normal.         Mood and Affect: Mood normal.         Speech: Speech normal.         Behavior: Behavior normal. Behavior is cooperative.         Assessment & Plan   Problem List Items Addressed This Visit    None  Visit Diagnoses     Acute cystitis with hematuria    -  Primary    Relevant Medications    nitrofurantoin, macrocrystal-monohydrate, (MACROBID) 100 MG capsule    Other Relevant Orders    Urine Culture - Urine, Urine, Clean Catch (Completed)    Frequent urination        Relevant Orders    POCT urinalysis dipstick, automated (Completed)    Medication management        Relevant Orders    Compliance Drug Analysis, Ur - Urine, Clean Catch         1. Acute cystitis with hematuria- New, unstable.  Start Macrobid.  The patient will attempt to give more urine today for urine culture. If unable to, then she will complete a urine sample at home and bring this by the office.  She will increase her water intake and suggested over the counter Azo to help with her discomfort.      Current Outpatient Medications:   •  amLODIPine (NORVASC) 5 MG tablet, TAKE 1 TABLET DAILY, Disp: 30 tablet, Rfl: 11  •  atorvastatin (Lipitor) 40 MG tablet, Take 1 tablet by mouth Daily., Disp: 90 tablet, Rfl: 1  •  cloNIDine (CATAPRES) 0.1 MG tablet, TAKE 1 TABLET DAILY AS  NEEDED FOR HIGH BLOOD PRESSURE. TAKE FOR BLOOD PRESSURE GREATER THAN 160/90, Disp: 30 tablet, Rfl: 11  •  ferrous sulfate 325 (65 FE) MG tablet, Take 1 tablet by mouth Daily With Breakfast. Take with orange juice or vitamin C, Disp: 90 tablet, Rfl: 1  •  HYDROcodone-acetaminophen (NORCO) 7.5-325 MG per tablet, Take 1 tablet by mouth Every 6 (Six) Hours As Needed for Moderate Pain., Disp: 28 tablet, Rfl: 0  •  Latuda 120 MG tablet tablet, , Disp: , Rfl:   •  levothyroxine (SYNTHROID, LEVOTHROID) 75 MCG tablet, TAKE 1 TABLET DAILY, FASTING AND WAIT 1 HOUR FOR FOOD OR OTHER MEDICATIONS (DISCONTINUE SYNTHROID 88 MCG), Disp: 90 tablet, Rfl: 3  •  losartan (COZAAR) 100 MG tablet, Take 1 tablet by mouth Daily., Disp: 90 tablet, Rfl: 1  •  montelukast (SINGULAIR) 10 MG tablet, TAKE 1 TABLET EVERY NIGHT, Disp: 90 tablet, Rfl: 3  •  omeprazole (priLOSEC) 20 MG capsule, TAKE 1 CAPSULE DAILY, Disp: 30 capsule, Rfl: 11  •  tiZANidine (ZANAFLEX) 4 MG tablet, Take 1 tablet by mouth Every 8 (Eight) Hours As Needed for Muscle Spasms., Disp: 180 tablet, Rfl: 2  •  traZODone (DESYREL) 50 MG tablet, Take 1-2 tablets one hour before bedtime as needed for sleep., Disp: 45 tablet, Rfl: 2  •  vilazodone (VIIBRYD) 10 MG tablet tablet, Take 1 tablet by mouth Daily. with food, Disp: , Rfl:   •  nitrofurantoin, macrocrystal-monohydrate, (MACROBID) 100 MG capsule, Take 1 capsule by mouth Every 12 (Twelve) Hours for 7 days., Disp: 14 capsule, Rfl: 0       Plan of care reviewed with the patient at the conclusion of today's visit.  Education was provided regarding diagnosis, management, and any prescribed or recommended OTC medications.  Patient verbalized understanding of and agreement with management plan.     Return if symptoms worsen or fail to improve, for Follow-up.      Transcribed from ambient dictation for GERSON Arellano by Tori Kirkpatrick.  04/24/23   14:11 EDT    Patient or patient representative verbalized consent to the  visit recording.  I have personally performed the services described in this document as transcribed by the above individual, and it is both accurate and complete.

## 2023-04-26 LAB — BACTERIA SPEC AEROBE CULT: ABNORMAL

## 2023-04-27 ENCOUNTER — TELEPHONE (OUTPATIENT)
Dept: INTERNAL MEDICINE | Facility: CLINIC | Age: 63
End: 2023-04-27
Payer: MEDICARE

## 2023-04-27 DIAGNOSIS — Z12.31 ENCOUNTER FOR SCREENING MAMMOGRAM FOR MALIGNANT NEOPLASM OF BREAST: Primary | ICD-10-CM

## 2023-04-27 NOTE — TELEPHONE ENCOUNTER
Caller: Alisa Ames    Relationship: Self    Best call back number: [phone]          What is the provider, practice or medical service name: [facility]         Any additional details: THE PATIENT STATES THAT A REFERRAL WAS SENT FOR HER TO GO THE DOCTOR [facility] IN January OF THIS YEAR THE PATIENT STATES THAT SHE RECEIVED A BILL .53 BECAUSE [facility] IS SAYING THAT THEY DID NOT RECEIVE THE REFERRAL PLEASE CALL THE PATIENT TO LET HER KNOW IF THE REFERRAL WAS SENT TO [facility]

## 2023-04-27 NOTE — TELEPHONE ENCOUNTER
Pt called and asked for a referral for a screening mammogram ,she is having no issues. She wants to go to [facility] if possible, please advise

## 2023-04-28 NOTE — TELEPHONE ENCOUNTER
Hailee - referral was put in on 1/9 but she requested to see Dr. Byrne. Not sure if Dr. Gleason is at same office or not. Can you follow up for me. Thanks. I ordered her mammogram and requested this be done at [facility].

## 2023-04-30 LAB — DRUGS UR: NORMAL

## 2023-05-01 ENCOUNTER — TELEPHONE (OUTPATIENT)
Dept: INTERNAL MEDICINE | Facility: CLINIC | Age: 63
End: 2023-05-01

## 2023-05-01 ENCOUNTER — TELEPHONE (OUTPATIENT)
Dept: INTERNAL MEDICINE | Facility: CLINIC | Age: 63
End: 2023-05-01
Payer: MEDICARE

## 2023-05-01 NOTE — TELEPHONE ENCOUNTER
----- Message from GERSON Neri sent at 4/28/2023  4:38 PM EDT -----  Please let patient know the antibiotic she was given is strong enough to get rid of her urinary tract infection. If she continues to have any symptoms please let me now.

## 2023-05-01 NOTE — TELEPHONE ENCOUNTER
Caller: Alisa Ames    Relationship: Self    Best call back number: [phone]    What test was performed: URINE    When was the test performed: WEEK    Where was the test performed: OUR OFFICE

## 2023-05-03 ENCOUNTER — TELEPHONE (OUTPATIENT)
Dept: INTERNAL MEDICINE | Facility: CLINIC | Age: 63
End: 2023-05-03
Payer: MEDICARE

## 2023-05-03 NOTE — TELEPHONE ENCOUNTER
If kidney doctor wants her to have an ultrasound they need to order this. There are two types and I am unsure which they are suggesting. There is a regular kidney ultrasound and then there is a renal duplex. I suggest she contact their office and ask them to order this. I already did a referral to ENT on 1/9/23 per patient request to see Dr. Byrne. Patient called last week and laurita was going to fax this to dr. Gleason office.

## 2023-05-03 NOTE — TELEPHONE ENCOUNTER
Pt called and stated that she would like a referral sent to [facility] to have ultrasound on her kidneys. Pt stated that she was just at the kidney doctor and that she wanted pt to have an ultra sound, pt did not want to go into detail. Pt stated that she needed authorization through [facility] to see angelo pope for ent. Please call pt back.

## 2023-05-03 NOTE — TELEPHONE ENCOUNTER
Patient requested ENT referral in January for Dr. Byrne. I did the referral and we sent this over but patient is calling an states she saw a dr. Gleason and simona won't pay for her visit due to needing the referral. i'm not sure if dr. Gleason is in same office with robbie or what is going on. Can you send this referral over to dr. Gleason or do I need to do a new one?

## 2023-05-04 RX ORDER — MONTELUKAST SODIUM 10 MG/1
10 TABLET ORAL NIGHTLY
Qty: 90 TABLET | Refills: 3 | Status: SHIPPED | OUTPATIENT
Start: 2023-05-04

## 2023-05-04 NOTE — TELEPHONE ENCOUNTER
Caller: Alisa Ames    Relationship: Self    Best call back number: [phone]    Requested Prescriptions:   Requested Prescriptions     Pending Prescriptions Disp Refills   • montelukast (SINGULAIR) 10 MG tablet 90 tablet 3     Sig: Take 1 tablet by mouth Every Night.        Pharmacy where request should be sent: [facility] [address] [phone] [facility] [phone]      Last office visit with prescribing clinician: 4/24/2023   Last telemedicine visit with prescribing clinician: 7/25/2023   Next office visit with prescribing clinician: 7/25/2023     Additional details provided by patient:     Does the patient have less than a 3 day supply:  [] Yes  [x] No    Would you like a call back once the refill request has been completed: [x] Yes [] No    If the office needs to give you a call back, can they leave a voicemail: [x] Yes [] No    Ahmet Aguirre Rep   05/04/23 09:08 EDT

## 2023-05-25 DIAGNOSIS — K21.9 GASTROESOPHAGEAL REFLUX DISEASE WITHOUT ESOPHAGITIS: ICD-10-CM

## 2023-05-25 RX ORDER — OMEPRAZOLE 20 MG/1
20 CAPSULE, DELAYED RELEASE ORAL DAILY
Qty: 30 CAPSULE | Refills: 11 | Status: SHIPPED | OUTPATIENT
Start: 2023-05-25

## 2023-05-25 NOTE — TELEPHONE ENCOUNTER
Caller: Alisa Ames    Relationship: Self    Best call back number: [phone]    Requested Prescriptions:   Requested Prescriptions     Pending Prescriptions Disp Refills   • omeprazole (priLOSEC) 20 MG capsule 30 capsule 11     Sig: Take 1 capsule by mouth Daily.        Pharmacy where request should be sent: [facility] - [address] [phone] [facility] [phone]      Last office visit with prescribing clinician: 4/24/2023   Last telemedicine visit with prescribing clinician: Visit date not found   Next office visit with prescribing clinician: 7/25/2023     Additional details provided by patient:     Does the patient have less than a 3 day supply:  [] Yes  [x] No    Would you like a call back once the refill request has been completed: [] Yes [x] No    If the office needs to give you a call back, can they leave a voicemail: [] Yes [x] No    Cadance Dunaway, [facility] Rep   05/25/23 11:50 EDT

## 2023-05-30 ENCOUNTER — TELEPHONE (OUTPATIENT)
Dept: INTERNAL MEDICINE | Facility: CLINIC | Age: 63
End: 2023-05-30

## 2023-05-30 DIAGNOSIS — H91.93 BILATERAL HEARING LOSS, UNSPECIFIED HEARING LOSS TYPE: Primary | ICD-10-CM

## 2023-05-30 NOTE — TELEPHONE ENCOUNTER
Pt called the office needing a new referral  To see ent specialist  for her clogged ears, they wont take the referral from January 2023, please advise

## 2023-06-05 NOTE — TELEPHONE ENCOUNTER
[facility] been [facility]

## 2023-07-03 ENCOUNTER — TELEPHONE (OUTPATIENT)
Dept: ORTHOPEDIC SURGERY | Facility: CLINIC | Age: 63
End: 2023-07-03

## 2023-07-03 NOTE — TELEPHONE ENCOUNTER
Caller: NICOLÁS HURST    Relationship to patient: SELF    Best call back number: [phone]    Chief complaint: RIGHT KNEE PAIN    Type of visit: CORTISONE INJECTION/DISCUSS SURGERY    Requested date: ASAP    If rescheduling, when is the original appointment: N/A     Additional notes: LAST INJECTION 04.04.23

## 2023-08-08 ENCOUNTER — OFFICE VISIT (OUTPATIENT)
Dept: INTERNAL MEDICINE | Facility: CLINIC | Age: 63
End: 2023-08-08
Payer: MEDICARE

## 2023-08-08 ENCOUNTER — LAB (OUTPATIENT)
Dept: INTERNAL MEDICINE | Facility: CLINIC | Age: 63
End: 2023-08-08
Payer: MEDICARE

## 2023-08-08 VITALS
DIASTOLIC BLOOD PRESSURE: 66 MMHG | HEART RATE: 74 BPM | BODY MASS INDEX: 20.09 KG/M2 | SYSTOLIC BLOOD PRESSURE: 124 MMHG | TEMPERATURE: 97.9 F | RESPIRATION RATE: 16 BRPM | HEIGHT: 67 IN | WEIGHT: 128 LBS | OXYGEN SATURATION: 97 %

## 2023-08-08 DIAGNOSIS — Z87.891 PERSONAL HISTORY OF NICOTINE DEPENDENCE: ICD-10-CM

## 2023-08-08 DIAGNOSIS — E78.00 PURE HYPERCHOLESTEROLEMIA: ICD-10-CM

## 2023-08-08 DIAGNOSIS — E11.65 TYPE 2 DIABETES MELLITUS WITH HYPERGLYCEMIA, UNSPECIFIED WHETHER LONG TERM INSULIN USE: ICD-10-CM

## 2023-08-08 DIAGNOSIS — Z00.00 MEDICARE ANNUAL WELLNESS VISIT, SUBSEQUENT: Primary | ICD-10-CM

## 2023-08-08 DIAGNOSIS — E03.9 HYPOTHYROIDISM (ACQUIRED): ICD-10-CM

## 2023-08-08 DIAGNOSIS — D50.8 IRON DEFICIENCY ANEMIA SECONDARY TO INADEQUATE DIETARY IRON INTAKE: ICD-10-CM

## 2023-08-08 DIAGNOSIS — N18.31 STAGE 3A CHRONIC KIDNEY DISEASE: ICD-10-CM

## 2023-08-08 DIAGNOSIS — E78.00 PURE HYPERCHOLESTEROLEMIA: Primary | ICD-10-CM

## 2023-08-08 LAB — HBA1C MFR BLD: 5.6 %

## 2023-08-08 PROCEDURE — 80053 COMPREHEN METABOLIC PANEL: CPT | Performed by: NURSE PRACTITIONER

## 2023-08-08 PROCEDURE — 36415 COLL VENOUS BLD VENIPUNCTURE: CPT | Performed by: NURSE PRACTITIONER

## 2023-08-08 PROCEDURE — 80061 LIPID PANEL: CPT | Performed by: NURSE PRACTITIONER

## 2023-08-08 PROCEDURE — 85025 COMPLETE CBC W/AUTO DIFF WBC: CPT | Performed by: NURSE PRACTITIONER

## 2023-08-08 PROCEDURE — 84443 ASSAY THYROID STIM HORMONE: CPT | Performed by: NURSE PRACTITIONER

## 2023-08-08 NOTE — PROGRESS NOTES
The ABCs of the Annual Wellness Visit  Subsequent Medicare Wellness Visit    Chief Complaint   Patient presents with    Medicare Wellness-subsequent     Pt is fasting for labs       Subjective   History of Present Illness:  Alisa Ames is a 63 y.o. female who presents for a Subsequent Medicare Wellness Visit.    HPI  The patient is here today for Medicare wellness.    The patient is doing well. She denies being hospitalized overnight in the past year. She does not have an advanced directive on file. She feels her mental health is better, but her physical health is worse compared to 1 year ago.    The patient has been coughing constantly. She would like to get a lung check. She has been a smoker for 40 years. She quit 7 years ago. She smoked 2 packs a day, then went down to 1 pack a day. She has not had a CT scan of her chest in the past year. She gets a wet cough when she is sleeping. She denies heartburn.    The patient saw Dr. Sabine Rendon. She had an ultrasound of her kidneys. She was diagnosed with stage 3 chronic kidney disease. Dr. Rendon assumes it is from blood pressure and previous NSAID use. Dr. Rendon feels that overall, her condition is stable. She will follow up in 6 months.    The patient sees Dr. Rose for her heart. She does not have atrial fibrillation.      HEALTH RISK ASSESSMENT    Recent Hospitalizations:  No hospitalization(s) within the last year.    Current Medical Providers:  Patient Care Team:  Shanae Hamilton APRN as PCP - General (Nurse Practitioner)    Smoking Status:  Social History     Tobacco Use   Smoking Status Former    Packs/day: 1.00    Years: 40.00    Pack years: 40.00    Types: Cigarettes    Quit date:     Years since quittin.6   Smokeless Tobacco Never   Tobacco Comments    QUIT SMOKING WITH E CIG-1 WEEK AGO        Alcohol Consumption:  Social History     Substance and Sexual Activity   Alcohol Use Yes    Alcohol/week: 1.0 standard drink    Types: 1 Cans  of beer per week    Comment: 1 drink per month       Depression Screen:       2023    11:23 AM   PHQ-2/PHQ-9 Depression Screening   Little Interest or Pleasure in Doing Things 1-->several days   Feeling Down, Depressed or Hopeless 0-->not at all   PHQ-9: Brief Depression Severity Measure Score 1       Fall Risk Screen:  DEO Fall Risk Assessment was completed, and patient is at LOW risk for falls.Assessment completed on:2023    Health Habits and Functional and Cognitive Screenin/8/2023    11:24 AM   Functional & Cognitive Status   Do you have difficulty preparing food and eating? No   Do you have difficulty bathing yourself, getting dressed or grooming yourself? No   Do you have difficulty using the toilet? No   Do you have difficulty moving around from place to place? No   Do you have trouble with steps or getting out of a bed or a chair? No   Current Diet Limited Junk Food   Dental Exam Up to date   Eye Exam Up to date   Exercise (times per week) 0 times per week   Current Exercises Include No Regular Exercise   Do you need help using the phone?  No   Are you deaf or do you have serious difficulty hearing?  No   Do you need help to go to places out of walking distance? No   Do you need help shopping? No   Do you need help preparing meals?  No   Do you need help with housework?  No   Do you need help with laundry? No   Do you need help taking your medications? No   Do you need help managing money? No   Do you ever drive or ride in a car without wearing a seat belt? No   Have you felt unusual stress, anger or loneliness in the last month? No   Who do you live with? Spouse   If you need help, do you have trouble finding someone available to you? No   Have you been bothered in the last four weeks by sexual problems? No   Do you have difficulty concentrating, remembering or making decisions? No         Does the patient have evidence of cognitive impairment? No    Asprin use counseling:Does not need  ASA (and currently is not on it)    Age-appropriate Screening Schedule:  Refer to the list below for future screening recommendations based on patient's age, sex and/or medical conditions. Orders for these recommended tests are listed in the plan section. The patient has been provided with a written plan.    Health Maintenance   Topic Date Due    URINE MICROALBUMIN  Never done    DXA SCAN  Never done    COVID-19 Vaccine (1) Never done    Pneumococcal Vaccine 65+ (1 - PCV) Never done    TDAP/TD VACCINES (1 - Tdap) Never done    ZOSTER VACCINE (1 of 2) Never done    DIABETIC FOOT EXAM  Never done    PAP SMEAR  Never done    DIABETIC EYE EXAM  Never done    LUNG CANCER SCREENING  02/25/2018    INFLUENZA VACCINE  10/01/2023    HEMOGLOBIN A1C  02/08/2024    ANNUAL WELLNESS VISIT  08/08/2024    LIPID PANEL  08/08/2024    MAMMOGRAM  06/06/2025    COLORECTAL CANCER SCREENING  08/17/2025    HEPATITIS C SCREENING  Completed       Transcribed from ambient dictation for GERSON Arellano by GERSON Arellano.  08/08/23   11:52 EDT    Patient or patient representative verbalized consent to the visit recording.  I have personally performed the services described in this document as transcribed by the above individual, and it is both accurate and complete.       The following portions of the patient's history were reviewed and updated as appropriate: allergies, current medications, past family history, past medical history, past social history, past surgical history, and problem list.    Review of Systems  Pertinent items are noted in HPI.     Outpatient Medications Prior to Visit   Medication Sig Dispense Refill    cloNIDine (CATAPRES) 0.1 MG tablet TAKE 1 TABLET DAILY AS NEEDED FOR HIGH BLOOD PRESSURE. TAKE FOR BLOOD PRESSURE GREATER THAN 160/90 30 tablet 11    HYDROcodone-acetaminophen (NORCO) 7.5-325 MG per tablet Take 1 tablet by mouth Every 6 (Six) Hours As Needed for Moderate Pain. 28 tablet 0     Latuda 120 MG tablet tablet       vilazodone (VIIBRYD) 10 MG tablet tablet Take 1 tablet by mouth Daily. with food      amLODIPine (NORVASC) 5 MG tablet Take 1 tablet by mouth Daily. 30 tablet 11    atorvastatin (Lipitor) 40 MG tablet Take 1 tablet by mouth Daily. 90 tablet 1    ferrous sulfate 325 (65 FE) MG tablet Take 1 tablet by mouth Daily With Breakfast. Take with orange juice or vitamin C 90 tablet 1    levothyroxine (SYNTHROID, LEVOTHROID) 75 MCG tablet TAKE 1 TABLET DAILY, FASTING AND WAIT 1 HOUR FOR FOOD OR OTHER MEDICATIONS (DISCONTINUE SYNTHROID 88 MCG) 90 tablet 3    losartan (COZAAR) 100 MG tablet Take 1 tablet by mouth Daily. 90 tablet 1    montelukast (SINGULAIR) 10 MG tablet Take 1 tablet by mouth Every Night. 90 tablet 3    omeprazole (priLOSEC) 20 MG capsule Take 1 capsule by mouth Daily. 30 capsule 11    tiZANidine (ZANAFLEX) 4 MG tablet Take 1 tablet by mouth Every 8 (Eight) Hours As Needed for Muscle Spasms. 180 tablet 2    traZODone (DESYREL) 50 MG tablet TAKE 1 TO 2 TABLETS BY MOUTH EVERY NIGHT 1 HOUR BEFORE BEDTIME AS NEEDED FOR SLEEP 45 tablet 2     No facility-administered medications prior to visit.       Patient Active Problem List   Diagnosis    DDD (degenerative disc disease), cervical    Migraine syndrome    Hypothyroidism (acquired)    DDD (degenerative disc disease), lumbar    Depression with anxiety    Arthritis    Syncope    Allergic rhinitis    Pure hypercholesterolemia    Status post total replacement of left hip    Prediabetes    Irritable bowel syndrome with diarrhea    Gastroesophageal reflux disease without esophagitis    Irritable bowel syndrome with both constipation and diarrhea    Chronic left shoulder pain    Iron deficiency anemia secondary to inadequate dietary iron intake    Drug-induced parkinsonism    Diplopia         Advanced Care Planning:  ACP discussion was held with the patient during this visit. Patient does not have an advance directive, information  "provided.    Compared to one year ago, the patient feels her physical health is worse.  Compared to one year ago, the patient feels her mental health is better.    Reviewed chart for potential of high risk medication in the elderly: yes  Reviewed chart for potential of harmful drug interactions in the elderly:yes    Objective         Vitals:    08/08/23 1122   BP: 124/66   Pulse: 74   Resp: 16   Temp: 97.9 øF (36.6 øC)   TempSrc: Tympanic   SpO2: 97%   Weight: 58.1 kg (128 lb)   Height: 169 cm (66.54\")   PainSc:   2   PainLoc: Knee       Body mass index is 20.33 kg/mý.  Discussed the patient's BMI with her. The BMI is in the acceptable range.    Physical Exam  Vitals reviewed.   Constitutional:       Appearance: Normal appearance. She is well-developed.   HENT:      Head: Normocephalic and atraumatic.      Nose: Nose normal.   Eyes:      General: Lids are normal.      Conjunctiva/sclera: Conjunctivae normal.      Pupils: Pupils are equal, round, and reactive to light.   Neck:      Thyroid: No thyromegaly.      Trachea: Trachea normal.   Cardiovascular:      Rate and Rhythm: Normal rate. Rhythm regularly irregular.      Heart sounds: Normal heart sounds.   Pulmonary:      Effort: Pulmonary effort is normal. No respiratory distress.      Breath sounds: Normal breath sounds.   Skin:     General: Skin is warm and dry.   Neurological:      Mental Status: She is alert and oriented to person, place, and time.      GCS: GCS eye subscore is 4. GCS verbal subscore is 5. GCS motor subscore is 6.   Psychiatric:         Attention and Perception: Attention normal.         Mood and Affect: Mood normal.         Speech: Speech normal.         Behavior: Behavior normal. Behavior is cooperative.         Thought Content: Thought content normal.       BMI is within normal parameters. No other follow-up for BMI required.      Lab Results   Component Value Date    TRIG 67 08/08/2023    HDL 74 (H) 08/08/2023    LDL 88 08/08/2023    VLDL 13 " 08/08/2023    HGBA1C 5.6 08/08/2023        Assessment & Plan   Medicare Risks and Personalized Health Plan  CMS Preventative Services Quick Reference  Advance Directive Discussion  Cardiovascular risk  Chronic Pain   Depression/Dysphoria  Fall Risk  Inactivity/Sedentary  Osteoporosis Risk    The above risks/problems have been discussed with the patient.  Pertinent information has been shared with the patient in the After Visit Summary.  Follow up plans and orders are seen below in the Assessment/Plan Section.    Diagnoses and all orders for this visit:    1. Medicare annual wellness visit, subsequent (Primary)    2. Type 2 diabetes mellitus with hyperglycemia, unspecified whether long term insulin use  -     POCT glycated hemoglobin, total  -     Comprehensive Metabolic Panel    3. Stage 3a chronic kidney disease  -     Comprehensive Metabolic Panel    4. Hypothyroidism (acquired)  -     TSH Rfx On Abnormal To Free T4    5. Pure hypercholesterolemia  -     Lipid Panel    6. Iron deficiency anemia secondary to inadequate dietary iron intake  -     Comprehensive Metabolic Panel  -     CBC Auto Differential    7. Personal history of nicotine dependence  -      CT Chest Low Dose Cancer Screening WO; Future    1. Medicare Wellness.  - Patient will continue to eat a well-balanced diet, drink adequate amount of water, exercise at least 3 times weekly, and get adequate rest.  Suggested a multivitamin daily.  Discussed future preventative screenings and immunizations.    2. Type 2 diabetes.  - Chronic, stable.  - Continue with diabetic diet and lifestyle management.    3. Stage III chronic kidney disease.  - chronic unstable.   - Completing CMP.  - Patient will continue follow-ups with nephrology.  - Drink adequate number of fluids and refrain from any NSAID use.    4. Hypothyroidism.  - Chronic, stable.  - Continue Synthroid.  - Checking TSH.    5. Hypercholesterolemia.  - Chronic, stable.  - Continue atorvastatin.  -  Completing fasting lipid panel.  - Follow heart healthy, low cholesterol diet.    6. Iron deficiency anemia.  - Chronic, stable.  - Continue iron supplement and high iron diet.  - Completing CBC with differential.    7. Personal history of nicotine dependence.  - CT screening CT of chest ordered.      Follow Up:  No follow-ups on file.     An After Visit Summary and PPPS were given to the patient.         Transcribed from ambient dictation for GERSON Arellano by Sandra Markham.  08/08/23   14:49 EDT    Patient or patient representative verbalized consent to the visit recording.  I have personally performed the services described in this document as transcribed by the above individual, and it is both accurate and complete.

## 2023-08-09 ENCOUNTER — TELEPHONE (OUTPATIENT)
Dept: INTERNAL MEDICINE | Facility: CLINIC | Age: 63
End: 2023-08-09
Payer: MEDICARE

## 2023-08-09 DIAGNOSIS — G47.09 OTHER INSOMNIA: ICD-10-CM

## 2023-08-09 DIAGNOSIS — K21.9 GASTROESOPHAGEAL REFLUX DISEASE WITHOUT ESOPHAGITIS: ICD-10-CM

## 2023-08-09 DIAGNOSIS — M50.30 DDD (DEGENERATIVE DISC DISEASE), CERVICAL: ICD-10-CM

## 2023-08-09 DIAGNOSIS — M19.91 PRIMARY OSTEOARTHRITIS, UNSPECIFIED SITE: ICD-10-CM

## 2023-08-09 DIAGNOSIS — E03.9 HYPOTHYROIDISM (ACQUIRED): ICD-10-CM

## 2023-08-09 DIAGNOSIS — I10 ESSENTIAL HYPERTENSION: ICD-10-CM

## 2023-08-09 LAB
ALBUMIN SERPL-MCNC: 4.5 G/DL (ref 3.5–5.2)
ALBUMIN/GLOB SERPL: 2 G/DL
ALP SERPL-CCNC: 90 U/L (ref 39–117)
ALT SERPL W P-5'-P-CCNC: 14 U/L (ref 1–33)
ANION GAP SERPL CALCULATED.3IONS-SCNC: 10 MMOL/L (ref 5–15)
AST SERPL-CCNC: 17 U/L (ref 1–32)
BASOPHILS # BLD AUTO: 0.04 10*3/MM3 (ref 0–0.2)
BASOPHILS NFR BLD AUTO: 1.1 % (ref 0–1.5)
BILIRUB SERPL-MCNC: 0.3 MG/DL (ref 0–1.2)
BUN SERPL-MCNC: 10 MG/DL (ref 8–23)
BUN/CREAT SERPL: 10.5 (ref 7–25)
CALCIUM SPEC-SCNC: 9.3 MG/DL (ref 8.6–10.5)
CHLORIDE SERPL-SCNC: 102 MMOL/L (ref 98–107)
CHOLEST SERPL-MCNC: 175 MG/DL (ref 0–200)
CO2 SERPL-SCNC: 23 MMOL/L (ref 22–29)
CREAT SERPL-MCNC: 0.95 MG/DL (ref 0.57–1)
DEPRECATED RDW RBC AUTO: 47 FL (ref 37–54)
EGFRCR SERPLBLD CKD-EPI 2021: 66.6 ML/MIN/1.73
EOSINOPHIL # BLD AUTO: 0.05 10*3/MM3 (ref 0–0.4)
EOSINOPHIL NFR BLD AUTO: 1.3 % (ref 0.3–6.2)
ERYTHROCYTE [DISTWIDTH] IN BLOOD BY AUTOMATED COUNT: 13.4 % (ref 12.3–15.4)
GLOBULIN UR ELPH-MCNC: 2.3 GM/DL
GLUCOSE SERPL-MCNC: 95 MG/DL (ref 65–99)
HCT VFR BLD AUTO: 35.4 % (ref 34–46.6)
HDLC SERPL-MCNC: 74 MG/DL (ref 40–60)
HGB BLD-MCNC: 12.4 G/DL (ref 12–15.9)
IMM GRANULOCYTES # BLD AUTO: 0 10*3/MM3 (ref 0–0.05)
IMM GRANULOCYTES NFR BLD AUTO: 0 % (ref 0–0.5)
LDLC SERPL CALC-MCNC: 88 MG/DL (ref 0–100)
LDLC/HDLC SERPL: 1.18 {RATIO}
LYMPHOCYTES # BLD AUTO: 1.69 10*3/MM3 (ref 0.7–3.1)
LYMPHOCYTES NFR BLD AUTO: 45.2 % (ref 19.6–45.3)
MCH RBC QN AUTO: 33.7 PG (ref 26.6–33)
MCHC RBC AUTO-ENTMCNC: 35 G/DL (ref 31.5–35.7)
MCV RBC AUTO: 96.2 FL (ref 79–97)
MONOCYTES # BLD AUTO: 0.53 10*3/MM3 (ref 0.1–0.9)
MONOCYTES NFR BLD AUTO: 14.2 % (ref 5–12)
NEUTROPHILS NFR BLD AUTO: 1.43 10*3/MM3 (ref 1.7–7)
NEUTROPHILS NFR BLD AUTO: 38.2 % (ref 42.7–76)
NRBC BLD AUTO-RTO: 0 /100 WBC (ref 0–0.2)
PLATELET # BLD AUTO: 259 10*3/MM3 (ref 140–450)
PMV BLD AUTO: 9.8 FL (ref 6–12)
POTASSIUM SERPL-SCNC: 3.9 MMOL/L (ref 3.5–5.2)
PROT SERPL-MCNC: 6.8 G/DL (ref 6–8.5)
RBC # BLD AUTO: 3.68 10*6/MM3 (ref 3.77–5.28)
SODIUM SERPL-SCNC: 135 MMOL/L (ref 136–145)
TRIGL SERPL-MCNC: 67 MG/DL (ref 0–150)
TSH SERPL DL<=0.05 MIU/L-ACNC: 3.74 UIU/ML (ref 0.27–4.2)
VLDLC SERPL-MCNC: 13 MG/DL (ref 5–40)
WBC NRBC COR # BLD: 3.74 10*3/MM3 (ref 3.4–10.8)

## 2023-08-09 RX ORDER — LOSARTAN POTASSIUM 100 MG/1
100 TABLET ORAL DAILY
Qty: 90 TABLET | Refills: 1 | Status: SHIPPED | OUTPATIENT
Start: 2023-08-09

## 2023-08-09 RX ORDER — HYDROCODONE BITARTRATE AND ACETAMINOPHEN 7.5; 325 MG/1; MG/1
1 TABLET ORAL EVERY 6 HOURS PRN
Qty: 28 TABLET | Refills: 0 | Status: CANCELLED | OUTPATIENT
Start: 2023-08-09

## 2023-08-09 RX ORDER — ATORVASTATIN CALCIUM 40 MG/1
40 TABLET, FILM COATED ORAL DAILY
Qty: 90 TABLET | Refills: 1 | Status: SHIPPED | OUTPATIENT
Start: 2023-08-09

## 2023-08-09 RX ORDER — TIZANIDINE 4 MG/1
4 TABLET ORAL EVERY 8 HOURS PRN
Qty: 180 TABLET | Refills: 2 | Status: SHIPPED | OUTPATIENT
Start: 2023-08-09

## 2023-08-09 RX ORDER — LEVOTHYROXINE SODIUM 0.07 MG/1
75 TABLET ORAL
Qty: 90 TABLET | Refills: 3 | Status: SHIPPED | OUTPATIENT
Start: 2023-08-09

## 2023-08-09 RX ORDER — LURASIDONE HYDROCHLORIDE 120 MG/1
TABLET, FILM COATED ORAL
Qty: 30 TABLET | Status: CANCELLED | OUTPATIENT
Start: 2023-08-09

## 2023-08-09 RX ORDER — OMEPRAZOLE 20 MG/1
20 CAPSULE, DELAYED RELEASE ORAL DAILY
Qty: 30 CAPSULE | Refills: 11 | Status: SHIPPED | OUTPATIENT
Start: 2023-08-09

## 2023-08-09 RX ORDER — MONTELUKAST SODIUM 10 MG/1
10 TABLET ORAL NIGHTLY
Qty: 90 TABLET | Refills: 3 | Status: SHIPPED | OUTPATIENT
Start: 2023-08-09

## 2023-08-09 RX ORDER — AMLODIPINE BESYLATE 5 MG/1
5 TABLET ORAL DAILY
Qty: 30 TABLET | Refills: 11 | Status: SHIPPED | OUTPATIENT
Start: 2023-08-09

## 2023-08-09 RX ORDER — FERROUS SULFATE 325(65) MG
325 TABLET ORAL
Qty: 90 TABLET | Refills: 1 | Status: SHIPPED | OUTPATIENT
Start: 2023-08-09

## 2023-08-09 NOTE — TELEPHONE ENCOUNTER
Caller: Alisa Ames SIDNEY    Relationship: Self    Best call back number: [phone]    Requested Prescriptions:   Requested Prescriptions     Pending Prescriptions Disp Refills    traZODone (DESYREL) 50 MG tablet 45 tablet 2     Sig: TAKE 1 TO 2 TABLETS BY MOUTH EVERY NIGHT 1 HOUR BEFORE BEDTIME AS NEEDED FOR SLEEP    omeprazole (priLOSEC) 20 MG capsule 30 capsule 11     Sig: Take 1 capsule by mouth Daily.    montelukast (SINGULAIR) 10 MG tablet 90 tablet 3     Sig: Take 1 tablet by mouth Every Night.    amLODIPine (NORVASC) 5 MG tablet 30 tablet 11     Sig: Take 1 tablet by mouth Daily.    atorvastatin (Lipitor) 40 MG tablet 90 tablet 1     Sig: Take 1 tablet by mouth Daily.    HYDROcodone-acetaminophen (NORCO) 7.5-325 MG per tablet 28 tablet 0     Sig: Take 1 tablet by mouth Every 6 (Six) Hours As Needed for Moderate Pain.    losartan (COZAAR) 100 MG tablet 90 tablet 1     Sig: Take 1 tablet by mouth Daily.    Latuda 120 MG tablet tablet 30 tablet     tiZANidine (ZANAFLEX) 4 MG tablet 180 tablet 2     Sig: Take 1 tablet by mouth Every 8 (Eight) Hours As Needed for Muscle Spasms.    levothyroxine (SYNTHROID, LEVOTHROID) 75 MCG tablet 90 tablet 3    ferrous sulfate 325 (65 FE) MG tablet 90 tablet 1     Sig: Take 1 tablet by mouth Daily With Breakfast. Take with orange juice or vitamin C        Pharmacy where request should be sent: [facility] [address] [phone] [facility] [phone] [facility]     Last office visit with prescribing clinician: 8/8/2023   Last telemedicine visit with prescribing clinician: Visit date not found   Next office visit with prescribing clinician: 8/12/2024     Additional details provided by patient: PATIENTS [facility] NEW YEARLY PRESCRIPTIONS    Does the patient have less than a 3 day supply:  [] Yes  [x] No    Would you like a call back once the refill request has been completed: [x] Yes [] No    If the office needs to give  you a call back, can they leave a voicemail: [] Yes [x] No    Isaías Velasco, PCT   08/09/23 10:36 EDT

## 2023-08-11 RX ORDER — TRAZODONE HYDROCHLORIDE 50 MG/1
TABLET ORAL
Qty: 45 TABLET | Refills: 2 | Status: SHIPPED | OUTPATIENT
Start: 2023-08-11

## 2023-08-30 ENCOUNTER — TELEPHONE (OUTPATIENT)
Dept: INTERNAL MEDICINE | Facility: CLINIC | Age: 63
End: 2023-08-30
Payer: MEDICARE

## 2023-08-30 NOTE — TELEPHONE ENCOUNTER
Caller: Alisa Ames    Relationship: Self    Best call back number: [phone]     What medications are you currently taking:   Current Outpatient Medications on File Prior to Visit   Medication Sig Dispense Refill    amLODIPine (NORVASC) 5 MG tablet Take 1 tablet by mouth Daily. 30 tablet 11    atorvastatin (Lipitor) 40 MG tablet Take 1 tablet by mouth Daily. 90 tablet 1    cloNIDine (CATAPRES) 0.1 MG tablet TAKE 1 TABLET DAILY AS NEEDED FOR HIGH BLOOD PRESSURE. TAKE FOR BLOOD PRESSURE GREATER THAN 160/90 30 tablet 11    ferrous sulfate 325 (65 FE) MG tablet Take 1 tablet by mouth Daily With Breakfast. Take with orange juice or vitamin C 90 tablet 1    HYDROcodone-acetaminophen (NORCO) 7.5-325 MG per tablet Take 1 tablet by mouth Every 6 (Six) Hours As Needed for Moderate Pain. 28 tablet 0    Latuda 120 MG tablet tablet       levothyroxine (SYNTHROID, LEVOTHROID) 75 MCG tablet Take 1 tablet by mouth Every Morning. 90 tablet 3    losartan (COZAAR) 100 MG tablet Take 1 tablet by mouth Daily. 90 tablet 1    montelukast (SINGULAIR) 10 MG tablet Take 1 tablet by mouth Every Night. 90 tablet 3    omeprazole (priLOSEC) 20 MG capsule Take 1 capsule by mouth Daily. 30 capsule 11    tiZANidine (ZANAFLEX) 4 MG tablet Take 1 tablet by mouth Every 8 (Eight) Hours As Needed for Muscle Spasms. 180 tablet 2    traZODone (DESYREL) 50 MG tablet TAKE 1 TO 2 TABLETS BY MOUTH EVERY NIGHT 1 HOUR BEFORE BEDTIME AS NEEDED FOR SLEEP 45 tablet 2    vilazodone (VIIBRYD) 10 MG tablet tablet Take 1 tablet by mouth Daily. with food       No current facility-administered medications on file prior to visit.          When did you start taking these medications: 3-4MO AGO    Which medication are you concerned about: traZODone (DESYREL) 50 MG tablet    Who prescribed you this medication: ESTHER SEYMOUR    What are your concerns: PATIENT IS WONDERING IF SHE SHOULD BE TAKING THIS MEDICATION WITH HER KIDNEY PROBLEMS.

## 2023-08-30 NOTE — TELEPHONE ENCOUNTER
There are no contraindications for trazodone in even severe kidney disease with dialysis. It is safe for her to take this medication

## 2023-09-19 ENCOUNTER — HOSPITAL ENCOUNTER (OUTPATIENT)
Dept: CT IMAGING | Facility: HOSPITAL | Age: 63
Discharge: HOME | End: 2023-09-19
Admitting: NURSE PRACTITIONER
Payer: MEDICARE

## 2023-09-19 DIAGNOSIS — Z87.891 PERSONAL HISTORY OF NICOTINE DEPENDENCE: ICD-10-CM

## 2023-09-19 PROCEDURE — 71271 CT THORAX LUNG CANCER SCR C-: CPT

## 2023-09-20 DIAGNOSIS — G47.09 OTHER INSOMNIA: ICD-10-CM

## 2023-09-20 RX ORDER — TRAZODONE HYDROCHLORIDE 50 MG/1
TABLET ORAL
Qty: 45 TABLET | Refills: 5 | Status: SHIPPED | OUTPATIENT
Start: 2023-09-20

## 2023-10-02 ENCOUNTER — TELEPHONE (OUTPATIENT)
Dept: INTERNAL MEDICINE | Facility: CLINIC | Age: 63
End: 2023-10-02
Payer: MEDICARE

## 2023-10-02 DIAGNOSIS — M19.91 PRIMARY OSTEOARTHRITIS, UNSPECIFIED SITE: ICD-10-CM

## 2023-10-02 RX ORDER — HYDROCODONE BITARTRATE AND ACETAMINOPHEN 7.5; 325 MG/1; MG/1
1 TABLET ORAL EVERY 6 HOURS PRN
Qty: 28 TABLET | Refills: 0 | Status: SHIPPED | OUTPATIENT
Start: 2023-10-02

## 2023-10-02 NOTE — TELEPHONE ENCOUNTER
Caller: Alisa Ames    Relationship: Self    Best call back number: [phone]    Requested Prescriptions:   Requested Prescriptions     Pending Prescriptions Disp Refills    HYDROcodone-acetaminophen (NORCO) 7.5-325 MG per tablet 28 tablet 0     Sig: Take 1 tablet by mouth Every 6 (Six) Hours As Needed for Moderate Pain.        Pharmacy where request should be sent: [facility] [address] [phone] [facility] [phone]      Last office visit with prescribing clinician: 8/8/2023   Last telemedicine visit with prescribing clinician: Visit date not found   Next office visit with prescribing clinician: 8/12/2024     Additional details provided by patient: PT HAS 2 PILLS LEFT    Does the patient have less than a 3 day supply:  [x] Yes  [] No    Would you like a call back once the refill request has been completed: [x] Yes [] No    If the office needs to give you a call back, can they leave a voicemail: [] Yes [] No    Ahmet Gutierrez Rep   10/02/23 13:03 EDT

## 2023-10-02 NOTE — TELEPHONE ENCOUNTER
Caller: Alisa Ames    Relationship: Self    Best call back number: [phone]    Caller requesting test results: PATIENT    What test was performed: LUNG SCAN    When was the test performed: 9/19/2023    Where was the test performed: [facility]    Additional notes: WOULD LIKE RESULTS CALLED TO HER TODAY

## 2023-10-17 ENCOUNTER — OFFICE VISIT (OUTPATIENT)
Dept: ORTHOPEDIC SURGERY | Facility: CLINIC | Age: 63
End: 2023-10-17
Payer: MEDICARE

## 2023-10-17 VITALS
DIASTOLIC BLOOD PRESSURE: 66 MMHG | BODY MASS INDEX: 19.65 KG/M2 | SYSTOLIC BLOOD PRESSURE: 118 MMHG | WEIGHT: 125.2 LBS | HEIGHT: 67 IN

## 2023-10-17 DIAGNOSIS — M25.562 LEFT KNEE PAIN, UNSPECIFIED CHRONICITY: ICD-10-CM

## 2023-10-17 DIAGNOSIS — M17.11 PRIMARY OSTEOARTHRITIS OF RIGHT KNEE: Primary | ICD-10-CM

## 2023-10-17 DIAGNOSIS — M17.12 PRIMARY OSTEOARTHRITIS OF LEFT KNEE: ICD-10-CM

## 2023-10-17 RX ORDER — LIDOCAINE HYDROCHLORIDE 10 MG/ML
3 INJECTION, SOLUTION EPIDURAL; INFILTRATION; INTRACAUDAL; PERINEURAL
Status: COMPLETED | OUTPATIENT
Start: 2023-10-17 | End: 2023-10-17

## 2023-10-17 RX ORDER — TRIAMCINOLONE ACETONIDE 40 MG/ML
80 INJECTION, SUSPENSION INTRA-ARTICULAR; INTRAMUSCULAR
Status: COMPLETED | OUTPATIENT
Start: 2023-10-17 | End: 2023-10-17

## 2023-10-17 RX ORDER — ASPIRIN 81 MG/1
1 TABLET ORAL DAILY
COMMUNITY
Start: 2023-10-14

## 2023-10-17 RX ORDER — BUDESONIDE 0.5 MG/2ML
INHALANT ORAL
COMMUNITY
Start: 2023-10-09

## 2023-10-17 RX ORDER — BUPIVACAINE HYDROCHLORIDE 5 MG/ML
1 INJECTION, SOLUTION EPIDURAL; INTRACAUDAL
Status: COMPLETED | OUTPATIENT
Start: 2023-10-17 | End: 2023-10-17

## 2023-10-17 RX ADMIN — BUPIVACAINE HYDROCHLORIDE 1 ML: 5 INJECTION, SOLUTION EPIDURAL; INTRACAUDAL at 11:35

## 2023-10-17 RX ADMIN — TRIAMCINOLONE ACETONIDE 80 MG: 40 INJECTION, SUSPENSION INTRA-ARTICULAR; INTRAMUSCULAR at 11:35

## 2023-10-17 RX ADMIN — LIDOCAINE HYDROCHLORIDE 3 ML: 10 INJECTION, SOLUTION EPIDURAL; INFILTRATION; INTRACAUDAL; PERINEURAL at 11:35

## 2023-10-17 NOTE — PROGRESS NOTES
Procedure   - Large Joint Arthrocentesis: R knee on 10/17/2023 11:35 AM  Indications: pain  Details: 21 G needle, superolateral approach  Medications: 1 mL bupivacaine (PF) 0.5 %; 3 mL lidocaine PF 1% 1 %; 80 mg triamcinolone acetonide 40 MG/ML  Outcome: tolerated well, no immediate complications  Procedure, treatment alternatives, risks and benefits explained, specific risks discussed. Consent was given by the patient. Immediately prior to procedure a time out was called to verify the correct patient, procedure, equipment, support staff and site/side marked as required. Patient was prepped and draped in the usual sterile fashion.

## 2023-10-17 NOTE — PROGRESS NOTES
Orthopaedic Clinic Note: Knee Established Patient    Chief Complaint   Patient presents with    Left Knee - Pain    Follow-up     3 month follow up - Primary osteoarthritis of right knee        HPI    It has been 3  month(s) since Ms. Ames's last visit. She returns to clinic today for follow-up right knee osteoarthritis as well as new complaint of left knee pain.  She underwent cortisone injection in the right knee 3 months ago.  She states the injection provided about 6 weeks of relief.  Pain has gradually returned.  In addition to this, she is complaining of pain in the left knee that began about a month ago she has had intermittent pain localized primarily to the medial and anterior aspect of the knee.  Overall, right knee is more painful than the left.  She rates her pain a 5/10 on the pain scale.  She is ambulating with assistance of a cane.  Denies fevers chills or constitutional symptoms.  Overall she is doing worse.      Past Medical History:   Diagnosis Date    COPD (chronic obstructive pulmonary disease)     Fibromyalgia     Hyperlipidemia     Malignant neoplasm     Migraine     Syncope     Thyroid nodule     Wears dentures     UPPER AND LOWER       Past Surgical History:   Procedure Laterality Date    APPENDECTOMY      BREAST BIOPSY Right     CERVICAL SPINE SURGERY      Fibromyalgia and DDD with h/o C spine surgery- initially on flexeril bid.    COLONOSCOPY  2015    NECK SURGERY      NOSE SURGERY      r/t Skin CA    SHOULDER SURGERY Left 09/10/2020    left shoulder arthroscopy with lysis of adhesions and manipulation under anesthesia    SKIN CANCER EXCISION      THYROID LOBECTOMY Right     On discussion, pt reported a h/o right thyroid lobectomy for goiter.U/S demo surgical absence of right lobe and diffuse nodularity of the left lobe with a dominant nodule in the lower pole of 1.8 x 3.5 cm.    TONSILLECTOMY      TOTAL HIP ARTHROPLASTY Left 1/7/2019    Procedure: TOTAL HIP ARTHROPLASTY LEFT;  Surgeon:  Allen Madera MD;  Location: [facility] [place];  Service: Orthopedics    TOTAL THYROIDECTOMY        Family History   Problem Relation Age of Onset    Other Mother         malignant neoplasm    Coronary artery disease Mother         MI (60's)    Heart attack Mother     Other Other         malignant neoplasm    Valvular heart disease Father     Birth defects Son     Breast cancer Daughter 40    Breast cancer Paternal Grandmother         DX AGE MID 80'S    Breast cancer Maternal Cousin         DX AGE 40'S    Ovarian cancer Neg Hx      Social History     Socioeconomic History    Marital status:    Tobacco Use    Smoking status: Former     Packs/day: 1.00     Years: 40.00     Additional pack years: 0.00     Total pack years: 40.00     Types: Cigarettes     Quit date:      Years since quittin.7    Smokeless tobacco: Never    Tobacco comments:     QUIT SMOKING WITH E C[employer]-1 WEEK AGO    Vaping Use    Vaping Use: Former    Quit date: 2020    Substances: Nicotine, Flavoring    Devices: Refillable tank   Substance and Sexual Activity    Alcohol use: Yes     Alcohol/week: 1.0 standard drink of alcohol     Types: 1 Cans of beer per week     Comment: 1 drink per month    Drug use: No    Sexual activity: Yes     Partners: Male      Current Outpatient Medications on File Prior to Visit   Medication Sig Dispense Refill    amLODIPine (NORVASC) 5 MG tablet Take 1 tablet by mouth Daily. 30 tablet 11    aspirin 81 MG EC tablet Take 1 tablet by mouth Daily.      atorvastatin (Lipitor) 40 MG tablet Take 1 tablet by mouth Daily. 90 tablet 1    budesonide (PULMICORT) 0.5 MG/2ML nebulizer solution MIX CONTENTS OF 1 VIAL WITH THREE MLS OF DILUENT PROVIDED,MIX, DRAW UP IN SYRINGE, FILL EAR CANAL, REPEAT TWICE DAILY.      cloNIDine (CATAPRES) 0.1 MG tablet TAKE 1 TABLET DAILY AS NEEDED FOR HIGH BLOOD PRESSURE. TAKE FOR BLOOD PRESSURE GREATER THAN 160/90 30 tablet 11    ferrous sulfate 325 (65 FE) MG tablet Take 1 tablet by mouth  "Daily With Breakfast. Take with orange juice or vitamin C 90 tablet 1    HYDROcodone-acetaminophen (NORCO) 7.5-325 MG per tablet Take 1 tablet by mouth Every 6 (Six) Hours As Needed for Moderate Pain. 28 tablet 0    Latuda 120 MG tablet tablet       levothyroxine (SYNTHROID, LEVOTHROID) 75 MCG tablet Take 1 tablet by mouth Every Morning. 90 tablet 3    losartan (COZAAR) 100 MG tablet Take 1 tablet by mouth Daily. 90 tablet 1    montelukast (SINGULAIR) 10 MG tablet Take 1 tablet by mouth Every Night. 90 tablet 3    omeprazole (priLOSEC) 20 MG capsule Take 1 capsule by mouth Daily. 30 capsule 11    tiZANidine (ZANAFLEX) 4 MG tablet Take 1 tablet by mouth Every 8 (Eight) Hours As Needed for Muscle Spasms. 180 tablet 2    traZODone (DESYREL) 50 MG tablet TAKE 1 TO 2 TABLETS EVERY NIGHT 1 HOUR BEFORE BEDTIME AS NEEDED FOR SLEEP 45 tablet 5    vilazodone (VIIBRYD) 10 MG tablet tablet Take 1 tablet by mouth Daily. with food       No current facility-administered medications on file prior to visit.      No Known Allergies     Review of Systems   Constitutional: Negative.    HENT: Negative.     Eyes: Negative.    Respiratory: Negative.     Cardiovascular: Negative.    Gastrointestinal: Negative.    Endocrine: Negative.    Genitourinary: Negative.    Musculoskeletal:  Positive for arthralgias.   Skin: Negative.    Allergic/Immunologic: Negative.    Neurological: Negative.    Hematological: Negative.    Psychiatric/Behavioral: Negative.          The patient's Review of Systems was personally reviewed and confirmed as accurate.    Physical Exam  Blood pressure 118/66, height 169 cm (66.54\"), weight 56.8 kg (125 lb 3.2 oz), not currently breastfeeding.    Body mass index is 19.88 kg/m².    GENERAL APPEARANCE: awake, alert, oriented, in no acute distress and well developed, well nourished  LUNGS:  breathing nonlabored  EXTREMITIES: no clubbing, cyanosis  PERIPHERAL PULSES: palpable dorsalis pedis and posterior tibial pulses " bilaterally.    GAIT:  Antalgic        ----------  Right Knee Exam:  ----------  ALIGNMENT: moderate varus, correctible to neutral  ----------  RANGE OF MOTION:  Decreased (5 - 115 degrees) with no extensor lag  LIGAMENTOUS STABILITY:   stable to varus and valgus stress at terminal extension and 30 degrees; retensioning of the MCL is appreciated with valgus stress at 30 degrees consistent with medial compartment degeneration  ----------  STRENGTH:  KNEE FLEXION 4/5  KNEE EXTENSION  4/5  ANKLE DORSIFLEXION  5/5  ANKLE PLANTARFLEXION  5/5  ----------  PAIN WITH PALPATION:global  KNEE EFFUSION: yes, trace effusion  PAIN WITH KNEE ROM: yes  PATELLAR CREPITUS:  yes, painful and symptomatic  ----------  SENSATION TO LIGHT TOUCH:  DEEP PERONEAL/SUPERFICIAL PERONEAL/SURAL/SAPHENOUS/TIBIAL:    intact  ----------  EDEMA:  no  ERYTHEMA:    no  WOUNDS/INCISIONS:   no  ----------  Left Knee Exam:  ----------  ALIGNMENT: neutral  ----------  RANGE OF MOTION:  Normal (0-120 degrees) with no extensor lag or flexion contracture  LIGAMENTOUS STABILITY:   stable to varus and valgus stress at terminal extension and 30 degrees without any evidence of laxity  ----------  STRENGTH:  KNEE FLEXION 5/5  KNEE EXTENSION  5/5  ANKLE DORSIFLEXION  5/5  ANKLE PLANTARFLEXION  5/5  ----------  PAIN WITH PALPATION:medial joint line and anterior knee  KNEE EFFUSION: yes, trace effusion  PAIN WITH KNEE ROM: yes  PATELLAR CREPITUS:  yes, asymptomatic  ----------  SENSATION TO LIGHT TOUCH:  DEEP PERONEAL/SUPERFICIAL PERONEAL/SURAL/SAPHENOUS/TIBIAL:    intact  ----------  EDEMA:  no  ERYTHEMA:    no  WOUNDS/INCISIONS:   no  _____________________________________________________________________  _____________________________________________________________________    RADIOGRAPHIC FINDINGS:   Indication: Bilateral knee pain    Comparison: Todays xrays were compared to previous xrays from 4/4/2023    Knee films: Right: Moderate tricompartmental arthritis  with genu varum alignment, periarticular osteophytes visualized in all compartments.  No significant change compared to prior radiographs.  Left: Mild tricompartmental osteoarthritis with neutral alignment.  Small periarticular osteophytes visualized in all compartments.  No acute bony injury or fracture.    Assessment/Plan:   Diagnosis Plan   1. Primary osteoarthritis of right knee  XR Knee 4+ View Bilateral    Large Joint Arthrocentesis      2. Left knee pain, unspecified chronicity  XR Knee 4+ View Bilateral      3. Primary osteoarthritis of left knee          Patient suffering from osteoarthritis of the left knee.  Her symptoms are not severe enough to warrant injection or surgical intervention at this time.  In regards to the right knee, the patient has failed conservative treatment measures and is a candidate for joint arthroplasty.  I discussed the joint arthroplasty surgical process as well as the recovery and rehabilitation time frame.  The patient asked several questions regarding the joint arthroplasty surgery, which were answered accordingly.  Ultimately, the patient declines surgical intervention at this time and wishes to continue with conservative treatment measures.  Alternative conservative treatment measures were discussed including bracing, therapy, topical/oral anti-inflammatories, activity modification, and weight loss.  The patient considered these treatment options and wishes to proceed with corticosteroid injection(s) today.  Therefore we will proceed with corticosteroid injection(s) today.  In addition to this, we will obtain insurance preauthorization to initiate viscosupplementation injection series in the right knee.  We will see her back after insurance approval to initiate the injections.    Procedure Note:  I discussed with the patient the potential benefits of performing a therapeutic injection of the right knee as well as potential risks including but not limited to infection,  swelling, pain, bleeding, bruising, nerve/vessel damage, skin color changes, transient elevation in blood glucose levels, and fat atrophy. After informed consent and verifying correct patient, procedure site, and type of procedure, the area was prepped with alcohol, ethyl chloride was used to numb the skin. Via the superolateral approach, 3 cc of 1% lidocaine, 1 cc of 0.5% bupivicaine, and 2 cc of 40mg/ml of Kenalog were injected into the right knee. The patient tolerated the procedure well. There were no complications. A sterile dressing was placed over the injection site.      Allen Madera MD  10/17/23  11:35 EDT

## 2023-10-25 ENCOUNTER — TELEPHONE (OUTPATIENT)
Dept: INTERNAL MEDICINE | Facility: CLINIC | Age: 63
End: 2023-10-25
Payer: MEDICARE

## 2023-10-25 NOTE — TELEPHONE ENCOUNTER
Pt called and stated that he bp keeps bottoming out and that she had blacked out and fell. Pt stated that when she could get a bp reading that it was 60/25. I asked pt if someone was with her to take her to the er and she said yes and that she would go to the er. Pt was told that giancarlo was not in the office today.

## 2023-10-26 NOTE — TELEPHONE ENCOUNTER
Pt went to [facility]. Pt notified to stop losartan. Patient wanted to come in next Tuesday. Sched appt.

## 2023-10-26 NOTE — TELEPHONE ENCOUNTER
Not seeing an ER visit to where she went locally. Tell patient to stop her losartan and schedule with me asap. If I don't have any spots let me know and I will find one. thanks

## 2023-10-31 ENCOUNTER — OFFICE VISIT (OUTPATIENT)
Dept: INTERNAL MEDICINE | Facility: CLINIC | Age: 63
End: 2023-10-31
Payer: MEDICARE

## 2023-10-31 VITALS
WEIGHT: 130 LBS | DIASTOLIC BLOOD PRESSURE: 78 MMHG | HEIGHT: 67 IN | BODY MASS INDEX: 20.4 KG/M2 | SYSTOLIC BLOOD PRESSURE: 120 MMHG | TEMPERATURE: 97.1 F | HEART RATE: 86 BPM | OXYGEN SATURATION: 98 %

## 2023-10-31 DIAGNOSIS — G25.81 RESTLESS LEG SYNDROME: ICD-10-CM

## 2023-10-31 DIAGNOSIS — R25.3 MUSCLE TWITCHING: ICD-10-CM

## 2023-10-31 DIAGNOSIS — R25.1 TREMORS OF NERVOUS SYSTEM: ICD-10-CM

## 2023-10-31 DIAGNOSIS — I10 ESSENTIAL HYPERTENSION: Primary | ICD-10-CM

## 2023-10-31 PROCEDURE — 1159F MED LIST DOCD IN RCRD: CPT | Performed by: NURSE PRACTITIONER

## 2023-10-31 PROCEDURE — 1160F RVW MEDS BY RX/DR IN RCRD: CPT | Performed by: NURSE PRACTITIONER

## 2023-10-31 PROCEDURE — 3044F HG A1C LEVEL LT 7.0%: CPT | Performed by: NURSE PRACTITIONER

## 2023-10-31 PROCEDURE — 99214 OFFICE O/P EST MOD 30 MIN: CPT | Performed by: NURSE PRACTITIONER

## 2023-10-31 RX ORDER — ROPINIROLE 0.5 MG/1
0.5 TABLET, FILM COATED ORAL NIGHTLY
Qty: 30 TABLET | Refills: 0 | Status: SHIPPED | OUTPATIENT
Start: 2023-10-31 | End: 2023-11-01

## 2023-10-31 NOTE — PROGRESS NOTES
Subjective   Chief Complaint   Patient presents with    Hypotension      Alisa Ames is a 63 y.o. female.     The patient is here today for follow-up on low blood pressure. She is accompanied by an adult female.    The patient reports that her blood pressure dropped down a couple of days ago when she went to the hospital. Her last blood pressure reading at home was 147/85 mmHg. She denies taking losartan anymore. She denies drinking plenty of fluids. She had angina on 10/28/2023 and 10/29/2023 and it subsided. She denies experiencing it in the past. She consults with Dr. Rose, and he usually does an EKG on her. She reports that the last 2 to 3 blood pressures at home have been elevated. She takes clonidine when her blood pressure is elevated. She mentions that it decreased to 66/40 mmHg when she went to the hospital. She underwent an X-ray of her leg, head, and chest. She reports that her lower extremity discomfort from when she fell. She fell again when she came home from the hospital. She had syncope. She checked her blood pressure before she went to the grocery store one day and when she got to the grocery store, she thought she was going to hit the cement. She had palpitations and dyspnea. She felt vertigo. She feels like things are normalized and doing better. Her next appointment with Dr. Rose is on 01/15/2024. Before she went to the hospital, she denies having dyspnea and palpitations. She mentions that her left lower extremity is better; however, she complains of a sprained foot. She can flex it and move her toes. She denies keeping her shoes on when she is at home. She had a headache before she went to the hospital, and it subsided.     The patient has restless leg syndrome for 4 months. She reports that her legs are always moving. She denies sitting still if she is laying down and denies trying anything for it. She mentions that her symptoms are better at bedtime.    I have reviewed the following  portions of the patient's history and confirmed they are accurate: allergies, current medications, past family history, past medical history, past social history, past surgical history, and problem list    Review of Systems  Pertinent items are noted in HPI.     Current Outpatient Medications on File Prior to Visit   Medication Sig    amLODIPine (NORVASC) 5 MG tablet Take 1 tablet by mouth Daily.    aspirin 81 MG EC tablet Take 1 tablet by mouth Daily.    atorvastatin (Lipitor) 40 MG tablet Take 1 tablet by mouth Daily.    budesonide (PULMICORT) 0.5 MG/2ML nebulizer solution MIX CONTENTS OF 1 VIAL WITH THREE MLS OF DILUENT PROVIDED,MIX, DRAW UP IN SYRINGE, FILL EAR CANAL, REPEAT TWICE DAILY.    cloNIDine (CATAPRES) 0.1 MG tablet TAKE 1 TABLET DAILY AS NEEDED FOR HIGH BLOOD PRESSURE. TAKE FOR BLOOD PRESSURE GREATER THAN 160/90    ferrous sulfate 325 (65 FE) MG tablet Take 1 tablet by mouth Daily With Breakfast. Take with orange juice or vitamin C    HYDROcodone-acetaminophen (NORCO) 7.5-325 MG per tablet Take 1 tablet by mouth Every 6 (Six) Hours As Needed for Moderate Pain.    Latuda 120 MG tablet tablet     levothyroxine (SYNTHROID, LEVOTHROID) 75 MCG tablet Take 1 tablet by mouth Every Morning.    montelukast (SINGULAIR) 10 MG tablet Take 1 tablet by mouth Every Night.    omeprazole (priLOSEC) 20 MG capsule Take 1 capsule by mouth Daily.    tiZANidine (ZANAFLEX) 4 MG tablet Take 1 tablet by mouth Every 8 (Eight) Hours As Needed for Muscle Spasms.    traZODone (DESYREL) 50 MG tablet TAKE 1 TO 2 TABLETS EVERY NIGHT 1 HOUR BEFORE BEDTIME AS NEEDED FOR SLEEP    vilazodone (VIIBRYD) 10 MG tablet tablet Take 1 tablet by mouth Daily. with food    [DISCONTINUED] losartan (COZAAR) 100 MG tablet Take 1 tablet by mouth Daily. (Patient not taking: Reported on 10/31/2023)     No current facility-administered medications on file prior to visit.       Objective   Vitals:    10/31/23 1146   BP: 120/78   Pulse: 86   Temp: 97.1 °F  "(36.2 °C)   TempSrc: Infrared   SpO2: 98%   Weight: 59 kg (130 lb)   Height: 169 cm (66.54\")     Body mass index is 20.65 kg/m².    Physical Exam  Vitals and nursing note reviewed.   Constitutional:       Appearance: Normal appearance. She is well-developed.   HENT:      Head: Normocephalic and atraumatic.      Nose: Nose normal.   Eyes:      General: Lids are normal.      Conjunctiva/sclera: Conjunctivae normal.      Pupils: Pupils are equal, round, and reactive to light.   Neck:      Thyroid: No thyromegaly.      Trachea: Trachea normal.   Cardiovascular:      Rate and Rhythm: Normal rate. Rhythm irregular.      Heart sounds: Normal heart sounds.      Comments: Irregular heart rate at her baseline.   Pulmonary:      Effort: Pulmonary effort is normal. No respiratory distress.      Breath sounds: Normal breath sounds.   Abdominal:      General: Bowel sounds are normal.      Palpations: Abdomen is soft.   Musculoskeletal:      Cervical back: Normal range of motion and neck supple.   Skin:     General: Skin is warm and dry.   Neurological:      Mental Status: She is alert and oriented to person, place, and time.      GCS: GCS eye subscore is 4. GCS verbal subscore is 5. GCS motor subscore is 6.   Psychiatric:         Attention and Perception: Attention normal.         Mood and Affect: Mood normal.         Speech: Speech normal.         Behavior: Behavior normal. Behavior is cooperative.         Thought Content: Thought content normal.         Judgment: Judgment normal.         Assessment & Plan   Problem List Items Addressed This Visit    None  Visit Diagnoses       Essential hypertension    -  Primary    Restless leg syndrome        Relevant Medications    rOPINIRole (REQUIP) 0.5 MG tablet    Other Relevant Orders    Ambulatory Referral to Neurology (Completed)    Tremors of nervous system        Relevant Orders    Ambulatory Referral to Neurology (Completed)    Muscle twitching        Relevant Orders    Ambulatory " Referral to Neurology (Completed)             1. Hypertension  - Chronic, stable.  - Continue amlodipine.  - Patient has discontinued losartan.  - Patient will closely monitor blood pressure.  - If averaging greater than 140/90 mmHg, she will follow up and losartan 25 mg daily will be restarted.  - If blood pressures are averaging greater than 160/90 mmHg, she will take clonidine and follow up with office.  - Encouraged patient that if she has any heart palpitations, shortness of breath, or persistent instability of blood pressures to follow up with her cardiologist.    2. Restless leg syndrome  - Chronic, unstable.  - Start Requip.  - Referring to neurology for consult.    3. Tremors and muscle twitching  - Chronic, unstable.  - Referring to neurology for consult.        Current Outpatient Medications:     amLODIPine (NORVASC) 5 MG tablet, Take 1 tablet by mouth Daily., Disp: 30 tablet, Rfl: 11    aspirin 81 MG EC tablet, Take 1 tablet by mouth Daily., Disp: , Rfl:     atorvastatin (Lipitor) 40 MG tablet, Take 1 tablet by mouth Daily., Disp: 90 tablet, Rfl: 1    budesonide (PULMICORT) 0.5 MG/2ML nebulizer solution, MIX CONTENTS OF 1 VIAL WITH THREE MLS OF DILUENT PROVIDED,MIX, DRAW UP IN SYRINGE, FILL EAR CANAL, REPEAT TWICE DAILY., Disp: , Rfl:     cloNIDine (CATAPRES) 0.1 MG tablet, TAKE 1 TABLET DAILY AS NEEDED FOR HIGH BLOOD PRESSURE. TAKE FOR BLOOD PRESSURE GREATER THAN 160/90, Disp: 30 tablet, Rfl: 11    ferrous sulfate 325 (65 FE) MG tablet, Take 1 tablet by mouth Daily With Breakfast. Take with orange juice or vitamin C, Disp: 90 tablet, Rfl: 1    HYDROcodone-acetaminophen (NORCO) 7.5-325 MG per tablet, Take 1 tablet by mouth Every 6 (Six) Hours As Needed for Moderate Pain., Disp: 28 tablet, Rfl: 0    Latuda 120 MG tablet tablet, , Disp: , Rfl:     levothyroxine (SYNTHROID, LEVOTHROID) 75 MCG tablet, Take 1 tablet by mouth Every Morning., Disp: 90 tablet, Rfl: 3    montelukast (SINGULAIR) 10 MG tablet, Take  1 tablet by mouth Every Night., Disp: 90 tablet, Rfl: 3    omeprazole (priLOSEC) 20 MG capsule, Take 1 capsule by mouth Daily., Disp: 30 capsule, Rfl: 11    tiZANidine (ZANAFLEX) 4 MG tablet, Take 1 tablet by mouth Every 8 (Eight) Hours As Needed for Muscle Spasms., Disp: 180 tablet, Rfl: 2    traZODone (DESYREL) 50 MG tablet, TAKE 1 TO 2 TABLETS EVERY NIGHT 1 HOUR BEFORE BEDTIME AS NEEDED FOR SLEEP, Disp: 45 tablet, Rfl: 5    vilazodone (VIIBRYD) 10 MG tablet tablet, Take 1 tablet by mouth Daily. with food, Disp: , Rfl:     rOPINIRole (REQUIP) 0.5 MG tablet, Take 1 tablet by mouth Every Night. Take 1 hour before bedtime., Disp: 30 tablet, Rfl: 0       Plan of care reviewed with the patient at the conclusion of today's visit.  Education was provided regarding diagnosis, management, and any prescribed or recommended OTC medications.  Patient verbalized understanding of and agreement with management plan.     No follow-ups on file.      Transcribed from ambient dictation for GERSON Arellano by GERSON Arellano.  10/31/23   12:18 EDT    Patient or patient representative verbalized consent to the visit recording.  I have personally performed the services described in this document as transcribed by the above individual, and it is both accurate and complete.    Transcribed from ambient dictation for GERSON Arellano by Otto De Jesus.  10/31/23   13:44 EDT    Patient or patient representative verbalized consent to the visit recording.  I have personally performed the services described in this document as transcribed by the above individual, and it is both accurate and complete.

## 2023-11-01 RX ORDER — ROPINIROLE 0.5 MG/1
0.5 TABLET, FILM COATED ORAL NIGHTLY
Qty: 90 TABLET | Refills: 0 | Status: SHIPPED | OUTPATIENT
Start: 2023-11-01 | End: 2023-11-03

## 2023-11-03 ENCOUNTER — TELEPHONE (OUTPATIENT)
Dept: INTERNAL MEDICINE | Facility: CLINIC | Age: 63
End: 2023-11-03
Payer: MEDICARE

## 2023-11-03 DIAGNOSIS — G25.81 RESTLESS LEG SYNDROME: Primary | ICD-10-CM

## 2023-11-03 RX ORDER — ROPINIROLE 1 MG/1
1 TABLET, FILM COATED ORAL NIGHTLY
Qty: 30 TABLET | Refills: 1 | Status: SHIPPED | OUTPATIENT
Start: 2023-11-03

## 2023-11-03 NOTE — TELEPHONE ENCOUNTER
Yes have her double her dose and start 1mg. I have sent new script to pharmacy or she can take 2 tablets of the script she has.

## 2023-11-03 NOTE — TELEPHONE ENCOUNTER
Caller: Alisa Ames    Relationship: Self    Best call back number: [phone]     Which medication are you concerned about: rOPINIRole (REQUIP) 0.5 MG tablet     What are your concerns: PATIENT WAS TOLD TO CALL BACK WITH AN UPDATE ON IF THIS MEDICATION WAS WORKING BUT SHE STATES IT IS NOT WORKING AND WOULD LIKE TO KNOW IF SHE CAN UP THE DOSE.

## 2023-11-07 DIAGNOSIS — G47.09 OTHER INSOMNIA: ICD-10-CM

## 2023-11-07 DIAGNOSIS — M19.91 PRIMARY OSTEOARTHRITIS, UNSPECIFIED SITE: ICD-10-CM

## 2023-11-07 DIAGNOSIS — K21.9 GASTROESOPHAGEAL REFLUX DISEASE WITHOUT ESOPHAGITIS: ICD-10-CM

## 2023-11-07 DIAGNOSIS — E03.9 HYPOTHYROIDISM (ACQUIRED): ICD-10-CM

## 2023-11-07 DIAGNOSIS — M50.30 DDD (DEGENERATIVE DISC DISEASE), CERVICAL: ICD-10-CM

## 2023-11-07 DIAGNOSIS — G25.81 RESTLESS LEG SYNDROME: ICD-10-CM

## 2023-11-07 DIAGNOSIS — I10 ESSENTIAL HYPERTENSION: ICD-10-CM

## 2023-11-07 RX ORDER — AMLODIPINE BESYLATE 5 MG/1
5 TABLET ORAL DAILY
Qty: 30 TABLET | Refills: 11 | Status: SHIPPED | OUTPATIENT
Start: 2023-11-07

## 2023-11-07 RX ORDER — LEVOTHYROXINE SODIUM 0.07 MG/1
75 TABLET ORAL
Qty: 90 TABLET | Refills: 3 | Status: SHIPPED | OUTPATIENT
Start: 2023-11-07

## 2023-11-07 RX ORDER — ROPINIROLE 1 MG/1
1 TABLET, FILM COATED ORAL NIGHTLY
Qty: 30 TABLET | Refills: 1 | Status: SHIPPED | OUTPATIENT
Start: 2023-11-07

## 2023-11-07 RX ORDER — OMEPRAZOLE 20 MG/1
20 CAPSULE, DELAYED RELEASE ORAL DAILY
Qty: 30 CAPSULE | Refills: 11 | Status: SHIPPED | OUTPATIENT
Start: 2023-11-07

## 2023-11-07 RX ORDER — ATORVASTATIN CALCIUM 40 MG/1
40 TABLET, FILM COATED ORAL DAILY
Qty: 90 TABLET | Refills: 1 | Status: SHIPPED | OUTPATIENT
Start: 2023-11-07

## 2023-11-07 RX ORDER — FERROUS SULFATE 325(65) MG
325 TABLET ORAL
Qty: 90 TABLET | Refills: 1 | Status: SHIPPED | OUTPATIENT
Start: 2023-11-07

## 2023-11-07 RX ORDER — MONTELUKAST SODIUM 10 MG/1
10 TABLET ORAL NIGHTLY
Qty: 90 TABLET | Refills: 3 | Status: SHIPPED | OUTPATIENT
Start: 2023-11-07

## 2023-11-07 RX ORDER — CLONIDINE HYDROCHLORIDE 0.1 MG/1
TABLET ORAL
Qty: 30 TABLET | Refills: 11 | Status: SHIPPED | OUTPATIENT
Start: 2023-11-07

## 2023-11-07 RX ORDER — TIZANIDINE 4 MG/1
4 TABLET ORAL EVERY 8 HOURS PRN
Qty: 180 TABLET | Refills: 2 | Status: SHIPPED | OUTPATIENT
Start: 2023-11-07

## 2023-11-07 NOTE — TELEPHONE ENCOUNTER
Caller: Cayden Amesevan LITTLE    Relationship: Self    Best call back number: [phone]    Requested Prescriptions:   Requested Prescriptions     Pending Prescriptions Disp Refills    amLODIPine (NORVASC) 5 MG tablet 30 tablet 11     Sig: Take 1 tablet by mouth Daily.    atorvastatin (Lipitor) 40 MG tablet 90 tablet 1     Sig: Take 1 tablet by mouth Daily.    cloNIDine (CATAPRES) 0.1 MG tablet 30 tablet 11    ferrous sulfate 325 (65 FE) MG tablet 90 tablet 1     Sig: Take 1 tablet by mouth Daily With Breakfast. Take with orange juice or vitamin C    HYDROcodone-acetaminophen (NORCO) 7.5-325 MG per tablet 28 tablet 0     Sig: Take 1 tablet by mouth Every 6 (Six) Hours As Needed for Moderate Pain.    levothyroxine (SYNTHROID, LEVOTHROID) 75 MCG tablet 90 tablet 3     Sig: Take 1 tablet by mouth Every Morning.    montelukast (SINGULAIR) 10 MG tablet 90 tablet 3     Sig: Take 1 tablet by mouth Every Night.    omeprazole (priLOSEC) 20 MG capsule 30 capsule 11     Sig: Take 1 capsule by mouth Daily.    rOPINIRole (REQUIP) 1 MG tablet 30 tablet 1     Sig: Take 1 tablet by mouth Every Night. Take 1 hour before bedtime.    tiZANidine (ZANAFLEX) 4 MG tablet 180 tablet 2     Sig: Take 1 tablet by mouth Every 8 (Eight) Hours As Needed for Muscle Spasms.    traZODone (DESYREL) 50 MG tablet 45 tablet 5     Sig: TAKE 1 TO 2 TABLETS EVERY NIGHT 1 HOUR BEFORE BEDTIME AS NEEDED FOR SLEEP        Pharmacy where request should be sent: [facility] [address] [phone] [facility] [phone]      Last office visit with prescribing clinician: 10/31/2023   Last telemedicine visit with prescribing clinician: Visit date not found   Next office visit with prescribing clinician: 8/12/2024     Additional details provided by patient:     Does the patient have less than a 3 day supply:  [] Yes  [x] No    Would you like a call back once the refill request has been completed: [x] Yes [] No    If the  office needs to give you a call back, can they leave a voicemail: [x] Yes [] No    Earnestine Polk, [employer]eva[employer] Rep   11/07/23 11:12 EST

## 2023-11-08 RX ORDER — TRAZODONE HYDROCHLORIDE 50 MG/1
TABLET ORAL
Qty: 45 TABLET | Refills: 5 | Status: SHIPPED | OUTPATIENT
Start: 2023-11-08

## 2023-11-08 RX ORDER — HYDROCODONE BITARTRATE AND ACETAMINOPHEN 7.5; 325 MG/1; MG/1
1 TABLET ORAL EVERY 6 HOURS PRN
Qty: 28 TABLET | Refills: 0 | Status: SHIPPED | OUTPATIENT
Start: 2023-11-08

## 2023-11-13 ENCOUNTER — TELEPHONE (OUTPATIENT)
Dept: INTERNAL MEDICINE | Facility: CLINIC | Age: 63
End: 2023-11-13
Payer: MEDICARE

## 2023-11-13 NOTE — TELEPHONE ENCOUNTER
Caller: Alisa Ames A    Relationship to patient: Self    Best call back number: [phone]     PATIENT IS CALLING BACK TO SEE IF SHE COULD GET AN INCREASE IN HER ROPINOROLE.

## 2023-11-14 RX ORDER — ROPINIROLE 2 MG/1
2 TABLET, FILM COATED ORAL NIGHTLY
Qty: 30 TABLET | Refills: 1 | Status: SHIPPED | OUTPATIENT
Start: 2023-11-14

## 2023-11-17 ENCOUNTER — TELEPHONE (OUTPATIENT)
Dept: ORTHOPEDIC SURGERY | Facility: CLINIC | Age: 63
End: 2023-11-17

## 2023-11-17 NOTE — TELEPHONE ENCOUNTER
Caller: NICOLÁS    Relationship to patient: PATIENT    Best call back number: [phone] (home)       Chief complaint: RIGHT KNEE    Type of visit: INJECTIONS    Requested date: BEFORE SHE GOES OUT OF TOWN PATIENT .SHE WILL BE GONE 12/19/2023- DECEMBER 31ST     If rescheduling, when is the original appointment:   PATIENT HAS A SERIES OF GEL SHOT SCHEDULED.   12/19/2023 1/2/2024 1/9/2023       Additional notes:  PATIENT HAS TO CANCEL APPOINTMENT FOR 12/19/2023. WOULD LIKE TO DO IT THE DAY BEFORE ON 12/18/2023 SHE IS GOING OUT OF TOWN

## 2023-11-22 ENCOUNTER — TELEPHONE (OUTPATIENT)
Dept: INTERNAL MEDICINE | Facility: CLINIC | Age: 63
End: 2023-11-22
Payer: MEDICARE

## 2023-11-22 RX ORDER — PRAMIPEXOLE DIHYDROCHLORIDE 0.5 MG/1
0.5 TABLET ORAL NIGHTLY
Qty: 30 TABLET | Refills: 0 | Status: SHIPPED | OUTPATIENT
Start: 2023-11-22 | End: 2023-11-22

## 2023-11-22 RX ORDER — PRAMIPEXOLE DIHYDROCHLORIDE 0.5 MG/1
0.5 TABLET ORAL NIGHTLY
Qty: 90 TABLET | Refills: 0 | Status: SHIPPED | OUTPATIENT
Start: 2023-11-22

## 2023-11-22 RX ORDER — PRAMIPEXOLE DIHYDROCHLORIDE 0.5 MG/1
0.5 TABLET ORAL NIGHTLY
Qty: 30 TABLET | Refills: 0 | Status: SHIPPED | OUTPATIENT
Start: 2023-11-22 | End: 2023-11-22 | Stop reason: SDUPTHER

## 2023-11-22 NOTE — TELEPHONE ENCOUNTER
Caller: Alisa Ames    Relationship: Self    Best call back number: [phone]    What is the best time to reach you: ANYTIME    Who are you requesting to speak with (clinical staff, provider,  specific staff member): CLINICAL STAFF    Do you know the name of the person who called: PATIENT/ ALISA    What was the call regarding: THE rOPINIRole (REQUIP) 2 MG tablet IS NOT EFFECTIVE; CAN THIS BE INCREASED    Is it okay if the provider responds through MyChart:

## 2023-11-22 NOTE — TELEPHONE ENCOUNTER
This dose was just doubled one week ago. If not improving her symptoms then I don't feel this medication is the one for her. I want to see her for a visit to discuss other options and what will be best choice.

## 2023-11-22 NOTE — TELEPHONE ENCOUNTER
Please see patients message in regards to wanting to increase her dose of requip. I reviewed her chart and she has not tried mirapex wihich is similar to requip but some patients respond better to this. I have sent her a script to try but I want to see her for follow up soon.

## 2023-11-28 ENCOUNTER — TELEPHONE (OUTPATIENT)
Dept: INTERNAL MEDICINE | Facility: CLINIC | Age: 63
End: 2023-11-28
Payer: MEDICARE

## 2023-11-28 DIAGNOSIS — G25.81 RESTLESS LEG SYNDROME: Primary | ICD-10-CM

## 2023-11-28 DIAGNOSIS — G25.81 RESTLESS LEG SYNDROME: ICD-10-CM

## 2023-11-28 RX ORDER — PRAMIPEXOLE DIHYDROCHLORIDE 1 MG/1
1 TABLET ORAL NIGHTLY
Qty: 90 TABLET | Refills: 0 | Status: SHIPPED | OUTPATIENT
Start: 2023-11-28

## 2023-11-28 RX ORDER — PRAMIPEXOLE DIHYDROCHLORIDE 1 MG/1
1 TABLET ORAL NIGHTLY
Qty: 30 TABLET | Refills: 0 | Status: SHIPPED | OUTPATIENT
Start: 2023-11-28 | End: 2023-11-28

## 2023-11-28 NOTE — TELEPHONE ENCOUNTER
Caller: Alisa Ames    Relationship: Self    Best call back number: [phone]     Which medication are you concerned about:     pramipexole (MIRAPEX) 0.5 MG tablet     Who prescribed you this medication: GERSON SEYMOUR     When did you start taking this medication: 4-5 DAYS AGO     What are your concerns: PATIENT STATES SHE IS ON A PRESCRIPTION FOR RESTLESS LEGS AND IT IS NOT WORKING SO SHE IS REQUESTING AN INCREASE IN DOSAGE.     [facility] #[id number] - [facility] [address] AT [facility] [address] - [phone]  - [phone]  [phone]     How long have you had these concerns: FOR 4-5 DAYS

## 2023-11-28 NOTE — TELEPHONE ENCOUNTER
I increased script to 1mg. Please let patient know if this does not help with her symptoms she needs to schedule follow up. There could be something else going on causing her symptoms.

## 2023-12-15 ENCOUNTER — TELEPHONE (OUTPATIENT)
Dept: INTERNAL MEDICINE | Facility: CLINIC | Age: 63
End: 2023-12-15
Payer: MEDICARE

## 2023-12-15 DIAGNOSIS — M19.91 PRIMARY OSTEOARTHRITIS, UNSPECIFIED SITE: ICD-10-CM

## 2023-12-15 RX ORDER — HYDROCODONE BITARTRATE AND ACETAMINOPHEN 7.5; 325 MG/1; MG/1
1 TABLET ORAL EVERY 6 HOURS PRN
Qty: 28 TABLET | Refills: 0 | Status: SHIPPED | OUTPATIENT
Start: 2023-12-15

## 2023-12-15 NOTE — TELEPHONE ENCOUNTER
Caller: Alisa Ames    Relationship: Self    Best call back number: [phone]    What medication are you requesting: HYDROCODONE-ACETAMINOPHEN 10 MG TABLET- INCREASED DOSAGE     What are your current symptoms: HURT ROTATOR CUFF    How long have you been experiencing symptoms: A FEW WEEKS AGO    If a prescription is needed, what is your preferred pharmacy and phone number: [facility] #[id number] [facility] [address] AT [facility] [address] - [phone] [facility] [phone]      Additional notes: PATIENT STATES THAT IF SHE CAN'T GET AN INCREASE TO PLEASE REFILL SHE WILL BE GOING OUT OF TOWN BUT SCHEDULED AN APPOINTMENT FOR AFTER THE NEW YEAR

## 2024-01-02 ENCOUNTER — CLINICAL SUPPORT (OUTPATIENT)
Dept: ORTHOPEDIC SURGERY | Facility: CLINIC | Age: 64
End: 2024-01-02
Payer: MEDICARE

## 2024-01-02 DIAGNOSIS — M17.11 PRIMARY OSTEOARTHRITIS OF RIGHT KNEE: Primary | ICD-10-CM

## 2024-01-02 PROCEDURE — 20610 DRAIN/INJ JOINT/BURSA W/O US: CPT | Performed by: ORTHOPAEDIC SURGERY

## 2024-01-02 NOTE — PROGRESS NOTES
Procedure   Large Joint Arthrocentesis: R knee  Date/Time: 1/2/2024 10:12 AM  Consent given by: patient  Site marked: site marked  Timeout: Immediately prior to procedure a time out was called to verify the correct patient, procedure, equipment, support staff and site/side marked as required   Supporting Documentation  Indications: pain   Procedure Details  Location: knee - R knee  Preparation: Patient was prepped and draped in the usual sterile fashion  Needle gauge: 21g.  Approach: anterolateral  Medications administered: 30 mg Hyaluronan 30 MG/2ML  Patient tolerance: patient tolerated the procedure well with no immediate complications

## 2024-01-09 ENCOUNTER — CLINICAL SUPPORT (OUTPATIENT)
Dept: ORTHOPEDIC SURGERY | Facility: CLINIC | Age: 64
End: 2024-01-09
Payer: MEDICARE

## 2024-01-09 DIAGNOSIS — M17.11 PRIMARY OSTEOARTHRITIS OF RIGHT KNEE: Primary | ICD-10-CM

## 2024-01-09 PROCEDURE — 20610 DRAIN/INJ JOINT/BURSA W/O US: CPT | Performed by: ORTHOPAEDIC SURGERY

## 2024-01-09 RX ORDER — VILAZODONE HYDROCHLORIDE 20 MG/1
20 TABLET ORAL DAILY
COMMUNITY
Start: 2023-11-30

## 2024-01-09 NOTE — PROGRESS NOTES
Procedure   - Large Joint Arthrocentesis: R knee on 1/9/2024 10:02 AM  Indications: pain  Details: 21 G needle, superolateral approach  Medications: 30 mg Hyaluronan 30 MG/2ML  Outcome: tolerated well, no immediate complications  Procedure, treatment alternatives, risks and benefits explained, specific risks discussed. Consent was given by the patient. Immediately prior to procedure a time out was called to verify the correct patient, procedure, equipment, support staff and site/side marked as required. Patient was prepped and draped in the usual sterile fashion.

## 2024-01-09 NOTE — PROGRESS NOTES
Patient returns for second Orthovisc injection in the right knee.  Denies complications with the first injection.  Overall her pain has improved.    Procedure Note:  I discussed with the patient the potential benefits of performing a therapeutic injection of the right knee as well as potential risks including but not limited to infection, swelling, pain, bleeding, bruising, nerve/vessel damage, pseudoseptic reaction, and worsening joint pain. After informed consent and verifying correct patient, procedure site, and type of procedure, the area was prepped with alcohol, ethyl chloride was used to numb the skin. Via the superolateral approach, the viscosupplementation syringe contents were injected into the right knee. The patient tolerated the procedure well. There were no complications. A sterile dressing was placed over the injection site.    Follow up 1 week.

## 2024-01-11 DIAGNOSIS — I10 ESSENTIAL HYPERTENSION: ICD-10-CM

## 2024-01-11 RX ORDER — AMLODIPINE BESYLATE 5 MG/1
5 TABLET ORAL DAILY
Qty: 90 TABLET | Refills: 1 | Status: SHIPPED | OUTPATIENT
Start: 2024-01-11

## 2024-01-11 NOTE — TELEPHONE ENCOUNTER
Caller: Alisa Ames    Relationship: Self    Best call back number: [phone]     Requested Prescriptions:   Requested Prescriptions     Pending Prescriptions Disp Refills    amLODIPine (NORVASC) 5 MG tablet 30 tablet 11     Sig: Take 1 tablet by mouth Daily.        Pharmacy where request should be sent: [id number] [address] AT [facility] [address] - [phone]  - [phone] FX     Last office visit with prescribing clinician: 10/31/2023   Last telemedicine visit with prescribing clinician: Visit date not found   Next office visit with prescribing clinician: 1/16/2024     Additional details provided by patient: 1 DAY LEFT. PATIENT STATES SHE HAS REQUESTED A REFILL FROM [facility] BUT WILL BE OUT OF MEDICATION BEFORE RECEIVING THE REFILL IN THE MAIL. PATIENT WOULD LIKE TO REQUEST [facility] UNTIL SHE RECEIVES THE MEDICATION IN THE MAIL.     Does the patient have less than a 3 day supply:  [x] Yes  [] No    Would you like a call back once the refill request has been completed: [] Yes [x] No    If the office needs to give you a call back, can they leave a voicemail: [] Yes [x] No    Ahmet Mota Rep   01/11/24 12:11 EST

## 2024-01-11 NOTE — TELEPHONE ENCOUNTER
PT CALLED AND STATED THAT YESTERDAY SHE WAS DRINKING WATER AND LOST CONTROL OF HER MOTOR SKILLS AND SPILT THE WATER ON THE FLOOR. PT STATED THAT HER BP WAS EXTREMELY LOW AND WHEN SHE SOMETHING SALTY IT CAME BACK UP. PT DOES NOT THINK THAT IT WAS A STROKE BECAUSE HER SPEECH WAS FINE AND SHE COULD FOLLOW HER HUSBANDS FINGER WITH HER EYES. PT WAS ADVISED TO GO TO THE ER AND DECLINED BECAUSE LAST TIME THEY DID NOT DO MUCH FOR HER. PT WAS OFFERED AN APPOINTMENT TODAY AND DECLINED. PT HAS AN APPOINTMENT WITH ESTHER ON 01/16/2024, PT STATED THAT IF IT HAPPENS AGAIN BEFORE HER APPOINTMENT SHE WILL GO TO THE ER

## 2024-01-16 ENCOUNTER — CLINICAL SUPPORT (OUTPATIENT)
Dept: ORTHOPEDIC SURGERY | Facility: CLINIC | Age: 64
End: 2024-01-16
Payer: MEDICARE

## 2024-01-16 ENCOUNTER — OFFICE VISIT (OUTPATIENT)
Dept: INTERNAL MEDICINE | Facility: CLINIC | Age: 64
End: 2024-01-16
Payer: MEDICARE

## 2024-01-16 VITALS
DIASTOLIC BLOOD PRESSURE: 68 MMHG | OXYGEN SATURATION: 97 % | TEMPERATURE: 97.1 F | BODY MASS INDEX: 21 KG/M2 | HEART RATE: 90 BPM | SYSTOLIC BLOOD PRESSURE: 140 MMHG | HEIGHT: 67 IN | WEIGHT: 133.8 LBS

## 2024-01-16 DIAGNOSIS — I10 ESSENTIAL HYPERTENSION: Primary | ICD-10-CM

## 2024-01-16 DIAGNOSIS — E11.65 TYPE 2 DIABETES MELLITUS WITH HYPERGLYCEMIA, UNSPECIFIED WHETHER LONG TERM INSULIN USE: ICD-10-CM

## 2024-01-16 DIAGNOSIS — M17.11 PRIMARY OSTEOARTHRITIS OF RIGHT KNEE: Primary | ICD-10-CM

## 2024-01-16 DIAGNOSIS — E89.0 POST-OPERATIVE HYPOTHYROIDISM: ICD-10-CM

## 2024-01-16 DIAGNOSIS — E78.00 PURE HYPERCHOLESTEROLEMIA: ICD-10-CM

## 2024-01-16 DIAGNOSIS — F51.01 PRIMARY INSOMNIA: ICD-10-CM

## 2024-01-16 DIAGNOSIS — E55.9 VITAMIN D DEFICIENCY: ICD-10-CM

## 2024-01-16 PROCEDURE — 20610 DRAIN/INJ JOINT/BURSA W/O US: CPT | Performed by: ORTHOPAEDIC SURGERY

## 2024-01-16 RX ORDER — LOSARTAN POTASSIUM 25 MG/1
25 TABLET ORAL DAILY
Qty: 90 TABLET | Refills: 1 | Status: SHIPPED | OUTPATIENT
Start: 2024-01-16 | End: 2024-01-17 | Stop reason: SDUPTHER

## 2024-01-16 NOTE — PROGRESS NOTES
Patient returns for third Orthovisc injection in the right knee.  Denies complications with the prior 2 injections.  Overall her pain has improved.    Procedure Note:  I discussed with the patient the potential benefits of performing a therapeutic injection of the right knee as well as potential risks including but not limited to infection, swelling, pain, bleeding, bruising, nerve/vessel damage, pseudoseptic reaction, and worsening joint pain. After informed consent and verifying correct patient, procedure site, and type of procedure, the area was prepped with alcohol, ethyl chloride was used to numb the skin. Via the superolateral approach, the viscosupplementation syringe contents were injected into the right knee. The patient tolerated the procedure well. There were no complications. A sterile dressing was placed over the injection site.    Follow up 3 months.

## 2024-01-16 NOTE — PROGRESS NOTES
Subjective   Chief Complaint   Patient presents with    Med Management     Patient was told by her cardiologist stop the amlodipine and start losartan    Hand Pain      Alisa mAes is a 63 y.o. female.     The patient is here today for a followup. She is accompanied by an adult male.    She reports her blood pressure has been fluctuating. She lost all of her lower extremity function the other night, causing her to fall and injure her arm and rotator cuff. Her blood pressure was 64/30 mmHg when she went to the cardiologist yesterday, 01/15/2024, so he discontinued the amlodipine and put her back on losartan 25 mg daily.  She took losartan 50 mg today, and was educated on correct dosing to take as prescribed by the cardiologist. Today, 01/16/2024, her blood pressure is 140/68 mmHg. She has a follow-up appointment with her cardiologist in 6 months. She believes her cardiologist was reviewing an old medication list, she will go to his office to update him as she is unable to speak with anyone via phone call.    She is taking atorvastatin 40 mg and is compliant with her current regimen. She was instructed to return to the office while fasting for an update on yearly blood work.    She reports she was in [place] for [employer], and her friend noticed that her left arm had tremors, more so than her friend who has Parkinson's disease. She is concerned about medication toxicity possibly attributing to the tremors. She is going to see a new neurologist.    She is taking omeprazole 20 mg for her stomach, but reports she does not have any stomach issues. She gets diarrhea once in a while, but nothing she is concerned about.    She is taking trazodone with her muscle relaxer. She got up the other night after an hour of taking it and went to get a glass of water and she blacked out. She likes the fact that she is able to sleep all night but reports she would be willing to decreasing her trazodone dosing by half. She would like  to stop the muscle relaxer. She lost all of her motor functions.    She is taking hydrocodone as needed for pain. She reports having 6 left because she was in quite a bit of pain with her shoulder.    She is taking Singulair for seasonal allergies and breathing. She is not using her inhaler. She reports her breathing is fine.    She is taking Latuda and Viibryd. She is compliant with her current medication regimen.    She reports her hand hurts when she unscrews something or uses her hand with any kind of force. Her shoulder seems to be much better than it was. She was going to massage therapy because she could not get in with primary care quick enough. It has been about a month and a half since she fell on her arm. If she uses it too much, she reports it tends to hurt quite a bit.      I have reviewed the following portions of the patient's history and confirmed they are accurate: allergies, current medications, past family history, past medical history, past social history, past surgical history, and problem list    Review of Systems  Pertinent items are noted in HPI.     Current Outpatient Medications on File Prior to Visit   Medication Sig    aspirin 81 MG EC tablet Take 1 tablet by mouth Daily.    atorvastatin (Lipitor) 40 MG tablet Take 1 tablet by mouth Daily.    HYDROcodone-acetaminophen (NORCO) 7.5-325 MG per tablet Take 1 tablet by mouth Every 6 (Six) Hours As Needed for Moderate Pain.    Latuda 120 MG tablet tablet     levothyroxine (SYNTHROID, LEVOTHROID) 75 MCG tablet Take 1 tablet by mouth Every Morning.    montelukast (SINGULAIR) 10 MG tablet Take 1 tablet by mouth Every Night.    tiZANidine (ZANAFLEX) 4 MG tablet Take 1 tablet by mouth Every 8 (Eight) Hours As Needed for Muscle Spasms.    traZODone (DESYREL) 50 MG tablet TAKE 1 TO 2 TABLETS EVERY NIGHT 1 HOUR BEFORE BEDTIME AS NEEDED FOR SLEEP    vilazodone (VIIBRYD) 20 MG tablet tablet Take 1 tablet by mouth Daily.    [DISCONTINUED] amLODIPine  "(NORVASC) 5 MG tablet Take 1 tablet by mouth Daily.    [DISCONTINUED] budesonide (PULMICORT) 0.5 MG/2ML nebulizer solution MIX CONTENTS OF 1 VIAL WITH THREE MLS OF DILUENT PROVIDED,MIX, DRAW UP IN SYRINGE, FILL EAR CANAL, REPEAT TWICE DAILY.    [DISCONTINUED] cloNIDine (CATAPRES) 0.1 MG tablet Take 1 tablet daily as needed for high blood pressure. Take for blood pressure greater than 160/90    [DISCONTINUED] ferrous sulfate 325 (65 FE) MG tablet Take 1 tablet by mouth Daily With Breakfast. Take with orange juice or vitamin C    [DISCONTINUED] omeprazole (priLOSEC) 20 MG capsule Take 1 capsule by mouth Daily.     Current Facility-Administered Medications on File Prior to Visit   Medication    [COMPLETED] Hyaluronan (ORTHOVISC) injection 30 mg       Objective   Vitals:    01/16/24 1508   BP: 140/68   BP Location: Left arm   Patient Position: Sitting   Cuff Size: Adult   Pulse: 90   Temp: 97.1 °F (36.2 °C)   TempSrc: Temporal   SpO2: 97%   Weight: 60.7 kg (133 lb 12.8 oz)   Height: 169 cm (66.54\")     Body mass index is 21.25 kg/m².    Physical Exam  Vitals reviewed.   Constitutional:       Appearance: Normal appearance. She is well-developed.   HENT:      Head: Normocephalic and atraumatic.      Nose: Nose normal.   Eyes:      General: Lids are normal.      Conjunctiva/sclera: Conjunctivae normal.      Pupils: Pupils are equal, round, and reactive to light.   Neck:      Thyroid: No thyromegaly.      Trachea: Trachea normal.   Cardiovascular:      Rate and Rhythm: Normal rate and regular rhythm.      Heart sounds: Normal heart sounds.   Pulmonary:      Effort: Pulmonary effort is normal. No respiratory distress.      Breath sounds: Normal breath sounds.   Skin:     General: Skin is warm and dry.   Neurological:      Mental Status: She is alert and oriented to person, place, and time.      GCS: GCS eye subscore is 4. GCS verbal subscore is 5. GCS motor subscore is 6.   Psychiatric:         Attention and Perception: " Attention normal.         Mood and Affect: Mood normal.         Speech: Speech normal.         Behavior: Behavior normal. Behavior is cooperative.         Thought Content: Thought content normal.         Assessment & Plan   Problem List Items Addressed This Visit       Pure hypercholesterolemia     Other Visit Diagnoses       Essential hypertension    -  Primary    Relevant Medications    losartan (COZAAR) 25 MG tablet    Primary insomnia        Post-operative hypothyroidism             Hypertension  - Chronic, unstable.  - Continue losartan. Follow heart healthy, low cholesterol diet.   - She will monitor blood pressures at home. If averaging greater than 140/90 or lower than 100/60 will follow up in office. She has a 6-month follow-up with cardiology.     Insomnia  - Chronic, stable.  - She will decrease her trazodone dose to 1/2 tablet nightly to see if this improves any of her weakness and low blood pressures at night.    Hypercholesterolemia  - Chronic, stable.  - Continue atorvastatin.   - Follow heart healthy cholesterol diet.   - Patient will come back for fasting lipid panel, CMP.    Postoperative hypothyroidism  - Chronic, stable.  - Continue Synthroid.   - She will come by for TSH blood work.      Current Outpatient Medications:     aspirin 81 MG EC tablet, Take 1 tablet by mouth Daily., Disp: , Rfl:     atorvastatin (Lipitor) 40 MG tablet, Take 1 tablet by mouth Daily., Disp: 90 tablet, Rfl: 1    HYDROcodone-acetaminophen (NORCO) 7.5-325 MG per tablet, Take 1 tablet by mouth Every 6 (Six) Hours As Needed for Moderate Pain., Disp: 28 tablet, Rfl: 0    Latuda 120 MG tablet tablet, , Disp: , Rfl:     levothyroxine (SYNTHROID, LEVOTHROID) 75 MCG tablet, Take 1 tablet by mouth Every Morning., Disp: 90 tablet, Rfl: 3    montelukast (SINGULAIR) 10 MG tablet, Take 1 tablet by mouth Every Night., Disp: 90 tablet, Rfl: 3    tiZANidine (ZANAFLEX) 4 MG tablet, Take 1 tablet by mouth Every 8 (Eight) Hours As Needed  for Muscle Spasms., Disp: 180 tablet, Rfl: 2    traZODone (DESYREL) 50 MG tablet, TAKE 1 TO 2 TABLETS EVERY NIGHT 1 HOUR BEFORE BEDTIME AS NEEDED FOR SLEEP, Disp: 45 tablet, Rfl: 5    vilazodone (VIIBRYD) 20 MG tablet tablet, Take 1 tablet by mouth Daily., Disp: , Rfl:     losartan (COZAAR) 25 MG tablet, Take 1 tablet by mouth Daily., Disp: 90 tablet, Rfl: 1  No current facility-administered medications for this visit.       Plan of care reviewed with the patient at the conclusion of today's visit.  Education was provided regarding diagnosis, management, and any prescribed or recommended OTC medications.  Patient verbalized understanding of and agreement with management plan.     Return in about 3 months (around 4/16/2024), or if symptoms worsen or fail to improve, for Follow-up.      Transcribed from ambient dictation for GERSON Arellano by Hailey Fernandez   01/16/24   15:42 EST    Patient or patient representative verbalized consent to the visit recording.  I have personally performed the services described in this document as transcribed by the above individual, and it is both accurate and complete.

## 2024-01-16 NOTE — PROGRESS NOTES
Procedure   - Large Joint Arthrocentesis: R knee on 1/16/2024 9:59 AM  Indications: pain  Details: 21 G needle, superolateral approach  Medications: 30 mg Hyaluronan 30 MG/2ML  Outcome: tolerated well, no immediate complications  Procedure, treatment alternatives, risks and benefits explained, specific risks discussed. Consent was given by the patient. Immediately prior to procedure a time out was called to verify the correct patient, procedure, equipment, support staff and site/side marked as required. Patient was prepped and draped in the usual sterile fashion.

## 2024-01-17 DIAGNOSIS — I10 ESSENTIAL HYPERTENSION: ICD-10-CM

## 2024-01-17 RX ORDER — LOSARTAN POTASSIUM 25 MG/1
25 TABLET ORAL DAILY
Qty: 90 TABLET | Refills: 1 | Status: SHIPPED | OUTPATIENT
Start: 2024-01-17

## 2024-01-17 NOTE — TELEPHONE ENCOUNTER
Caller: Alisa Ames    Relationship: Self    Best call back number: [phone]     Requested Prescriptions:   Requested Prescriptions     Pending Prescriptions Disp Refills    losartan (COZAAR) 25 MG tablet 90 tablet 1     Sig: Take 1 tablet by mouth Daily.        Pharmacy where request should be sent: [facility] #[id number] [facility] [address] AT [facility] [address] - [phone]  - [phone] FX     Last office visit with prescribing clinician: 1/16/2024   Last telemedicine visit with prescribing clinician: Visit date not found   Next office visit with prescribing clinician: 4/23/2024     Additional details provided by patient: SENT TO THE WRONG PHARMACY    Does the patient have less than a 3 day supply:  [x] Yes  [] No    Would you like a call back once the refill request has been completed: [] Yes [x] No    If the office needs to give you a call back, can they leave a voicemail: [] Yes [x] No    Ahmet Miller Rep   01/17/24 09:27 EST

## 2024-01-24 ENCOUNTER — LAB (OUTPATIENT)
Dept: INTERNAL MEDICINE | Facility: CLINIC | Age: 64
End: 2024-01-24
Payer: MEDICARE

## 2024-01-24 DIAGNOSIS — E03.9 HYPOTHYROIDISM (ACQUIRED): Primary | ICD-10-CM

## 2024-01-24 LAB
25(OH)D3 SERPL-MCNC: 31.2 NG/ML (ref 30–100)
CHOLEST SERPL-MCNC: 192 MG/DL (ref 0–200)
DEPRECATED RDW RBC AUTO: 41.7 FL (ref 37–54)
ERYTHROCYTE [DISTWIDTH] IN BLOOD BY AUTOMATED COUNT: 13.2 % (ref 12.3–15.4)
FOLATE SERPL-MCNC: 11.1 NG/ML (ref 4.78–24.2)
HBA1C MFR BLD: 6.2 % (ref 4.8–5.6)
HCT VFR BLD AUTO: 31.5 % (ref 34–46.6)
HDLC SERPL-MCNC: 72 MG/DL (ref 40–60)
HGB BLD-MCNC: 10 G/DL (ref 12–15.9)
LDLC SERPL CALC-MCNC: 109 MG/DL (ref 0–100)
LDLC/HDLC SERPL: 1.5 {RATIO}
MCH RBC QN AUTO: 27.7 PG (ref 26.6–33)
MCHC RBC AUTO-ENTMCNC: 31.7 G/DL (ref 31.5–35.7)
MCV RBC AUTO: 87.3 FL (ref 79–97)
PLATELET # BLD AUTO: 403 10*3/MM3 (ref 140–450)
PMV BLD AUTO: 9.7 FL (ref 6–12)
RBC # BLD AUTO: 3.61 10*6/MM3 (ref 3.77–5.28)
TRIGL SERPL-MCNC: 60 MG/DL (ref 0–150)
TSH SERPL DL<=0.05 MIU/L-ACNC: 1.32 UIU/ML (ref 0.27–4.2)
VIT B12 BLD-MCNC: 549 PG/ML (ref 211–946)
VLDLC SERPL-MCNC: 11 MG/DL (ref 5–40)
WBC NRBC COR # BLD AUTO: 3.29 10*3/MM3 (ref 3.4–10.8)

## 2024-01-24 PROCEDURE — 83036 HEMOGLOBIN GLYCOSYLATED A1C: CPT | Performed by: NURSE PRACTITIONER

## 2024-01-24 PROCEDURE — 80061 LIPID PANEL: CPT | Performed by: NURSE PRACTITIONER

## 2024-01-24 PROCEDURE — 85027 COMPLETE CBC AUTOMATED: CPT | Performed by: NURSE PRACTITIONER

## 2024-01-24 PROCEDURE — 82607 VITAMIN B-12: CPT | Performed by: NURSE PRACTITIONER

## 2024-01-24 PROCEDURE — 36415 COLL VENOUS BLD VENIPUNCTURE: CPT | Performed by: NURSE PRACTITIONER

## 2024-01-24 PROCEDURE — 82746 ASSAY OF FOLIC ACID SERUM: CPT | Performed by: NURSE PRACTITIONER

## 2024-01-24 PROCEDURE — 82306 VITAMIN D 25 HYDROXY: CPT | Performed by: NURSE PRACTITIONER

## 2024-01-24 PROCEDURE — 80053 COMPREHEN METABOLIC PANEL: CPT | Performed by: NURSE PRACTITIONER

## 2024-01-24 PROCEDURE — 84443 ASSAY THYROID STIM HORMONE: CPT | Performed by: NURSE PRACTITIONER

## 2024-01-25 LAB
ALBUMIN SERPL-MCNC: 4.2 G/DL (ref 3.5–5.2)
ALBUMIN/GLOB SERPL: 1.4 G/DL
ALP SERPL-CCNC: 124 U/L (ref 39–117)
ALT SERPL W P-5'-P-CCNC: 15 U/L (ref 1–33)
ANION GAP SERPL CALCULATED.3IONS-SCNC: 13.7 MMOL/L (ref 5–15)
AST SERPL-CCNC: 24 U/L (ref 1–32)
BILIRUB SERPL-MCNC: 0.3 MG/DL (ref 0–1.2)
BUN SERPL-MCNC: 9 MG/DL (ref 8–23)
BUN/CREAT SERPL: 7.6 (ref 7–25)
CALCIUM SPEC-SCNC: 9.1 MG/DL (ref 8.6–10.5)
CHLORIDE SERPL-SCNC: 98 MMOL/L (ref 98–107)
CO2 SERPL-SCNC: 22.3 MMOL/L (ref 22–29)
CREAT SERPL-MCNC: 1.18 MG/DL (ref 0.57–1)
EGFRCR SERPLBLD CKD-EPI 2021: 51.4 ML/MIN/1.73
GLOBULIN UR ELPH-MCNC: 3.1 GM/DL
GLUCOSE SERPL-MCNC: 83 MG/DL (ref 65–99)
POTASSIUM SERPL-SCNC: 4 MMOL/L (ref 3.5–5.2)
PROT SERPL-MCNC: 7.3 G/DL (ref 6–8.5)
SODIUM SERPL-SCNC: 134 MMOL/L (ref 136–145)

## 2024-02-04 RX ORDER — FERROUS SULFATE 325(65) MG
325 TABLET ORAL
Qty: 90 TABLET | Refills: 1 | Status: SHIPPED | OUTPATIENT
Start: 2024-02-04

## 2024-02-05 ENCOUNTER — TELEPHONE (OUTPATIENT)
Dept: INTERNAL MEDICINE | Facility: CLINIC | Age: 64
End: 2024-02-05
Payer: MEDICARE

## 2024-02-05 NOTE — TELEPHONE ENCOUNTER
Name: Alisa Ames    Relationship: Self    Best Callback Number: [phone]    HUB PROVIDED THE RELAY MESSAGE FROM THE OFFICE   PATIENT VOICED UNDERSTANDING AND HAS NO FURTHER QUESTIONS AT THIS TIME    ADDITIONAL INFORMATION:

## 2024-02-05 NOTE — TELEPHONE ENCOUNTER
LVM for pt to return call.     OK FOR HUB TO RELAY MESSAGE  ----- Message from GERSON Neri sent at 2/4/2024  4:22 PM EST -----  Please contact patient and advise:    Anemia has worsened. I have [facility] to start daily with breakfast. Eat a high iron diet. Monitor you stools and let me know if having any bleeding or dark stools. [facility]. Blood sugar is back up. Follow low carb and low sugar diet. Other vitamin levels look great. Thyroid is normal.

## 2024-02-16 DIAGNOSIS — G47.09 OTHER INSOMNIA: ICD-10-CM

## 2024-02-16 DIAGNOSIS — M50.30 DDD (DEGENERATIVE DISC DISEASE), CERVICAL: ICD-10-CM

## 2024-02-16 DIAGNOSIS — M19.91 PRIMARY OSTEOARTHRITIS, UNSPECIFIED SITE: ICD-10-CM

## 2024-02-16 RX ORDER — TRAZODONE HYDROCHLORIDE 50 MG/1
TABLET ORAL
Qty: 45 TABLET | Refills: 5 | Status: SHIPPED | OUTPATIENT
Start: 2024-02-16

## 2024-02-16 RX ORDER — TIZANIDINE 4 MG/1
4 TABLET ORAL EVERY 8 HOURS PRN
Qty: 180 TABLET | Refills: 2 | Status: SHIPPED | OUTPATIENT
Start: 2024-02-16

## 2024-02-16 RX ORDER — HYDROCODONE BITARTRATE AND ACETAMINOPHEN 7.5; 325 MG/1; MG/1
1 TABLET ORAL EVERY 6 HOURS PRN
Qty: 28 TABLET | Refills: 0 | Status: SHIPPED | OUTPATIENT
Start: 2024-02-16

## 2024-02-16 RX ORDER — MONTELUKAST SODIUM 10 MG/1
10 TABLET ORAL NIGHTLY
Qty: 90 TABLET | Refills: 3 | Status: SHIPPED | OUTPATIENT
Start: 2024-02-16

## 2024-02-16 RX ORDER — ATORVASTATIN CALCIUM 40 MG/1
40 TABLET, FILM COATED ORAL DAILY
Qty: 90 TABLET | Refills: 1 | Status: SHIPPED | OUTPATIENT
Start: 2024-02-16

## 2024-02-16 RX ORDER — ASPIRIN 81 MG/1
81 TABLET ORAL DAILY
Qty: 90 TABLET | Refills: 1 | Status: SHIPPED | OUTPATIENT
Start: 2024-02-16

## 2024-02-19 ENCOUNTER — TELEPHONE (OUTPATIENT)
Dept: INTERNAL MEDICINE | Facility: CLINIC | Age: 64
End: 2024-02-19
Payer: MEDICARE

## 2024-02-19 RX ORDER — DICYCLOMINE HCL 20 MG
TABLET ORAL
Qty: 60 TABLET | Refills: 2 | Status: SHIPPED | OUTPATIENT
Start: 2024-02-19

## 2024-02-19 NOTE — TELEPHONE ENCOUNTER
Caller: Alisa Ames    Relationship: Self    Best call back number: [phone]     Requested Prescriptions:   DICYCLOMINE 20MG    Pharmacy where request should be sent: [facility] #[id number] [facility] [address] AT [facility] [address] - [phone]  - [phone] FX     Last office visit with prescribing clinician: 1/16/2024   Last telemedicine visit with prescribing clinician: Visit date not found   Next office visit with prescribing clinician: 4/23/2024     Additional details provided by patient:     Does the patient have less than a 3 day supply:  [x] Yes  [] No    Would you like a call back once the refill request has been completed: [] Yes [x] No    If the office needs to give you a call back, can they leave a voicemail: [] Yes [x] No    Cadance Dunaway, Reg[employer] Rep   02/19/24 13:11 EST

## 2024-02-27 ENCOUNTER — TELEPHONE (OUTPATIENT)
Dept: INTERNAL MEDICINE | Facility: CLINIC | Age: 64
End: 2024-02-27
Payer: MEDICARE

## 2024-02-27 DIAGNOSIS — M19.91 PRIMARY OSTEOARTHRITIS, UNSPECIFIED SITE: ICD-10-CM

## 2024-02-27 RX ORDER — HYDROCODONE BITARTRATE AND ACETAMINOPHEN 7.5; 325 MG/1; MG/1
1 TABLET ORAL EVERY 6 HOURS PRN
Qty: 28 TABLET | Refills: 0 | Status: CANCELLED | OUTPATIENT
Start: 2024-02-27

## 2024-02-27 NOTE — TELEPHONE ENCOUNTER
I spoke with [facility], ferrous sulfate is not covered by insurance since it is available otc.   Hydrocodone is not available. Pt would like it sent to [facility] instead.

## 2024-02-27 NOTE — TELEPHONE ENCOUNTER
Caller: Ailsa Ames    Relationship: Self    Best call back number: [phone]     Which medication are you concerned about:     ferrous sulfate 325 (65 FE) MG tablet     HYDROcodone-acetaminophen (NORCO) 7.5-325 MG per tablet     Who prescribed you this medication: DEPAUL        What are your concerns: THE FERROUS SULFATE MEDICATION WAS SENT TO [facility] ON 2/4/24. SHE HAS NOT GOTTEN IT. HYDROCODONE WAS SENT ON 2/16/24 AND SHE HAS NOT RECEIVED THOSE EITHER. PATIENT REQUEST TO RESEND TO [facility] PLEASE

## 2024-02-28 DIAGNOSIS — M19.91 PRIMARY OSTEOARTHRITIS, UNSPECIFIED SITE: ICD-10-CM

## 2024-02-28 RX ORDER — HYDROCODONE BITARTRATE AND ACETAMINOPHEN 7.5; 325 MG/1; MG/1
1 TABLET ORAL EVERY 6 HOURS PRN
Qty: 28 TABLET | Refills: 0 | Status: SHIPPED | OUTPATIENT
Start: 2024-02-28

## 2024-02-28 NOTE — TELEPHONE ENCOUNTER
NICOLÁS HAS BEEN OUT OF HYDROCODONE SINCE [id number].  SHE WOULD LIKE IT SENT TO:  [id number] - [facility] [address] AT [facility] [address] - [phone]  - [phone]  [phone]

## 2024-03-01 NOTE — PROGRESS NOTES
Subjective   Chief Complaint   Patient presents with   • Hypertension             Alisa Ames is a 62 y.o. female.     The patient is here today for a follow up on hypertension. Her  accompanies her.    The patient's blood pressure is within normal limits today, 04/12/2023. She denies any issues or concerns.    The patient received a cortisone injection for knee osteoarthritis 1 week ago, significantly improving her pain. She continues to have some pain but was informed that the medication can take 1 to 2 weeks to be fully effective. Moreover, the patient complains of gradual worsening of left hip discomfort. The patient has a history of seeing a pain specialist 3 times. She had a good experience with Dr. Mccauley, but had a bad experience with Dr. Papi Barreto in 2017. She inquires about having a prescription for pain medication for future use. She has no issues with hydrocodone. She believes her insurance company would only cover her first 3 visits to a pain specialist. She denies the need to be referred to a pain specialist now. However, if needed, she would like to be referred to Dr. Mccauley.    The patient acknowledges that her hemoglobin A1c on 01/2023 was 6.3 percent. She finds it difficult to eat healthily. She agrees to have a recheck of her hemoglobin A1c level today, 04/12/2023.     The patient is doing well with her thyroid. She denies any issues or concerns.    The patient is doing well with depression and anxiety. She sees a psychiatrist for her medication.    The patient's double vision has improved but still has an occasional doubling of vision lasting an entire day. She acknowledges the results of her recent bloodwork, including improved anemia and lipid levels. Moreover, the patient recognized that her recent kidney function result had not changed. She was informed that she was referred to [facility], but she never received a call to schedule an appointment. Hence, she was referred  to the [facility] and tried to schedule an appointment with them. Additionally, she has an occasional irregular heartbeat. After undergoing tests, which revealed normal results, she was informed by Dr. Rose that it could happen with aging.    I have reviewed the following portions of the patient's history and confirmed they are accurate: allergies, current medications, past family history, past medical history, past social history, past surgical history, and problem list    I have personally completed the patient's review of systems.    Review of Systems   Constitutional: Positive for fatigue. Negative for activity change, appetite change, chills, diaphoresis, fever, unexpected weight gain and unexpected weight loss.   HENT: Positive for hearing loss. Negative for ear discharge, ear pain, mouth sores, nosebleeds, sinus pressure, sneezing and sore throat.    Eyes: Positive for double vision and visual disturbance. Negative for pain, discharge and itching.   Respiratory: Negative for cough, chest tightness, shortness of breath and wheezing.    Cardiovascular: Negative for chest pain, palpitations and leg swelling.   Gastrointestinal: Negative for abdominal pain, blood in stool, constipation, diarrhea, nausea and vomiting.   Endocrine: Negative for heat intolerance, polydipsia and polyphagia.   Genitourinary: Negative for dysuria, flank pain, frequency, hematuria and urgency.   Musculoskeletal: Positive for arthralgias, back pain, joint swelling and myalgias. Negative for gait problem, neck pain and neck stiffness.   Skin: Negative for color change, pallor, rash and skin lesions.   Allergic/Immunologic: Negative.  Negative for immunocompromised state.   Neurological: Positive for tremors. Negative for dizziness, seizures, speech difficulty, weakness, light-headedness, numbness and headache.   Hematological: Negative for adenopathy.   Psychiatric/Behavioral: Negative for agitation, decreased  "concentration, dysphoric mood, sleep disturbance, suicidal ideas and depressed mood. The patient is nervous/anxious.        Current Outpatient Medications on File Prior to Visit   Medication Sig   • amLODIPine (NORVASC) 5 MG tablet TAKE 1 TABLET DAILY   • atorvastatin (Lipitor) 40 MG tablet Take 1 tablet by mouth Daily.   • cloNIDine (CATAPRES) 0.1 MG tablet TAKE 1 TABLET DAILY AS NEEDED FOR HIGH BLOOD PRESSURE. TAKE FOR BLOOD PRESSURE GREATER THAN 160/90   • ferrous sulfate 325 (65 FE) MG tablet Take 1 tablet by mouth Daily With Breakfast. Take with orange juice or vitamin C   • Latuda 120 MG tablet tablet    • levothyroxine (SYNTHROID, LEVOTHROID) 75 MCG tablet TAKE 1 TABLET DAILY, FASTING AND WAIT 1 HOUR FOR FOOD OR OTHER MEDICATIONS (DISCONTINUE SYNTHROID 88 MCG)   • losartan (COZAAR) 100 MG tablet Take 1 tablet by mouth Daily.   • montelukast (SINGULAIR) 10 MG tablet TAKE 1 TABLET EVERY NIGHT   • omeprazole (priLOSEC) 20 MG capsule TAKE 1 CAPSULE DAILY   • tiZANidine (ZANAFLEX) 4 MG tablet Take 1 tablet by mouth Every 8 (Eight) Hours As Needed for Muscle Spasms.   • traZODone (DESYREL) 50 MG tablet Take 1-2 tablets one hour before bedtime as needed for sleep.   • vilazodone (VIIBRYD) 10 MG tablet tablet Take 1 tablet by mouth Daily. with food     No current facility-administered medications on file prior to visit.       Objective   Vitals:    04/12/23 0935   BP: 118/70   Pulse: 83   Resp: 16   Temp: 97.4 °F (36.3 °C)   TempSrc: Tympanic   SpO2: 98%   Weight: 65.8 kg (145 lb)   Height: 167.6 cm (66\")     Body mass index is 23.4 kg/m².    Physical Exam  Vitals reviewed.   Constitutional:       Appearance: Normal appearance. She is well-developed.   HENT:      Head: Normocephalic and atraumatic.      Nose: Nose normal.   Eyes:      General: Lids are normal.      Conjunctiva/sclera: Conjunctivae normal.      Pupils: Pupils are equal, round, and reactive to light.   Neck:      Thyroid: No thyromegaly.      Trachea: " Trachea normal.   Cardiovascular:      Rate and Rhythm: Normal rate. Rhythm irregularly irregular.      Heart sounds: Normal heart sounds.   Pulmonary:      Effort: Pulmonary effort is normal. No respiratory distress.      Breath sounds: Normal breath sounds.   Skin:     General: Skin is warm and dry.   Neurological:      Mental Status: She is alert and oriented to person, place, and time.      GCS: GCS eye subscore is 4. GCS verbal subscore is 5. GCS motor subscore is 6.   Psychiatric:         Attention and Perception: Attention normal.         Mood and Affect: Mood normal.         Speech: Speech normal.         Behavior: Behavior normal. Behavior is cooperative.         Thought Content: Thought content normal.         Assessment & Plan   Problem List Items Addressed This Visit        Endocrine and Metabolic    Hypothyroidism (acquired)   Other Visit Diagnoses     Essential hypertension    -  Primary    Primary osteoarthritis, unspecified site        Relevant Medications    HYDROcodone-acetaminophen (NORCO) 7.5-325 MG per tablet    Borderline diabetes        Relevant Orders    POC Glycosylated Hemoglobin (Hb A1C) (Completed)         Hypertension.  Chronic, stable.  Continue losartan and amlodipine.  Continue clonidine as needed for blood pressures greater than 160/90 mmHg.  Follow a heart-healthy, low-cholesterol diet.    Borderline diabetes mellitus.  Chronic, unstable.  Checking hemoglobin A1c today.  She will follow a diabetic diet.  Her previous hemoglobin A1c was 6.3 percent.    Hypothyroidism.  Chronic, stable.  Continue Synthroid.      Current Outpatient Medications:   •  amLODIPine (NORVASC) 5 MG tablet, TAKE 1 TABLET DAILY, Disp: 30 tablet, Rfl: 11  •  atorvastatin (Lipitor) 40 MG tablet, Take 1 tablet by mouth Daily., Disp: 90 tablet, Rfl: 1  •  cloNIDine (CATAPRES) 0.1 MG tablet, TAKE 1 TABLET DAILY AS NEEDED FOR HIGH BLOOD PRESSURE. TAKE FOR BLOOD PRESSURE GREATER THAN 160/90, Disp: 30 tablet, Rfl:  11  •  ferrous sulfate 325 (65 FE) MG tablet, Take 1 tablet by mouth Daily With Breakfast. Take with orange juice or vitamin C, Disp: 90 tablet, Rfl: 1  •  Latuda 120 MG tablet tablet, , Disp: , Rfl:   •  levothyroxine (SYNTHROID, LEVOTHROID) 75 MCG tablet, TAKE 1 TABLET DAILY, FASTING AND WAIT 1 HOUR FOR FOOD OR OTHER MEDICATIONS (DISCONTINUE SYNTHROID 88 MCG), Disp: 90 tablet, Rfl: 3  •  losartan (COZAAR) 100 MG tablet, Take 1 tablet by mouth Daily., Disp: 90 tablet, Rfl: 1  •  montelukast (SINGULAIR) 10 MG tablet, TAKE 1 TABLET EVERY NIGHT, Disp: 90 tablet, Rfl: 3  •  omeprazole (priLOSEC) 20 MG capsule, TAKE 1 CAPSULE DAILY, Disp: 30 capsule, Rfl: 11  •  tiZANidine (ZANAFLEX) 4 MG tablet, Take 1 tablet by mouth Every 8 (Eight) Hours As Needed for Muscle Spasms., Disp: 180 tablet, Rfl: 2  •  traZODone (DESYREL) 50 MG tablet, Take 1-2 tablets one hour before bedtime as needed for sleep., Disp: 45 tablet, Rfl: 2  •  vilazodone (VIIBRYD) 10 MG tablet tablet, Take 1 tablet by mouth Daily. with food, Disp: , Rfl:   •  HYDROcodone-acetaminophen (NORCO) 7.5-325 MG per tablet, Take 1 tablet by mouth Every 6 (Six) Hours As Needed for Moderate Pain., Disp: 28 tablet, Rfl: 0       Plan of care reviewed with the patient at the conclusion of today's visit.  Education was provided regarding diagnosis, management, and any prescribed or recommended OTC medications.  Patient verbalized understanding of and agreement with management plan.     Return in about 3 months (around 7/12/2023), or if symptoms worsen or fail to improve, for Follow-up.      Transcribed from ambient dictation for GERSON Arellano by Penny Pate.  04/12/23   10:16 EDT    Patient or patient representative verbalized consent to the visit recording.  I have personally performed the services described in this document as transcribed by the above individual, and it is both accurate and complete.   2.1

## 2024-03-04 DIAGNOSIS — G25.81 RESTLESS LEG SYNDROME: ICD-10-CM

## 2024-03-05 RX ORDER — PRAMIPEXOLE DIHYDROCHLORIDE 1 MG/1
1 TABLET ORAL NIGHTLY
Qty: 90 TABLET | Refills: 0 | OUTPATIENT
Start: 2024-03-05

## 2024-03-07 ENCOUNTER — TELEPHONE (OUTPATIENT)
Dept: INTERNAL MEDICINE | Facility: CLINIC | Age: 64
End: 2024-03-07
Payer: MEDICARE

## 2024-03-07 NOTE — TELEPHONE ENCOUNTER
Caller: Alisa Ames    Relationship: Self    Best call back number:[phone]    What orders are you requesting (i.e. lab or imaging): MRI    In what timeframe would the patient need to come in: ASAP    Where will you receive your lab/imaging services: [facility]    Additional notes: WHEN PT WAS HERE IN JAN 2024 SHE WASN'T IN AS MUCH PAIN BUT THE PAIN IN HER RIGHT ARM AND HAND HAS INCREASED SLIGHTLY OVER TIME AND IS NOW TO THE POINT THAT IT IS ALL THE TIME. SHE NOW THINKS THE MRI THAT ESTHER SUGGESTED AT THE JAN VISIT IS A GREAT IDEA. WOULD LIKE THAT SET UP ASAP

## 2024-03-11 DIAGNOSIS — G89.29 CHRONIC RIGHT SHOULDER PAIN: Primary | ICD-10-CM

## 2024-03-11 DIAGNOSIS — M25.511 CHRONIC RIGHT SHOULDER PAIN: Primary | ICD-10-CM

## 2024-03-11 DIAGNOSIS — M54.2 CERVICAL PAIN: ICD-10-CM

## 2024-03-11 NOTE — TELEPHONE ENCOUNTER
Please contact patient and ask if she feels the pain is coming from her right shoulder or localized to arm and hand? I will order xrays and as soon as this comes back can refer her to orthopedics to see if they want MRI.

## 2024-03-11 NOTE — TELEPHONE ENCOUNTER
I ordered a neck and right shoulder xray. Let her know where she can get these done at. Then we will go from there.

## 2024-03-13 ENCOUNTER — HOSPITAL ENCOUNTER (OUTPATIENT)
Dept: GENERAL RADIOLOGY | Facility: HOSPITAL | Age: 64
Discharge: HOME | End: 2024-03-13
Admitting: NURSE PRACTITIONER
Payer: MEDICARE

## 2024-03-13 DIAGNOSIS — G89.29 CHRONIC RIGHT SHOULDER PAIN: ICD-10-CM

## 2024-03-13 DIAGNOSIS — M25.511 CHRONIC RIGHT SHOULDER PAIN: ICD-10-CM

## 2024-03-13 DIAGNOSIS — M54.2 CERVICAL PAIN: ICD-10-CM

## 2024-03-13 PROCEDURE — 72050 X-RAY EXAM NECK SPINE 4/5VWS: CPT

## 2024-03-13 PROCEDURE — 73030 X-RAY EXAM OF SHOULDER: CPT

## 2024-03-14 ENCOUNTER — TELEPHONE (OUTPATIENT)
Dept: INTERNAL MEDICINE | Facility: CLINIC | Age: 64
End: 2024-03-14
Payer: MEDICARE

## 2024-03-14 RX ORDER — CLONIDINE HYDROCHLORIDE 0.1 MG/1
TABLET ORAL
Qty: 180 TABLET | Refills: 0 | Status: SHIPPED | OUTPATIENT
Start: 2024-03-14

## 2024-03-14 RX ORDER — CLONIDINE HYDROCHLORIDE 0.1 MG/1
TABLET ORAL
Qty: 60 TABLET | Refills: 0 | Status: SHIPPED | OUTPATIENT
Start: 2024-03-14 | End: 2024-03-14

## 2024-03-14 NOTE — TELEPHONE ENCOUNTER
Pt called stating her BP is 187/87 and she is requesting a refill of clonidine.  Per giancarlo the med was sent in and pt is to come for appt.

## 2024-03-15 ENCOUNTER — TELEPHONE (OUTPATIENT)
Dept: INTERNAL MEDICINE | Facility: CLINIC | Age: 64
End: 2024-03-15
Payer: MEDICARE

## 2024-03-15 DIAGNOSIS — M54.2 CERVICAL PAIN: ICD-10-CM

## 2024-03-15 DIAGNOSIS — M25.511 CHRONIC RIGHT SHOULDER PAIN: Primary | ICD-10-CM

## 2024-03-15 DIAGNOSIS — G89.29 CHRONIC RIGHT SHOULDER PAIN: Primary | ICD-10-CM

## 2024-03-15 DIAGNOSIS — Z98.1 HISTORY OF FUSION OF CERVICAL SPINE: ICD-10-CM

## 2024-03-15 DIAGNOSIS — M54.12 CERVICAL RADICULOPATHY: ICD-10-CM

## 2024-03-15 NOTE — TELEPHONE ENCOUNTER
Caller: Alisa Ames    Relationship: Self    Best call back number: [phone]    What test was performed: X-RAY OF RIGHT SHOULDER AND NECK    When was the test performed: 3/13/24    Additional notes:     PLEASE CALL PATIENT WITH RESULTS

## 2024-03-22 NOTE — TELEPHONE ENCOUNTER
Name: Alisa Ames      Relationship: Self      Best Callback Number: [phone]       [facility] PROVIDED THE RELAY MESSAGE FROM THE OFFICE      PATIENT: HAS FURTHER QUESTIONS AND WOULD LIKE A CALL BACK AT THE FOLLOWING PHONE NUMBER     ADDITIONAL INFORMATION: PATIENT WOULD LIKE TO HAVE THE MRI COMPLETED BUT WOULD LIKE TO KNOW WHAT WOULD THE MRI SHOW AND SHOULD SHE SEE ORTHOPEDIC SPECIALIST FIRST BEFORE HAVING THE MRI     [facility]  Nursing Note  [phone]  Fax [phone]  Name:  Hetal Jones  YOB: 1944    Received outpatient Palliative Medicine referral from Dr. Tyra Valverde to see pt for symptom management and care decisions. Chart  reviewed. Pt has stage IV non small cell lung cancer of the left lung. Pt's most recent office visit with Dr. Ana Ogden was 1/9/18. See his note for treatment history. ACP:  No AMD on file. DDNR dated 12/27/17 on file                                                                                                                                          Nurse called pt to introduce Palliative Medicine services and schedule appt.   No answer, left message .  LYNN TompkinsN, RN-BC  Clinical Referral Navigator  [phone]

## 2024-03-22 NOTE — TELEPHONE ENCOUNTER
Please contact patient and advise:    Neck problem is most of the time what causes patients to have shoulder pain. Since xrays only showed mild arthritis of shoulder and multiple neck abnormalities then pain is most likely coming from neck. If I refer you to ortho they will agree and suggest neck MRI. They don't see for neck so I will need to get you into different specialist for this. My recommendation is neck MRI and go from there.

## 2024-03-22 NOTE — TELEPHONE ENCOUNTER
LVM for pt to return call.     OK FOR [facility] TO RELAY MESSAGE    Neck xray shows her prior fusion and arthritis with bone spurs. Shoulder xray shows mild arthritis. Pain could be coming from neck. Please ask if she wants referred to orthopedics for consult on shoulder or I can order a neck MRI to see if having nerve impingement and severity of the arthritis.

## 2024-03-22 NOTE — TELEPHONE ENCOUNTER
Contact patient and advise:    Neck xray shows her prior fusion and arthritis with bone spurs. Shoulder xray shows mild arthritis. Pain could be coming from neck. Please ask if she wants referred to orthopedics for consult on shoulder or I can order a neck MRI to see if having nerve impingement and severity of the arthritis.

## 2024-03-25 DIAGNOSIS — E03.9 HYPOTHYROIDISM (ACQUIRED): ICD-10-CM

## 2024-03-25 RX ORDER — LEVOTHYROXINE SODIUM 0.07 MG/1
75 TABLET ORAL
Qty: 30 TABLET | Refills: 0 | Status: SHIPPED | OUTPATIENT
Start: 2024-03-25 | End: 2024-03-25 | Stop reason: SDUPTHER

## 2024-03-25 RX ORDER — LEVOTHYROXINE SODIUM 0.07 MG/1
75 TABLET ORAL
Qty: 90 TABLET | Refills: 0 | Status: SHIPPED | OUTPATIENT
Start: 2024-03-25

## 2024-03-25 NOTE — TELEPHONE ENCOUNTER
I can get the neck MRI through but unsure insurance will approve the shoulder MRI. I have referred her to ortho for shoulder. They will order MRI if feels necessary based on exam. She will get called with cervical MRI appt and ortho appt.

## 2024-03-25 NOTE — TELEPHONE ENCOUNTER
Caller: Alisa Ames    Relationship: Self    Best call back number: [phone]     Requested Prescriptions:   Requested Prescriptions     Pending Prescriptions Disp Refills    levothyroxine (SYNTHROID, LEVOTHROID) 75 MCG tablet 90 tablet 3     Sig: Take 1 tablet by mouth Every Morning.        Pharmacy where request should be sent: [id number] [place] [address] AT [facility] [address] - [phone] [facility] [phone] FX     Last office visit with prescribing clinician: 1/16/2024   Last telemedicine visit with prescribing clinician: Visit date not found   Next office visit with prescribing clinician: 4/23/2024     Additional details provided by patient: PATIENT NEEDS A SUPPLY UNTIL SHE GET THE PRESCRIPTION FROM THE MAIL ORDER.    Does the patient have less than a 3 day supply:  [x] Yes  [] No    Would you like a call back once the refill request has been completed: [x] Yes [] No    If the office needs to give you a call back, can they leave a voicemail: [x] Yes [] No    Ahmet Velásquez Rep   03/25/24 11:26 EDT

## 2024-03-25 NOTE — TELEPHONE ENCOUNTER
Caller: Alisa Ames    Relationship: Self    Best call back number: [phone]     Requested Prescriptions:   Requested Prescriptions     Pending Prescriptions Disp Refills    levothyroxine (SYNTHROID, LEVOTHROID) 75 MCG tablet 30 tablet 0     Sig: Take 1 tablet by mouth Every Morning.        Pharmacy where request should be sent: [facility] #[id number] [place] [address] AT [facility] [address] - [phone] [facility] [phone] FX     Last office visit with prescribing clinician: 1/16/2024   Last telemedicine visit with prescribing clinician: Visit date not found   Next office visit with prescribing clinician: 4/23/2024     Additional details provided by patient: PATIENT HAS A 30 DAY SUPPLY ALREADY CALLED IN HERE, BUT SHE WANTS TO GO AHEAD AND GET AN ADDITIONAL 60 DAY SUPPLY (FOR HER TOTAL 90 DAYS SHE GETS FROM [email or web address]) SENT AS WELL.     Does the patient have less than a 3 day supply:  [x] Yes  [] No    Would you like a call back once the refill request has been completed: [] Yes [x] No    If the office needs to give you a call back, can they leave a voicemail: [] Yes [x] No    Ahmet Dugan Rep   03/25/24 11:45 EDT

## 2024-03-27 ENCOUNTER — TELEPHONE (OUTPATIENT)
Dept: INTERNAL MEDICINE | Facility: CLINIC | Age: 64
End: 2024-03-27

## 2024-03-27 ENCOUNTER — TELEPHONE (OUTPATIENT)
Dept: ORTHOPEDIC SURGERY | Facility: CLINIC | Age: 64
End: 2024-03-27
Payer: MEDICARE

## 2024-03-27 NOTE — TELEPHONE ENCOUNTER
Provider: WILSON    Caller: NICOLÁS HURST    Relationship to Patient: PATIENT    Pharmacy: NA    Phone Number: [phone] (home)       Reason for Call: PATIENT IS HAVING RT SHLDR PAIN POST FALL 4 MONTHS AGO - THIS IS A NEW PROBLEM AND PATIENT REQUESTS SOMEONE OTHER THAN DR SMITH      When was the patient last seen: 7.26.21 (BY LU[facility])

## 2024-03-27 NOTE — TELEPHONE ENCOUNTER
Hub staff attempted to follow warm transfer process and was unsuccessful     Caller: Alisa Ames    Relationship to patient: Self    Best call back number: [phone]    Patient is needing: TO INFORM PCP SHE DOES NOT NEED THE REFERRAL FOR AN ORTHOPEDIC SURGEON.  SHE HAS ONE SCHEDULED FOR [id number]

## 2024-03-29 ENCOUNTER — TELEPHONE (OUTPATIENT)
Dept: INTERNAL MEDICINE | Facility: CLINIC | Age: 64
End: 2024-03-29

## 2024-03-29 NOTE — TELEPHONE ENCOUNTER
Her neck MRI was ordered on 3/25 and she was referred to ortho on 3/25 for her shoulder. They can decide if shoulder MRI is needed. She should be getting calls on these soon with appts.

## 2024-03-29 NOTE — TELEPHONE ENCOUNTER
Caller: Alisa Ames    Relationship: Self    Best call back number: [phone]     What is the medical concern/diagnosis: PAIN IN THE RIGHT SHOULDER AND NECK    What specialty or service is being requested: MRI OF THE RIGHT SHOULDER AND NECK    What is the provider, practice or medical service name:     What is the office location: [facility]    What is the office phone number:     Any additional details: PATIENT IS CALLING TO GET A REFERRAL FOR THE MRI FOR HER SHOULDER AND NECK .       PLEASE CALL PATIENT ONCE THE REFRRAL REQUEST HAS BEEN APPROVED

## 2024-04-23 ENCOUNTER — LAB (OUTPATIENT)
Dept: INTERNAL MEDICINE | Facility: CLINIC | Age: 64
End: 2024-04-23
Payer: MEDICARE

## 2024-04-23 ENCOUNTER — OFFICE VISIT (OUTPATIENT)
Dept: ORTHOPEDIC SURGERY | Facility: CLINIC | Age: 64
End: 2024-04-23
Payer: MEDICARE

## 2024-04-23 ENCOUNTER — OFFICE VISIT (OUTPATIENT)
Dept: INTERNAL MEDICINE | Facility: CLINIC | Age: 64
End: 2024-04-23
Payer: MEDICARE

## 2024-04-23 VITALS
HEIGHT: 67 IN | DIASTOLIC BLOOD PRESSURE: 74 MMHG | WEIGHT: 125.4 LBS | SYSTOLIC BLOOD PRESSURE: 132 MMHG | BODY MASS INDEX: 19.68 KG/M2

## 2024-04-23 DIAGNOSIS — R73.03 BORDERLINE DIABETES: ICD-10-CM

## 2024-04-23 DIAGNOSIS — M19.91 PRIMARY OSTEOARTHRITIS, UNSPECIFIED SITE: ICD-10-CM

## 2024-04-23 DIAGNOSIS — Z79.899 MEDICATION MANAGEMENT: ICD-10-CM

## 2024-04-23 DIAGNOSIS — M17.11 PRIMARY OSTEOARTHRITIS OF RIGHT KNEE: Primary | ICD-10-CM

## 2024-04-23 DIAGNOSIS — D50.8 IRON DEFICIENCY ANEMIA SECONDARY TO INADEQUATE DIETARY IRON INTAKE: ICD-10-CM

## 2024-04-23 DIAGNOSIS — I10 PRIMARY HYPERTENSION: Primary | ICD-10-CM

## 2024-04-23 DIAGNOSIS — E89.0 POST-OPERATIVE HYPOTHYROIDISM: ICD-10-CM

## 2024-04-23 LAB
BASOPHILS # BLD AUTO: 0.05 10*3/MM3 (ref 0–0.2)
BASOPHILS NFR BLD AUTO: 1.2 % (ref 0–1.5)
DEPRECATED RDW RBC AUTO: 65.1 FL (ref 37–54)
EOSINOPHIL # BLD AUTO: 0.03 10*3/MM3 (ref 0–0.4)
EOSINOPHIL NFR BLD AUTO: 0.7 % (ref 0.3–6.2)
ERYTHROCYTE [DISTWIDTH] IN BLOOD BY AUTOMATED COUNT: 20.6 % (ref 12.3–15.4)
EXPIRATION DATE: NORMAL
HBA1C MFR BLD: 5.3 % (ref 4.5–5.7)
HCT VFR BLD AUTO: 35.5 % (ref 34–46.6)
HGB BLD-MCNC: 11.7 G/DL (ref 12–15.9)
IMM GRANULOCYTES # BLD AUTO: 0.01 10*3/MM3 (ref 0–0.05)
IMM GRANULOCYTES NFR BLD AUTO: 0.2 % (ref 0–0.5)
LYMPHOCYTES # BLD AUTO: 1.27 10*3/MM3 (ref 0.7–3.1)
LYMPHOCYTES NFR BLD AUTO: 30.2 % (ref 19.6–45.3)
Lab: NORMAL
MCH RBC QN AUTO: 29.7 PG (ref 26.6–33)
MCHC RBC AUTO-ENTMCNC: 33 G/DL (ref 31.5–35.7)
MCV RBC AUTO: 90.1 FL (ref 79–97)
MONOCYTES # BLD AUTO: 0.29 10*3/MM3 (ref 0.1–0.9)
MONOCYTES NFR BLD AUTO: 6.9 % (ref 5–12)
NEUTROPHILS NFR BLD AUTO: 2.56 10*3/MM3 (ref 1.7–7)
NEUTROPHILS NFR BLD AUTO: 60.8 % (ref 42.7–76)
NRBC BLD AUTO-RTO: 0 /100 WBC (ref 0–0.2)
PLATELET # BLD AUTO: 314 10*3/MM3 (ref 140–450)
PMV BLD AUTO: 10.3 FL (ref 6–12)
RBC # BLD AUTO: 3.94 10*6/MM3 (ref 3.77–5.28)
WBC NRBC COR # BLD AUTO: 4.21 10*3/MM3 (ref 3.4–10.8)

## 2024-04-23 PROCEDURE — 99214 OFFICE O/P EST MOD 30 MIN: CPT | Performed by: ORTHOPAEDIC SURGERY

## 2024-04-23 PROCEDURE — 85025 COMPLETE CBC W/AUTO DIFF WBC: CPT | Performed by: NURSE PRACTITIONER

## 2024-04-23 PROCEDURE — 1160F RVW MEDS BY RX/DR IN RCRD: CPT | Performed by: ORTHOPAEDIC SURGERY

## 2024-04-23 PROCEDURE — 83540 ASSAY OF IRON: CPT | Performed by: NURSE PRACTITIONER

## 2024-04-23 PROCEDURE — 80053 COMPREHEN METABOLIC PANEL: CPT | Performed by: NURSE PRACTITIONER

## 2024-04-23 PROCEDURE — 84466 ASSAY OF TRANSFERRIN: CPT | Performed by: NURSE PRACTITIONER

## 2024-04-23 PROCEDURE — 82728 ASSAY OF FERRITIN: CPT | Performed by: NURSE PRACTITIONER

## 2024-04-23 PROCEDURE — 36415 COLL VENOUS BLD VENIPUNCTURE: CPT | Performed by: NURSE PRACTITIONER

## 2024-04-23 PROCEDURE — 1159F MED LIST DOCD IN RCRD: CPT | Performed by: ORTHOPAEDIC SURGERY

## 2024-04-23 PROCEDURE — 20610 DRAIN/INJ JOINT/BURSA W/O US: CPT | Performed by: ORTHOPAEDIC SURGERY

## 2024-04-23 PROCEDURE — 82043 UR ALBUMIN QUANTITATIVE: CPT | Performed by: NURSE PRACTITIONER

## 2024-04-23 PROCEDURE — 82570 ASSAY OF URINE CREATININE: CPT | Performed by: NURSE PRACTITIONER

## 2024-04-23 RX ORDER — LIDOCAINE HYDROCHLORIDE 10 MG/ML
3 INJECTION, SOLUTION EPIDURAL; INFILTRATION; INTRACAUDAL; PERINEURAL
Status: COMPLETED | OUTPATIENT
Start: 2024-04-23 | End: 2024-04-23

## 2024-04-23 RX ORDER — HYDROCODONE BITARTRATE AND ACETAMINOPHEN 7.5; 325 MG/1; MG/1
1 TABLET ORAL EVERY 6 HOURS PRN
Qty: 28 TABLET | Refills: 0 | Status: SHIPPED | OUTPATIENT
Start: 2024-04-23

## 2024-04-23 RX ORDER — AMLODIPINE BESYLATE 5 MG/1
1 TABLET ORAL DAILY
COMMUNITY
End: 2024-04-23

## 2024-04-23 RX ORDER — BUPIVACAINE HYDROCHLORIDE 2.5 MG/ML
3 INJECTION, SOLUTION EPIDURAL; INFILTRATION; INTRACAUDAL
Status: COMPLETED | OUTPATIENT
Start: 2024-04-23 | End: 2024-04-23

## 2024-04-23 RX ORDER — TRIAMCINOLONE ACETONIDE 40 MG/ML
80 INJECTION, SUSPENSION INTRA-ARTICULAR; INTRAMUSCULAR
Status: COMPLETED | OUTPATIENT
Start: 2024-04-23 | End: 2024-04-23

## 2024-04-23 RX ORDER — CLONIDINE HYDROCHLORIDE 0.1 MG/1
TABLET ORAL
Qty: 60 TABLET | Refills: 0 | Status: SHIPPED | OUTPATIENT
Start: 2024-04-23

## 2024-04-23 RX ORDER — LURASIDONE HYDROCHLORIDE 60 MG/1
60 TABLET, FILM COATED ORAL DAILY
COMMUNITY
Start: 2024-04-18

## 2024-04-23 RX ORDER — FLUTICASONE PROPIONATE 50 MCG
1 SPRAY, SUSPENSION (ML) NASAL DAILY
COMMUNITY
Start: 2024-04-11

## 2024-04-23 RX ORDER — MONTELUKAST SODIUM 10 MG/1
10 TABLET ORAL NIGHTLY
Qty: 90 TABLET | Refills: 3 | Status: SHIPPED | OUTPATIENT
Start: 2024-04-23

## 2024-04-23 RX ORDER — OMEPRAZOLE 20 MG/1
CAPSULE, DELAYED RELEASE ORAL
COMMUNITY
Start: 2024-03-05

## 2024-04-23 RX ORDER — BUDESONIDE 0.5 MG/2ML
INHALANT ORAL AS NEEDED
COMMUNITY
End: 2024-04-23

## 2024-04-23 RX ADMIN — TRIAMCINOLONE ACETONIDE 80 MG: 40 INJECTION, SUSPENSION INTRA-ARTICULAR; INTRAMUSCULAR at 10:51

## 2024-04-23 RX ADMIN — LIDOCAINE HYDROCHLORIDE 3 ML: 10 INJECTION, SOLUTION EPIDURAL; INFILTRATION; INTRACAUDAL; PERINEURAL at 10:51

## 2024-04-23 RX ADMIN — BUPIVACAINE HYDROCHLORIDE 3 ML: 2.5 INJECTION, SOLUTION EPIDURAL; INFILTRATION; INTRACAUDAL at 10:51

## 2024-04-23 NOTE — PROGRESS NOTES
Procedure   - Large Joint Arthrocentesis: R knee on 4/23/2024 10:51 AM  Indications: pain  Details: 21 G needle, superolateral approach  Medications: 80 mg triamcinolone acetonide 40 MG/ML; 3 mL lidocaine PF 1% 1 %; 3 mL bupivacaine (PF) 0.25 %  Outcome: tolerated well, no immediate complications  Procedure, treatment alternatives, risks and benefits explained, specific risks discussed. Consent was given by the patient. Immediately prior to procedure a time out was called to verify the correct patient, procedure, equipment, support staff and site/side marked as required. Patient was prepped and draped in the usual sterile fashion.

## 2024-04-23 NOTE — PROGRESS NOTES
Orthopaedic Clinic Note: Knee Established Patient    Chief Complaint   Patient presents with    Follow-up     3 month f/u -- Primary osteoarthritis of right knee         HPI    It has been 3  month(s) since Ms. Ames's last visit. She returns to clinic today for follow-up right knee osteoarthritis.  Patient completed viscosupplementation injection series 3 months ago.  The injections provided about 2 months of relief.  Her pain has gradually returned.  She rates her pain a 3/10 on the pain scale today.  She is ambulating with assistance of a cane.  Denies fevers chills or constitutional symptoms.  Overall she is doing worse.    Past Medical History:   Diagnosis Date    COPD (chronic obstructive pulmonary disease)     Fibromyalgia     Hyperlipidemia     Malignant neoplasm     Migraine     Syncope     Thyroid nodule     Wears dentures     UPPER AND LOWER       Past Surgical History:   Procedure Laterality Date    APPENDECTOMY      BREAST BIOPSY Right     CERVICAL SPINE SURGERY      Fibromyalgia and DDD with h/o C spine surgery- initially on flexeril bid.    COLONOSCOPY  2015    NECK SURGERY      NOSE SURGERY      r/t Skin CA    SHOULDER SURGERY Left 09/10/2020    left shoulder arthroscopy with lysis of adhesions and manipulation under anesthesia    SKIN CANCER EXCISION      THYROID LOBECTOMY Right     On discussion, pt reported a h/o right thyroid lobectomy for goiter.U/S demo surgical absence of right lobe and diffuse nodularity of the left lobe with a dominant nodule in the lower pole of 1.8 x 3.5 cm.    TONSILLECTOMY      TOTAL HIP ARTHROPLASTY Left 1/7/2019    Procedure: TOTAL HIP ARTHROPLASTY LEFT;  Surgeon: Allen Madera MD;  Location: [facility];  Service: Orthopedics    TOTAL THYROIDECTOMY        Family History   Problem Relation Age of Onset    Other Mother         malignant neoplasm    Coronary artery disease Mother         MI (60's)    Heart attack Mother     Other Other         malignant neoplasm     Valvular heart disease Father     Birth defects Son     Breast cancer Daughter 40    Breast cancer Paternal Grandmother         DX AGE MID 80'S    Breast cancer Maternal Cousin         DX AGE 40'S    Ovarian cancer Neg Hx      Social History     Socioeconomic History    Marital status:    Tobacco Use    Smoking status: Former     Current packs/day: 0.00     Average packs/day: 1 pack/day for 40.0 years (40.0 ttl pk-yrs)     Types: Cigarettes     Start date:      Quit date:      Years since quittin.3    Smokeless tobacco: Never    Tobacco comments:     QUIT SMOKING WITH E C[employer]-1 WEEK AGO    Vaping Use    Vaping status: Former    Quit date: 2020    Substances: Nicotine, Flavoring    Devices: Refillable tank   Substance and Sexual Activity    Alcohol use: Yes     Alcohol/week: 1.0 standard drink of alcohol     Types: 1 Cans of beer per week     Comment: 1 drink per month    Drug use: No    Sexual activity: Yes     Partners: Male      Current Outpatient Medications on File Prior to Visit   Medication Sig Dispense Refill    amLODIPine (NORVASC) 5 MG tablet Take 1 tablet by mouth Daily.      aspirin 81 MG EC tablet Take 1 tablet by mouth Daily. 90 tablet 1    atorvastatin (Lipitor) 40 MG tablet Take 1 tablet by mouth Daily. 90 tablet 1    budesonide (PULMICORT) 0.5 MG/2ML nebulizer solution As Needed.      cloNIDine (CATAPRES) 0.1 MG tablet TAKE 1 TABLET TWICE DAILY AS NEEDED FOR BLOOD PRESSURE GREATER THAN 160/90 180 tablet 0    dicyclomine (BENTYL) 20 MG tablet Take 1/2-1 tablet by mouth 4 times daily (every 4-6 hours) as needed for diarrhea. 60 tablet 2    fluticasone (FLONASE) 50 MCG/ACT nasal spray 1 spray by Each Nare route Daily. Shake before using.      HYDROcodone-acetaminophen (NORCO) 7.5-325 MG per tablet Take 1 tablet by mouth Every 6 (Six) Hours As Needed for Moderate Pain. 28 tablet 0    levothyroxine (SYNTHROID, LEVOTHROID) 75 MCG tablet Take 1 tablet by mouth Every Morning. 90 tablet 0  "   losartan (COZAAR) 25 MG tablet Take 1 tablet by mouth Daily. 90 tablet 1    lurasidone HCl (LATUDA) 60 MG tablet tablet Take 1 tablet by mouth Daily. with food      montelukast (SINGULAIR) 10 MG tablet Take 1 tablet by mouth Every Night. 90 tablet 3    omeprazole (priLOSEC) 20 MG capsule       tiZANidine (ZANAFLEX) 4 MG tablet Take 1 tablet by mouth Every 8 (Eight) Hours As Needed for Muscle Spasms. 180 tablet 2    traZODone (DESYREL) 50 MG tablet TAKE 1 TO 2 TABLETS EVERY NIGHT 1 HOUR BEFORE BEDTIME AS NEEDED FOR SLEEP 45 tablet 5    vilazodone (VIIBRYD) 20 MG tablet tablet Take 1 tablet by mouth Daily.      ferrous sulfate 325 (65 FE) MG tablet Take 1 tablet by mouth Daily With Breakfast. Take with orange juice or vitamin C (Patient not taking: Reported on 4/23/2024) 90 tablet 1    [DISCONTINUED] Latuda 120 MG tablet tablet        No current facility-administered medications on file prior to visit.      No Known Allergies     Review of Systems   Constitutional: Negative.    HENT: Negative.     Eyes: Negative.    Respiratory: Negative.     Cardiovascular: Negative.    Gastrointestinal: Negative.    Endocrine: Negative.    Genitourinary: Negative.    Musculoskeletal:  Positive for arthralgias.   Skin: Negative.    Allergic/Immunologic: Negative.    Neurological: Negative.    Hematological: Negative.    Psychiatric/Behavioral: Negative.          The patient's Review of Systems was personally reviewed and confirmed as accurate.    Physical Exam  Blood pressure 132/74, height 169 cm (66.54\"), weight 56.9 kg (125 lb 6.4 oz), not currently breastfeeding.    Body mass index is 19.92 kg/m².    GENERAL APPEARANCE: awake, alert, oriented, in no acute distress and well developed, well nourished  LUNGS:  breathing nonlabored  EXTREMITIES: no clubbing, cyanosis  PERIPHERAL PULSES: palpable dorsalis pedis and posterior tibial pulses bilaterally.    GAIT:  Antalgic        ----------  Right Knee Exam:  ----------  ALIGNMENT: " moderate varus, correctible to neutral  ----------  RANGE OF MOTION:  Decreased (5 - 115 degrees) with no extensor lag  LIGAMENTOUS STABILITY:   stable to varus and valgus stress at terminal extension and 30 degrees; retensioning of the MCL is appreciated with valgus stress at 30 degrees consistent with medial compartment degeneration  ----------  STRENGTH:  KNEE FLEXION 4/5  KNEE EXTENSION  4/5  ANKLE DORSIFLEXION  5/5  ANKLE PLANTARFLEXION  5/5  ----------  PAIN WITH PALPATION:global  KNEE EFFUSION: yes, mild effusion  PAIN WITH KNEE ROM: yes  PATELLAR CREPITUS:  yes, painful and symptomatic  ----------  SENSATION TO LIGHT TOUCH:  DEEP PERONEAL/SUPERFICIAL PERONEAL/SURAL/SAPHENOUS/TIBIAL:    intact  ----------  EDEMA:  no  ERYTHEMA:    no  WOUNDS/INCISIONS:   no  _____________________________________________________________________  _____________________________________________________________________    RADIOGRAPHIC FINDINGS:   Indication: Right knee pain    Comparison: Todays xrays were compared to previous xrays from 10/17/2023    Knee films: moderate tricompartmental arthritis with genu varum alignment, periarticular osteophytes visualized in all compartments and No significant changes compared to prior radiographs.    Assessment/Plan:   Diagnosis Plan   1. Primary osteoarthritis of right knee  XR Knee 4+ View Right        The patient has failed conservative treatment measures and is a candidate for joint arthroplasty.  I discussed the joint arthroplasty surgical process as well as the recovery and rehabilitation time frame.  The patient asked several questions regarding the joint arthroplasty surgery, which were answered accordingly.  Ultimately, the patient declines surgical intervention at this time and wishes to continue with conservative treatment measures.  Alternative conservative treatment measures were discussed including bracing, therapy, topical/oral anti-inflammatories, activity modification, and  weight loss.  The patient considered these treatment options and wishes to proceed with corticosteroid injection(s) today.  Therefore we will proceed with corticosteroid injection(s) today.  Follow-up 3 months for repeat evaluation.    Procedure Note:  I discussed with the patient the potential benefits of performing a therapeutic injection of the right knee as well as potential risks including but not limited to infection, swelling, pain, bleeding, bruising, nerve/vessel damage, skin color changes, transient elevation in blood glucose levels, and fat atrophy. After informed consent and verifying correct patient, procedure site, and type of procedure, the area was prepped with alcohol, ethyl chloride was used to numb the skin. Via the superolateral approach, 3 cc of 1% lidocaine, 3 cc of 0.25% Marcaine and 2 cc of 40mg/ml of Kenalog were injected into the right knee. The patient tolerated the procedure well. There were no complications. A sterile dressing was placed over the injection site.        Allen Madera MD  04/23/24  10:54 EDT

## 2024-04-23 NOTE — TELEPHONE ENCOUNTER
Caller: Alisa Ames    Relationship: Self    Best call back number: [phone]    Requested Prescriptions:   Requested Prescriptions     Pending Prescriptions Disp Refills    montelukast (SINGULAIR) 10 MG tablet 90 tablet 3     Sig: Take 1 tablet by mouth Every Night.        Pharmacy where request should be sent:    [facility] [phone]  Last office visit with prescribing clinician: 4/23/2024   Last telemedicine visit with prescribing clinician: Visit date not found   Next office visit with prescribing clinician: 7/23/2024     Additional details provided by patient: PATIENT IS OUT OF REFILLS    Does the patient have less than a 3 day supply:  [] Yes  [] No    Would you like a call back once the refill request has been completed: [] Yes [x] No    If the office needs to give you a call back, can they leave a voicemail: [] Yes [x] No    Ahmet Clements Rep   04/23/24 14:14 EDT

## 2024-04-24 LAB
ALBUMIN SERPL-MCNC: 4.3 G/DL (ref 3.5–5.2)
ALBUMIN UR-MCNC: <1.2 MG/DL
ALBUMIN/GLOB SERPL: 1.5 G/DL
ALP SERPL-CCNC: 100 U/L (ref 39–117)
ALT SERPL W P-5'-P-CCNC: 15 U/L (ref 1–33)
ANION GAP SERPL CALCULATED.3IONS-SCNC: 12.7 MMOL/L (ref 5–15)
AST SERPL-CCNC: 18 U/L (ref 1–32)
BILIRUB SERPL-MCNC: 0.3 MG/DL (ref 0–1.2)
BUN SERPL-MCNC: 10 MG/DL (ref 8–23)
BUN/CREAT SERPL: 9.8 (ref 7–25)
CALCIUM SPEC-SCNC: 9.3 MG/DL (ref 8.6–10.5)
CHLORIDE SERPL-SCNC: 92 MMOL/L (ref 98–107)
CO2 SERPL-SCNC: 24.3 MMOL/L (ref 22–29)
CREAT SERPL-MCNC: 1.02 MG/DL (ref 0.57–1)
CREAT UR-MCNC: 44.1 MG/DL
EGFRCR SERPLBLD CKD-EPI 2021: 60.8 ML/MIN/1.73
FERRITIN SERPL-MCNC: 19.3 NG/ML (ref 13–150)
GLOBULIN UR ELPH-MCNC: 2.9 GM/DL
GLUCOSE SERPL-MCNC: 109 MG/DL (ref 65–99)
IRON 24H UR-MRATE: 253 MCG/DL (ref 37–145)
IRON SATN MFR SERPL: 60 % (ref 20–50)
MICROALBUMIN/CREAT UR: NORMAL MG/G{CREAT}
POTASSIUM SERPL-SCNC: 4.5 MMOL/L (ref 3.5–5.2)
PROT SERPL-MCNC: 7.2 G/DL (ref 6–8.5)
SODIUM SERPL-SCNC: 129 MMOL/L (ref 136–145)
TIBC SERPL-MCNC: 420 MCG/DL (ref 298–536)
TRANSFERRIN SERPL-MCNC: 282 MG/DL (ref 200–360)

## 2024-04-25 VITALS
HEIGHT: 67 IN | WEIGHT: 129 LBS | BODY MASS INDEX: 20.25 KG/M2 | HEART RATE: 83 BPM | OXYGEN SATURATION: 96 % | DIASTOLIC BLOOD PRESSURE: 78 MMHG | SYSTOLIC BLOOD PRESSURE: 130 MMHG | TEMPERATURE: 96.7 F

## 2024-04-25 DIAGNOSIS — G47.09 OTHER INSOMNIA: ICD-10-CM

## 2024-04-25 DIAGNOSIS — M50.30 DDD (DEGENERATIVE DISC DISEASE), CERVICAL: ICD-10-CM

## 2024-04-25 RX ORDER — TIZANIDINE 4 MG/1
4 TABLET ORAL EVERY 8 HOURS PRN
Qty: 180 TABLET | Refills: 2 | Status: SHIPPED | OUTPATIENT
Start: 2024-04-25

## 2024-04-25 RX ORDER — TRAZODONE HYDROCHLORIDE 50 MG/1
TABLET ORAL
Qty: 45 TABLET | Refills: 5 | Status: SHIPPED | OUTPATIENT
Start: 2024-04-25

## 2024-04-25 NOTE — TELEPHONE ENCOUNTER
Caller: Alisa Ames    Relationship: Self    Best call back number: [phone]     Requested Prescriptions:   Requested Prescriptions     Pending Prescriptions Disp Refills    traZODone (DESYREL) 50 MG tablet 45 tablet 5     Sig: TAKE 1 TO 2 TABLETS EVERY NIGHT 1 HOUR BEFORE BEDTIME AS NEEDED FOR SLEEP    tiZANidine (ZANAFLEX) 4 MG tablet 180 tablet 2     Sig: Take 1 tablet by mouth Every 8 (Eight) Hours As Needed for Muscle Spasms.        Pharmacy where request should be sent: [facility] [address] [phone] [facility] [phone]      Last office visit with prescribing clinician: 4/23/2024   Last telemedicine visit with prescribing clinician: Visit date not found   Next office visit with prescribing clinician: 7/23/2024     Additional details provided by patient:     Does the patient have less than a 3 day supply:  [] Yes  [x] No    Would you like a call back once the refill request has been completed: [] Yes [x] No    If the office needs to give you a call back, can they leave a voicemail: [] Yes [x] No    Ahmet Miller Rep   04/25/24 11:48 EDT

## 2024-05-02 LAB — DRUGS UR: NORMAL

## 2024-05-09 DIAGNOSIS — G47.09 OTHER INSOMNIA: ICD-10-CM

## 2024-05-09 DIAGNOSIS — M50.30 DDD (DEGENERATIVE DISC DISEASE), CERVICAL: ICD-10-CM

## 2024-05-09 DIAGNOSIS — E03.9 HYPOTHYROIDISM (ACQUIRED): ICD-10-CM

## 2024-05-09 RX ORDER — MONTELUKAST SODIUM 10 MG/1
10 TABLET ORAL NIGHTLY
Qty: 90 TABLET | Refills: 3 | Status: SHIPPED | OUTPATIENT
Start: 2024-05-09

## 2024-05-09 RX ORDER — TIZANIDINE 4 MG/1
4 TABLET ORAL EVERY 8 HOURS PRN
Qty: 180 TABLET | Refills: 2 | Status: SHIPPED | OUTPATIENT
Start: 2024-05-09

## 2024-05-09 RX ORDER — LEVOTHYROXINE SODIUM 0.07 MG/1
75 TABLET ORAL
Qty: 90 TABLET | Refills: 0 | Status: SHIPPED | OUTPATIENT
Start: 2024-05-09

## 2024-05-09 RX ORDER — ATORVASTATIN CALCIUM 40 MG/1
40 TABLET, FILM COATED ORAL DAILY
Qty: 90 TABLET | Refills: 1 | Status: SHIPPED | OUTPATIENT
Start: 2024-05-09

## 2024-05-10 RX ORDER — OMEPRAZOLE 20 MG/1
20 CAPSULE, DELAYED RELEASE ORAL DAILY
Qty: 30 CAPSULE | Refills: 2 | Status: SHIPPED | OUTPATIENT
Start: 2024-05-10

## 2024-05-10 RX ORDER — TRAZODONE HYDROCHLORIDE 50 MG/1
TABLET ORAL
Qty: 45 TABLET | Refills: 2 | Status: SHIPPED | OUTPATIENT
Start: 2024-05-10

## 2024-05-10 RX ORDER — LURASIDONE HYDROCHLORIDE 60 MG/1
60 TABLET, FILM COATED ORAL DAILY
Qty: 30 TABLET | Refills: 2 | Status: SHIPPED | OUTPATIENT
Start: 2024-05-10

## 2024-05-15 ENCOUNTER — TELEPHONE (OUTPATIENT)
Dept: INTERNAL MEDICINE | Facility: CLINIC | Age: 64
End: 2024-05-15

## 2024-05-15 NOTE — TELEPHONE ENCOUNTER
Caller: Alisa Ames    Relationship: Self    Best call back number: [phone]      What was the call regarding: PATIENT CALLED STATING THAT SHE RECEIVED AMLODIPINE AND LOSARTAN FROM [facility].  PATIENT ASKED FOR THESE TO BE TAKEN OFF HER MEDICATION LIST.  PATIENT STATED THAT SHE NO LONGER TAKES THESE MEDICATIONS.

## 2024-06-04 ENCOUNTER — HOSPITAL ENCOUNTER (OUTPATIENT)
Dept: MRI IMAGING | Facility: HOSPITAL | Age: 64
Discharge: HOME | End: 2024-06-04
Admitting: NURSE PRACTITIONER
Payer: MEDICARE

## 2024-06-04 DIAGNOSIS — M54.2 CERVICAL PAIN: ICD-10-CM

## 2024-06-04 DIAGNOSIS — Z98.1 HISTORY OF FUSION OF CERVICAL SPINE: ICD-10-CM

## 2024-06-04 DIAGNOSIS — M54.12 CERVICAL RADICULOPATHY: ICD-10-CM

## 2024-06-04 PROCEDURE — 72141 MRI NECK SPINE W/O DYE: CPT

## 2024-06-10 ENCOUNTER — TELEPHONE (OUTPATIENT)
Dept: INTERNAL MEDICINE | Facility: CLINIC | Age: 64
End: 2024-06-10
Payer: MEDICARE

## 2024-06-10 NOTE — TELEPHONE ENCOUNTER
Caller: Alisa Ames    Relationship: Self    Best call back number: [phone]     What is the best time to reach you: ANYTIME    Who are you requesting to speak with (clinical staff, provider,  specific staff member): PCP/MA    Do you know the name of the person who called: ALISA    What was the call regarding: PATIENT IS REQUESTING A CALLBACK REGARDING RESULTS OF HER MRI DONE ON 6/4/24 AND SHE ALSO WANTS A COPY MAILED TO HER AT     [address] [place] [address]       Is it okay if the provider responds through MyChart: CALLBACK

## 2024-06-12 ENCOUNTER — TELEPHONE (OUTPATIENT)
Dept: INTERNAL MEDICINE | Facility: CLINIC | Age: 64
End: 2024-06-12
Payer: MEDICARE

## 2024-06-12 DIAGNOSIS — G47.09 OTHER INSOMNIA: ICD-10-CM

## 2024-06-12 DIAGNOSIS — E03.9 HYPOTHYROIDISM (ACQUIRED): ICD-10-CM

## 2024-06-12 DIAGNOSIS — M50.30 DDD (DEGENERATIVE DISC DISEASE), CERVICAL: ICD-10-CM

## 2024-06-12 NOTE — TELEPHONE ENCOUNTER
Caller: Alisa Ames    Relationship: Self    Best call back number: [phone]    Requested Prescriptions:   Requested Prescriptions      No prescriptions requested or ordered in this encounter   **PT STATES ALL MEDS NEED TO BE SENT TO [facility]     Pharmacy where request should be sent: [facility] - [address] [phone] [facility] [phone] [facility]     Last office visit with prescribing clinician: 4/23/2024   Last telemedicine visit with prescribing clinician: Visit date not found   Next office visit with prescribing clinician: 7/23/2024     Additional details provided by patient: PT IS NOT OUT BUT WILL BE SOON. PLEASE CALL IN ALL ASAP     Does the patient have less than a 3 day supply:  [x] Yes  [] No    Would you like a call back once the refill request has been completed: [x] Yes [] No    If the office needs to give you a call back, can they leave a voicemail: [x] Yes [] No    Ahmet Gutierrez Rep   06/12/24 15:57 EDT

## 2024-06-13 RX ORDER — DICYCLOMINE HCL 20 MG
TABLET ORAL
Qty: 60 TABLET | Refills: 2 | Status: SHIPPED | OUTPATIENT
Start: 2024-06-13

## 2024-06-13 RX ORDER — TRAZODONE HYDROCHLORIDE 50 MG/1
TABLET ORAL
Qty: 45 TABLET | Refills: 2 | Status: SHIPPED | OUTPATIENT
Start: 2024-06-13

## 2024-06-13 RX ORDER — CLONIDINE HYDROCHLORIDE 0.1 MG/1
TABLET ORAL
Qty: 60 TABLET | Refills: 0 | Status: SHIPPED | OUTPATIENT
Start: 2024-06-13

## 2024-06-13 RX ORDER — TIZANIDINE 4 MG/1
4 TABLET ORAL EVERY 8 HOURS PRN
Qty: 180 TABLET | Refills: 2 | Status: SHIPPED | OUTPATIENT
Start: 2024-06-13

## 2024-06-13 RX ORDER — LURASIDONE HYDROCHLORIDE 60 MG/1
60 TABLET, FILM COATED ORAL DAILY
Qty: 30 TABLET | Refills: 2 | Status: SHIPPED | OUTPATIENT
Start: 2024-06-13

## 2024-06-13 RX ORDER — MONTELUKAST SODIUM 10 MG/1
10 TABLET ORAL NIGHTLY
Qty: 90 TABLET | Refills: 3 | Status: SHIPPED | OUTPATIENT
Start: 2024-06-13

## 2024-06-13 RX ORDER — ASPIRIN 81 MG/1
81 TABLET ORAL DAILY
Qty: 90 TABLET | Refills: 1 | Status: SHIPPED | OUTPATIENT
Start: 2024-06-13

## 2024-06-13 RX ORDER — FERROUS SULFATE 325(65) MG
325 TABLET ORAL
Qty: 90 TABLET | Refills: 1 | Status: SHIPPED | OUTPATIENT
Start: 2024-06-13

## 2024-06-13 RX ORDER — LEVOTHYROXINE SODIUM 0.07 MG/1
75 TABLET ORAL
Qty: 90 TABLET | Refills: 0 | Status: SHIPPED | OUTPATIENT
Start: 2024-06-13

## 2024-06-13 RX ORDER — ATORVASTATIN CALCIUM 40 MG/1
40 TABLET, FILM COATED ORAL DAILY
Qty: 90 TABLET | Refills: 1 | Status: SHIPPED | OUTPATIENT
Start: 2024-06-13

## 2024-06-13 RX ORDER — OMEPRAZOLE 20 MG/1
20 CAPSULE, DELAYED RELEASE ORAL DAILY
Qty: 30 CAPSULE | Refills: 2 | Status: SHIPPED | OUTPATIENT
Start: 2024-06-13

## 2024-06-16 DIAGNOSIS — Z98.1 HISTORY OF FUSION OF CERVICAL SPINE: ICD-10-CM

## 2024-06-16 DIAGNOSIS — M54.2 CERVICAL PAIN: ICD-10-CM

## 2024-06-16 DIAGNOSIS — M54.12 CERVICAL RADICULOPATHY: Primary | ICD-10-CM

## 2024-06-19 ENCOUNTER — TELEPHONE (OUTPATIENT)
Dept: INTERNAL MEDICINE | Facility: CLINIC | Age: 64
End: 2024-06-19

## 2024-06-19 NOTE — TELEPHONE ENCOUNTER
Caller: Alisa Ames    Relationship: Self    Best call back number:       [phone] (Mobile)     What form or medical record are you requesting:     PATIENT REQUESTED A COPY OF RECENT MRI RESULTS OF HER NECK TO BE MAILED TO HER HOME ADDRESS AS SOON AS POSSIBLE    [address] [place] [address]

## 2024-06-20 RX ORDER — CLONIDINE HYDROCHLORIDE 0.1 MG/1
TABLET ORAL
Qty: 180 TABLET | Refills: 0 | Status: SHIPPED | OUTPATIENT
Start: 2024-06-20

## 2024-07-01 ENCOUNTER — OFFICE VISIT (OUTPATIENT)
Dept: NEUROSURGERY | Facility: CLINIC | Age: 64
End: 2024-07-01
Payer: MEDICARE

## 2024-07-01 VITALS — TEMPERATURE: 96.8 F | BODY MASS INDEX: 21.31 KG/M2 | HEIGHT: 66 IN | WEIGHT: 132.6 LBS

## 2024-07-01 DIAGNOSIS — M19.91 PRIMARY OSTEOARTHRITIS, UNSPECIFIED SITE: ICD-10-CM

## 2024-07-01 DIAGNOSIS — M54.2 NECK PAIN ON RIGHT SIDE: Primary | ICD-10-CM

## 2024-07-01 PROCEDURE — 99203 OFFICE O/P NEW LOW 30 MIN: CPT | Performed by: NEUROLOGICAL SURGERY

## 2024-07-01 RX ORDER — TIZANIDINE 4 MG/1
4 TABLET ORAL NIGHTLY PRN
COMMUNITY

## 2024-07-01 NOTE — PROGRESS NOTES
DATE:      CONSULTANT:  Rajinder Butts Subjective     Chief Complaint: Shoulder and neck pain, history of prior ACDF    Patient ID: Alisa Ames is a 64 y.o. female seen for consultation today at the request of  Shanae Thompson*    Neck Pain       This is a 64-year-old woman who presents to my office with chief complaints of neck pain stemming from a fall that she experienced about 6 months ago.  She really has not tried much in the way of conservative treatment other than some chiropractic manipulation and massage.  Massage does help, but she reports that she underwent a chiropractic manipulation that did exacerbate her neck and head pain.  She did have occasional radiating pain into her right shoulder but this has resolved, and the majority of her symptoms at this point are axial neck pain eccentric to the right side.  She denies any bladder/bowel incontinence, numbness, tingling, weakness, gait instability, or bladder/bowel incontinence.    She does not have much in the way of medical comorbidities.  She denies alcohol or tobacco abuse.    She had a C4-6 anterior cervical discectomy with fusion at the [facility] in [place] about 10-15 years ago.  She does not remember the name of her surgeon.    The following portions of the patient's history were reviewed and updated as appropriate: allergies, current medications, past family history, past medical history, past social history, past surgical history and problem list.    Family history:   Family History   Problem Relation Age of Onset    Other Mother         malignant neoplasm    Coronary artery disease Mother         MI (60's)    Heart attack Mother     Other Other         malignant neoplasm    Valvular heart disease Father     Birth defects Son     Breast cancer Daughter 40    Breast cancer Paternal Grandmother         DX AGE MID 80'S    Breast cancer Maternal Cousin         DX AGE 40'S    Ovarian cancer Neg Hx        Social history:   Social History     Socioeconomic History     "Marital status:    Tobacco Use    Smoking status: Former     Current packs/day: 0.00     Average packs/day: 1 pack/day for 40.0 years (40.0 ttl pk-yrs)     Types: Cigarettes     Start date:      Quit date: 2017     Years since quittin.5    Smokeless tobacco: Never    Tobacco comments:     QUIT SMOKING WITH E C[employer]-1 WEEK AGO    Vaping Use    Vaping status: Former    Quit date: 2020    Substances: Nicotine, Flavoring    Devices: Refillable tank   Substance and Sexual Activity    Alcohol use: Yes     Alcohol/week: 1.0 standard drink of alcohol     Types: 1 Cans of beer per week     Comment: 1 drink per month    Drug use: No    Sexual activity: Yes     Partners: Male       Review of Systems   Musculoskeletal:  Positive for neck pain.       Objective   Temperature 96.8 °F (36 °C), temperature source Temporal, height 167.6 cm (66\"), weight 60.1 kg (132 lb 9.6 oz), not currently breastfeeding.  Body mass index is 21.4 kg/m².    Physical Exam  Vitals and nursing note reviewed.   Constitutional:       Appearance: She is well-developed. She is not toxic-appearing.   HENT:      Head: Normocephalic and atraumatic.      Right Ear: Hearing normal.      Left Ear: Hearing normal.      Nose: Nose normal.   Eyes:      General: Lids are normal.      Conjunctiva/sclera: Conjunctivae normal.      Pupils: Pupils are equal, round, and reactive to light.   Neck:      Vascular: No JVD.   Cardiovascular:      Rate and Rhythm: Normal rate and regular rhythm.      Pulses:           Radial pulses are 2+ on the right side and 2+ on the left side.   Pulmonary:      Effort: Pulmonary effort is normal. No respiratory distress.      Breath sounds: No stridor. No wheezing.   Musculoskeletal:      Cervical back: Normal range of motion.      Comments: Restricted range of motion in the cervical spine particularly with lateral bending and rotation to the right.  Lhermitte sign and Spurling sign are both absent.   Skin:     General: Skin " [phone].          _____________________               ____________________________________   DATE AND TIME                       MD JAKE Mendiola/MEDQ-#[id number]   DD:  07/23/2017 14:39:19      DT:  07/23/2017 16:33:10      cc:   Khushi Romo M.D.            [facility]      CONSULTATION                MAL HANNON#: [id number]   ROOM: SSU1 16   ACCT#: [id number]      is warm and dry.      Findings: No erythema or rash.   Neurological:      Mental Status: She is alert and oriented to person, place, and time.      GCS: GCS eye subscore is 4. GCS verbal subscore is 5. GCS motor subscore is 6.      Cranial Nerves: No cranial nerve deficit.      Motor: No abnormal muscle tone.      Deep Tendon Reflexes: Reflexes are normal and symmetric. Reflexes normal.      Reflex Scores:       Tricep reflexes are 1+ on the right side and 1+ on the left side.       Bicep reflexes are 1+ on the right side and 1+ on the left side.       Brachioradialis reflexes are 1+ on the right side and 1+ on the left side.     Comments: Muscle stretch reflexes are symmetric and somewhat hyporeactive.  Casual, toe raise, heel raise, and tandem gait testing are performed unremarkably.  Station is intact.   Psychiatric:         Behavior: Behavior normal.         Thought Content: Thought content normal.         Judgment: Judgment normal.         Assessment & Plan     Independent Review of Radiographic Studies:      Available for my review is a MRI of the cervical spine which was performed on June 4, 2024.  There is susceptibility artifact within the vertebral bodies of C4, C5, and C6.  There is evidence of prior anterior instrumented fusion from C4-6.  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  There are disc osteophyte complexes present at C3-4 and C6-7.  There is some E centricity with the disc bulges off to the right side at both levels although I do not appreciate any obvious/severe intraforaminal or lateral recess stenosis.  There is a CSF signal present circumferentially around the spinal cord at both levels.      Medical Decision Making:      This is a 64-year-old woman with axial neck pain and no clinical or radiographic signs/symptoms of cervical radiculopathy or cervical myelopathy.    I carefully reviewed the signs and symptoms of cervical radiculopathy and cervical myelopathy with her.  I directed her to  contact my office with new or worsening symptoms.  I think she is an excellent candidate for pain management and physical therapy.  I would be happy to follow-up with her if these treatment and allergies do not provide her with satisfactory pain relief, or if she begins to experience new neurological symptoms.  I think the risk of morbidity at this point without intervention is low.    Diagnoses and all orders for this visit:    1. Neck pain on right side (Primary)  -     Ambulatory Referral to Physical Therapy  -     Ambulatory Referral to Pain Management        No follow-ups on file.           This document signed by RADHA Gonzalez MD July 1, 2024 10:35 EDT

## 2024-07-01 NOTE — TELEPHONE ENCOUNTER
Caller: Alisa Ames    Relationship: Self    Best call back number: [phone]     Requested Prescriptions:   Requested Prescriptions     Pending Prescriptions Disp Refills    HYDROcodone-acetaminophen (NORCO) 7.5-325 MG per tablet 28 tablet 0     Sig: Take 1 tablet by mouth Every 6 (Six) Hours As Needed for Moderate Pain.        Pharmacy where request should be sent: [id number] [address] AT [facility] & [place] - [phone] [facility] [phone]      Last office visit with prescribing clinician: 4/23/2024   Last telemedicine visit with prescribing clinician: Visit date not found   Next office visit with prescribing clinician: 7/23/2024     Additional details provided by patient: OUT OF MEDICATION.    Does the patient have less than a 3 day supply:  [x] Yes  [] No    Would you like a call back once the refill request has been completed: [] Yes [] No    If the office needs to give you a call back, can they leave a voicemail: [] Yes [] No    Ahmet Louise Rep   07/01/24 15:45 EDT

## 2024-07-02 RX ORDER — HYDROCODONE BITARTRATE AND ACETAMINOPHEN 7.5; 325 MG/1; MG/1
1 TABLET ORAL EVERY 6 HOURS PRN
Qty: 28 TABLET | Refills: 0 | Status: SHIPPED | OUTPATIENT
Start: 2024-07-02

## 2024-07-09 ENCOUNTER — TELEPHONE (OUTPATIENT)
Dept: INTERNAL MEDICINE | Facility: CLINIC | Age: 64
End: 2024-07-09
Payer: MEDICARE

## 2024-07-09 DIAGNOSIS — Z12.31 ENCOUNTER FOR SCREENING MAMMOGRAM FOR MALIGNANT NEOPLASM OF BREAST: Primary | ICD-10-CM

## 2024-07-09 NOTE — TELEPHONE ENCOUNTER
Caller: Alisa Ames    Relationship: Self    Best call back number: [phone]     What is the medical concern/diagnosis: BREAST HEALTH    What specialty or service is being requested: ROUTINE MAMMOGRAM    What is the provider, practice or medical service name: [facility]    What is the office phone number: [phone]

## 2024-07-15 ENCOUNTER — TELEPHONE (OUTPATIENT)
Dept: INTERNAL MEDICINE | Facility: CLINIC | Age: 64
End: 2024-07-15
Payer: MEDICARE

## 2024-07-15 NOTE — TELEPHONE ENCOUNTER
Caller: Alisa Ames    Relationship: Self  [phone]   Best call back number:     What is the medical concern/diagnosis: MAMMOGRAM    What specialty or service is being requested: THE PATIENT WOULD LIKE TO GO TO [facility] INSTEAD OF [facility]. REFERRAL ON FILE    What is the provider, practice or medical service name:     What is the office location:     What is the office phone number:     Any additional details:PLEASE CALL THE PATIENT WHEN THIS HAS BEEN DONE.

## 2024-07-15 NOTE — TELEPHONE ENCOUNTER
Caller: Alisa Ames    Relationship: Self    Best call back number: [phone]     What is the medical concern/diagnosis: BRUISED RIBS    What specialty or service is being requested: PAIN MANAGEMENT    What is the provider, practice or medical service name: [facility] IN [place]    What is the office location: UNKNOWN    What is the office phone number: UNKNOWN    Any additional details: PLEASE CALL PATIENT WHEN THIS HAS BEEN DONE.

## 2024-07-22 ENCOUNTER — TELEPHONE (OUTPATIENT)
Dept: INTERNAL MEDICINE | Facility: CLINIC | Age: 64
End: 2024-07-22
Payer: MEDICARE

## 2024-07-22 NOTE — TELEPHONE ENCOUNTER
Caller: Alisa Ames    Relationship: Self    Best call back number: [phone]     What orders are you requesting (i.e. lab or imaging): LABS FOR THYROID CHECK     In what timeframe would the patient need to come in: TODAY JULY 22    Where will you receive your lab/imaging services: OFFICE     Additional notes: WANTS TO GET THIS DONE TODAY SO SHE DOESN'T HAVE TO FAST TOMORROW FOR APPOINTMENT. PLEASE CALL TO LET HER KNOW IF POSSIBLE

## 2024-07-23 ENCOUNTER — OFFICE VISIT (OUTPATIENT)
Dept: INTERNAL MEDICINE | Facility: CLINIC | Age: 64
End: 2024-07-23
Payer: MEDICARE

## 2024-07-23 ENCOUNTER — OFFICE VISIT (OUTPATIENT)
Dept: ORTHOPEDIC SURGERY | Facility: CLINIC | Age: 64
End: 2024-07-23
Payer: MEDICARE

## 2024-07-23 ENCOUNTER — LAB (OUTPATIENT)
Dept: INTERNAL MEDICINE | Facility: CLINIC | Age: 64
End: 2024-07-23
Payer: MEDICARE

## 2024-07-23 VITALS
HEIGHT: 65 IN | BODY MASS INDEX: 21.83 KG/M2 | DIASTOLIC BLOOD PRESSURE: 100 MMHG | WEIGHT: 131 LBS | SYSTOLIC BLOOD PRESSURE: 140 MMHG | HEART RATE: 86 BPM | TEMPERATURE: 97.5 F | OXYGEN SATURATION: 95 %

## 2024-07-23 VITALS
WEIGHT: 132.5 LBS | HEIGHT: 66 IN | DIASTOLIC BLOOD PRESSURE: 110 MMHG | BODY MASS INDEX: 21.29 KG/M2 | SYSTOLIC BLOOD PRESSURE: 200 MMHG

## 2024-07-23 DIAGNOSIS — E11.65 TYPE 2 DIABETES MELLITUS WITH HYPERGLYCEMIA, UNSPECIFIED WHETHER LONG TERM INSULIN USE: ICD-10-CM

## 2024-07-23 DIAGNOSIS — M17.11 PRIMARY OSTEOARTHRITIS OF RIGHT KNEE: Primary | ICD-10-CM

## 2024-07-23 DIAGNOSIS — Z12.31 ENCOUNTER FOR SCREENING MAMMOGRAM FOR MALIGNANT NEOPLASM OF BREAST: ICD-10-CM

## 2024-07-23 DIAGNOSIS — E03.9 HYPOTHYROIDISM (ACQUIRED): Primary | ICD-10-CM

## 2024-07-23 DIAGNOSIS — E03.9 HYPOTHYROIDISM (ACQUIRED): ICD-10-CM

## 2024-07-23 DIAGNOSIS — Z91.81 HISTORY OF RECENT FALL: ICD-10-CM

## 2024-07-23 DIAGNOSIS — D50.8 IRON DEFICIENCY ANEMIA SECONDARY TO INADEQUATE DIETARY IRON INTAKE: ICD-10-CM

## 2024-07-23 DIAGNOSIS — I10 PRIMARY HYPERTENSION: Primary | ICD-10-CM

## 2024-07-23 DIAGNOSIS — E78.00 PURE HYPERCHOLESTEROLEMIA: ICD-10-CM

## 2024-07-23 DIAGNOSIS — R07.81 RIB PAIN ON LEFT SIDE: ICD-10-CM

## 2024-07-23 LAB
EXPIRATION DATE: NORMAL
HBA1C MFR BLD: 5.5 % (ref 4.5–5.7)
Lab: NORMAL

## 2024-07-23 PROCEDURE — 1160F RVW MEDS BY RX/DR IN RCRD: CPT | Performed by: NURSE PRACTITIONER

## 2024-07-23 PROCEDURE — 1159F MED LIST DOCD IN RCRD: CPT | Performed by: NURSE PRACTITIONER

## 2024-07-23 PROCEDURE — 99214 OFFICE O/P EST MOD 30 MIN: CPT | Performed by: NURSE PRACTITIONER

## 2024-07-23 PROCEDURE — 3044F HG A1C LEVEL LT 7.0%: CPT | Performed by: NURSE PRACTITIONER

## 2024-07-23 PROCEDURE — 84443 ASSAY THYROID STIM HORMONE: CPT | Performed by: NURSE PRACTITIONER

## 2024-07-23 PROCEDURE — G2211 COMPLEX E/M VISIT ADD ON: HCPCS | Performed by: NURSE PRACTITIONER

## 2024-07-23 PROCEDURE — 80053 COMPREHEN METABOLIC PANEL: CPT | Performed by: NURSE PRACTITIONER

## 2024-07-23 PROCEDURE — 20610 DRAIN/INJ JOINT/BURSA W/O US: CPT | Performed by: ORTHOPAEDIC SURGERY

## 2024-07-23 PROCEDURE — 1159F MED LIST DOCD IN RCRD: CPT | Performed by: ORTHOPAEDIC SURGERY

## 2024-07-23 PROCEDURE — 1125F AMNT PAIN NOTED PAIN PRSNT: CPT | Performed by: NURSE PRACTITIONER

## 2024-07-23 PROCEDURE — 36415 COLL VENOUS BLD VENIPUNCTURE: CPT | Performed by: NURSE PRACTITIONER

## 2024-07-23 PROCEDURE — 85025 COMPLETE CBC W/AUTO DIFF WBC: CPT | Performed by: NURSE PRACTITIONER

## 2024-07-23 PROCEDURE — 1160F RVW MEDS BY RX/DR IN RCRD: CPT | Performed by: ORTHOPAEDIC SURGERY

## 2024-07-23 PROCEDURE — 99214 OFFICE O/P EST MOD 30 MIN: CPT | Performed by: ORTHOPAEDIC SURGERY

## 2024-07-23 PROCEDURE — 83036 HEMOGLOBIN GLYCOSYLATED A1C: CPT | Performed by: NURSE PRACTITIONER

## 2024-07-23 RX ORDER — HYDROCODONE BITARTRATE AND ACETAMINOPHEN 10; 325 MG/1; MG/1
1 TABLET ORAL EVERY 6 HOURS PRN
Qty: 28 TABLET | Refills: 0 | Status: SHIPPED | OUTPATIENT
Start: 2024-07-23

## 2024-07-23 RX ORDER — BUPIVACAINE HYDROCHLORIDE 2.5 MG/ML
3 INJECTION, SOLUTION EPIDURAL; INFILTRATION; INTRACAUDAL
Status: COMPLETED | OUTPATIENT
Start: 2024-07-23 | End: 2024-07-23

## 2024-07-23 RX ORDER — TRIAMCINOLONE ACETONIDE 40 MG/ML
80 INJECTION, SUSPENSION INTRA-ARTICULAR; INTRAMUSCULAR
Status: COMPLETED | OUTPATIENT
Start: 2024-07-23 | End: 2024-07-23

## 2024-07-23 RX ORDER — LIDOCAINE HYDROCHLORIDE 10 MG/ML
3 INJECTION, SOLUTION EPIDURAL; INFILTRATION; INTRACAUDAL; PERINEURAL
Status: COMPLETED | OUTPATIENT
Start: 2024-07-23 | End: 2024-07-23

## 2024-07-23 RX ADMIN — TRIAMCINOLONE ACETONIDE 80 MG: 40 INJECTION, SUSPENSION INTRA-ARTICULAR; INTRAMUSCULAR at 11:26

## 2024-07-23 RX ADMIN — LIDOCAINE HYDROCHLORIDE 3 ML: 10 INJECTION, SOLUTION EPIDURAL; INFILTRATION; INTRACAUDAL; PERINEURAL at 11:26

## 2024-07-23 RX ADMIN — BUPIVACAINE HYDROCHLORIDE 3 ML: 2.5 INJECTION, SOLUTION EPIDURAL; INFILTRATION; INTRACAUDAL at 11:26

## 2024-07-23 NOTE — PROGRESS NOTES
Subjective   Chief Complaint   Patient presents with    Primary hypertension     3 month follow-up        Alisa Ames is a 64 y.o. female.    History of Present Illness  The patient presents for evaluation of multiple medical concerns.    The patient recently experienced a fall, resulting in rib bruising. She sought medical attention from a cardiologist due to fluctuating blood pressure, which she attributes to pain. Today, during a visit to another physician, her blood pressure was recorded as 200/110, which has since decreased to 140/100. The day of her fall, her blood pressure was recorded as 64/50, which subsequently increased to 200/190. Her cardiologist, Dr. Rose, recommended increased hydration due to her inadequate hydration. If this proves ineffective, a new medication may be considered. She takes clonidine as needed for elevated blood pressure, but not daily. She monitors her blood pressure in the morning and evening. On one occasion, she took a muscle relaxer due to low blood pressure, which decreased her blood pressure within 15 minutes. She was previously on losartan 25 mg. She sought emergency care on 07/12/2023 and underwent laboratory tests. She contacted a pain management referral for her ribs approximately one week ago. The bruise on her back is healing well, with no visible blood.    Supplemental Information  She saw Dr. Gonzalez for pain management for her neck because she was having headaches for 2 months. She is in physical therapy now, but she has not been able to go for a couple of weeks since her fall. He wanted to do it in her neck, but she is leery about getting an injection in her neck, so she would like to go to pain management for her ribs. She was given 28 pills on 07/02/2023, but they did not help her. She was taking up Tylenol, but it did not even take the edge off. She does not need anything anymore for her neck. She finally got better after 2 months. She had a CT scan of her  "chest to make sure she did not have a fracture. When she turns on an angle to her right, she can feel the bottom rib sticking out. They could not see it in the MRI or CAT scan.    I have reviewed the following portions of the patient's history and confirmed they are accurate: allergies, current medications, past family history, past medical history, past social history, past surgical history, and problem list    Review of Systems  Pertinent items are noted in HPI.     Current Outpatient Medications on File Prior to Visit   Medication Sig    aspirin 81 MG EC tablet Take 1 tablet by mouth Daily.    atorvastatin (Lipitor) 40 MG tablet Take 1 tablet by mouth Daily.    cloNIDine (CATAPRES) 0.1 MG tablet TAKE 1 TABLET TWICE DAILY AS NEEDED FOR BLOOD PRESSURE GREATER THAN 160/90    dicyclomine (BENTYL) 20 MG tablet Take 1/2-1 tablet by mouth 4 times daily (every 4-6 hours) as needed for diarrhea.    ferrous sulfate 325 (65 FE) MG tablet Take 1 tablet by mouth Daily With Breakfast. Take with orange juice or vitamin C    lurasidone HCl (LATUDA) 60 MG tablet tablet Take 1 tablet by mouth Daily. with food (Patient taking differently: Take 40 mg by mouth Daily. with food)    tiZANidine (ZANAFLEX) 4 MG tablet Take 1 tablet by mouth At Night As Needed for Muscle Spasms.    vilazodone (VIIBRYD) 20 MG tablet tablet Take 1 tablet by mouth Daily.     No current facility-administered medications on file prior to visit.       Objective   Vitals:    07/23/24 1443   BP: 140/100   Pulse: 86   Temp: 97.5 °F (36.4 °C)   TempSrc: Temporal   SpO2: 95%   Weight: 59.4 kg (131 lb)   Height: 165.1 cm (65\")     Body mass index is 21.8 kg/m².    Physical Exam  Vitals reviewed.   Constitutional:       Appearance: Normal appearance. She is well-developed.   HENT:      Head: Normocephalic and atraumatic.      Nose: Nose normal.   Eyes:      General: Lids are normal.      Conjunctiva/sclera: Conjunctivae normal.      Pupils: Pupils are equal, round, " and reactive to light.   Neck:      Thyroid: No thyromegaly.      Trachea: Trachea normal.   Cardiovascular:      Rate and Rhythm: Normal rate and regular rhythm.      Heart sounds: Normal heart sounds.   Pulmonary:      Effort: Pulmonary effort is normal. No respiratory distress.      Breath sounds: Normal breath sounds.   Skin:     General: Skin is warm and dry.   Neurological:      Mental Status: She is alert and oriented to person, place, and time.      GCS: GCS eye subscore is 4. GCS verbal subscore is 5. GCS motor subscore is 6.   Psychiatric:         Attention and Perception: Attention normal.         Mood and Affect: Mood normal.         Speech: Speech normal.         Behavior: Behavior normal. Behavior is cooperative.         Thought Content: Thought content normal.         Results  Laboratory Studies  Blood sugar lab today is 5.5.    Imaging  CT scan of the chest showed no fracture.    Assessment & Plan   Problem List Items Addressed This Visit       Hypothyroidism (acquired)    Relevant Orders    TSH Rfx On Abnormal To Free T4 (Completed)    Pure hypercholesterolemia    Iron deficiency anemia secondary to inadequate dietary iron intake    Relevant Orders    CBC Auto Differential (Completed)    Comprehensive Metabolic Panel (Completed)     Other Visit Diagnoses       Primary hypertension    -  Primary    Type 2 diabetes mellitus with hyperglycemia, unspecified whether long term insulin use        Relevant Orders    POC Glycosylated Hemoglobin (Hb A1C) (Completed)    Encounter for screening mammogram for malignant neoplasm of breast        Relevant Orders    Mammo Screening Digital Tomosynthesis Bilateral With CAD    Rib pain on left side        Relevant Medications    HYDROcodone-acetaminophen (NORCO)  MG per tablet    Other Relevant Orders    XR Ribs 2 View Left    History of recent fall        Relevant Medications    HYDROcodone-acetaminophen (NORCO)  MG per tablet    Other Relevant Orders     XR Ribs 2 View Left               Current Outpatient Medications:     aspirin 81 MG EC tablet, Take 1 tablet by mouth Daily., Disp: 90 tablet, Rfl: 1    atorvastatin (Lipitor) 40 MG tablet, Take 1 tablet by mouth Daily., Disp: 90 tablet, Rfl: 1    cloNIDine (CATAPRES) 0.1 MG tablet, TAKE 1 TABLET TWICE DAILY AS NEEDED FOR BLOOD PRESSURE GREATER THAN 160/90, Disp: 180 tablet, Rfl: 0    dicyclomine (BENTYL) 20 MG tablet, Take 1/2-1 tablet by mouth 4 times daily (every 4-6 hours) as needed for diarrhea., Disp: 60 tablet, Rfl: 2    ferrous sulfate 325 (65 FE) MG tablet, Take 1 tablet by mouth Daily With Breakfast. Take with orange juice or vitamin C, Disp: 90 tablet, Rfl: 1    lurasidone HCl (LATUDA) 60 MG tablet tablet, Take 1 tablet by mouth Daily. with food (Patient taking differently: Take 40 mg by mouth Daily. with food), Disp: 30 tablet, Rfl: 2    tiZANidine (ZANAFLEX) 4 MG tablet, Take 1 tablet by mouth At Night As Needed for Muscle Spasms., Disp: , Rfl:     vilazodone (VIIBRYD) 20 MG tablet tablet, Take 1 tablet by mouth Daily., Disp: , Rfl:     fluticasone (FLONASE) 50 MCG/ACT nasal spray, 1 spray by Each Nare route Daily. Shake before using., Disp: 1 mL, Rfl: 2    HYDROcodone-acetaminophen (NORCO)  MG per tablet, Take 1 tablet by mouth Every 6 (Six) Hours As Needed for Moderate Pain., Disp: 28 tablet, Rfl: 0    levothyroxine (SYNTHROID, LEVOTHROID) 75 MCG tablet, Take 1 tablet by mouth Every Morning., Disp: 90 tablet, Rfl: 2    montelukast (SINGULAIR) 10 MG tablet, Take 1 tablet by mouth Every Night., Disp: 90 tablet, Rfl: 2    omeprazole (priLOSEC) 20 MG capsule, Take 1 capsule by mouth Daily., Disp: 30 capsule, Rfl: 2    tiZANidine (ZANAFLEX) 4 MG tablet, Take 1 tablet by mouth Every 8 (Eight) Hours As Needed for Muscle Spasms., Disp: 180 tablet, Rfl: 2    traZODone (DESYREL) 50 MG tablet, TAKE 1 TO 2 TABLETS EVERY NIGHT 1 HOUR BEFORE BEDTIME AS NEEDED FOR SLEEP, Disp: 45 tablet, Rfl: 2    Assessment &  Plan  Hypertension, chronic unstable  Continue clonoidine PRN for BP greater than 160/90. Not restarting daily BP meds due to instability of BP. The patient is advised to consult with cardiologist, Dr. Rose regarding her blood pressure.    Diabetes, chronic stable  Continue with diet and lifestyle management. Recheck A1c in 3 months.     Hypercholesterolemia, chronic stable  Continue atorvastatin. Follow heart healthy low cholesterol diet. She is not fasting today. Repeat fasting lipid panel at next follow up. Checking Cmp today.     Rib pain,  new unstable  An x-ray of the ribs has been ordered. A prescription for hydrocodone 10 mg has been issued. Patient will use this sparingly. Continue tylenol PRN for first line of pain. Faisal reviewed and appropriate. UDS and CSC up to date.     Anemia, chronic unstable  Continue iron supplement. Checking CBC and CMP. Follow high iron diet.            Plan of care reviewed with the patient at the conclusion of today's visit.  Education was provided regarding diagnosis, management, and any prescribed or recommended OTC medications.  Patient verbalized understanding of and agreement with management plan.     Return in about 3 months (around 10/23/2024), or if symptoms worsen or fail to improve, for Follow-up.      Transcribed from ambient dictation for GERSON Arellano by GERSON Arellano.  07/23/24   15:12 EDT    Patient or patient representative verbalized consent for the use of Ambient Listening during the visit with  GERSON Arellano for chart documentation. 7/31/2024  01:51 EDT

## 2024-07-23 NOTE — PROGRESS NOTES
Procedure   - Large Joint Arthrocentesis: R knee on 7/23/2024 11:26 AM  Indications: pain  Details: 21 G needle, superolateral approach  Medications: 3 mL bupivacaine (PF) 0.25 %; 3 mL lidocaine PF 1% 1 %; 80 mg triamcinolone acetonide 40 MG/ML  Outcome: tolerated well, no immediate complications  Procedure, treatment alternatives, risks and benefits explained, specific risks discussed. Consent was given by the patient. Immediately prior to procedure a time out was called to verify the correct patient, procedure, equipment, support staff and site/side marked as required. Patient was prepped and draped in the usual sterile fashion.

## 2024-07-23 NOTE — PROGRESS NOTES
Orthopaedic Clinic Note: Knee Established Patient    Chief Complaint   Patient presents with    Follow-up     3 month f/u; Primary osteoarthritis of right knee        HPI    It has been 3  month(s) since Ms. Ames's last visit. She returns to clinic today for follow-up right knee osteoarthritis.  Patient underwent cortisone injection in the right knee 3 months ago.  The injection worked well for for about 2 months.  Pain has gradually returned.  She rates her pain a 6/10 on the pain scale.  She is having recurrent swelling and stiffness in the knees and difficulty walking weightbearing activities.  Overall she is doing worse since the injection wore off.    Past Medical History:   Diagnosis Date    COPD (chronic obstructive pulmonary disease)     Fibromyalgia     Hyperlipidemia     Malignant neoplasm     Migraine     Syncope     Thyroid nodule     Wears dentures     UPPER AND LOWER       Past Surgical History:   Procedure Laterality Date    APPENDECTOMY      BREAST BIOPSY Right     CERVICAL SPINE SURGERY      Fibromyalgia and DDD with h/o C spine surgery- initially on flexeril bid.    COLONOSCOPY  2015    NECK SURGERY      NOSE SURGERY      r/t Skin CA    SHOULDER SURGERY Left 09/10/2020    left shoulder arthroscopy with lysis of adhesions and manipulation under anesthesia    SKIN CANCER EXCISION      THYROID LOBECTOMY Right     On discussion, pt reported a h/o right thyroid lobectomy for goiter.U/S demo surgical absence of right lobe and diffuse nodularity of the left lobe with a dominant nodule in the lower pole of 1.8 x 3.5 cm.    TONSILLECTOMY      TOTAL HIP ARTHROPLASTY Left 1/7/2019    Procedure: TOTAL HIP ARTHROPLASTY LEFT;  Surgeon: Allen Madera MD;  Location: [facility];  Service: Orthopedics    TOTAL THYROIDECTOMY        Family History   Problem Relation Age of Onset    Other Mother         malignant neoplasm    Coronary artery disease Mother         MI (60's)    Heart attack Mother     Other Other          malignant neoplasm    Valvular heart disease Father     Birth defects Son     Breast cancer Daughter 40    Breast cancer Paternal Grandmother         DX AGE MID 80'S    Breast cancer Maternal Cousin         DX AGE 40'S    Ovarian cancer Neg Hx      Social History     Socioeconomic History    Marital status:    Tobacco Use    Smoking status: Former     Current packs/day: 0.00     Average packs/day: 1 pack/day for 40.0 years (40.0 ttl pk-yrs)     Types: Cigarettes     Start date:      Quit date:      Years since quittin.5    Smokeless tobacco: Never    Tobacco comments:     QUIT SMOKING WITH E C[employer]-1 WEEK AGO    Vaping Use    Vaping status: Former    Quit date: 2020    Substances: Nicotine, Flavoring    Devices: Refillable tank   Substance and Sexual Activity    Alcohol use: Yes     Alcohol/week: 1.0 standard drink of alcohol     Types: 1 Cans of beer per week     Comment: 1 drink per month    Drug use: No    Sexual activity: Yes     Partners: Male      Current Outpatient Medications on File Prior to Visit   Medication Sig Dispense Refill    aspirin 81 MG EC tablet Take 1 tablet by mouth Daily. 90 tablet 1    atorvastatin (Lipitor) 40 MG tablet Take 1 tablet by mouth Daily. 90 tablet 1    cloNIDine (CATAPRES) 0.1 MG tablet TAKE 1 TABLET TWICE DAILY AS NEEDED FOR BLOOD PRESSURE GREATER THAN 160/90 180 tablet 0    dicyclomine (BENTYL) 20 MG tablet Take 1/2-1 tablet by mouth 4 times daily (every 4-6 hours) as needed for diarrhea. 60 tablet 2    ferrous sulfate 325 (65 FE) MG tablet Take 1 tablet by mouth Daily With Breakfast. Take with orange juice or vitamin C 90 tablet 1    fluticasone (FLONASE) 50 MCG/ACT nasal spray 1 spray by Each Nare route Daily. Shake before using.      HYDROcodone-acetaminophen (NORCO) 7.5-325 MG per tablet Take 1 tablet by mouth Every 6 (Six) Hours As Needed for Moderate Pain. 28 tablet 0    levothyroxine (SYNTHROID, LEVOTHROID) 75 MCG tablet Take 1 tablet by mouth  "Every Morning. 90 tablet 0    lurasidone HCl (LATUDA) 60 MG tablet tablet Take 1 tablet by mouth Daily. with food (Patient taking differently: Take 40 mg by mouth Daily. with food) 30 tablet 2    montelukast (SINGULAIR) 10 MG tablet Take 1 tablet by mouth Every Night. 90 tablet 3    omeprazole (priLOSEC) 20 MG capsule Take 1 capsule by mouth Daily. 30 capsule 2    tiZANidine (ZANAFLEX) 4 MG tablet Take 1 tablet by mouth Every 8 (Eight) Hours As Needed for Muscle Spasms. 180 tablet 2    tiZANidine (ZANAFLEX) 4 MG tablet Take 1 tablet by mouth At Night As Needed for Muscle Spasms.      traZODone (DESYREL) 50 MG tablet TAKE 1 TO 2 TABLETS EVERY NIGHT 1 HOUR BEFORE BEDTIME AS NEEDED FOR SLEEP 45 tablet 2    vilazodone (VIIBRYD) 20 MG tablet tablet Take 1 tablet by mouth Daily.       No current facility-administered medications on file prior to visit.      No Known Allergies     Review of Systems   Constitutional: Negative.    HENT: Negative.     Eyes: Negative.    Respiratory: Negative.     Cardiovascular: Negative.    Gastrointestinal: Negative.    Endocrine: Negative.    Genitourinary: Negative.    Musculoskeletal:  Positive for arthralgias.   Skin: Negative.    Allergic/Immunologic: Negative.    Neurological: Negative.    Hematological: Negative.    Psychiatric/Behavioral: Negative.          The patient's Review of Systems was personally reviewed and confirmed as accurate.    Physical Exam  Blood pressure (!) 200/110, height 167.6 cm (65.98\"), weight 60.1 kg (132 lb 7.9 oz), not currently breastfeeding.    Body mass index is 21.4 kg/m².    GENERAL APPEARANCE: awake, alert, oriented, in no acute distress and well developed, well nourished  LUNGS:  breathing nonlabored  EXTREMITIES: no clubbing, cyanosis  PERIPHERAL PULSES: palpable dorsalis pedis and posterior tibial pulses bilaterally.    GAIT:  Antalgic        ----------  Right Knee Exam:  ----------  ALIGNMENT: moderate varus, correctible to " neutral  ----------  RANGE OF MOTION:  Decreased (5 - 115 degrees) with no extensor lag  LIGAMENTOUS STABILITY:   stable to varus and valgus stress at terminal extension and 30 degrees; retensioning of the MCL is appreciated with valgus stress at 30 degrees consistent with medial compartment degeneration  ----------  STRENGTH:  KNEE FLEXION 4/5  KNEE EXTENSION  4/5  ANKLE DORSIFLEXION  5/5  ANKLE PLANTARFLEXION  5/5  ----------  PAIN WITH PALPATION:global, especially medial joint  KNEE EFFUSION: yes, mild effusion  PAIN WITH KNEE ROM: yes  PATELLAR CREPITUS:  yes, painful and symptomatic  ----------  SENSATION TO LIGHT TOUCH:  DEEP PERONEAL/SUPERFICIAL PERONEAL/SURAL/SAPHENOUS/TIBIAL:    intact  ----------  EDEMA:  no  ERYTHEMA:    no  WOUNDS/INCISIONS:   no    _____________________________________________________________________  _____________________________________________________________________    RADIOGRAPHIC FINDINGS:   No new imaging today    Assessment/Plan:   Diagnosis Plan   1. Primary osteoarthritis of right knee          The patient has failed conservative treatment measures and is a candidate for joint arthroplasty.  I discussed the joint arthroplasty surgical process as well as the recovery and rehabilitation time frame.  The patient asked several questions regarding the joint arthroplasty surgery, which were answered accordingly.  Ultimately, the patient declines surgical intervention at this time and wishes to continue with conservative treatment measures.  Alternative conservative treatment measures were discussed including bracing, therapy, topical/oral anti-inflammatories, activity modification, and weight loss.  The patient considered these treatment options and wishes to proceed with corticosteroid injection(s) today.  Therefore we will proceed with corticosteroid injection(s) today.  Follow-up 3 months for repeat evaluation with x-ray 4 views right knee on return.    Procedure Note:  I  discussed with the patient the potential benefits of performing a therapeutic injection of the right knee as well as potential risks including but not limited to infection, swelling, pain, bleeding, bruising, nerve/vessel damage, skin color changes, transient elevation in blood glucose levels, and fat atrophy. After informed consent and verifying correct patient, procedure site, and type of procedure, the area was prepped with alcohol, ethyl chloride was used to numb the skin. Via the superolateral approach, 3 cc of 1% lidocaine, 3 cc of 0.25% Marcaine and 2 cc of 40mg/ml of Kenalog were injected into the right knee. The patient tolerated the procedure well. There were no complications. A sterile dressing was placed over the injection site.        Allen Madera MD  07/23/24  11:27 EDT

## 2024-07-24 LAB
ALBUMIN SERPL-MCNC: 4.5 G/DL (ref 3.5–5.2)
ALBUMIN/GLOB SERPL: 1.5 G/DL
ALP SERPL-CCNC: 128 U/L (ref 39–117)
ALT SERPL W P-5'-P-CCNC: 15 U/L (ref 1–33)
ANION GAP SERPL CALCULATED.3IONS-SCNC: 14 MMOL/L (ref 5–15)
AST SERPL-CCNC: 21 U/L (ref 1–32)
BASOPHILS # BLD AUTO: 0.03 10*3/MM3 (ref 0–0.2)
BASOPHILS NFR BLD AUTO: 0.5 % (ref 0–1.5)
BILIRUB SERPL-MCNC: 0.3 MG/DL (ref 0–1.2)
BUN SERPL-MCNC: 8 MG/DL (ref 8–23)
BUN/CREAT SERPL: 8.2 (ref 7–25)
CALCIUM SPEC-SCNC: 9.5 MG/DL (ref 8.6–10.5)
CHLORIDE SERPL-SCNC: 96 MMOL/L (ref 98–107)
CO2 SERPL-SCNC: 23 MMOL/L (ref 22–29)
CREAT SERPL-MCNC: 0.97 MG/DL (ref 0.57–1)
DEPRECATED RDW RBC AUTO: 41.5 FL (ref 37–54)
EGFRCR SERPLBLD CKD-EPI 2021: 64.6 ML/MIN/1.73
EOSINOPHIL # BLD AUTO: 0 10*3/MM3 (ref 0–0.4)
EOSINOPHIL NFR BLD AUTO: 0 % (ref 0.3–6.2)
ERYTHROCYTE [DISTWIDTH] IN BLOOD BY AUTOMATED COUNT: 11.9 % (ref 12.3–15.4)
GLOBULIN UR ELPH-MCNC: 3 GM/DL
GLUCOSE SERPL-MCNC: 133 MG/DL (ref 65–99)
HCT VFR BLD AUTO: 39.4 % (ref 34–46.6)
HGB BLD-MCNC: 13.6 G/DL (ref 12–15.9)
IMM GRANULOCYTES # BLD AUTO: 0.01 10*3/MM3 (ref 0–0.05)
IMM GRANULOCYTES NFR BLD AUTO: 0.2 % (ref 0–0.5)
LYMPHOCYTES # BLD AUTO: 0.57 10*3/MM3 (ref 0.7–3.1)
LYMPHOCYTES NFR BLD AUTO: 10.1 % (ref 19.6–45.3)
MCH RBC QN AUTO: 33.3 PG (ref 26.6–33)
MCHC RBC AUTO-ENTMCNC: 34.5 G/DL (ref 31.5–35.7)
MCV RBC AUTO: 96.3 FL (ref 79–97)
MONOCYTES # BLD AUTO: 0.09 10*3/MM3 (ref 0.1–0.9)
MONOCYTES NFR BLD AUTO: 1.6 % (ref 5–12)
NEUTROPHILS NFR BLD AUTO: 4.93 10*3/MM3 (ref 1.7–7)
NEUTROPHILS NFR BLD AUTO: 87.6 % (ref 42.7–76)
NRBC BLD AUTO-RTO: 0 /100 WBC (ref 0–0.2)
PLATELET # BLD AUTO: 318 10*3/MM3 (ref 140–450)
PMV BLD AUTO: 10.2 FL (ref 6–12)
POTASSIUM SERPL-SCNC: 4.1 MMOL/L (ref 3.5–5.2)
PROT SERPL-MCNC: 7.5 G/DL (ref 6–8.5)
RBC # BLD AUTO: 4.09 10*6/MM3 (ref 3.77–5.28)
SODIUM SERPL-SCNC: 133 MMOL/L (ref 136–145)
TSH SERPL DL<=0.05 MIU/L-ACNC: 0.78 UIU/ML (ref 0.27–4.2)
WBC NRBC COR # BLD AUTO: 5.63 10*3/MM3 (ref 3.4–10.8)

## 2024-07-25 ENCOUNTER — TELEPHONE (OUTPATIENT)
Dept: INTERNAL MEDICINE | Facility: CLINIC | Age: 64
End: 2024-07-25

## 2024-07-25 NOTE — TELEPHONE ENCOUNTER
PATIENT JUST CALLED TO UPDATE HER PHARMACY TO [facility] IN [place]. HUB ADDED IT TO HER LIST

## 2024-07-26 DIAGNOSIS — G47.09 OTHER INSOMNIA: ICD-10-CM

## 2024-07-26 DIAGNOSIS — M50.30 DDD (DEGENERATIVE DISC DISEASE), CERVICAL: ICD-10-CM

## 2024-07-26 DIAGNOSIS — E03.9 HYPOTHYROIDISM (ACQUIRED): ICD-10-CM

## 2024-07-26 RX ORDER — TIZANIDINE 4 MG/1
4 TABLET ORAL EVERY 8 HOURS PRN
Qty: 180 TABLET | Refills: 2 | Status: SHIPPED | OUTPATIENT
Start: 2024-07-26

## 2024-07-26 RX ORDER — TRAZODONE HYDROCHLORIDE 50 MG/1
TABLET ORAL
Qty: 45 TABLET | Refills: 2 | Status: SHIPPED | OUTPATIENT
Start: 2024-07-26

## 2024-07-26 RX ORDER — MONTELUKAST SODIUM 10 MG/1
10 TABLET ORAL NIGHTLY
Qty: 90 TABLET | Refills: 2 | Status: SHIPPED | OUTPATIENT
Start: 2024-07-26

## 2024-07-26 RX ORDER — OMEPRAZOLE 20 MG/1
20 CAPSULE, DELAYED RELEASE ORAL DAILY
Qty: 30 CAPSULE | Refills: 2 | Status: SHIPPED | OUTPATIENT
Start: 2024-07-26

## 2024-07-26 RX ORDER — FLUTICASONE PROPIONATE 50 MCG
1 SPRAY, SUSPENSION (ML) NASAL DAILY
Qty: 1 ML | Refills: 2 | Status: SHIPPED | OUTPATIENT
Start: 2024-07-26

## 2024-07-26 RX ORDER — LEVOTHYROXINE SODIUM 0.07 MG/1
75 TABLET ORAL
Qty: 90 TABLET | Refills: 2 | Status: SHIPPED | OUTPATIENT
Start: 2024-07-26

## 2024-07-26 NOTE — TELEPHONE ENCOUNTER
Caller: Alisa Ames SIDNEY    Relationship: Self    Best call back number: [phone]     Requested Prescriptions:   Requested Prescriptions     Pending Prescriptions Disp Refills    fluticasone (FLONASE) 50 MCG/ACT nasal spray       Si spray by Each Nare route Daily. Shake before using.    levothyroxine (SYNTHROID, LEVOTHROID) 75 MCG tablet 90 tablet 0     Sig: Take 1 tablet by mouth Every Morning.    montelukast (SINGULAIR) 10 MG tablet 90 tablet 3     Sig: Take 1 tablet by mouth Every Night.    omeprazole (priLOSEC) 20 MG capsule 30 capsule 2     Sig: Take 1 capsule by mouth Daily.    tiZANidine (ZANAFLEX) 4 MG tablet 180 tablet 2     Sig: Take 1 tablet by mouth Every 8 (Eight) Hours As Needed for Muscle Spasms.    traZODone (DESYREL) 50 MG tablet 45 tablet 2     Sig: TAKE 1 TO 2 TABLETS EVERY NIGHT 1 HOUR BEFORE BEDTIME AS NEEDED FOR SLEEP        Pharmacy where request should be sent: [facility] [address] [phone] [facility] [phone] [facility]     Last office visit with prescribing clinician: 2024   Last telemedicine visit with prescribing clinician: Visit date not found   Next office visit with prescribing clinician: 2024     Additional details provided by patient:     Does the patient have less than a 3 day supply:  [] Yes  [x] No    Would you like a call back once the refill request has been completed: [] Yes [x] No    If the office needs to give you a call back, can they leave a voicemail: [] Yes [x] No    Tracy Starr MA   24 16:14 EDT

## 2024-07-30 ENCOUNTER — TELEPHONE (OUTPATIENT)
Dept: INTERNAL MEDICINE | Facility: CLINIC | Age: 64
End: 2024-07-30
Payer: MEDICARE

## 2024-07-30 NOTE — TELEPHONE ENCOUNTER
WE DID NOT ORDER THE REFERRAL, ;'S OFFICE DID, BUT I PRINTED THE REFERRAL AND RELEVANT NOTES AND FAXED THEM [phone]. TALHA

## 2024-07-30 NOTE — TELEPHONE ENCOUNTER
Caller: Alisa Ames    Relationship: Self    Best call back number: [phone]     Caller requesting test results: SELF    What test was performed: LABS    When was the test performed: 7/23/24    Where was the test performed: OFFICE    Additional notes: REQUESTING LAB RESULTS

## 2024-08-29 ENCOUNTER — OFFICE VISIT (OUTPATIENT)
Dept: INTERNAL MEDICINE | Facility: CLINIC | Age: 64
End: 2024-08-29
Payer: MEDICARE

## 2024-08-29 VITALS
OXYGEN SATURATION: 97 % | HEART RATE: 62 BPM | DIASTOLIC BLOOD PRESSURE: 84 MMHG | HEIGHT: 65 IN | SYSTOLIC BLOOD PRESSURE: 136 MMHG | TEMPERATURE: 97.6 F | WEIGHT: 129.8 LBS | BODY MASS INDEX: 21.63 KG/M2

## 2024-08-29 DIAGNOSIS — R10.13 EPIGASTRIC ABDOMINAL PAIN: ICD-10-CM

## 2024-08-29 DIAGNOSIS — K21.9 GASTROESOPHAGEAL REFLUX DISEASE WITHOUT ESOPHAGITIS: Primary | ICD-10-CM

## 2024-08-29 DIAGNOSIS — Z12.31 ENCOUNTER FOR SCREENING MAMMOGRAM FOR MALIGNANT NEOPLASM OF BREAST: ICD-10-CM

## 2024-08-29 PROCEDURE — 1125F AMNT PAIN NOTED PAIN PRSNT: CPT | Performed by: NURSE PRACTITIONER

## 2024-08-29 PROCEDURE — 99214 OFFICE O/P EST MOD 30 MIN: CPT | Performed by: NURSE PRACTITIONER

## 2024-08-29 PROCEDURE — 1159F MED LIST DOCD IN RCRD: CPT | Performed by: NURSE PRACTITIONER

## 2024-08-29 PROCEDURE — 3044F HG A1C LEVEL LT 7.0%: CPT | Performed by: NURSE PRACTITIONER

## 2024-08-29 PROCEDURE — 1160F RVW MEDS BY RX/DR IN RCRD: CPT | Performed by: NURSE PRACTITIONER

## 2024-08-29 RX ORDER — OMEPRAZOLE 40 MG/1
40 CAPSULE, DELAYED RELEASE ORAL DAILY
Qty: 90 CAPSULE | Refills: 1 | Status: SHIPPED | OUTPATIENT
Start: 2024-08-29

## 2024-08-29 NOTE — PROGRESS NOTES
Subjective   Chief Complaint   Patient presents with    Hospital Follow Up Visit        Alisa Ames is a 64 y.o. female.    History of Present Illness  The patient presents for evaluation of multiple medical concerns.    She experienced chest pain for a day and a half, which subsided but was followed by nausea. She sought medical attention at the hospital where she was diagnosed with either acid reflux or a hernia. She has been on omeprazole and Pepcid twice daily, which initially provided relief but is no longer effective. She also reports feeling pressure when bending over, a symptom she has been experiencing for some time.    She needs a referral for a mammogram. She was referred to [facility] a month ago but could not make an appointment because he bruised her ribs in a fall. She had to wait a while due to the bruised ribs. She has an appointment with Dr. Madera on 10/29/2024 in the morning.    I have reviewed the following portions of the patient's history and confirmed they are accurate: allergies, current medications, past family history, past medical history, past social history, past surgical history, and problem list    Review of Systems  Pertinent items are noted in HPI.     Current Outpatient Medications on File Prior to Visit   Medication Sig    aspirin 81 MG EC tablet Take 1 tablet by mouth Daily.    atorvastatin (Lipitor) 40 MG tablet Take 1 tablet by mouth Daily.    cloNIDine (CATAPRES) 0.1 MG tablet TAKE 1 TABLET TWICE DAILY AS NEEDED FOR BLOOD PRESSURE GREATER THAN 160/90    ferrous sulfate 325 (65 FE) MG tablet Take 1 tablet by mouth Daily With Breakfast. Take with orange juice or vitamin C    fluticasone (FLONASE) 50 MCG/ACT nasal spray 1 spray by Each Nare route Daily. Shake before using.    levothyroxine (SYNTHROID, LEVOTHROID) 75 MCG tablet Take 1 tablet by mouth Every Morning.    lurasidone HCl (LATUDA) 60 MG tablet tablet Take 1 tablet by mouth Daily. with food (Patient taking  "differently: Take 40 mg by mouth Daily. with food)    montelukast (SINGULAIR) 10 MG tablet Take 1 tablet by mouth Every Night.    tiZANidine (ZANAFLEX) 4 MG tablet Take 1 tablet by mouth Every 8 (Eight) Hours As Needed for Muscle Spasms.    traZODone (DESYREL) 50 MG tablet TAKE 1 TO 2 TABLETS EVERY NIGHT 1 HOUR BEFORE BEDTIME AS NEEDED FOR SLEEP    vilazodone (VIIBRYD) 20 MG tablet tablet Take 1 tablet by mouth Daily.    HYDROcodone-acetaminophen (NORCO)  MG per tablet Take 1 tablet by mouth Every 6 (Six) Hours As Needed for Moderate Pain. (Patient not taking: Reported on 8/29/2024)    tiZANidine (ZANAFLEX) 4 MG tablet Take 1 tablet by mouth At Night As Needed for Muscle Spasms. (Patient not taking: Reported on 8/29/2024)     No current facility-administered medications on file prior to visit.       Objective   Vitals:    08/29/24 1524   BP: 136/84   BP Location: Left arm   Patient Position: Sitting   Cuff Size: Small Adult   Pulse: 62   Temp: 97.6 °F (36.4 °C)   SpO2: 97%   Weight: 58.9 kg (129 lb 12.8 oz)   Height: 165.1 cm (65\")     Body mass index is 21.6 kg/m².    Physical Exam  Vitals reviewed.   Constitutional:       Appearance: Normal appearance. She is well-developed.   HENT:      Head: Normocephalic and atraumatic.      Nose: Nose normal.   Eyes:      General: Lids are normal.      Conjunctiva/sclera: Conjunctivae normal.      Pupils: Pupils are equal, round, and reactive to light.   Neck:      Thyroid: No thyromegaly.      Trachea: Trachea normal.   Cardiovascular:      Rate and Rhythm: Normal rate and regular rhythm.      Heart sounds: Normal heart sounds.   Pulmonary:      Effort: Pulmonary effort is normal. No respiratory distress.      Breath sounds: Normal breath sounds.   Skin:     General: Skin is warm and dry.   Neurological:      Mental Status: She is alert and oriented to person, place, and time.      GCS: GCS eye subscore is 4. GCS verbal subscore is 5. GCS motor subscore is 6. "   Psychiatric:         Attention and Perception: Attention normal.         Mood and Affect: Mood normal.         Speech: Speech normal.         Behavior: Behavior normal. Behavior is cooperative.         Thought Content: Thought content normal.         Results      Assessment & Plan   Problem List Items Addressed This Visit       Gastroesophageal reflux disease without esophagitis - Primary    Relevant Medications    omeprazole (priLOSEC) 40 MG capsule    Other Relevant Orders    Ambulatory Referral to Gastroenterology (Completed)     Other Visit Diagnoses       Epigastric abdominal pain        Relevant Orders    Ambulatory Referral to Gastroenterology (Completed)    Encounter for screening mammogram for malignant neoplasm of breast        Relevant Orders    Mammo Screening Digital Tomosynthesis Bilateral With CAD               Current Outpatient Medications:     aspirin 81 MG EC tablet, Take 1 tablet by mouth Daily., Disp: 90 tablet, Rfl: 1    atorvastatin (Lipitor) 40 MG tablet, Take 1 tablet by mouth Daily., Disp: 90 tablet, Rfl: 1    cloNIDine (CATAPRES) 0.1 MG tablet, TAKE 1 TABLET TWICE DAILY AS NEEDED FOR BLOOD PRESSURE GREATER THAN 160/90, Disp: 180 tablet, Rfl: 0    ferrous sulfate 325 (65 FE) MG tablet, Take 1 tablet by mouth Daily With Breakfast. Take with orange juice or vitamin C, Disp: 90 tablet, Rfl: 1    fluticasone (FLONASE) 50 MCG/ACT nasal spray, 1 spray by Each Nare route Daily. Shake before using., Disp: 1 mL, Rfl: 2    levothyroxine (SYNTHROID, LEVOTHROID) 75 MCG tablet, Take 1 tablet by mouth Every Morning., Disp: 90 tablet, Rfl: 2    lurasidone HCl (LATUDA) 60 MG tablet tablet, Take 1 tablet by mouth Daily. with food (Patient taking differently: Take 40 mg by mouth Daily. with food), Disp: 30 tablet, Rfl: 2    montelukast (SINGULAIR) 10 MG tablet, Take 1 tablet by mouth Every Night., Disp: 90 tablet, Rfl: 2    tiZANidine (ZANAFLEX) 4 MG tablet, Take 1 tablet by mouth Every 8 (Eight) Hours As  Needed for Muscle Spasms., Disp: 180 tablet, Rfl: 2    traZODone (DESYREL) 50 MG tablet, TAKE 1 TO 2 TABLETS EVERY NIGHT 1 HOUR BEFORE BEDTIME AS NEEDED FOR SLEEP, Disp: 45 tablet, Rfl: 2    vilazodone (VIIBRYD) 20 MG tablet tablet, Take 1 tablet by mouth Daily., Disp: , Rfl:     HYDROcodone-acetaminophen (NORCO)  MG per tablet, Take 1 tablet by mouth Every 6 (Six) Hours As Needed for Moderate Pain. (Patient not taking: Reported on 8/29/2024), Disp: 28 tablet, Rfl: 0    omeprazole (priLOSEC) 40 MG capsule, Take 1 capsule by mouth Daily., Disp: 90 capsule, Rfl: 1    tiZANidine (ZANAFLEX) 4 MG tablet, Take 1 tablet by mouth At Night As Needed for Muscle Spasms. (Patient not taking: Reported on 8/29/2024), Disp: , Rfl:     Assessment & Plan  1. Gastroesophageal Reflux Disease (GERD).  The patient reports ongoing symptoms despite taking omeprazole and Pepcid. Omeprazole dosage will be increased to see if it helps alleviate symptoms. If there is no improvement, Protonix may be considered. He is advised to discontinue Pepcid if it is not providing relief. A referral to gastroenterology will be made for further evaluation and management.    2. Epigastric Abdominal Pain.  The patient experiences pressure when bending over, suggestive of a possible hiatal hernia. An EGD will be scheduled to confirm the diagnosis. The patient is informed about the procedure, including the use of sedation and numbing spray. He is advised to follow up with gastroenterology for the EGD.    3. Health Maintenance.  A referral for a mammogram will be re-sent to [facility]. The patient had to delay the previous appointment due to bruised ribs. The referral coordinator, Hailee, will contact [facility] to schedule the mammogram and inform the patient of the appointment details.           Plan of care reviewed with the patient at the conclusion of today's visit.  Education was provided regarding diagnosis, management, and any prescribed or  recommended OTC medications.  Patient verbalized understanding of and agreement with management plan.     Return if symptoms worsen or fail to improve.      Transcribed from ambient dictation for GERSON Arellano by GERSON Arellano.  08/29/24   16:03 EDT    Patient or patient representative verbalized consent for the use of Ambient Listening during the visit with  GERSON Arellano for chart documentation. 9/10/2024  13:30 EDT

## 2024-09-05 ENCOUNTER — TELEPHONE (OUTPATIENT)
Dept: INTERNAL MEDICINE | Facility: CLINIC | Age: 64
End: 2024-09-05
Payer: MEDICARE

## 2024-09-05 NOTE — TELEPHONE ENCOUNTER
Caller: Alisa Ames    Relationship: Self    Best call back number: [phone]    What is the medical concern/diagnosis:     HERNIA    What specialty or service is being requested:     GASTROENTEROLOGY    Any additional details:     PLEASE CALL PATIENT WITH APPOINTMENT DETAILS

## 2024-09-06 NOTE — TELEPHONE ENCOUNTER
Pt was advised her referral and relevant records were sent to [facility] care and she should be hearing from the office shortly, fyi.

## 2024-09-16 ENCOUNTER — TELEPHONE (OUTPATIENT)
Dept: INTERNAL MEDICINE | Facility: CLINIC | Age: 64
End: 2024-09-16
Payer: MEDICARE

## 2024-09-25 DIAGNOSIS — Z91.81 HISTORY OF RECENT FALL: ICD-10-CM

## 2024-09-25 DIAGNOSIS — R07.81 RIB PAIN ON LEFT SIDE: ICD-10-CM

## 2024-09-25 RX ORDER — HYDROCODONE BITARTRATE AND ACETAMINOPHEN 10; 325 MG/1; MG/1
1 TABLET ORAL EVERY 6 HOURS PRN
Qty: 28 TABLET | Refills: 0 | Status: SHIPPED | OUTPATIENT
Start: 2024-09-25

## 2024-10-13 DIAGNOSIS — G47.09 OTHER INSOMNIA: ICD-10-CM

## 2024-10-14 ENCOUNTER — TELEPHONE (OUTPATIENT)
Dept: INTERNAL MEDICINE | Facility: CLINIC | Age: 64
End: 2024-10-14

## 2024-10-14 RX ORDER — TRAZODONE HYDROCHLORIDE 50 MG/1
TABLET, FILM COATED ORAL
Qty: 45 TABLET | Refills: 2 | Status: SHIPPED | OUTPATIENT
Start: 2024-10-14

## 2024-10-29 ENCOUNTER — OFFICE VISIT (OUTPATIENT)
Dept: INTERNAL MEDICINE | Facility: CLINIC | Age: 64
End: 2024-10-29
Payer: MEDICARE

## 2024-10-29 ENCOUNTER — OFFICE VISIT (OUTPATIENT)
Dept: ORTHOPEDIC SURGERY | Facility: CLINIC | Age: 64
End: 2024-10-29
Payer: MEDICARE

## 2024-10-29 VITALS
BODY MASS INDEX: 21.08 KG/M2 | HEIGHT: 65 IN | SYSTOLIC BLOOD PRESSURE: 118 MMHG | WEIGHT: 126.5 LBS | DIASTOLIC BLOOD PRESSURE: 70 MMHG

## 2024-10-29 VITALS
HEIGHT: 65 IN | HEART RATE: 87 BPM | OXYGEN SATURATION: 96 % | DIASTOLIC BLOOD PRESSURE: 84 MMHG | BODY MASS INDEX: 21.09 KG/M2 | WEIGHT: 126.6 LBS | SYSTOLIC BLOOD PRESSURE: 140 MMHG | TEMPERATURE: 97.9 F

## 2024-10-29 DIAGNOSIS — R26.9 IMPAIRED GAIT: ICD-10-CM

## 2024-10-29 DIAGNOSIS — I10 PRIMARY HYPERTENSION: ICD-10-CM

## 2024-10-29 DIAGNOSIS — Z13.0 SCREENING FOR BLOOD DISEASE: ICD-10-CM

## 2024-10-29 DIAGNOSIS — E78.00 PURE HYPERCHOLESTEROLEMIA: ICD-10-CM

## 2024-10-29 DIAGNOSIS — E55.9 VITAMIN D DEFICIENCY: ICD-10-CM

## 2024-10-29 DIAGNOSIS — Z00.00 MEDICARE ANNUAL WELLNESS VISIT, SUBSEQUENT: Primary | ICD-10-CM

## 2024-10-29 DIAGNOSIS — M54.2 CERVICAL PAIN: ICD-10-CM

## 2024-10-29 DIAGNOSIS — M17.11 PRIMARY OSTEOARTHRITIS OF RIGHT KNEE: Primary | ICD-10-CM

## 2024-10-29 DIAGNOSIS — R41.3 MEMORY LOSS: ICD-10-CM

## 2024-10-29 DIAGNOSIS — Z78.0 ENCOUNTER FOR OSTEOPOROSIS SCREENING IN ASYMPTOMATIC POSTMENOPAUSAL PATIENT: ICD-10-CM

## 2024-10-29 DIAGNOSIS — M54.12 CERVICAL RADICULOPATHY: ICD-10-CM

## 2024-10-29 DIAGNOSIS — Z13.820 ENCOUNTER FOR OSTEOPOROSIS SCREENING IN ASYMPTOMATIC POSTMENOPAUSAL PATIENT: ICD-10-CM

## 2024-10-29 DIAGNOSIS — Z98.1 HISTORY OF FUSION OF CERVICAL SPINE: ICD-10-CM

## 2024-10-29 DIAGNOSIS — R25.1 TREMORS OF NERVOUS SYSTEM: ICD-10-CM

## 2024-10-29 DIAGNOSIS — Z13.21 ENCOUNTER FOR VITAMIN DEFICIENCY SCREENING: ICD-10-CM

## 2024-10-29 DIAGNOSIS — E11.65 TYPE 2 DIABETES MELLITUS WITH HYPERGLYCEMIA, UNSPECIFIED WHETHER LONG TERM INSULIN USE: ICD-10-CM

## 2024-10-29 DIAGNOSIS — E03.9 HYPOTHYROIDISM (ACQUIRED): ICD-10-CM

## 2024-10-29 RX ORDER — ROSUVASTATIN CALCIUM 20 MG/1
20 TABLET, COATED ORAL DAILY
Qty: 90 TABLET | Refills: 1 | Status: SHIPPED | OUTPATIENT
Start: 2024-10-29

## 2024-10-29 RX ORDER — LIDOCAINE HYDROCHLORIDE 10 MG/ML
3 INJECTION, SOLUTION EPIDURAL; INFILTRATION; INTRACAUDAL; PERINEURAL
Status: COMPLETED | OUTPATIENT
Start: 2024-10-29 | End: 2024-10-29

## 2024-10-29 RX ORDER — BUPIVACAINE HYDROCHLORIDE 2.5 MG/ML
3 INJECTION, SOLUTION EPIDURAL; INFILTRATION; INTRACAUDAL
Status: COMPLETED | OUTPATIENT
Start: 2024-10-29 | End: 2024-10-29

## 2024-10-29 RX ORDER — TRIAMCINOLONE ACETONIDE 40 MG/ML
80 INJECTION, SUSPENSION INTRA-ARTICULAR; INTRAMUSCULAR
Status: COMPLETED | OUTPATIENT
Start: 2024-10-29 | End: 2024-10-29

## 2024-10-29 RX ORDER — HYDROCODONE BITARTRATE AND ACETAMINOPHEN 10; 325 MG/1; MG/1
1 TABLET ORAL EVERY 6 HOURS PRN
Qty: 28 TABLET | Refills: 0 | Status: SHIPPED | OUTPATIENT
Start: 2024-10-29

## 2024-10-29 RX ADMIN — TRIAMCINOLONE ACETONIDE 80 MG: 40 INJECTION, SUSPENSION INTRA-ARTICULAR; INTRAMUSCULAR at 11:40

## 2024-10-29 RX ADMIN — LIDOCAINE HYDROCHLORIDE 3 ML: 10 INJECTION, SOLUTION EPIDURAL; INFILTRATION; INTRACAUDAL; PERINEURAL at 11:40

## 2024-10-29 RX ADMIN — BUPIVACAINE HYDROCHLORIDE 3 ML: 2.5 INJECTION, SOLUTION EPIDURAL; INFILTRATION; INTRACAUDAL at 11:40

## 2024-10-29 NOTE — PROGRESS NOTES
Procedure   - Large Joint Arthrocentesis: R knee on 10/29/2024 11:40 AM  Indications: pain  Details: 21 G needle, superolateral approach  Medications: 80 mg triamcinolone acetonide 40 MG/ML; 3 mL lidocaine PF 1% 1 %; 3 mL bupivacaine (PF) 0.25 %  Outcome: tolerated well, no immediate complications  Procedure, treatment alternatives, risks and benefits explained, specific risks discussed. Consent was given by the patient. Immediately prior to procedure a time out was called to verify the correct patient, procedure, equipment, support staff and site/side marked as required. Patient was prepped and draped in the usual sterile fashion.

## 2024-10-29 NOTE — PROGRESS NOTES
Orthopaedic Clinic Note: Knee Established Patient    Chief Complaint   Patient presents with    Follow-up     3 month follow up--Primary osteoarthritis of right knee        HPI    It has been 3  month(s) since Ms. Ames's last visit. She returns to clinic today for follow-up right knee osteoarthritis.  Patient with cortisone injection of the right knee 3 months ago.  The injection worked well and is continuing to provide reasonable relief. She rates her pain a 0/10 on the pain scale and is currently taking nothing for pain.  She does note some occasional discomfort that is starting to return that is a 2/10 on pain scale at its worst.  She is ambulating with no assistive device. She has not attempted therapy.  She denies fevers, chills, or constitutional symptoms.  Overall, she is doing better.     I have reviewed the following portions of the patient's history:History of Present Illness    Past Medical History:   Diagnosis Date    COPD (chronic obstructive pulmonary disease)     Fibromyalgia     Hyperlipidemia     Malignant neoplasm     Migraine     Syncope     Thyroid nodule     Wears dentures     UPPER AND LOWER       Past Surgical History:   Procedure Laterality Date    APPENDECTOMY      BREAST BIOPSY Right     CERVICAL SPINE SURGERY      Fibromyalgia and DDD with h/o C spine surgery- initially on flexeril bid.    COLONOSCOPY  2015    NECK SURGERY      NOSE SURGERY      r/t Skin CA    SHOULDER SURGERY Left 09/10/2020    left shoulder arthroscopy with lysis of adhesions and manipulation under anesthesia    SKIN CANCER EXCISION      THYROID LOBECTOMY Right     On discussion, pt reported a h/o right thyroid lobectomy for goiter.U/S demo surgical absence of right lobe and diffuse nodularity of the left lobe with a dominant nodule in the lower pole of 1.8 x 3.5 cm.    TONSILLECTOMY      TOTAL HIP ARTHROPLASTY Left 1/7/2019    Procedure: TOTAL HIP ARTHROPLASTY LEFT;  Surgeon: Allen Madera MD;  Location: [facility]  OR;  Service: Orthopedics    TOTAL THYROIDECTOMY        Family History   Problem Relation Age of Onset    Other Mother         malignant neoplasm    Coronary artery disease Mother         MI (60's)    Heart attack Mother     Other Other         malignant neoplasm    Valvular heart disease Father     Birth defects Son     Breast cancer Daughter 40    Breast cancer Paternal Grandmother         DX AGE MID 80'S    Breast cancer Maternal Cousin         DX AGE 40'S    Ovarian cancer Neg Hx      Social History     Socioeconomic History    Marital status:    Tobacco Use    Smoking status: Former     Current packs/day: 0.00     Average packs/day: 1 pack/day for 40.0 years (40.0 ttl pk-yrs)     Types: Cigarettes     Start date:      Quit date:      Years since quittin.8    Smokeless tobacco: Never    Tobacco comments:     QUIT SMOKING WITH E C[employer]-1 WEEK AGO    Vaping Use    Vaping status: Former    Quit date: 2020    Substances: Nicotine, Flavoring    Devices: Refillable tank   Substance and Sexual Activity    Alcohol use: Yes     Alcohol/week: 1.0 standard drink of alcohol     Types: 1 Cans of beer per week     Comment: 1 drink per month    Drug use: No    Sexual activity: Yes     Partners: Male      Current Outpatient Medications on File Prior to Visit   Medication Sig Dispense Refill    aspirin 81 MG EC tablet Take 1 tablet by mouth Daily. 90 tablet 1    cloNIDine (CATAPRES) 0.1 MG tablet TAKE 1 TABLET TWICE DAILY AS NEEDED FOR BLOOD PRESSURE GREATER THAN 160/90 180 tablet 0    ferrous sulfate 325 (65 FE) MG tablet Take 1 tablet by mouth Daily With Breakfast. Take with orange juice or vitamin C 90 tablet 1    fluticasone (FLONASE) 50 MCG/ACT nasal spray 1 spray by Each Nare route Daily. Shake before using. 1 mL 2    levothyroxine (SYNTHROID, LEVOTHROID) 75 MCG tablet Take 1 tablet by mouth Every Morning. 90 tablet 2    lurasidone HCl (LATUDA) 60 MG tablet tablet Take 1 tablet by mouth Daily. with food  "(Patient taking differently: Take 40 mg by mouth Daily. with food) 30 tablet 2    montelukast (SINGULAIR) 10 MG tablet Take 1 tablet by mouth Every Night. 90 tablet 2    tiZANidine (ZANAFLEX) 4 MG tablet Take 1 tablet by mouth At Night As Needed for Muscle Spasms.      tiZANidine (ZANAFLEX) 4 MG tablet Take 1 tablet by mouth Every 8 (Eight) Hours As Needed for Muscle Spasms. 180 tablet 2    traZODone (DESYREL) 50 MG tablet TAKE 1 TO 2 TABLETS EVERY NIGHT 1 HOUR BEFORE BEDTIME AS NEEDED FOR SLEEP 45 tablet 2    vilazodone (VIIBRYD) 20 MG tablet tablet Take 1 tablet by mouth Daily.      [DISCONTINUED] atorvastatin (Lipitor) 40 MG tablet Take 1 tablet by mouth Daily. 90 tablet 1    [DISCONTINUED] HYDROcodone-acetaminophen (NORCO)  MG per tablet Take 1 tablet by mouth Every 6 (Six) Hours As Needed for Moderate Pain. 28 tablet 0    [DISCONTINUED] omeprazole (priLOSEC) 40 MG capsule Take 1 capsule by mouth Daily. 90 capsule 1     No current facility-administered medications on file prior to visit.      No Known Allergies     Review of Systems   Constitutional: Negative.    HENT: Negative.     Eyes: Negative.    Respiratory: Negative.     Cardiovascular: Negative.    Gastrointestinal: Negative.    Endocrine: Negative.    Genitourinary: Negative.    Musculoskeletal:  Positive for arthralgias.   Skin: Negative.    Allergic/Immunologic: Negative.    Neurological: Negative.    Hematological: Negative.    Psychiatric/Behavioral: Negative.          The patient's Review of Systems was personally reviewed and confirmed as accurate.    Physical Exam  Blood pressure 118/70, height 165.1 cm (65\"), weight 57.4 kg (126 lb 8 oz), not currently breastfeeding.    Body mass index is 21.05 kg/m².    GENERAL APPEARANCE: awake, alert, oriented, in no acute distress and well developed, well nourished  LUNGS:  breathing nonlabored  EXTREMITIES: no clubbing, cyanosis  PERIPHERAL PULSES: palpable dorsalis pedis and posterior tibial pulses " bilaterally.    GAIT:  Normal        ----------  Right Knee Exam:  ----------  ALIGNMENT: moderate varus, correctible to neutral  ----------  RANGE OF MOTION:  Decreased (5 - 115 degrees) with no extensor lag  LIGAMENTOUS STABILITY:   stable to varus and valgus stress at terminal extension and 30 degrees; retensioning of the MCL is appreciated with valgus stress at 30 degrees consistent with medial compartment degeneration  ----------  STRENGTH:  KNEE FLEXION 4/5  KNEE EXTENSION  4/5  ANKLE DORSIFLEXION  5/5  ANKLE PLANTARFLEXION  5/5  ----------  PAIN WITH PALPATION:global, especially medial joint  KNEE EFFUSION: yes, mild effusion  PAIN WITH KNEE ROM: yes  PATELLAR CREPITUS:  yes, painful and symptomatic  ----------  SENSATION TO LIGHT TOUCH:  DEEP PERONEAL/SUPERFICIAL PERONEAL/SURAL/SAPHENOUS/TIBIAL:    intact  ----------  EDEMA:  no  ERYTHEMA:    no  WOUNDS/INCISIONS:   no  _____________________________________________________________________  _____________________________________________________________________    RADIOGRAPHIC FINDINGS:   Indication: Right knee pain    Comparison: Todays xrays were compared to previous xrays from 4/23/2024    Knee films: moderate to severe tricompartmental arthritis with genu varum alignment, periarticular osteophytes visualized in all compartments and No significant changes compared to prior radiographs.    Assessment/Plan:   Diagnosis Plan   1. Primary osteoarthritis of right knee  XR Knee 4+ View Right        The patient has failed conservative treatment measures and is a candidate for joint arthroplasty.  I discussed the joint arthroplasty surgical process as well as the recovery and rehabilitation time frame.  The patient asked several questions regarding the joint arthroplasty surgery, which were answered accordingly.  Ultimately, the patient declines surgical intervention at this time and wishes to continue with conservative treatment measures.  Alternative conservative  treatment measures were discussed including bracing, therapy, topical/oral anti-inflammatories, activity modification, and weight loss.  The patient considered these treatment options and wishes to proceed with corticosteroid injection(s) today.  Therefore we will proceed with corticosteroid injection(s) today.  Follow-up in 4 months for reevaluation.    Procedure Note:  I discussed with the patient the potential benefits of performing a therapeutic injection of the right knee as well as potential risks including but not limited to infection, swelling, pain, bleeding, bruising, nerve/vessel damage, skin color changes, transient elevation in blood glucose levels, and fat atrophy. After informed consent and verifying correct patient, procedure site, and type of procedure, the area was prepped with alcohol, ethyl chloride was used to numb the skin. Via the superolateral approach, 3 cc of 1% lidocaine, 3 cc of 0.25% Marcaine and 2 cc of 40mg/ml of Kenalog were injected into the right knee. The patient tolerated the procedure well. There were no complications. A sterile dressing was placed over the injection site.        Allen Madera MD  10/29/24  11:42 EDT

## 2024-10-29 NOTE — PROGRESS NOTES
The ABCs of the Annual Wellness Visit  Subsequent Medicare Wellness Visit    Chief Complaint   Patient presents with    Medicare Wellness-subsequent       Subjective   History of Present Illness:  Alisa Ames is a 64 y.o. female who presents for a Subsequent Medicare Wellness Visit.    History of Present Illness  The patient presents for evaluation of multiple medical concerns. She is accompanied by an adult male.    She has been experiencing severe pain for the past year, which has not been alleviated by physical therapy. Dry needling provided temporary relief, but a recent session resulted in increased shoulder pain due to a needle being inserted too deeply. She has also been receiving massage therapy for her neck and head, which she believes are the sources of her pain. She sustained injuries to her neck and shoulder from a fall and experiences pain that radiates from her armpit area to the base of her skull. She suspects she may have whiplash. Her shoulder pain had subsided, but a recent 15-minute task of changing clothes aggravated it. She believes her pain originates from her shoulder, neck, and back. Her neck pain has worsened over time, and she now experiences pain in her neck, head, shoulder, and back. She is considering pain management at [facility], where she previously received a hip injection. She is not interested in injections for her shoulder, hip, or knee, but is open to a neck injection to reduce inflammation and promote muscle healing. She has been managing her pain with medication, but her supply has run out. She has been unable to perform household chores or exercise due to her pain and has been largely sedentary for the past year.    She has also been experiencing memory loss and balance issues, which she attributes to her sedentary lifestyle or early signs of dementia. She is interested in being tested for dementia.  She reports off-balance gait.  She has hand tremors that have worsened.  She  has stopped taking Lipitor and Prilosec due to her memory loss and feels these medications have contributed. She has not taken Prilosec for the past 3 days and reports no stomach issues. She is interested in switching to Crestor for cholesterol management.     Last A1c was 5.5.  Last lipid panel showed LDL slightly elevated while taking atorvastatin.  Anemia had improved.  TSH was normal taking levothyroxine daily on empty stomach.    She is due for lung cancer screening but had CT chest completed on 24 at [facility]. Insurance will not cover the screening until one year after this date. She had a calcium deficiency during pregnancy and is considering a bone scan.    She has not been hospitalized overnight in the past year but has visited the ER twice. She does not have an advanced directive on file. She experienced double vision for 2 to 3 years, which she believes was caused by a head injury. She has had multiple surgeries and medical conditions over the past 15 years.    SOCIAL HISTORY  She quit smoking 8 or 9 years ago. She smoked for 40 years. She smoked 2 packs a day for half the time and half a pack for the other half.    FAMILY HISTORY  Her father had dementia.    HEALTH RISK ASSESSMENT    Recent Hospitalizations:  No hospitalization(s) within the last year.    Current Medical Providers:  Patient Care Team:  Shanae Hamilton APRN as PCP - General (Nurse Practitioner)  Shanae Hamilton APRN as Referring Physician (Nurse Practitioner)  Sabas Gonzalez MD as Consulting Physician (Neurosurgery)    Smoking Status:  Social History     Tobacco Use   Smoking Status Former    Current packs/day: 0.00    Average packs/day: 1 pack/day for 40.0 years (40.0 ttl pk-yrs)    Types: Cigarettes    Start date:     Quit date: 2017    Years since quittin.8   Smokeless Tobacco Never   Tobacco Comments    QUIT SMOKING WITH E CIG-1 WEEK AGO        Alcohol Consumption:  Social History      Substance and Sexual Activity   Alcohol Use Yes    Alcohol/week: 1.0 standard drink of alcohol    Types: 1 Cans of beer per week    Comment: 1 drink per month       Depression Screen:       10/29/2024     8:00 AM   PHQ-2/PHQ-9 Depression Screening   Little interest or pleasure in doing things Over half   Feeling down, depressed, or hopeless Not at all   Trouble falling or staying asleep, or sleeping too much Almost all   Feeling tired or having little energy Not at all   Poor appetite or overeating Not at all   Feeling bad about yourself - or that you are a failure or have let yourself or your family down Not at all   Trouble concentrating on things, such as reading the newspaper or watching television Not at all   Moving or speaking so slowly that other people could have noticed? Or the opposite - being so fidgety or restless that you have been moving around a lot more than usual. Not at all   Thoughts that you would be better off dead or hurting yourself in some way Not at all   Patient Health Questionnaire-9 Score 5   How difficult have these problems made it for you to do your work, take care of things at home, or get along with other people? Not difficult at all       Fall Risk Screen:  DEO Fall Risk Assessment was completed, and patient is at LOW risk for falls.Assessment completed on:10/29/2024    Health Habits and Functional and Cognitive Screening:      10/29/2024     8:00 AM   Functional & Cognitive Status   Do you have difficulty preparing food and eating? No   Do you have difficulty bathing yourself, getting dressed or grooming yourself? No   Do you have difficulty using the toilet? No   Do you have difficulty moving around from place to place? No   Do you have trouble with steps or getting out of a bed or a chair? No   Current Diet Other   Dental Exam Unknown   Eye Exam Unknown   Exercise (times per week) 0 times per week   Current Exercises Include No Regular Exercise   Do you need help using  the phone?  No   Are you deaf or do you have serious difficulty hearing?  Yes   Do you need help to go to places out of walking distance? No   Do you need help shopping? No   Do you need help preparing meals?  No   Do you need help with housework?  Yes   Do you need help with laundry? No   Do you need help taking your medications? No   Do you need help managing money? No   Do you ever drive or ride in a car without wearing a seat belt? No   Have you felt unusual stress, anger or loneliness in the last month? No   Who do you live with? Spouse   If you need help, do you have trouble finding someone available to you? Yes   Have you been bothered in the last four weeks by sexual problems? No   Do you have difficulty concentrating, remembering or making decisions? Yes         Does the patient have evidence of cognitive impairment? Yes    Asprin use counseling:Taking ASA appropriately as indicated    Age-appropriate Screening Schedule:  Refer to the list below for future screening recommendations based on patient's age, sex and/or medical conditions. Orders for these recommended tests are listed in the plan section. The patient has been provided with a written plan.    Health Maintenance   Topic Date Due    DXA SCAN  Never done    Pneumococcal Vaccine 65+ (1 of 2 - PCV) Never done    TDAP/TD VACCINES (1 - Tdap) Never done    ZOSTER VACCINE (1 of 2) Never done    DIABETIC EYE EXAM  12/24/2015    DIABETIC FOOT EXAM  Never done    PAP SMEAR  Never done    INFLUENZA VACCINE  Never done    COVID-19 Vaccine (1 - 2024-25 season) Never done    LUNG CANCER SCREENING  09/19/2024    HEMOGLOBIN A1C  01/23/2025    LIPID PANEL  01/24/2025    URINE MICROALBUMIN  04/23/2025    COLORECTAL CANCER SCREENING  08/17/2025    ANNUAL WELLNESS VISIT  10/29/2025    MAMMOGRAM  09/12/2026    HEPATITIS C SCREENING  Completed       Transcribed from ambient dictation for GERSON Arellano by GERSON Arellano.  10/29/24   09:03  EDT    Patient or patient representative verbalized consent for the use of Ambient Listening during the visit with  GERSON Arellano for chart documentation. 11/3/2024  09:05 EDT       The following portions of the patient's history were reviewed and updated as appropriate: allergies, current medications, past family history, past medical history, past social history, past surgical history, and problem list.    Review of Systems  Pertinent items are noted in HPI.     Outpatient Medications Prior to Visit   Medication Sig Dispense Refill    aspirin 81 MG EC tablet Take 1 tablet by mouth Daily. 90 tablet 1    cloNIDine (CATAPRES) 0.1 MG tablet TAKE 1 TABLET TWICE DAILY AS NEEDED FOR BLOOD PRESSURE GREATER THAN 160/90 180 tablet 0    ferrous sulfate 325 (65 FE) MG tablet Take 1 tablet by mouth Daily With Breakfast. Take with orange juice or vitamin C 90 tablet 1    fluticasone (FLONASE) 50 MCG/ACT nasal spray 1 spray by Each Nare route Daily. Shake before using. 1 mL 2    levothyroxine (SYNTHROID, LEVOTHROID) 75 MCG tablet Take 1 tablet by mouth Every Morning. 90 tablet 2    lurasidone HCl (LATUDA) 60 MG tablet tablet Take 1 tablet by mouth Daily. with food (Patient taking differently: Take 40 mg by mouth Daily. with food) 30 tablet 2    montelukast (SINGULAIR) 10 MG tablet Take 1 tablet by mouth Every Night. 90 tablet 2    tiZANidine (ZANAFLEX) 4 MG tablet Take 1 tablet by mouth At Night As Needed for Muscle Spasms.      tiZANidine (ZANAFLEX) 4 MG tablet Take 1 tablet by mouth Every 8 (Eight) Hours As Needed for Muscle Spasms. 180 tablet 2    traZODone (DESYREL) 50 MG tablet TAKE 1 TO 2 TABLETS EVERY NIGHT 1 HOUR BEFORE BEDTIME AS NEEDED FOR SLEEP 45 tablet 2    vilazodone (VIIBRYD) 20 MG tablet tablet Take 1 tablet by mouth Daily.      atorvastatin (Lipitor) 40 MG tablet Take 1 tablet by mouth Daily. 90 tablet 1    HYDROcodone-acetaminophen (NORCO)  MG per tablet Take 1 tablet by mouth Every 6  "(Six) Hours As Needed for Moderate Pain. 28 tablet 0    omeprazole (priLOSEC) 40 MG capsule Take 1 capsule by mouth Daily. 90 capsule 1     No facility-administered medications prior to visit.       Patient Active Problem List   Diagnosis    DDD (degenerative disc disease), cervical    Migraine syndrome    Hypothyroidism (acquired)    DDD (degenerative disc disease), lumbar    Depression with anxiety    Arthritis    Syncope    Allergic rhinitis    Pure hypercholesterolemia    Status post total replacement of left hip    Prediabetes    Irritable bowel syndrome with diarrhea    Gastroesophageal reflux disease without esophagitis    Irritable bowel syndrome with both constipation and diarrhea    Chronic left shoulder pain    Iron deficiency anemia secondary to inadequate dietary iron intake    Drug-induced parkinsonism    Diplopia         Advanced Care Planning:  ACP discussion was held with the patient during this visit. Patient does not have an advance directive, information provided.    Compared to one year ago, the patient feels her physical health is better.  Compared to one year ago, the patient feels her mental health is worse.    Opioid medication/s are on active medication list, and I have evaluated active treatment plan and pain score trends  Reviewed chart for potential of high risk medication in the elderly: yes  Reviewed chart for potential of harmful drug interactions in the elderly:yes    Objective         Vitals:    10/29/24 0819   BP: 140/84   BP Location: Left arm   Patient Position: Sitting   Cuff Size: Adult   Pulse: 87   Temp: 97.9 °F (36.6 °C)   SpO2: 96%   Weight: 57.4 kg (126 lb 9.6 oz)   Height: 165.1 cm (65\")   PainSc:   5       Body mass index is 21.07 kg/m².  Discussed the patient's BMI with her. The BMI is in the acceptable range.    Physical Exam  Vitals reviewed.   Constitutional:       Appearance: Normal appearance. She is well-developed.   HENT:      Head: Normocephalic and atraumatic. "      Right Ear: Hearing, tympanic membrane, ear canal and external ear normal.      Left Ear: Hearing, tympanic membrane, ear canal and external ear normal.      Nose: Nose normal.      Mouth/Throat:      Lips: Pink.      Mouth: Mucous membranes are moist.      Pharynx: Oropharynx is clear. Uvula midline.   Eyes:      General: Lids are normal.      Extraocular Movements: Extraocular movements intact.      Conjunctiva/sclera: Conjunctivae normal.      Pupils: Pupils are equal, round, and reactive to light.   Neck:      Thyroid: No thyromegaly.      Trachea: Trachea normal.   Cardiovascular:      Rate and Rhythm: Normal rate and regular rhythm.      Pulses:           Carotid pulses are 2+ on the right side and 2+ on the left side.     Heart sounds: Normal heart sounds.   Pulmonary:      Effort: Pulmonary effort is normal. No respiratory distress.      Breath sounds: Normal breath sounds.   Abdominal:      General: Bowel sounds are normal.      Palpations: Abdomen is soft.      Tenderness: There is no abdominal tenderness.   Musculoskeletal:      Cervical back: Tenderness present. Decreased range of motion.   Skin:     General: Skin is warm and dry.      Capillary Refill: Capillary refill takes 2 to 3 seconds.   Neurological:      Mental Status: She is alert and oriented to person, place, and time.      GCS: GCS eye subscore is 4. GCS verbal subscore is 5. GCS motor subscore is 6.      Comments: Bilateral hand tremors.   Psychiatric:         Attention and Perception: Attention normal.         Mood and Affect: Mood normal.         Speech: Speech normal.         Behavior: Behavior normal. Behavior is cooperative.         Thought Content: Thought content normal. Thought content does not include homicidal or suicidal ideation. Thought content does not include homicidal or suicidal plan.         Results  Lab Results   Component Value Date    HGBA1C 5.5 07/23/2024     Lab Results   Component Value Date    CHOL 192 01/24/2024     TRIG 60 01/24/2024    HDL 72 (H) 01/24/2024     (H) 01/24/2024     Lab Results   Component Value Date    TSH 0.775 07/23/2024     Lab Results   Component Value Date    GLUCOSE 133 (H) 07/23/2024    BUN 8 07/23/2024    CREATININE 0.97 07/23/2024     (L) 07/23/2024    K 4.1 07/23/2024    CL 96 (L) 07/23/2024    CALCIUM 9.5 07/23/2024    PROTEINTOT 7.5 07/23/2024    ALBUMIN 4.5 07/23/2024    ALT 15 07/23/2024    AST 21 07/23/2024    ALKPHOS 128 (H) 07/23/2024    BILITOT 0.3 07/23/2024    GLOB 3.0 07/23/2024    AGRATIO 1.5 07/23/2024    BCR 8.2 07/23/2024    ANIONGAP 14.0 07/23/2024    EGFR 64.6 07/23/2024     Lab Results   Component Value Date    WBC 5.63 07/23/2024    HGB 13.6 07/23/2024    HCT 39.4 07/23/2024    MCV 96.3 07/23/2024     07/23/2024         BMI is within normal parameters. No other follow-up for BMI required.            Assessment & Plan   Medicare Risks and Personalized Health Plan  CMS Preventative Services Quick Reference  Advance Directive Discussion  Cardiovascular risk  Chronic Pain   Depression/Dysphoria  Fall Risk  Inactivity/Sedentary  Lung Cancer Risk  Osteoporosis Risk    The above risks/problems have been discussed with the patient.  Pertinent information has been shared with the patient in the After Visit Summary.  Follow up plans and orders are seen below in the Assessment/Plan Section.    Diagnoses and all orders for this visit:    1. Medicare annual wellness visit, subsequent (Primary)    2. Type 2 diabetes mellitus with hyperglycemia, unspecified whether long term insulin use  -     CBC (No Diff); Future  -     Lipid Panel; Future    3. Pure hypercholesterolemia  -     Lipid Panel; Future    4. Hypothyroidism (acquired)  -     TSH Rfx On Abnormal To Free T4; Future    5. Primary hypertension  -     CBC (No Diff); Future  -     Comprehensive Metabolic Panel; Future  -     Lipid Panel; Future    6. Memory loss  -     Ambulatory Referral to Neurology  -     MRI Brain  Without Contrast; Future    7. Cervical radiculopathy  -     Ambulatory Referral to Pain Management  -     HYDROcodone-acetaminophen (NORCO)  MG per tablet; Take 1 tablet by mouth Every 6 (Six) Hours As Needed for Moderate Pain.  Dispense: 28 tablet; Refill: 0    8. Cervical pain  -     Ambulatory Referral to Pain Management  -     HYDROcodone-acetaminophen (NORCO)  MG per tablet; Take 1 tablet by mouth Every 6 (Six) Hours As Needed for Moderate Pain.  Dispense: 28 tablet; Refill: 0    9. History of fusion of cervical spine  -     Ambulatory Referral to Pain Management  -     HYDROcodone-acetaminophen (NORCO)  MG per tablet; Take 1 tablet by mouth Every 6 (Six) Hours As Needed for Moderate Pain.  Dispense: 28 tablet; Refill: 0    10. Impaired gait  -     Ambulatory Referral to Neurology  -     MRI Brain Without Contrast; Future    11. Tremors of nervous system  -     Ambulatory Referral to Neurology  -     MRI Brain Without Contrast; Future    12. Encounter for osteoporosis screening in asymptomatic postmenopausal patient  -     DEXA Bone Density Axial; Future    13. Screening for blood disease  -     CBC (No Diff); Future  -     Comprehensive Metabolic Panel; Future  -     Lipid Panel; Future  -     Hemoglobin A1c; Future  -     TSH Rfx On Abnormal To Free T4; Future  -     Vitamin B12 & Folate; Future  -     Vitamin D,25-Hydroxy; Future    14. Vitamin D deficiency  -     Vitamin D,25-Hydroxy; Future    15. Encounter for vitamin deficiency screening  -     Vitamin B12 & Folate; Future  -     Vitamin D,25-Hydroxy; Future    Other orders  -     rosuvastatin (Crestor) 20 MG tablet; Take 1 tablet by mouth Daily.  Dispense: 90 tablet; Refill: 1        Assessment & Plan  Medicare wellness  Patient will continue to eat a well-balanced diet, drink adequate amount of water, exercise at least 3 times weekly, and get adequate rest.  Suggested a multivitamin daily.  Discussed future preventative screenings  and immunizations.    A bone scan (DEXA scan) will be ordered to assess bone density. She is also due for a lung cancer screening after July 12, 2025.    Start Crestor for hyperlipidemia.  Completing fasting lipid panel CMP.  She will continue clonidine as needed for blood pressures greater than 160/90.  She will continue follow-ups with cardiology.  Continue tenacity and as needed for neck pain.  Continue hydrocodone as needed for severe pain.  Referring patient to pain management.  Continue levothyroxine daily on empty stomach.  Checking TSH.  Continue with diabetic diet and lifestyle management for diabetes.  Checking A1c.  Referring to [facility] for evaluation of her memory loss.  Ordering brain MRI due to memory loss and worsening of her bilateral hand tremors.    Patient educated on risks and side effects of taking hydrocodone including risk of addiction and sedation. Advised to use this medication sparingly.  Advised to not drive or operate heavy machinery when taking this medication. UDS and CSC completed. Faisal reviewed and appropriate.       Follow-up  Patient will return in 3 months for follow-up.    Follow Up:  Return in about 3 months (around 1/29/2025), or if symptoms worsen or fail to improve.     An After Visit Summary and PPPS were given to the patient.       Patient or patient representative verbalized consent for the use of Ambient Listening during the visit with  GERSON Arellano for chart documentation. 11/3/2024  20:49 EST

## 2024-11-01 ENCOUNTER — TELEPHONE (OUTPATIENT)
Dept: INTERNAL MEDICINE | Facility: CLINIC | Age: 64
End: 2024-11-01
Payer: MEDICARE

## 2024-11-01 NOTE — TELEPHONE ENCOUNTER
Pt called and stated that for pain management she would like to see. Dr echevarria at [facility] on [facility] and if she can not get in to see him she will go to [facility] pain.   Please call pt back on husbands phone and it is ok to lvm or send a text, [phone].

## 2024-11-01 NOTE — TELEPHONE ENCOUNTER
If ov notes could be signed I can send along with pain mgmt order, they need it before they can schedule the pt

## 2024-11-06 ENCOUNTER — TELEPHONE (OUTPATIENT)
Dept: INTERNAL MEDICINE | Facility: CLINIC | Age: 64
End: 2024-11-06
Payer: MEDICARE

## 2024-11-06 NOTE — TELEPHONE ENCOUNTER
Caller: Alisa Ames    Relationship: Self    Best call back number: [phone]     What is the best time to reach you: ANYTIME    Who are you requesting to speak with (clinical staff, provider,  specific staff member): ESTHER SEYMOUR    What was the call regarding: PATIENT WAS PRESCRIBED METOPROLOL AT THE HOSPITAL WHICH SHE IS ON A MUSCLE RELAXER ALSO. SHE IS CONCERNED THAT PCP MAY WANT TO PRESCRIBE A DIFFERENT MUSCLE RELAXER AND SHE WANTS TO DISCUSS THAT.

## 2024-11-07 ENCOUNTER — TELEPHONE (OUTPATIENT)
Dept: INTERNAL MEDICINE | Facility: CLINIC | Age: 64
End: 2024-11-07
Payer: MEDICARE

## 2024-11-07 NOTE — TELEPHONE ENCOUNTER
[facility] called to request the last year of office notes and the last 3 years of diagnostic imaging related to the patient's cervical pain. They are requesting that it be faxed to [phone]

## 2024-11-08 RX ORDER — METHOCARBAMOL 500 MG/1
500 TABLET, FILM COATED ORAL 2 TIMES DAILY PRN
Qty: 60 TABLET | Refills: 2 | Status: SHIPPED | OUTPATIENT
Start: 2024-11-08

## 2024-11-14 ENCOUNTER — TELEPHONE (OUTPATIENT)
Dept: INTERNAL MEDICINE | Facility: CLINIC | Age: 64
End: 2024-11-14
Payer: MEDICARE

## 2024-11-14 NOTE — TELEPHONE ENCOUNTER
[facility] called and asked if we could fax the pt's last 2-3 ov notes to either [phone] and [phone], but I faxed the referral to both numbers.

## 2024-12-10 ENCOUNTER — HOSPITAL ENCOUNTER (OUTPATIENT)
Dept: MRI IMAGING | Facility: HOSPITAL | Age: 64
Discharge: HOME | End: 2024-12-10
Admitting: NURSE PRACTITIONER
Payer: MEDICARE

## 2024-12-10 DIAGNOSIS — R25.1 TREMORS OF NERVOUS SYSTEM: ICD-10-CM

## 2024-12-10 DIAGNOSIS — R41.3 MEMORY LOSS: ICD-10-CM

## 2024-12-10 DIAGNOSIS — R26.9 IMPAIRED GAIT: ICD-10-CM

## 2024-12-10 PROCEDURE — 70551 MRI BRAIN STEM W/O DYE: CPT

## 2024-12-11 ENCOUNTER — TELEPHONE (OUTPATIENT)
Dept: INTERNAL MEDICINE | Facility: CLINIC | Age: 64
End: 2024-12-11
Payer: MEDICARE

## 2024-12-11 NOTE — TELEPHONE ENCOUNTER
No answer no vm.     OK FOR HUB TO RELAY MESSAGE----- Message from Shanae Thompson sent at 12/10/2024  6:07 PM EST -----  Please contact patient and advise:    MRI is overall normal. Shows some mild age related changes but no concerning findings.

## 2024-12-23 ENCOUNTER — TELEPHONE (OUTPATIENT)
Dept: INTERNAL MEDICINE | Facility: CLINIC | Age: 64
End: 2024-12-23
Payer: MEDICARE

## 2024-12-23 NOTE — TELEPHONE ENCOUNTER
Caller: Alisa Ames    Relationship: Self    Best call back number: [phone]     What is the medical concern/diagnosis: DEMENTIA    What specialty or service is being requested: TEST FOR DEMENTIA    What is the provider, practice or medical service name: DR [facility]    Any additional details: PLEASE CALL WITH ANY QUESTIONS OR WHEN COMPLETED.

## 2024-12-26 DIAGNOSIS — R41.3 MEMORY LOSS: Primary | ICD-10-CM

## 2024-12-31 DIAGNOSIS — E03.9 HYPOTHYROIDISM (ACQUIRED): ICD-10-CM

## 2024-12-31 RX ORDER — LEVOTHYROXINE SODIUM 75 UG/1
75 TABLET ORAL
Qty: 90 TABLET | Refills: 2 | Status: SHIPPED | OUTPATIENT
Start: 2024-12-31

## 2024-12-31 NOTE — TELEPHONE ENCOUNTER
Caller: Alisa Ames    Relationship: Self    Best call back number: [phone]    Requested Prescriptions:   Requested Prescriptions     Pending Prescriptions Disp Refills    levothyroxine (SYNTHROID, LEVOTHROID) 75 MCG tablet 90 tablet 2     Sig: Take 1 tablet by mouth Every Morning.        Pharmacy where request should be sent:    [facility] [phone]  Last office visit with prescribing clinician: 10/29/2024   Last telemedicine visit with prescribing clinician: Visit date not found   Next office visit with prescribing clinician: 1/28/2025     Additional details provided by patient: PATIENT IS OUT OF MEDICATION. PATIENT WOULD LIKE 90 DAY SUPPLY    Does the patient have less than a 3 day supply:  [x] Yes  [] No    Would you like a call back once the refill request has been completed: [] Yes [x] No    If the office needs to give you a call back, can they leave a voicemail: [] Yes [x] No    Ahmet Clements Rep   12/31/24 10:37 EST

## 2025-01-06 ENCOUNTER — APPOINTMENT (OUTPATIENT)
Dept: BONE DENSITY | Facility: HOSPITAL | Age: 65
End: 2025-01-06
Payer: MEDICARE

## 2025-01-06 DIAGNOSIS — Z13.820 ENCOUNTER FOR OSTEOPOROSIS SCREENING IN ASYMPTOMATIC POSTMENOPAUSAL PATIENT: ICD-10-CM

## 2025-01-06 DIAGNOSIS — Z78.0 ENCOUNTER FOR OSTEOPOROSIS SCREENING IN ASYMPTOMATIC POSTMENOPAUSAL PATIENT: ICD-10-CM

## 2025-01-06 PROCEDURE — 77080 DXA BONE DENSITY AXIAL: CPT

## 2025-01-08 ENCOUNTER — TELEPHONE (OUTPATIENT)
Dept: INTERNAL MEDICINE | Facility: CLINIC | Age: 65
End: 2025-01-08
Payer: MEDICARE

## 2025-01-08 DIAGNOSIS — M81.0 OSTEOPOROSIS WITHOUT CURRENT PATHOLOGICAL FRACTURE, UNSPECIFIED OSTEOPOROSIS TYPE: Primary | ICD-10-CM

## 2025-01-08 RX ORDER — CHOLECALCIFEROL (VITAMIN D3) 50 MCG
2000 TABLET ORAL DAILY
Qty: 90 TABLET | Refills: 1 | Status: SHIPPED | OUTPATIENT
Start: 2025-01-08

## 2025-01-08 RX ORDER — PHENOL 1.4 %
600 AEROSOL, SPRAY (ML) MUCOUS MEMBRANE DAILY
Qty: 90 TABLET | Refills: 1 | Status: SHIPPED | OUTPATIENT
Start: 2025-01-08

## 2025-01-08 NOTE — TELEPHONE ENCOUNTER
Caller: Alisa Ames    Relationship: Self    Best call back number: [phone]     What medication are you requesting: SOMETHING TO HELP WITH SLEEP, PATIENT WOULD LIKE SOMETHING OTHER THAN TRAZODONE WHICH IS WHAT SHE HAS BEEN TAKING FOR SLEEP    If a prescription is needed, what is your preferred pharmacy and phone number: [facility] - [place], [place] - [address] [place] [address] - [phone]  - [phone]  [phone]

## 2025-01-10 ENCOUNTER — TELEPHONE (OUTPATIENT)
Dept: INTERNAL MEDICINE | Facility: CLINIC | Age: 65
End: 2025-01-10
Payer: MEDICARE

## 2025-01-15 NOTE — TELEPHONE ENCOUNTER
Hub can relay message to patient   Attempted to contact the pt to inform her, her referral has been to sent to dr.marvin richardson office. [facility] does not have a endocrinologist,  poli

## 2025-01-16 ENCOUNTER — TELEPHONE (OUTPATIENT)
Dept: INTERNAL MEDICINE | Facility: CLINIC | Age: 65
End: 2025-01-16
Payer: MEDICARE

## 2025-01-16 NOTE — TELEPHONE ENCOUNTER
Samantha with Dr. Roberts's office called regarding a referral they received for patient. She is needing to know if this referral is for derm or endo and also who the primary card wheeler is on the insurance. She said she tried to call patient, but could not reach her.

## 2025-01-28 ENCOUNTER — LAB (OUTPATIENT)
Dept: INTERNAL MEDICINE | Facility: CLINIC | Age: 65
End: 2025-01-28
Payer: MEDICARE

## 2025-01-28 ENCOUNTER — OFFICE VISIT (OUTPATIENT)
Dept: INTERNAL MEDICINE | Facility: CLINIC | Age: 65
End: 2025-01-28
Payer: MEDICARE

## 2025-01-28 VITALS
HEIGHT: 65 IN | OXYGEN SATURATION: 97 % | HEART RATE: 100 BPM | SYSTOLIC BLOOD PRESSURE: 110 MMHG | WEIGHT: 125 LBS | TEMPERATURE: 96.7 F | BODY MASS INDEX: 20.83 KG/M2 | DIASTOLIC BLOOD PRESSURE: 76 MMHG

## 2025-01-28 DIAGNOSIS — Z13.0 SCREENING FOR BLOOD DISEASE: ICD-10-CM

## 2025-01-28 DIAGNOSIS — R53.83 OTHER FATIGUE: ICD-10-CM

## 2025-01-28 DIAGNOSIS — E03.9 HYPOTHYROIDISM (ACQUIRED): ICD-10-CM

## 2025-01-28 DIAGNOSIS — Z13.21 ENCOUNTER FOR VITAMIN DEFICIENCY SCREENING: ICD-10-CM

## 2025-01-28 DIAGNOSIS — I10 PRIMARY HYPERTENSION: ICD-10-CM

## 2025-01-28 DIAGNOSIS — E78.00 PURE HYPERCHOLESTEROLEMIA: ICD-10-CM

## 2025-01-28 DIAGNOSIS — E55.9 VITAMIN D DEFICIENCY: ICD-10-CM

## 2025-01-28 DIAGNOSIS — F32.A ANXIETY AND DEPRESSION: ICD-10-CM

## 2025-01-28 DIAGNOSIS — F41.9 ANXIETY AND DEPRESSION: ICD-10-CM

## 2025-01-28 DIAGNOSIS — E11.65 TYPE 2 DIABETES MELLITUS WITH HYPERGLYCEMIA, UNSPECIFIED WHETHER LONG TERM INSULIN USE: ICD-10-CM

## 2025-01-28 DIAGNOSIS — E11.65 TYPE 2 DIABETES MELLITUS WITH HYPERGLYCEMIA, UNSPECIFIED WHETHER LONG TERM INSULIN USE: Primary | ICD-10-CM

## 2025-01-28 LAB
25(OH)D3 SERPL-MCNC: 34.8 NG/ML (ref 30–100)
ALBUMIN UR-MCNC: 2.1 MG/DL
BILIRUB UR QL STRIP: NEGATIVE
CLARITY UR: CLEAR
COLOR UR: YELLOW
CREAT UR-MCNC: 49.3 MG/DL
DEPRECATED RDW RBC AUTO: 43 FL (ref 37–54)
ERYTHROCYTE [DISTWIDTH] IN BLOOD BY AUTOMATED COUNT: 12.4 % (ref 12.3–15.4)
EXPIRATION DATE: NORMAL
FOLATE SERPL-MCNC: 7.45 NG/ML (ref 4.78–24.2)
GLUCOSE UR STRIP-MCNC: NEGATIVE MG/DL
HBA1C MFR BLD: 5.4 % (ref 4.5–5.7)
HBA1C MFR BLD: 5.8 % (ref 4.8–5.6)
HCT VFR BLD AUTO: 41.4 % (ref 34–46.6)
HGB BLD-MCNC: 14.3 G/DL (ref 12–15.9)
HGB UR QL STRIP.AUTO: NEGATIVE
HOLD SPECIMEN: NORMAL
KETONES UR QL STRIP: NEGATIVE
LEUKOCYTE ESTERASE UR QL STRIP.AUTO: NEGATIVE
Lab: NORMAL
MCH RBC QN AUTO: 32.9 PG (ref 26.6–33)
MCHC RBC AUTO-ENTMCNC: 34.5 G/DL (ref 31.5–35.7)
MCV RBC AUTO: 95.2 FL (ref 79–97)
MICROALBUMIN/CREAT UR: 42.6 MG/G (ref 0–29)
NITRITE UR QL STRIP: NEGATIVE
PH UR STRIP.AUTO: 6.5 [PH] (ref 5–8)
PLATELET # BLD AUTO: 376 10*3/MM3 (ref 140–450)
PMV BLD AUTO: 9.4 FL (ref 6–12)
PROT UR QL STRIP: NEGATIVE
RBC # BLD AUTO: 4.35 10*6/MM3 (ref 3.77–5.28)
SP GR UR STRIP: 1.01 (ref 1–1.03)
UROBILINOGEN UR QL STRIP: NORMAL
VIT B12 BLD-MCNC: 348 PG/ML (ref 211–946)
WBC NRBC COR # BLD AUTO: 6.03 10*3/MM3 (ref 3.4–10.8)

## 2025-01-28 PROCEDURE — 85027 COMPLETE CBC AUTOMATED: CPT | Performed by: NURSE PRACTITIONER

## 2025-01-28 PROCEDURE — 82746 ASSAY OF FOLIC ACID SERUM: CPT | Performed by: NURSE PRACTITIONER

## 2025-01-28 PROCEDURE — 82570 ASSAY OF URINE CREATININE: CPT | Performed by: NURSE PRACTITIONER

## 2025-01-28 PROCEDURE — 83036 HEMOGLOBIN GLYCOSYLATED A1C: CPT | Performed by: NURSE PRACTITIONER

## 2025-01-28 PROCEDURE — 82607 VITAMIN B-12: CPT | Performed by: NURSE PRACTITIONER

## 2025-01-28 PROCEDURE — 82043 UR ALBUMIN QUANTITATIVE: CPT | Performed by: NURSE PRACTITIONER

## 2025-01-28 PROCEDURE — 81003 URINALYSIS AUTO W/O SCOPE: CPT | Performed by: NURSE PRACTITIONER

## 2025-01-28 PROCEDURE — 80061 LIPID PANEL: CPT | Performed by: NURSE PRACTITIONER

## 2025-01-28 PROCEDURE — 36415 COLL VENOUS BLD VENIPUNCTURE: CPT | Performed by: NURSE PRACTITIONER

## 2025-01-28 PROCEDURE — 82306 VITAMIN D 25 HYDROXY: CPT | Performed by: NURSE PRACTITIONER

## 2025-01-28 PROCEDURE — 80053 COMPREHEN METABOLIC PANEL: CPT | Performed by: NURSE PRACTITIONER

## 2025-01-28 PROCEDURE — 84443 ASSAY THYROID STIM HORMONE: CPT | Performed by: NURSE PRACTITIONER

## 2025-01-28 NOTE — PROGRESS NOTES
"Subjective   Chief Complaint   Patient presents with    T2DM     General follow up        Alisa Ames is a 64 y.o. female.    History of Present Illness  The patient presents for evaluation of anxiety and depression.  is present for appt today.     She reports experiencing significant mental distress, characterized by heightened anxiety and depressive symptoms. She attributes this to a spiritual crisis, believing that she has lost her connection with Richy, which she had maintained for the past 40 years. She feels as though she is being manipulated by malevolent forces, leading to a state of constant anxiety. She describes a recent episode where she was abruptly awakened from sleep due to an anxiety attack. Despite these symptoms, she has not sought psychiatric consultation or communicated her current state to her psychiatrist. She expresses reluctance to engage in phone conversations, citing a lack of privacy. She also reports a loss of pauline and love, describing herself as feeling like a \"walking zombie\" devoid of israel. She recalls a time when she was more optimistic and positive, but notes that the past 25 years have been challenging due to her role as a  wife and subsequent isolation. She has previously attended therapy sessions but is currently unwilling to continue due to the inconvenience it poses to her , who accompanies her to all medical appointments.  states this not an inconvenient and he wants her to pursue more frequent appts with behavioral health. She has recently initiated social interactions with two women from her [employer], but is uncertain about the future of these relationships. She believes that her residential four years ago, which led to increased television viewing and exposure to political content, may have contributed to her current state. She denies any SI/HI plan. She does not believe she has a urinary tract infection but willing to do urine sample.     Spoke " with patient's  while she was in restroom. He states she's had delusions for years about having a stalker and delusions have been worse recently about this and Islam views. He feels this was pass but wants it noted in her chart. He plans to follow up with her psychiatrist. He does not feel she is a threat to others or her herself and can keep her safe.     A1c today is 5.8.  Last lipid panel showed elevated total cholesterol due to elevated HDL.  LDL and triglycerides were normal.  Blood pressure has been stable.  She sees cardiology.  Last TSH was normal.  She reports taking all of her medications as prescribed.      Supplemental Information  She has been experiencing neck and shoulder pain, which has improved following a neck injection.    I have reviewed the following portions of the patient's history and confirmed they are accurate: allergies, current medications, past family history, past medical history, past social history, past surgical history, and problem list    Review of Systems  Pertinent items are noted in HPI.     Current Outpatient Medications on File Prior to Visit   Medication Sig    amitriptyline (ELAVIL) 25 MG tablet Take 1-2 tablets by mouth 30 minutes before bedtime as needed for sleep.    aspirin 81 MG EC tablet Take 1 tablet by mouth Daily.    calcium carbonate (Calcium 600) 600 MG tablet Take 1 tablet by mouth Daily.    Cholecalciferol (Vitamin D) 50 MCG (2000 UT) tablet Take 1 tablet by mouth Daily.    cloNIDine (CATAPRES) 0.1 MG tablet TAKE 1 TABLET TWICE DAILY AS NEEDED FOR BLOOD PRESSURE GREATER THAN 160/90    fluticasone (FLONASE) 50 MCG/ACT nasal spray 1 spray by Each Nare route Daily. Shake before using.    levothyroxine (SYNTHROID, LEVOTHROID) 75 MCG tablet Take 1 tablet by mouth Every Morning.    montelukast (SINGULAIR) 10 MG tablet Take 1 tablet by mouth Every Night.    rosuvastatin (Crestor) 20 MG tablet Take 1 tablet by mouth Daily.    vilazodone (VIIBRYD) 20 MG tablet  "tablet Take 1 tablet by mouth Daily.    ferrous sulfate 325 (65 FE) MG tablet Take 1 tablet by mouth Daily With Breakfast. Take with orange juice or vitamin C (Patient not taking: Reported on 1/28/2025)    HYDROcodone-acetaminophen (NORCO)  MG per tablet Take 1 tablet by mouth Every 6 (Six) Hours As Needed for Moderate Pain. (Patient not taking: Reported on 1/28/2025)    lurasidone HCl (LATUDA) 60 MG tablet tablet Take 1 tablet by mouth Daily. with food (Patient taking differently: Take 40 mg by mouth Daily. with food)    methocarbamol (ROBAXIN) 500 MG tablet Take 1 tablet by mouth 2 (Two) Times a Day As Needed for Muscle Spasms.     No current facility-administered medications on file prior to visit.       Objective   Vitals:    01/28/25 1036   BP: 110/76   BP Location: Left arm   Patient Position: Sitting   Cuff Size: Adult   Pulse: 100   Temp: 96.7 °F (35.9 °C)   SpO2: 97%   Weight: 56.7 kg (125 lb)   Height: 165.1 cm (65\")     Body mass index is 20.8 kg/m².    Physical Exam  Vitals reviewed.   Constitutional:       Appearance: Normal appearance. She is well-developed.   HENT:      Head: Normocephalic and atraumatic.      Nose: Nose normal.   Eyes:      General: Lids are normal.      Conjunctiva/sclera: Conjunctivae normal.      Pupils: Pupils are equal, round, and reactive to light.   Neck:      Thyroid: No thyromegaly.      Trachea: Trachea normal.   Cardiovascular:      Rate and Rhythm: Normal rate and regular rhythm.      Heart sounds: Normal heart sounds.   Pulmonary:      Effort: Pulmonary effort is normal. No respiratory distress.      Breath sounds: Normal breath sounds.   Skin:     General: Skin is warm and dry.   Neurological:      Mental Status: She is alert and oriented to person, place, and time.      GCS: GCS eye subscore is 4. GCS verbal subscore is 5. GCS motor subscore is 6.   Psychiatric:         Attention and Perception: Attention normal.         Mood and Affect: Mood normal. Mood is " anxious and depressed.         Speech: Speech normal.         Behavior: Behavior normal. Behavior is cooperative.         Thought Content: Thought content normal. Thought content is paranoid and delusional. Thought content does not include homicidal or suicidal ideation. Thought content does not include homicidal or suicidal plan.         Results  Laboratory Studies  A1c is 5.4.    Lab Results   Component Value Date    WBC 6.03 01/28/2025    HGB 14.3 01/28/2025    HCT 41.4 01/28/2025    MCV 95.2 01/28/2025     01/28/2025      Lab Results   Component Value Date    GLUCOSE 105 (H) 01/28/2025    BUN 8 01/28/2025    CREATININE 1.11 (H) 01/28/2025     (L) 01/28/2025    K 3.9 01/28/2025    CL 83 (L) 01/28/2025    CALCIUM 9.6 01/28/2025    PROTEINTOT 7.5 01/28/2025    ALBUMIN 4.0 01/28/2025    ALT 17 01/28/2025    AST 22 01/28/2025    ALKPHOS 112 01/28/2025    BILITOT 0.4 01/28/2025    GLOB 3.5 01/28/2025    AGRATIO 1.1 01/28/2025    BCR 7.2 01/28/2025    ANIONGAP 15.6 (H) 01/28/2025    EGFR 54.9 (L) 01/28/2025      Lab Results   Component Value Date    HGBA1C 5.80 (H) 01/28/2025      Lab Results   Component Value Date    CHOL 215 (H) 01/28/2025    TRIG 73 01/28/2025     (H) 01/28/2025    LDL 99 01/28/2025      Lab Results   Component Value Date    TSH 2.700 01/28/2025          Assessment & Plan   Problem List Items Addressed This Visit       Hypothyroidism (acquired)    Pure hypercholesterolemia     Other Visit Diagnoses       Type 2 diabetes mellitus with hyperglycemia, unspecified whether long term insulin use    -  Primary    Relevant Orders    POC Glycosylated Hemoglobin (Hb A1C) (Completed)    Microalbumin / Creatinine Urine Ratio - Urine, Clean Catch (Completed)    Anxiety and depression        Other fatigue        Relevant Orders    Urinalysis With Culture If Indicated - (Completed)    Primary hypertension                   Current Outpatient Medications:     amitriptyline (ELAVIL) 25 MG tablet,  Take 1-2 tablets by mouth 30 minutes before bedtime as needed for sleep., Disp: 45 tablet, Rfl: 1    aspirin 81 MG EC tablet, Take 1 tablet by mouth Daily., Disp: 90 tablet, Rfl: 1    calcium carbonate (Calcium 600) 600 MG tablet, Take 1 tablet by mouth Daily., Disp: 90 tablet, Rfl: 1    Cholecalciferol (Vitamin D) 50 MCG (2000 UT) tablet, Take 1 tablet by mouth Daily., Disp: 90 tablet, Rfl: 1    cloNIDine (CATAPRES) 0.1 MG tablet, TAKE 1 TABLET TWICE DAILY AS NEEDED FOR BLOOD PRESSURE GREATER THAN 160/90, Disp: 180 tablet, Rfl: 0    fluticasone (FLONASE) 50 MCG/ACT nasal spray, 1 spray by Each Nare route Daily. Shake before using., Disp: 1 mL, Rfl: 2    levothyroxine (SYNTHROID, LEVOTHROID) 75 MCG tablet, Take 1 tablet by mouth Every Morning., Disp: 90 tablet, Rfl: 2    montelukast (SINGULAIR) 10 MG tablet, Take 1 tablet by mouth Every Night., Disp: 90 tablet, Rfl: 2    rosuvastatin (Crestor) 20 MG tablet, Take 1 tablet by mouth Daily., Disp: 90 tablet, Rfl: 1    vilazodone (VIIBRYD) 20 MG tablet tablet, Take 1 tablet by mouth Daily., Disp: , Rfl:     ferrous sulfate 325 (65 FE) MG tablet, Take 1 tablet by mouth Daily With Breakfast. Take with orange juice or vitamin C (Patient not taking: Reported on 1/28/2025), Disp: 90 tablet, Rfl: 1    HYDROcodone-acetaminophen (NORCO)  MG per tablet, Take 1 tablet by mouth Every 6 (Six) Hours As Needed for Moderate Pain. (Patient not taking: Reported on 1/28/2025), Disp: 28 tablet, Rfl: 0    lurasidone HCl (LATUDA) 60 MG tablet tablet, Take 1 tablet by mouth Daily. with food (Patient taking differently: Take 40 mg by mouth Daily. with food), Disp: 30 tablet, Rfl: 2    methocarbamol (ROBAXIN) 500 MG tablet, Take 1 tablet by mouth 2 (Two) Times a Day As Needed for Muscle Spasms., Disp: 60 tablet, Rfl: 2    Assessment & Plan  Anxiety and depression.  She reports worsening anxiety and depression, which she attributes to spiritual issues. She has not yet contacted her  psychiatrist regarding her current symptoms. She is advised to follow up with her psychiatrist for potential medication adjustments. Therapy is recommended to help change her thought process and focus on positive aspects. She is encouraged to seek support from her [employer] community and find a spiritual leader or trusted individual to guide her through her spiritual journey. Blood work will be conducted today to assess thyroid function and anemia status. A urine sample will also be collected to rule out any potential infection.  feels patient is safe and he can keep her safe. He plans to follow up with her psychiatrist.     She will continue current medications.  Completing labs today to rule out possible causes for concern.  TSH, CMP, CBC, and vitamin levels will be completed.  Repeat A1c in 3 months.  Encouraged low-cholesterol diabetic diet.         Plan of care reviewed with the patient at the conclusion of today's visit.  Education was provided regarding diagnosis, management, and any prescribed or recommended OTC medications.  Patient verbalized understanding of and agreement with management plan.     Return in about 3 months (around 4/28/2025), or if symptoms worsen or fail to improve, for Follow-up.      Transcribed from ambient dictation for GERSON Arellano by GERSON Arellano.  01/28/25   11:17 EST    Patient or patient representative verbalized consent for the use of Ambient Listening during the visit with  GERSON Arellano for chart documentation. 2/2/2025  23:04 EST

## 2025-01-29 LAB
ALBUMIN SERPL-MCNC: 4 G/DL (ref 3.5–5.2)
ALBUMIN/GLOB SERPL: 1.1 G/DL
ALP SERPL-CCNC: 112 U/L (ref 39–117)
ALT SERPL W P-5'-P-CCNC: 17 U/L (ref 1–33)
ANION GAP SERPL CALCULATED.3IONS-SCNC: 15.6 MMOL/L (ref 5–15)
AST SERPL-CCNC: 22 U/L (ref 1–32)
BILIRUB SERPL-MCNC: 0.4 MG/DL (ref 0–1.2)
BUN SERPL-MCNC: 8 MG/DL (ref 8–23)
BUN/CREAT SERPL: 7.2 (ref 7–25)
CALCIUM SPEC-SCNC: 9.6 MG/DL (ref 8.6–10.5)
CHLORIDE SERPL-SCNC: 83 MMOL/L (ref 98–107)
CHOLEST SERPL-MCNC: 215 MG/DL (ref 0–200)
CO2 SERPL-SCNC: 23.4 MMOL/L (ref 22–29)
CREAT SERPL-MCNC: 1.11 MG/DL (ref 0.57–1)
EGFRCR SERPLBLD CKD-EPI 2021: 54.9 ML/MIN/1.73
GLOBULIN UR ELPH-MCNC: 3.5 GM/DL
GLUCOSE SERPL-MCNC: 105 MG/DL (ref 65–99)
HDLC SERPL-MCNC: 103 MG/DL (ref 40–60)
LDLC SERPL CALC-MCNC: 99 MG/DL (ref 0–100)
LDLC/HDLC SERPL: 0.95 {RATIO}
POTASSIUM SERPL-SCNC: 3.9 MMOL/L (ref 3.5–5.2)
PROT SERPL-MCNC: 7.5 G/DL (ref 6–8.5)
SODIUM SERPL-SCNC: 122 MMOL/L (ref 136–145)
TRIGL SERPL-MCNC: 73 MG/DL (ref 0–150)
TSH SERPL DL<=0.05 MIU/L-ACNC: 2.7 UIU/ML (ref 0.27–4.2)
VLDLC SERPL-MCNC: 13 MG/DL (ref 5–40)

## 2025-02-27 ENCOUNTER — TELEPHONE (OUTPATIENT)
Dept: INTERNAL MEDICINE | Facility: CLINIC | Age: 65
End: 2025-02-27
Payer: MEDICARE
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
## 2025-02-27 DIAGNOSIS — S09.90XA INJURY OF HEAD, INITIAL ENCOUNTER: Primary | ICD-10-CM

## 2025-02-27 NOTE — TELEPHONE ENCOUNTER
Had a fall in bathroom 3 days ago. Hit head and still having headaches. Advised patient to go to ER. She declines and says they will not do anything for her. She wants Shanae to order a MRI of her head. Please call her today.

## 2025-02-27 NOTE — TELEPHONE ENCOUNTER
Hailee - just seeing her message about falling. I ordered stat CT head. Please let her know that for head injury she needs CT and not MRI. Please let her know highly suggest going to ER.

## 2025-03-06 ENCOUNTER — OFFICE VISIT (OUTPATIENT)
Dept: INTERNAL MEDICINE | Facility: CLINIC | Age: 65
End: 2025-03-06
Payer: MEDICARE

## 2025-03-06 ENCOUNTER — LAB (OUTPATIENT)
Dept: INTERNAL MEDICINE | Facility: CLINIC | Age: 65
End: 2025-03-06
Payer: MEDICARE

## 2025-03-06 VITALS
HEART RATE: 115 BPM | HEIGHT: 65 IN | OXYGEN SATURATION: 97 % | SYSTOLIC BLOOD PRESSURE: 104 MMHG | TEMPERATURE: 97.1 F | BODY MASS INDEX: 19.73 KG/M2 | DIASTOLIC BLOOD PRESSURE: 64 MMHG | WEIGHT: 118.4 LBS

## 2025-03-06 DIAGNOSIS — R74.8 ELEVATED LIVER ENZYMES: ICD-10-CM

## 2025-03-06 DIAGNOSIS — E78.00 PURE HYPERCHOLESTEROLEMIA: Primary | ICD-10-CM

## 2025-03-06 DIAGNOSIS — D50.8 IRON DEFICIENCY ANEMIA SECONDARY TO INADEQUATE DIETARY IRON INTAKE: ICD-10-CM

## 2025-03-06 DIAGNOSIS — R52 BODY ACHES: ICD-10-CM

## 2025-03-06 DIAGNOSIS — N28.9 FUNCTION KIDNEY DECREASED: ICD-10-CM

## 2025-03-06 LAB
BASOPHILS # BLD AUTO: 0.07 10*3/MM3 (ref 0–0.2)
BASOPHILS NFR BLD AUTO: 0.9 % (ref 0–1.5)
DEPRECATED RDW RBC AUTO: 42.1 FL (ref 37–54)
EOSINOPHIL # BLD AUTO: 0.06 10*3/MM3 (ref 0–0.4)
EOSINOPHIL NFR BLD AUTO: 0.8 % (ref 0.3–6.2)
ERYTHROCYTE [DISTWIDTH] IN BLOOD BY AUTOMATED COUNT: 11.9 % (ref 12.3–15.4)
HCT VFR BLD AUTO: 38.1 % (ref 34–46.6)
HGB BLD-MCNC: 12.4 G/DL (ref 12–15.9)
IMM GRANULOCYTES # BLD AUTO: 0.02 10*3/MM3 (ref 0–0.05)
IMM GRANULOCYTES NFR BLD AUTO: 0.3 % (ref 0–0.5)
LYMPHOCYTES # BLD AUTO: 2.04 10*3/MM3 (ref 0.7–3.1)
LYMPHOCYTES NFR BLD AUTO: 27.6 % (ref 19.6–45.3)
MCH RBC QN AUTO: 31.3 PG (ref 26.6–33)
MCHC RBC AUTO-ENTMCNC: 32.5 G/DL (ref 31.5–35.7)
MCV RBC AUTO: 96.2 FL (ref 79–97)
MONOCYTES # BLD AUTO: 0.82 10*3/MM3 (ref 0.1–0.9)
MONOCYTES NFR BLD AUTO: 11.1 % (ref 5–12)
NEUTROPHILS NFR BLD AUTO: 4.38 10*3/MM3 (ref 1.7–7)
NEUTROPHILS NFR BLD AUTO: 59.3 % (ref 42.7–76)
NRBC BLD AUTO-RTO: 0 /100 WBC (ref 0–0.2)
PLATELET # BLD AUTO: 426 10*3/MM3 (ref 140–450)
PMV BLD AUTO: 10.6 FL (ref 6–12)
RBC # BLD AUTO: 3.96 10*6/MM3 (ref 3.77–5.28)
WBC NRBC COR # BLD AUTO: 7.39 10*3/MM3 (ref 3.4–10.8)

## 2025-03-06 PROCEDURE — 80053 COMPREHEN METABOLIC PANEL: CPT | Performed by: NURSE PRACTITIONER

## 2025-03-06 PROCEDURE — 1125F AMNT PAIN NOTED PAIN PRSNT: CPT | Performed by: NURSE PRACTITIONER

## 2025-03-06 PROCEDURE — 85025 COMPLETE CBC W/AUTO DIFF WBC: CPT | Performed by: NURSE PRACTITIONER

## 2025-03-06 PROCEDURE — 3044F HG A1C LEVEL LT 7.0%: CPT | Performed by: NURSE PRACTITIONER

## 2025-03-06 PROCEDURE — 99214 OFFICE O/P EST MOD 30 MIN: CPT | Performed by: NURSE PRACTITIONER

## 2025-03-06 RX ORDER — LURASIDONE HYDROCHLORIDE 60 MG/1
60 TABLET, FILM COATED ORAL DAILY
COMMUNITY
Start: 2025-03-06

## 2025-03-06 RX ORDER — ROSUVASTATIN CALCIUM 40 MG/1
40 TABLET, COATED ORAL DAILY
Qty: 90 TABLET | Refills: 1 | Status: SHIPPED | OUTPATIENT
Start: 2025-03-06

## 2025-03-06 RX ORDER — GABAPENTIN 100 MG/1
100 CAPSULE ORAL 3 TIMES DAILY
COMMUNITY
Start: 2025-03-06

## 2025-03-07 LAB
ALBUMIN SERPL-MCNC: 4 G/DL (ref 3.5–5.2)
ALBUMIN/GLOB SERPL: 1.2 G/DL
ALP SERPL-CCNC: 101 U/L (ref 39–117)
ALT SERPL W P-5'-P-CCNC: 22 U/L (ref 1–33)
ANION GAP SERPL CALCULATED.3IONS-SCNC: 11.6 MMOL/L (ref 5–15)
AST SERPL-CCNC: 28 U/L (ref 1–32)
BILIRUB SERPL-MCNC: 0.2 MG/DL (ref 0–1.2)
BUN SERPL-MCNC: 13 MG/DL (ref 8–23)
BUN/CREAT SERPL: 9.4 (ref 7–25)
CALCIUM SPEC-SCNC: 10.2 MG/DL (ref 8.6–10.5)
CHLORIDE SERPL-SCNC: 98 MMOL/L (ref 98–107)
CO2 SERPL-SCNC: 26.4 MMOL/L (ref 22–29)
CREAT SERPL-MCNC: 1.39 MG/DL (ref 0.57–1)
EGFRCR SERPLBLD CKD-EPI 2021: 41.9 ML/MIN/1.73
GLOBULIN UR ELPH-MCNC: 3.4 GM/DL
GLUCOSE SERPL-MCNC: 113 MG/DL (ref 65–99)
POTASSIUM SERPL-SCNC: 4.3 MMOL/L (ref 3.5–5.2)
PROT SERPL-MCNC: 7.4 G/DL (ref 6–8.5)
SODIUM SERPL-SCNC: 136 MMOL/L (ref 136–145)

## 2025-03-10 ENCOUNTER — TELEPHONE (OUTPATIENT)
Dept: INTERNAL MEDICINE | Facility: CLINIC | Age: 65
End: 2025-03-10

## 2025-03-10 RX ORDER — MONTELUKAST SODIUM 10 MG/1
10 TABLET ORAL NIGHTLY
Qty: 90 TABLET | Refills: 2 | Status: SHIPPED | OUTPATIENT
Start: 2025-03-10

## 2025-03-10 NOTE — TELEPHONE ENCOUNTER
Caller: Alisa Ames    Relationship: Self    Best call back number: [phone]     What medication are you requesting: ANTIBIOTIC FOR UTI    What are your current symptoms: UTI    How long have you been experiencing symptoms: 2 WEEKS    Have you had these symptoms before:    [x] Yes  [] No    Have you been treated for these symptoms before:   [x] Yes  [] No    If a prescription is needed, what is your preferred pharmacy and phone number: [facility] - [address] - [phone]  - [phone] FX     Additional notes: PT STATED SHE FORGOT TO  TELL YOU ON 03-06-25. PT SAID THE HOSPITAL STAFF TOLD HER SHE HAD A UTI.

## 2025-03-10 NOTE — TELEPHONE ENCOUNTER
Spoke with patient. She stated that she thinks she was treated for the UTI but feels like she may have another one coming on. She doesn't want to make an appointment at this time, said she would if her sxs get worse.

## 2025-03-10 NOTE — TELEPHONE ENCOUNTER
Caller: Alisa Ames    Relationship: Self    Best call back number: [phone]     Requested Prescriptions:   Requested Prescriptions     Pending Prescriptions Disp Refills    montelukast (SINGULAIR) 10 MG tablet 90 tablet 2     Sig: Take 1 tablet by mouth Every Night.        Pharmacy where request should be sent: [facility] - [place], [place] - [address] [facility] - [phone] [facility] [phone] FX     Last office visit with prescribing clinician: 3/6/2025   Last telemedicine visit with prescribing clinician: Visit date not found   Next office visit with prescribing clinician: 4/15/2025     Additional details provided by patient: PT HAS 3 DAYS MEDS LEFT.    Does the patient have less than a 3 day supply:  [x] Yes  [] No    Would you like a call back once the refill request has been completed: [] Yes [x] No    If the office needs to give you a call back, can they leave a voicemail: [] Yes [x] No    Ahmet Garcia Rep   03/10/25 08:31 EDT

## 2025-03-11 ENCOUNTER — OFFICE VISIT (OUTPATIENT)
Dept: INTERNAL MEDICINE | Facility: CLINIC | Age: 65
End: 2025-03-11
Payer: MEDICARE

## 2025-03-11 ENCOUNTER — OFFICE VISIT (OUTPATIENT)
Dept: ORTHOPEDIC SURGERY | Facility: CLINIC | Age: 65
End: 2025-03-11
Payer: MEDICARE

## 2025-03-11 VITALS
BODY MASS INDEX: 19.22 KG/M2 | DIASTOLIC BLOOD PRESSURE: 68 MMHG | OXYGEN SATURATION: 99 % | TEMPERATURE: 97 F | HEIGHT: 66 IN | HEART RATE: 84 BPM | WEIGHT: 119.6 LBS | SYSTOLIC BLOOD PRESSURE: 122 MMHG

## 2025-03-11 VITALS
DIASTOLIC BLOOD PRESSURE: 60 MMHG | SYSTOLIC BLOOD PRESSURE: 100 MMHG | HEIGHT: 65 IN | WEIGHT: 119 LBS | BODY MASS INDEX: 19.83 KG/M2

## 2025-03-11 DIAGNOSIS — E03.9 HYPOTHYROIDISM (ACQUIRED): ICD-10-CM

## 2025-03-11 DIAGNOSIS — R05.1 ACUTE COUGH: ICD-10-CM

## 2025-03-11 DIAGNOSIS — R09.82 POST-NASAL DRIP: Primary | ICD-10-CM

## 2025-03-11 DIAGNOSIS — M17.11 PRIMARY OSTEOARTHRITIS OF RIGHT KNEE: Primary | ICD-10-CM

## 2025-03-11 DIAGNOSIS — R22.1 LUMP IN THROAT: ICD-10-CM

## 2025-03-11 DIAGNOSIS — J02.9 SORE THROAT: ICD-10-CM

## 2025-03-11 LAB
EXPIRATION DATE: NORMAL
EXPIRATION DATE: NORMAL
FLUAV AG UPPER RESP QL IA.RAPID: NOT DETECTED
FLUBV AG UPPER RESP QL IA.RAPID: NOT DETECTED
INTERNAL CONTROL: NORMAL
INTERNAL CONTROL: NORMAL
Lab: NORMAL
Lab: NORMAL
S PYO AG THROAT QL: NEGATIVE
SARS-COV-2 AG UPPER RESP QL IA.RAPID: NOT DETECTED

## 2025-03-11 PROCEDURE — 87428 SARSCOV & INF VIR A&B AG IA: CPT | Performed by: NURSE PRACTITIONER

## 2025-03-11 PROCEDURE — 1160F RVW MEDS BY RX/DR IN RCRD: CPT | Performed by: NURSE PRACTITIONER

## 2025-03-11 PROCEDURE — 3044F HG A1C LEVEL LT 7.0%: CPT | Performed by: NURSE PRACTITIONER

## 2025-03-11 PROCEDURE — 99214 OFFICE O/P EST MOD 30 MIN: CPT | Performed by: NURSE PRACTITIONER

## 2025-03-11 PROCEDURE — 1160F RVW MEDS BY RX/DR IN RCRD: CPT | Performed by: ORTHOPAEDIC SURGERY

## 2025-03-11 PROCEDURE — 1159F MED LIST DOCD IN RCRD: CPT | Performed by: NURSE PRACTITIONER

## 2025-03-11 PROCEDURE — 1159F MED LIST DOCD IN RCRD: CPT | Performed by: ORTHOPAEDIC SURGERY

## 2025-03-11 PROCEDURE — 87880 STREP A ASSAY W/OPTIC: CPT | Performed by: NURSE PRACTITIONER

## 2025-03-11 PROCEDURE — 1125F AMNT PAIN NOTED PAIN PRSNT: CPT | Performed by: NURSE PRACTITIONER

## 2025-03-11 RX ORDER — LIDOCAINE HYDROCHLORIDE 10 MG/ML
3 INJECTION, SOLUTION EPIDURAL; INFILTRATION; INTRACAUDAL; PERINEURAL
Status: COMPLETED | OUTPATIENT
Start: 2025-03-11 | End: 2025-03-11

## 2025-03-11 RX ORDER — VILAZODONE HYDROCHLORIDE 20 MG/1
1 TABLET ORAL DAILY
COMMUNITY

## 2025-03-11 RX ORDER — BACLOFEN 10 MG/1
1 TABLET ORAL 3 TIMES DAILY
COMMUNITY

## 2025-03-11 RX ORDER — ONDANSETRON 4 MG/1
4 TABLET, FILM COATED ORAL EVERY 6 HOURS PRN
COMMUNITY

## 2025-03-11 RX ORDER — TRIAMCINOLONE ACETONIDE 40 MG/ML
80 INJECTION, SUSPENSION INTRA-ARTICULAR; INTRAMUSCULAR
Status: COMPLETED | OUTPATIENT
Start: 2025-03-11 | End: 2025-03-11

## 2025-03-11 RX ORDER — BUPIVACAINE HYDROCHLORIDE 2.5 MG/ML
3 INJECTION, SOLUTION EPIDURAL; INFILTRATION; INTRACAUDAL
Status: COMPLETED | OUTPATIENT
Start: 2025-03-11 | End: 2025-03-11

## 2025-03-11 RX ORDER — DICYCLOMINE HCL 20 MG
TABLET ORAL
COMMUNITY

## 2025-03-11 RX ORDER — OXYCODONE AND ACETAMINOPHEN 7.5; 325 MG/1; MG/1
TABLET ORAL
COMMUNITY

## 2025-03-11 RX ORDER — PANTOPRAZOLE SODIUM 40 MG/1
1 TABLET, DELAYED RELEASE ORAL DAILY
COMMUNITY

## 2025-03-11 RX ADMIN — LIDOCAINE HYDROCHLORIDE 3 ML: 10 INJECTION, SOLUTION EPIDURAL; INFILTRATION; INTRACAUDAL; PERINEURAL at 11:13

## 2025-03-11 RX ADMIN — BUPIVACAINE HYDROCHLORIDE 3 ML: 2.5 INJECTION, SOLUTION EPIDURAL; INFILTRATION; INTRACAUDAL at 11:13

## 2025-03-11 RX ADMIN — TRIAMCINOLONE ACETONIDE 80 MG: 40 INJECTION, SUSPENSION INTRA-ARTICULAR; INTRAMUSCULAR at 11:13

## 2025-03-11 NOTE — PROGRESS NOTES
"Chief Complaint   Patient presents with    Sore Throat     Lump in her throat.       HPI  Alisa Ames is a 64 y.o. female presents for a \"lump in her throat\".  She also has been hoarse.  She is concerned that it could be her thyroid.  She states she hadn't been taking her thyroid medication on a regular basis since she got out of the hospital.  The past week she has taken it daily as instructed.     The following portions of the patient's history were reviewed and updated as appropriate: allergies, current medications, past family history, past medical history, past social history, past surgical history, and problem list.    Subjective  Review of Systems   HENT:  Positive for congestion, postnasal drip and voice change.        Objective  Visit Vitals  /68 (BP Location: Left arm, Patient Position: Sitting)   Pulse 84   Temp 97 °F (36.1 °C)   Ht 167.6 cm (66\")   Wt 54.3 kg (119 lb 9.6 oz)   SpO2 99%   BMI 19.30 kg/m²        Physical Exam  Vitals and nursing note reviewed.   HENT:      Head: Normocephalic.      Right Ear: There is impacted cerumen.      Ears:      Comments: L Tm dull     Nose: Nose normal.      Mouth/Throat:      Pharynx: Posterior oropharyngeal erythema present.      Comments: +PND   Eyes:      Pupils: Pupils are equal, round, and reactive to light.   Pulmonary:      Effort: Pulmonary effort is normal. No respiratory distress.   Skin:     General: Skin is warm and dry.      Capillary Refill: Capillary refill takes less than 2 seconds.   Neurological:      General: No focal deficit present.      Mental Status: She is alert and oriented to person, place, and time.      Gait: Gait is intact.   Psychiatric:         Attention and Perception: Attention normal.         Mood and Affect: Mood normal.         Behavior: Behavior normal.          Procedures     Results for orders placed or performed in visit on 03/11/25   POCT SARS-CoV-2 Antigen KENDRA + Flu    Collection Time: 03/11/25  1:30 PM    Specimen: " Swab   Result Value Ref Range    SARS Antigen Not Detected Not Detected, Presumptive Negative    Influenza A Antigen KENDRA Not Detected Not Detected    Influenza B Antigen KENDRA Not Detected Not Detected    Internal Control Passed Passed    Lot Number [id number]     Expiration Date 03/05/2026    POCT rapid strep A    Collection Time: 03/11/25  1:31 PM    Specimen: Swab   Result Value Ref Range    Rapid Strep A Screen Negative Negative, VALID, INVALID, Not Performed    Internal Control Passed Passed    Lot Number [id number]     Expiration Date 03/06/2027             Assessment and Plan  Diagnoses and all orders for this visit:    1. Post-nasal drip (Primary)    2. Acute cough  -     POCT SARS-CoV-2 Antigen KENDRA + Flu    3. Sore throat  -     POCT rapid strep A    4. Hypothyroidism (acquired)  -     TSH Rfx On Abnormal To Free T4; Future    5. Lump in throat  -     TSH Rfx On Abnormal To Free T4; Future    Neg testing  Recommend she restart her Flonase to help with PND  Return in 2-3 months to have thyroid rechecked, instructed to take medication consistently  Start Claritin daily    Return for Next scheduled follow up.      GERSON Luciano

## 2025-03-11 NOTE — PROGRESS NOTES
Orthopaedic Clinic Note: Knee Established Patient    Chief Complaint   Patient presents with    Follow-up     4.5 month follow up - Primary osteoarthritis of right knee        HPI    It has been 4  month(s) since Ms. Ames's last visit. She returns to clinic today for follow-up right knee osteoarthritis per patient with cortisone injection the right knee 4 and half months ago.  The injection worked well for about 3-1/2 months.  Pain is returned.  Rates it a 1/10 on the pain scale today.  She is ambulating with no assistive device.  Denies fevers chills or constitutional symptoms.  Overall she is doing about same.      Past Medical History:   Diagnosis Date    COPD (chronic obstructive pulmonary disease)     Fibromyalgia     Hyperlipidemia     Malignant neoplasm     Migraine     Syncope     Thyroid nodule     Wears dentures     UPPER AND LOWER       Past Surgical History:   Procedure Laterality Date    APPENDECTOMY      BREAST BIOPSY Right     CERVICAL SPINE SURGERY      Fibromyalgia and DDD with h/o C spine surgery- initially on flexeril bid.    COLONOSCOPY  2015    NECK SURGERY      NOSE SURGERY      r/t Skin CA    SHOULDER SURGERY Left 09/10/2020    left shoulder arthroscopy with lysis of adhesions and manipulation under anesthesia    SKIN CANCER EXCISION      THYROID LOBECTOMY Right     On discussion, pt reported a h/o right thyroid lobectomy for goiter.U/S demo surgical absence of right lobe and diffuse nodularity of the left lobe with a dominant nodule in the lower pole of 1.8 x 3.5 cm.    TONSILLECTOMY      TOTAL HIP ARTHROPLASTY Left 1/7/2019    Procedure: TOTAL HIP ARTHROPLASTY LEFT;  Surgeon: Allen Madera MD;  Location: [facility];  Service: Orthopedics    TOTAL THYROIDECTOMY        Family History   Problem Relation Age of Onset    Other Mother         malignant neoplasm    Coronary artery disease Mother         MI (60's)    Heart attack Mother     Other Other         malignant neoplasm    Valvular  heart disease Father     Birth defects Son     Breast cancer Daughter 40    Breast cancer Paternal Grandmother         DX AGE MID 80'S    Breast cancer Maternal Cousin         DX AGE 40'S    Ovarian cancer Neg Hx      Social History     Socioeconomic History    Marital status:    Tobacco Use    Smoking status: Former     Current packs/day: 0.00     Average packs/day: 1 pack/day for 40.0 years (40.0 ttl pk-yrs)     Types: Cigarettes     Start date:      Quit date:      Years since quittin.1    Smokeless tobacco: Never    Tobacco comments:     QUIT SMOKING WITH E C[employer]-1 WEEK AGO    Vaping Use    Vaping status: Former    Quit date: 2020    Substances: Nicotine, Flavoring    Devices: Refillable tank   Substance and Sexual Activity    Alcohol use: Yes     Alcohol/week: 1.0 standard drink of alcohol     Types: 1 Cans of beer per week     Comment: 1 drink per month    Drug use: No    Sexual activity: Yes     Partners: Male      Current Outpatient Medications on File Prior to Visit   Medication Sig Dispense Refill    amitriptyline (ELAVIL) 25 MG tablet As Needed.      aspirin 81 MG EC tablet Take 1 tablet by mouth Daily. 90 tablet 1    baclofen (LIORESAL) 10 MG tablet Take 1 tablet by mouth 3 (Three) Times a Day.      calcium carbonate (Calcium 600) 600 MG tablet Take 1 tablet by mouth Daily. 90 tablet 1    Cholecalciferol (Vitamin D) 50 MCG (2000 UT) tablet Take 1 tablet by mouth Daily. 90 tablet 1    cloNIDine (CATAPRES) 0.1 MG tablet TAKE 1 TABLET TWICE DAILY AS NEEDED FOR BLOOD PRESSURE GREATER THAN 160/90 180 tablet 0    dicyclomine (BENTYL) 20 MG tablet TAKE ONE TABLET BY MOUTH TWICE DAILY AS NEEDED FOR ABDOMINAL PAIN.      fluticasone (FLONASE) 50 MCG/ACT nasal spray 1 spray by Each Nare route Daily. Shake before using. 1 mL 2    gabapentin (NEURONTIN) 100 MG capsule Take 1 capsule by mouth 3 (Three) Times a Day.      levothyroxine (SYNTHROID, LEVOTHROID) 75 MCG tablet Take 1 tablet by mouth  "Every Morning. 90 tablet 2    lurasidone HCl (LATUDA) 60 MG tablet tablet Take 1 tablet by mouth Daily. with food      methocarbamol (ROBAXIN) 500 MG tablet Take 1 tablet by mouth 2 (Two) Times a Day As Needed for Muscle Spasms. 60 tablet 2    montelukast (SINGULAIR) 10 MG tablet Take 1 tablet by mouth Every Night. 90 tablet 2    ondansetron (ZOFRAN) 4 MG tablet Take 1 tablet by mouth Every 6 (Six) Hours As Needed for Nausea or Vomiting.      oxyCODONE-acetaminophen (PERCOCET) 7.5-325 MG per tablet TAKE ONE TABLET BY MOUTH THREE TIMES DAILY DO NOT FILL UNTIL EVERY 28 DAYS ROSELIA 25 RX      pantoprazole (PROTONIX) 40 MG EC tablet Take 1 tablet by mouth Daily.      rosuvastatin (Crestor) 40 MG tablet Take 1 tablet by mouth Daily. 90 tablet 1    vilazodone (VIIBRYD) 20 MG tablet tablet Take 1 tablet by mouth Daily.      HYDROcodone-acetaminophen (NORCO)  MG per tablet Take 1 tablet by mouth Every 6 (Six) Hours As Needed for Moderate Pain. (Patient not taking: Reported on 3/11/2025) 28 tablet 0     No current facility-administered medications on file prior to visit.      No Known Allergies     Review of Systems   Constitutional: Negative.    HENT: Negative.     Eyes: Negative.    Respiratory: Negative.     Cardiovascular: Negative.    Gastrointestinal: Negative.    Endocrine: Negative.    Genitourinary: Negative.    Musculoskeletal:  Positive for arthralgias.   Skin: Negative.    Allergic/Immunologic: Negative.    Neurological: Negative.    Hematological: Negative.    Psychiatric/Behavioral: Negative.          The patient's Review of Systems was personally reviewed and confirmed as accurate.    Physical Exam  Blood pressure 100/60, height 165.1 cm (65\"), weight 54 kg (119 lb), not currently breastfeeding.    Body mass index is 19.8 kg/m².    GENERAL APPEARANCE: awake, alert, oriented, in no acute distress and well developed, well nourished  LUNGS:  breathing nonlabored  EXTREMITIES: no clubbing, cyanosis  PERIPHERAL " PULSES: palpable dorsalis pedis and posterior tibial pulses bilaterally.    GAIT:  Normal        ----------  Right Knee Exam:  ----------  ALIGNMENT: moderate varus, correctible to neutral  ----------  RANGE OF MOTION:  Decreased (5 - 115 degrees) with no extensor lag  LIGAMENTOUS STABILITY:   stable to varus and valgus stress at terminal extension and 30 degrees; retensioning of the MCL is appreciated with valgus stress at 30 degrees consistent with medial compartment degeneration  ----------  STRENGTH:  KNEE FLEXION 4/5  KNEE EXTENSION  4/5  ANKLE DORSIFLEXION  5/5  ANKLE PLANTARFLEXION  5/5  ----------  PAIN WITH PALPATION:global, especially medial joint  KNEE EFFUSION: yes, mild effusion  PAIN WITH KNEE ROM: yes  PATELLAR CREPITUS:  yes, painful and symptomatic  ----------  SENSATION TO LIGHT TOUCH:  DEEP PERONEAL/SUPERFICIAL PERONEAL/SURAL/SAPHENOUS/TIBIAL:    intact  ----------  EDEMA:  no  ERYTHEMA:    no  WOUNDS/INCISIONS:   no  _____________________________________________________________________  _____________________________________________________________________    RADIOGRAPHIC FINDINGS:   No new imaging today    Assessment/Plan:   Diagnosis Plan   1. Primary osteoarthritis of right knee          The patient has failed conservative treatment measures and is a candidate for joint arthroplasty.  I discussed the joint arthroplasty surgical process as well as the recovery and rehabilitation time frame.  The patient asked several questions regarding the joint arthroplasty surgery, which were answered accordingly.  Ultimately, the patient declines surgical intervention at this time and wishes to continue with conservative treatment measures.  Alternative conservative treatment measures were discussed including bracing, therapy, topical/oral anti-inflammatories, activity modification, and weight loss.  The patient considered these treatment options and wishes to proceed with corticosteroid injection(s) today.   Therefore we will proceed with corticosteroid injection(s) today.  Follow-up in 4 months for reevaluation.    Procedure Note:  I discussed with the patient the potential benefits of performing a therapeutic injection of the right knee as well as potential risks including but not limited to infection, swelling, pain, bleeding, bruising, nerve/vessel damage, skin color changes, transient elevation in blood glucose levels, and fat atrophy. After informed consent and verifying correct patient, procedure site, and type of procedure, the area was prepped with alcohol, ethyl chloride was used to numb the skin. Via the superolateral approach, 3 cc of 1% lidocaine, 3 cc of 0.25% Marcaine and 2 cc of 40mg/ml of Kenalog were injected into the right knee. The patient tolerated the procedure well. There were no complications. A sterile dressing was placed over the injection site.        Allen Madera MD  03/11/25  11:12 EDT

## 2025-03-11 NOTE — PROGRESS NOTES
Procedure   - Large Joint Arthrocentesis: R knee on 3/11/2025 11:13 AM  Indications: pain  Details: 21 G needle, superolateral approach  Medications: 3 mL lidocaine PF 1% 1 %; 3 mL bupivacaine (PF) 0.25 %; 80 mg triamcinolone acetonide 40 MG/ML  Outcome: tolerated well, no immediate complications  Procedure, treatment alternatives, risks and benefits explained, specific risks discussed. Consent was given by the patient. Immediately prior to procedure a time out was called to verify the correct patient, procedure, equipment, support staff and site/side marked as required. Patient was prepped and draped in the usual sterile fashion.
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
## 2025-03-18 NOTE — TELEPHONE ENCOUNTER
Caller: Alisa Ames    Relationship: Self    Best call back number: [phone]    Requested Prescriptions:   Requested Prescriptions     Pending Prescriptions Disp Refills    cloNIDine (CATAPRES) 0.1 MG tablet 180 tablet 0     Sig: TAKE 1 TABLET TWICE DAILY AS NEEDED FOR BLOOD PRESSURE GREATER THAN 160/90        Pharmacy where request should be sent:    [facility] [phone]  Last office visit with prescribing clinician: 3/6/2025   Last telemedicine visit with prescribing clinician: Visit date not found   Next office visit with prescribing clinician: 4/15/2025     Additional details provided by patient: MEDICATION HAS  AND PATIENT NEEDS REFILL    Does the patient have less than a 3 day supply:  [x] Yes  [] No    Would you like a call back once the refill request has been completed: [] Yes [x] No    If the office needs to give you a call back, can they leave a voicemail: [] Yes [x] No    Ahmet Clements Rep   25 13:37 EDT         DELETE AFTER READING TO PATIENT: “Thank you for sharing this information with me. I will send a message to the clinical team. Please allow 48 hours for the clinical staff to follow up on this request.”

## 2025-03-19 RX ORDER — CLONIDINE HYDROCHLORIDE 0.1 MG/1
TABLET ORAL
Qty: 60 TABLET | Refills: 0 | Status: SHIPPED | OUTPATIENT
Start: 2025-03-19

## 2025-03-20 ENCOUNTER — TELEPHONE (OUTPATIENT)
Dept: INTERNAL MEDICINE | Facility: CLINIC | Age: 65
End: 2025-03-20
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
## 2025-03-25 ENCOUNTER — TELEPHONE (OUTPATIENT)
Dept: INTERNAL MEDICINE | Facility: CLINIC | Age: 65
End: 2025-03-25

## 2025-03-25 NOTE — TELEPHONE ENCOUNTER
Caller: Alisa Ames    Relationship: Self    Best call back number:     What is the medical concern/diagnosis: M81.0 (ICD-10-CM) - Osteoporosis without current pathological fracture, unspecified osteoporosis type     What specialty or service is being requested: SOMEWHERE CLOSER TO WHERE SHE LIVES. SHE STATED [facility] [place] WOULD BE CLOSER.  THE PATIENT WILL NOT BE AVAILABLE UNTIL LATE APRIL.      THE PATIENT STATED THAT SHE DOES NOT WANT TO SEE THE PREVIOUS REFERRAL TO ENDOCRINOLOGY.  THE OFFICE DOES NOT HAVE ANYTHING UNTIL JULY.  SHE COULD NOT MAKE THE APPT FOR   APRIL 1ST.     THE PATIENT STATED SHE WILL CALL BACK LATE APRIL ABOUT THE NEW REFERRAL.     What is the provider, practice or medical service name:     What is the office location:     What is the office phone number:     Any additional details: THE PATIENT SAID  ALSO LET HER PROVIDER KNOW THAT SHE HAS POST CONCUSSION SYNDROME. SHE SAID SHE HAS ALREADY BEEN SEEN FOR THIS AND DOES NOT NEED TO TALK TO ANYONE ABOUT IT.

## 2025-03-26 ENCOUNTER — TELEPHONE (OUTPATIENT)
Dept: INTERNAL MEDICINE | Facility: CLINIC | Age: 65
End: 2025-03-26
Payer: MEDICARE

## 2025-03-26 NOTE — TELEPHONE ENCOUNTER
Caller: Alisa Ames    Relationship: Self    Best call back number: [phone]     What medication are you requesting: MAXALT    What are your current symptoms: FOR MIGRAINE    How long have you been experiencing symptoms: ONE MONTH        If a prescription is needed, what is your preferred pharmacy and phone number:    [facility] - [place], [place] - [address] - [phone]  - [phone]   Additional notes:PLEASE CALL WITH ANY QUESTIONS OR WHEN COMPLETED.PATIENT HAS POST CONCUSSION SYNDROME           Doxepin Counseling:  Patient advised that the medication is sedating and not to drive a car after taking this medication. Patient informed of potential adverse effects including but not limited to dry mouth, urinary retention, and blurry vision.  The patient verbalized understanding of the proper use and possible adverse effects of doxepin.  All of the patient's questions and concerns were addressed.

## 2025-03-27 DIAGNOSIS — G43.909 MIGRAINE SYNDROME: Primary | Chronic | ICD-10-CM

## 2025-03-27 RX ORDER — RIZATRIPTAN BENZOATE 10 MG/1
10 TABLET ORAL ONCE AS NEEDED
Qty: 9 TABLET | Refills: 0 | Status: SHIPPED | OUTPATIENT
Start: 2025-03-27

## 2025-04-15 ENCOUNTER — OFFICE VISIT (OUTPATIENT)
Dept: INTERNAL MEDICINE | Facility: CLINIC | Age: 65
End: 2025-04-15
Payer: MEDICARE

## 2025-04-15 VITALS
BODY MASS INDEX: 18.35 KG/M2 | HEIGHT: 66 IN | SYSTOLIC BLOOD PRESSURE: 112 MMHG | HEART RATE: 63 BPM | TEMPERATURE: 97.5 F | DIASTOLIC BLOOD PRESSURE: 70 MMHG | OXYGEN SATURATION: 95 % | WEIGHT: 114.2 LBS

## 2025-04-15 DIAGNOSIS — I95.1 ORTHOSTATIC HYPOTENSION: ICD-10-CM

## 2025-04-15 DIAGNOSIS — I65.21 STENOSIS OF RIGHT CAROTID ARTERY: Primary | ICD-10-CM

## 2025-04-15 DIAGNOSIS — N18.32 STAGE 3B CHRONIC KIDNEY DISEASE: ICD-10-CM

## 2025-04-15 DIAGNOSIS — R00.0 TACHYCARDIA: ICD-10-CM

## 2025-04-15 RX ORDER — METOPROLOL TARTRATE 25 MG/1
25 TABLET, FILM COATED ORAL 2 TIMES DAILY
COMMUNITY
Start: 2025-04-15

## 2025-04-15 NOTE — PROGRESS NOTES
Subjective   Chief Complaint   Patient presents with    Lab results      General follow up         Alisa Ames is a 65 y.o. female.    History of Present Illness  The patient presents for evaluation of carotid artery stenosis, orthostatic hypotension, pneumonia, tremors, and kidney function decline.    She was recently hospitalized in [place] due to pneumonia, during which a 90% blockage in her carotid artery was discovered. This has led to a consultation with a cardiologist who is considering a stent placement. She has been experiencing weight loss, weakness, and frequent falls. She is currently seeking a vascular surgeon. She was admitted to the hospital following an episode of syncope and fever, which was initially attributed to a cold but later diagnosed as pneumonia. No other significant findings were reported from her hospital stay.    A history of post-concussion syndrome is noted, and daily blood pressure monitoring is performed, avoiding muscle relaxants if blood pressure is low. However, two episodes of syncope occurred after taking a muscle relaxant when blood pressure was 96/70. Blood pressure tends to drop significantly upon standing, causing lightheadedness. A walker is used at home for mobility. Metoprolol, previously discontinued, has been restarted by Dr. Correa at a dose of 25 mg twice daily. Magnesium 500 mg is also taken.    Some lung congestion persists, but gradual improvement is reported. Occasional rapid, shallow breathing is experienced.    A recurrence of tremors, previously resolved, was noticed about 4 days ago. Metoprolol was prescribed for tachycardia after discontinuing Latuda, which led to hospitalization. Gradual discontinuation of metoprolol was advised if the condition stabilized, but it was recently restarted due to persistent tachycardia.    Consultation with Dr. Chace Rendon regarding kidney function, currently at stage 3, was noted. Regular follow-ups with Dr. Rendon are not  maintained. Recent efforts to increase water intake to 86 ounces are reported after admitting to insufficient hydration.    I have reviewed the following portions of the patient's history and confirmed they are accurate: allergies, current medications, past family history, past medical history, past social history, past surgical history, and problem list    Review of Systems  Pertinent items are noted in HPI.     Current Outpatient Medications on File Prior to Visit   Medication Sig    amitriptyline (ELAVIL) 25 MG tablet As Needed.    aspirin 81 MG EC tablet Take 1 tablet by mouth Daily.    baclofen (LIORESAL) 10 MG tablet Take 1 tablet by mouth 3 (Three) Times a Day.    calcium carbonate (Calcium 600) 600 MG tablet Take 1 tablet by mouth Daily.    Cholecalciferol (Vitamin D) 50 MCG (2000 UT) tablet Take 1 tablet by mouth Daily.    cloNIDine (CATAPRES) 0.1 MG tablet TAKE 1 TABLET TWICE DAILY AS NEEDED FOR BLOOD PRESSURE GREATER THAN 160/90    dicyclomine (BENTYL) 20 MG tablet TAKE ONE TABLET BY MOUTH TWICE DAILY AS NEEDED FOR ABDOMINAL PAIN.    gabapentin (NEURONTIN) 100 MG capsule Take 1 capsule by mouth 3 (Three) Times a Day.    levothyroxine (SYNTHROID, LEVOTHROID) 75 MCG tablet Take 1 tablet by mouth Every Morning.    lurasidone HCl (LATUDA) 60 MG tablet tablet Take 1 tablet by mouth Daily. with food    magnesium, as, gluconate (MAGONATE) 500 (27 Mg) MG tablet Take 1 tablet by mouth Daily.    methocarbamol (ROBAXIN) 500 MG tablet Take 1 tablet by mouth 2 (Two) Times a Day As Needed for Muscle Spasms.    metoprolol tartrate (LOPRESSOR) 25 MG tablet Take 1 tablet by mouth 2 (Two) Times a Day.    montelukast (SINGULAIR) 10 MG tablet Take 1 tablet by mouth Every Night.    ondansetron (ZOFRAN) 4 MG tablet Take 1 tablet by mouth Every 6 (Six) Hours As Needed for Nausea or Vomiting.    oxyCODONE-acetaminophen (PERCOCET) 7.5-325 MG per tablet TAKE ONE TABLET BY MOUTH THREE TIMES DAILY DO NOT FILL UNTIL EVERY 28 DAYS ROSELIA  "25 RX    pantoprazole (PROTONIX) 40 MG EC tablet Take 1 tablet by mouth Daily.    rizatriptan (MAXALT) 10 MG tablet Take 1 tablet by mouth 1 (One) Time As Needed for Migraine for up to 1 dose. May repeat in 2 hours if needed. Do not exceed 30 mg in 24 hours.    rosuvastatin (Crestor) 40 MG tablet Take 1 tablet by mouth Daily.    vilazodone (VIIBRYD) 20 MG tablet tablet Take 1 tablet by mouth Daily.     No current facility-administered medications on file prior to visit.       Objective   Vitals:    04/15/25 1106   BP: 112/70   BP Location: Left arm   Patient Position: Sitting   Cuff Size: Small Adult   Pulse: 63   Temp: 97.5 °F (36.4 °C)   SpO2: 95%   Weight: 51.8 kg (114 lb 3.2 oz)   Height: 167.6 cm (65.98\")     Body mass index is 18.44 kg/m².    Physical Exam  Vitals reviewed.   Constitutional:       Appearance: Normal appearance. She is well-developed.   HENT:      Head: Normocephalic and atraumatic.      Nose: Nose normal.   Eyes:      General: Lids are normal.      Conjunctiva/sclera: Conjunctivae normal.      Pupils: Pupils are equal, round, and reactive to light.   Neck:      Thyroid: No thyromegaly.      Trachea: Trachea normal.   Cardiovascular:      Rate and Rhythm: Normal rate and regular rhythm.      Heart sounds: Normal heart sounds.   Pulmonary:      Effort: Pulmonary effort is normal. No respiratory distress.      Breath sounds: Normal breath sounds.   Skin:     General: Skin is warm and dry.   Neurological:      Mental Status: She is alert and oriented to person, place, and time.      GCS: GCS eye subscore is 4. GCS verbal subscore is 5. GCS motor subscore is 6.      Motor: Tremor present.      Comments: Mild hand tremors bilaterally.    Psychiatric:         Attention and Perception: Attention normal.         Mood and Affect: Mood normal.         Speech: Speech normal.         Behavior: Behavior normal. Behavior is cooperative.         Thought Content: Thought content normal. Thought content does " not include homicidal or suicidal ideation. Thought content does not include homicidal or suicidal plan.         Results  Labs   - Kidney function test: Kidney function had dropped down   - Complete Blood Count (CBC): Blood counts are normal    Imaging   - Carotid Doppler: 01/2024, Right carotid artery had 60% to 69% blockage, left carotid artery was normal    Lab Results   Component Value Date    WBC 7.39 03/06/2025    HGB 12.4 03/06/2025    HCT 38.1 03/06/2025    MCV 96.2 03/06/2025     03/06/2025      Lab Results   Component Value Date    GLUCOSE 113 (H) 03/06/2025    BUN 13 03/06/2025    CREATININE 1.39 (H) 03/06/2025     03/06/2025    K 4.3 03/06/2025    CL 98 03/06/2025    CALCIUM 10.2 03/06/2025    PROTEINTOT 7.4 03/06/2025    ALBUMIN 4.0 03/06/2025    ALT 22 03/06/2025    AST 28 03/06/2025    ALKPHOS 101 03/06/2025    BILITOT 0.2 03/06/2025    GLOB 3.4 03/06/2025    AGRATIO 1.2 03/06/2025    BCR 9.4 03/06/2025    ANIONGAP 11.6 03/06/2025    EGFR 41.9 (L) 03/06/2025      Lab Results   Component Value Date    HGBA1C 5.80 (H) 01/28/2025      Lab Results   Component Value Date    CHOL 215 (H) 01/28/2025    TRIG 73 01/28/2025     (H) 01/28/2025    LDL 99 01/28/2025      Lab Results   Component Value Date    TSH 2.700 01/28/2025          Assessment & Plan   Problem List Items Addressed This Visit    None  Visit Diagnoses         Stenosis of right carotid artery    -  Primary      Orthostatic hypotension          Tachycardia          Stage 3b chronic kidney disease        Relevant Orders    Ambulatory Referral to Nephrology (Completed)               Current Outpatient Medications:     amitriptyline (ELAVIL) 25 MG tablet, As Needed., Disp: , Rfl:     aspirin 81 MG EC tablet, Take 1 tablet by mouth Daily., Disp: 90 tablet, Rfl: 1    baclofen (LIORESAL) 10 MG tablet, Take 1 tablet by mouth 3 (Three) Times a Day., Disp: , Rfl:     calcium carbonate (Calcium 600) 600 MG tablet, Take 1 tablet by mouth  Daily., Disp: 90 tablet, Rfl: 1    Cholecalciferol (Vitamin D) 50 MCG (2000 UT) tablet, Take 1 tablet by mouth Daily., Disp: 90 tablet, Rfl: 1    cloNIDine (CATAPRES) 0.1 MG tablet, TAKE 1 TABLET TWICE DAILY AS NEEDED FOR BLOOD PRESSURE GREATER THAN 160/90, Disp: 60 tablet, Rfl: 0    dicyclomine (BENTYL) 20 MG tablet, TAKE ONE TABLET BY MOUTH TWICE DAILY AS NEEDED FOR ABDOMINAL PAIN., Disp: , Rfl:     gabapentin (NEURONTIN) 100 MG capsule, Take 1 capsule by mouth 3 (Three) Times a Day., Disp: , Rfl:     levothyroxine (SYNTHROID, LEVOTHROID) 75 MCG tablet, Take 1 tablet by mouth Every Morning., Disp: 90 tablet, Rfl: 2    lurasidone HCl (LATUDA) 60 MG tablet tablet, Take 1 tablet by mouth Daily. with food, Disp: , Rfl:     magnesium, as, gluconate (MAGONATE) 500 (27 Mg) MG tablet, Take 1 tablet by mouth Daily., Disp: , Rfl:     methocarbamol (ROBAXIN) 500 MG tablet, Take 1 tablet by mouth 2 (Two) Times a Day As Needed for Muscle Spasms., Disp: 60 tablet, Rfl: 2    metoprolol tartrate (LOPRESSOR) 25 MG tablet, Take 1 tablet by mouth 2 (Two) Times a Day., Disp: , Rfl:     montelukast (SINGULAIR) 10 MG tablet, Take 1 tablet by mouth Every Night., Disp: 90 tablet, Rfl: 2    ondansetron (ZOFRAN) 4 MG tablet, Take 1 tablet by mouth Every 6 (Six) Hours As Needed for Nausea or Vomiting., Disp: , Rfl:     oxyCODONE-acetaminophen (PERCOCET) 7.5-325 MG per tablet, TAKE ONE TABLET BY MOUTH THREE TIMES DAILY DO NOT FILL UNTIL EVERY 28 DAYS JAN 25 RX, Disp: , Rfl:     pantoprazole (PROTONIX) 40 MG EC tablet, Take 1 tablet by mouth Daily., Disp: , Rfl:     rizatriptan (MAXALT) 10 MG tablet, Take 1 tablet by mouth 1 (One) Time As Needed for Migraine for up to 1 dose. May repeat in 2 hours if needed. Do not exceed 30 mg in 24 hours., Disp: 9 tablet, Rfl: 0    rosuvastatin (Crestor) 40 MG tablet, Take 1 tablet by mouth Daily., Disp: 90 tablet, Rfl: 1    vilazodone (VIIBRYD) 20 MG tablet tablet, Take 1 tablet by mouth Daily., Disp: ,  Rfl:     Assessment & Plan  1. Carotid artery stenosis.  - Significant carotid artery stenosis with 90% blockage in some areas and 70% in others, identified during a recent hospital visit in [place].  - Carotid Doppler ultrasound in 01/2024 showed 60% to 69% blockage on the right side and normal findings on the left side.  - CT scan of the carotid artery ordered by Dr. Correa to further evaluate the extent of the blockage and determine if a stent is needed.  - If the CT scan results are delayed, the patient will inform us so that we can expedite the process.    2. Orthostatic hypotension.  - Significant drops in blood pressure upon standing, leading to episodes of fainting.  - Advised to be cautious when transitioning from sitting or lying down to standing and to use a walker at home to prevent falls.  - Currently on metoprolol 25 mg twice a day and magnesium 500 mg daily, as prescribed by Dr. Correa.  - Blood pressure monitoring showed a drop when standing up.    3. Pneumonia.  - Recently hospitalized for pneumonia and currently recovering.  - Reports that her lungs still have some congestion but feels it is improving.  - Lungs sound good with good air movement in the lower lobes.    4. Tremors.  - Tremors have returned about four days ago.  - Currently on metoprolol 25 mg twice a day and magnesium 500 mg daily, re-prescribed by Dr. Correa due to a fast heart rate.  - Tremors noticed after discontinuing Latuda, which affected heart rhythm.    5. Kidney function decline.  - Kidney function has shown a slight decline.  - Advised to maintain adequate hydration.  - Referral to a nephrologist will be made for annual follow-ups to monitor kidney function.  - Blood counts are perfect, but kidney function needs monitoring.    Follow-up  - Follow up in 3 months or sooner if necessary.         Plan of care reviewed with the patient at the conclusion of today's visit.  Education was provided regarding diagnosis,  management, and any prescribed or recommended OTC medications.  Patient verbalized understanding of and agreement with management plan.     Return in about 3 months (around 7/15/2025), or if symptoms worsen or fail to improve, for Follow-up.      Transcribed from ambient dictation for GERSON Arellano by GERSON Arellano.  04/15/25   11:37 EDT    Patient or patient representative verbalized consent for the use of Ambient Listening during the visit with  GERSON Arellano for chart documentation. 4/20/2025  22:04 EDT

## 2025-05-20 ENCOUNTER — TELEPHONE (OUTPATIENT)
Dept: INTERNAL MEDICINE | Age: 65
End: 2025-05-20
Payer: MEDICARE

## 2025-05-20 NOTE — TELEPHONE ENCOUNTER
Caller: Alisa Ames    Relationship: Self    Best call back number: [phone]     What medication are you requesting: MEDICATION TO REPLACE TRAZODONE.     What are your current symptoms: TROUBLE SLEEPING     Have you had these symptoms before:    [x] Yes  [] No    Have you been treated for these symptoms before:   [x] Yes  [] No    If a prescription is needed, what is your preferred pharmacy and phone number: [facility] - [address] - [phone] [facility] [phone]      Additional notes:  PATIENT STATES  CURRENT MEDICATION BEING TAKEN FOR SLEEP IS NOT WORKING AND WOULD LIKE TO KNOW IF SOMETHING ELSE CAN BE CALLED INTO PHARMACY. PLEASE CALL PATIENT AND ADVISE.

## 2025-05-22 NOTE — TELEPHONE ENCOUNTER
I will need to see her to determine what med needs to be started. She has tried and failed some in the past and needs appt to determine best option.

## 2025-05-23 DIAGNOSIS — E03.9 HYPOTHYROIDISM (ACQUIRED): ICD-10-CM

## 2025-05-23 RX ORDER — LEVOTHYROXINE SODIUM 75 UG/1
75 TABLET ORAL
Qty: 90 TABLET | Refills: 2 | Status: SHIPPED | OUTPATIENT
Start: 2025-05-23

## 2025-05-23 NOTE — TELEPHONE ENCOUNTER
LVM for pt to return call.     OK FOR HUB TO RELAY MESSAGE  Pt will need an appointment to discuss new medication.

## 2025-05-23 NOTE — TELEPHONE ENCOUNTER
Caller: Alisa Ames    Relationship: Self    Best call back number: [phone]     Requested Prescriptions:   Requested Prescriptions     Pending Prescriptions Disp Refills    levothyroxine (SYNTHROID, LEVOTHROID) 75 MCG tablet 90 tablet 2     Sig: Take 1 tablet by mouth Every Morning.        Pharmacy where request should be sent: [facility] - [facility] [address] [facility] [phone] [facility] [phone] FX     Last office visit with prescribing clinician: 4/15/2025   Last telemedicine visit with prescribing clinician: Visit date not found   Next office visit with prescribing clinician: 7/21/2025     Additional details provided by patient:     Does the patient have less than a 3 day supply:  [] Yes  [x] No    Would you like a call back once the refill request has been completed: [] Yes [x] No    If the office needs to give you a call back, can they leave a voicemail: [] Yes [x] No    Cadance Dunaway, RegSched Rep   05/23/25 08:32 EDT

## 2025-05-28 ENCOUNTER — TELEPHONE (OUTPATIENT)
Dept: INTERNAL MEDICINE | Age: 65
End: 2025-05-28
Payer: MEDICARE

## 2025-05-28 NOTE — TELEPHONE ENCOUNTER
Pt called the office b/c she said she was supposed to be referred to a new neurologist. For her head injury a month ago. Pt said her personality is changing, head aches are getting worse, and neck pain. Pt only wants to go to  neurology, if a referral could be placed I can route to wear it needs to go next, please advise.

## 2025-05-29 ENCOUNTER — TRANSCRIBE ORDERS (OUTPATIENT)
Dept: ADMINISTRATIVE | Facility: HOSPITAL | Age: 65
End: 2025-05-29
Payer: MEDICARE

## 2025-05-29 DIAGNOSIS — I65.21 STENOSIS OF RIGHT CAROTID ARTERY: Primary | ICD-10-CM

## 2025-05-29 NOTE — TELEPHONE ENCOUNTER
Is her referral from December still good to use? Can you use this to get her in with different provider? I'm not sure who she has seen so far

## 2025-06-03 DIAGNOSIS — S06.0X9D CONCUSSION WITH LOSS OF CONSCIOUSNESS, SUBSEQUENT ENCOUNTER: Primary | ICD-10-CM

## 2025-06-17 ENCOUNTER — OFFICE VISIT (OUTPATIENT)
Dept: ORTHOPEDIC SURGERY | Facility: CLINIC | Age: 65
End: 2025-06-17
Payer: MEDICARE

## 2025-06-17 VITALS
SYSTOLIC BLOOD PRESSURE: 118 MMHG | DIASTOLIC BLOOD PRESSURE: 64 MMHG | BODY MASS INDEX: 18.77 KG/M2 | WEIGHT: 116.8 LBS | HEIGHT: 66 IN

## 2025-06-17 DIAGNOSIS — M16.11 PRIMARY OSTEOARTHRITIS OF RIGHT HIP: ICD-10-CM

## 2025-06-17 DIAGNOSIS — M17.11 PRIMARY OSTEOARTHRITIS OF RIGHT KNEE: Primary | ICD-10-CM

## 2025-06-17 DIAGNOSIS — M70.61 TROCHANTERIC BURSITIS OF RIGHT HIP: ICD-10-CM

## 2025-06-17 DIAGNOSIS — M47.816 LUMBAR SPONDYLOSIS: ICD-10-CM

## 2025-06-17 DIAGNOSIS — M25.551 RIGHT HIP PAIN: ICD-10-CM

## 2025-06-17 RX ORDER — TRIAMCINOLONE ACETONIDE 40 MG/ML
80 INJECTION, SUSPENSION INTRA-ARTICULAR; INTRAMUSCULAR
Status: COMPLETED | OUTPATIENT
Start: 2025-06-17 | End: 2025-06-17

## 2025-06-17 RX ORDER — BUPIVACAINE HYDROCHLORIDE 2.5 MG/ML
3 INJECTION, SOLUTION EPIDURAL; INFILTRATION; INTRACAUDAL; PERINEURAL
Status: COMPLETED | OUTPATIENT
Start: 2025-06-17 | End: 2025-06-17

## 2025-06-17 RX ORDER — LIDOCAINE HYDROCHLORIDE 10 MG/ML
3 INJECTION, SOLUTION EPIDURAL; INFILTRATION; INTRACAUDAL; PERINEURAL
Status: COMPLETED | OUTPATIENT
Start: 2025-06-17 | End: 2025-06-17

## 2025-06-17 RX ADMIN — BUPIVACAINE HYDROCHLORIDE 3 ML: 2.5 INJECTION, SOLUTION EPIDURAL; INFILTRATION; INTRACAUDAL; PERINEURAL at 12:01

## 2025-06-17 RX ADMIN — LIDOCAINE HYDROCHLORIDE 3 ML: 10 INJECTION, SOLUTION EPIDURAL; INFILTRATION; INTRACAUDAL; PERINEURAL at 12:00

## 2025-06-17 RX ADMIN — TRIAMCINOLONE ACETONIDE 80 MG: 40 INJECTION, SUSPENSION INTRA-ARTICULAR; INTRAMUSCULAR at 12:00

## 2025-06-17 RX ADMIN — LIDOCAINE HYDROCHLORIDE 3 ML: 10 INJECTION, SOLUTION EPIDURAL; INFILTRATION; INTRACAUDAL; PERINEURAL at 12:01

## 2025-06-17 RX ADMIN — TRIAMCINOLONE ACETONIDE 80 MG: 40 INJECTION, SUSPENSION INTRA-ARTICULAR; INTRAMUSCULAR at 12:01

## 2025-06-17 RX ADMIN — BUPIVACAINE HYDROCHLORIDE 3 ML: 2.5 INJECTION, SOLUTION EPIDURAL; INFILTRATION; INTRACAUDAL; PERINEURAL at 12:00

## 2025-06-17 NOTE — PROGRESS NOTES
Procedure   - Large Joint Arthrocentesis: R knee on 6/17/2025 12:01 PM  Indications: pain  Details: 21 G needle, superolateral approach  Medications: 3 mL lidocaine PF 1% 1 %; 80 mg triamcinolone acetonide 40 MG/ML; 3 mL bupivacaine (PF) 0.25 %  Outcome: tolerated well, no immediate complications  Procedure, treatment alternatives, risks and benefits explained, specific risks discussed. Consent was given by the patient. Immediately prior to procedure a time out was called to verify the correct patient, procedure, equipment, support staff and site/side marked as required. Patient was prepped and draped in the usual sterile fashion.

## 2025-06-17 NOTE — PROGRESS NOTES
Procedure   - Large Joint Arthrocentesis: R greater trochanteric bursa on 6/17/2025 12:00 PM  Indications: pain  Details: 22 G needle, lateral approach  Medications: 3 mL lidocaine PF 1% 1 %; 80 mg triamcinolone acetonide 40 MG/ML; 3 mL bupivacaine (PF) 0.25 %  Outcome: tolerated well, no immediate complications  Procedure, treatment alternatives, risks and benefits explained, specific risks discussed. Consent was given by the patient. Immediately prior to procedure a time out was called to verify the correct patient, procedure, equipment, support staff and site/side marked as required. Patient was prepped and draped in the usual sterile fashion.

## 2025-06-17 NOTE — PROGRESS NOTES
Orthopaedic Clinic Note: Knee Established Patient    Chief Complaint   Patient presents with    Follow-up     3 month follow up - Primary osteoarthritis of right knee        New problem-right hip pain    HPI    It has been 3  month(s) since Ms. Ames's last visit. She returns to clinic today for follow-up right knee osteoarthritis.  Patient was last seen 3 months ago and underwent intra-articular cortisone injection in the right knee.  Injection provided relief for about 2 months for pain return.  She comes clinic today complaining of recurrent knee pain that she rates a 2/10 on the pain scale.  She is also complaining of hip pain that is new.  That is her main complaint today.  She rates her hip pain an 8/10 on the pain scale.  She is ambulating with the assistance of the cane.  Her pain in the hip is localized to the greater trochanter as well as lumbar paraspinal region..  She denies fevers chills or constitutional symptoms.  Overall she is doing worse.      Past Medical History:   Diagnosis Date    COPD (chronic obstructive pulmonary disease)     Fibromyalgia     Hyperlipidemia     Malignant neoplasm     Migraine     Syncope     Thyroid nodule     Wears dentures     UPPER AND LOWER       Past Surgical History:   Procedure Laterality Date    APPENDECTOMY      BREAST BIOPSY Right     CERVICAL SPINE SURGERY      Fibromyalgia and DDD with h/o C spine surgery- initially on flexeril bid.    COLONOSCOPY  2015    NECK SURGERY      NOSE SURGERY      r/t Skin CA    SHOULDER SURGERY Left 09/10/2020    left shoulder arthroscopy with lysis of adhesions and manipulation under anesthesia    SKIN CANCER EXCISION      THYROID LOBECTOMY Right     On discussion, pt reported a h/o right thyroid lobectomy for goiter.U/S demo surgical absence of right lobe and diffuse nodularity of the left lobe with a dominant nodule in the lower pole of 1.8 x 3.5 cm.    TONSILLECTOMY      TOTAL HIP ARTHROPLASTY Left 1/7/2019    Procedure: TOTAL HIP  ARTHROPLASTY LEFT;  Surgeon: Allen Madera MD;  Location: [facility];  Service: Orthopedics    TOTAL THYROIDECTOMY        Family History   Problem Relation Age of Onset    Other Mother         malignant neoplasm    Coronary artery disease Mother         MI (60's)    Heart attack Mother     Other Other         malignant neoplasm    Valvular heart disease Father     Birth defects Son     Breast cancer Daughter 40    Breast cancer Paternal Grandmother         DX AGE MID 80'S    Breast cancer Maternal Cousin         DX AGE 40'S    Ovarian cancer Neg Hx      Social History     Socioeconomic History    Marital status:    Tobacco Use    Smoking status: Former     Current packs/day: 0.00     Average packs/day: 1 pack/day for 40.0 years (40.0 ttl pk-yrs)     Types: Cigarettes     Start date:      Quit date:      Years since quittin.4    Smokeless tobacco: Never    Tobacco comments:     QUIT SMOKING WITH E C[employer]-1 WEEK AGO    Vaping Use    Vaping status: Former    Quit date: 2020    Substances: Nicotine, Flavoring    Devices: Refillable tank   Substance and Sexual Activity    Alcohol use: Yes     Alcohol/week: 1.0 standard drink of alcohol     Types: 1 Cans of beer per week     Comment: 1 drink per month    Drug use: No    Sexual activity: Yes     Partners: Male      Current Outpatient Medications on File Prior to Visit   Medication Sig Dispense Refill    amitriptyline (ELAVIL) 25 MG tablet As Needed.      aspirin 81 MG EC tablet Take 1 tablet by mouth Daily. 90 tablet 1    baclofen (LIORESAL) 10 MG tablet Take 1 tablet by mouth 3 (Three) Times a Day.      calcium carbonate (Calcium 600) 600 MG tablet Take 1 tablet by mouth Daily. 90 tablet 1    Cholecalciferol (Vitamin D) 50 MCG (2000 UT) tablet Take 1 tablet by mouth Daily. 90 tablet 1    cloNIDine (CATAPRES) 0.1 MG tablet TAKE 1 TABLET TWICE DAILY AS NEEDED FOR BLOOD PRESSURE GREATER THAN 160/90 60 tablet 0    dicyclomine (BENTYL) 20 MG tablet  TAKE ONE TABLET BY MOUTH TWICE DAILY AS NEEDED FOR ABDOMINAL PAIN.      gabapentin (NEURONTIN) 100 MG capsule Take 1 capsule by mouth 3 (Three) Times a Day.      levothyroxine (SYNTHROID, LEVOTHROID) 75 MCG tablet Take 1 tablet by mouth Every Morning. 90 tablet 2    lurasidone HCl (LATUDA) 60 MG tablet tablet Take 1 tablet by mouth Daily. with food      magnesium, as, gluconate (MAGONATE) 500 (27 Mg) MG tablet Take 1 tablet by mouth Daily.      methocarbamol (ROBAXIN) 500 MG tablet Take 1 tablet by mouth 2 (Two) Times a Day As Needed for Muscle Spasms. 60 tablet 2    metoprolol tartrate (LOPRESSOR) 25 MG tablet Take 1 tablet by mouth 2 (Two) Times a Day.      montelukast (SINGULAIR) 10 MG tablet Take 1 tablet by mouth Every Night. 90 tablet 2    ondansetron (ZOFRAN) 4 MG tablet Take 1 tablet by mouth Every 6 (Six) Hours As Needed for Nausea or Vomiting.      oxyCODONE-acetaminophen (PERCOCET) 7.5-325 MG per tablet TAKE ONE TABLET BY MOUTH THREE TIMES DAILY DO NOT FILL UNTIL EVERY 28 DAYS ROSELIA 25 RX      pantoprazole (PROTONIX) 40 MG EC tablet Take 1 tablet by mouth Daily.      rizatriptan (MAXALT) 10 MG tablet Take 1 tablet by mouth 1 (One) Time As Needed for Migraine for up to 1 dose. May repeat in 2 hours if needed. Do not exceed 30 mg in 24 hours. 9 tablet 0    rosuvastatin (Crestor) 40 MG tablet Take 1 tablet by mouth Daily. 90 tablet 1    vilazodone (VIIBRYD) 20 MG tablet tablet Take 1 tablet by mouth Daily.       No current facility-administered medications on file prior to visit.      No Known Allergies     Review of Systems   Constitutional: Negative.    HENT: Negative.     Eyes: Negative.    Respiratory: Negative.     Cardiovascular: Negative.    Gastrointestinal: Negative.    Endocrine: Negative.    Genitourinary: Negative.    Musculoskeletal:  Positive for arthralgias.   Skin: Negative.    Allergic/Immunologic: Negative.    Neurological: Negative.    Hematological: Negative.    Psychiatric/Behavioral:  "Negative.          The patient's Review of Systems was personally reviewed and confirmed as accurate.    Physical Exam  Blood pressure 118/64, height 167.6 cm (65.98\"), weight 53 kg (116 lb 12.8 oz), not currently breastfeeding.    Body mass index is 18.86 kg/m².    GENERAL APPEARANCE: awake, alert, oriented, in no acute distress and well developed, well nourished  LUNGS:  breathing nonlabored  EXTREMITIES: no clubbing, cyanosis  PERIPHERAL PULSES: palpable dorsalis pedis and posterior tibial pulses bilaterally.    GAIT:  Antalgic        ----------  Right hip exam:  ----------  RANGE OF MOTION:   FLEXION CONTRACTURE: None   FLEXION: 110 degrees   INTERNAL ROTATION: 20 degrees at 90 degrees of flexion   EXTERNAL ROTATION: 40 degrees at 90 degrees of flexion    PAIN WITH HIP MOTION: no  PAIN WITH LOGROLL: no  STINCHFIELD TEST: negative    STRENGTH:  5/5 hip adduction, abduction, flexion. 5/5 strength knee flexion, extension. 5/5 strength ankle dorsiflexion and plantarflexion.     GREATER TROCHANTER BURSAL PAIN: Yes  Tender to palpation about lumbar paraspinal musculature and gluteal region.  ------------    Right Knee Exam:  ----------  ALIGNMENT: moderate varus, correctible to neutral  ----------  RANGE OF MOTION:  Decreased (5 - 115 degrees) with no extensor lag  LIGAMENTOUS STABILITY:   stable to varus and valgus stress at terminal extension and 30 degrees; retensioning of the MCL is appreciated with valgus stress at 30 degrees consistent with medial compartment degeneration  ----------  STRENGTH:  KNEE FLEXION 4/5  KNEE EXTENSION  4/5  ANKLE DORSIFLEXION  5/5  ANKLE PLANTARFLEXION  5/5  ----------  PAIN WITH PALPATION:global, especially medial joint  KNEE EFFUSION: yes, mild effusion  PAIN WITH KNEE ROM: yes  PATELLAR CREPITUS:  yes, painful and symptomatic  ----------  SENSATION TO LIGHT TOUCH:  DEEP PERONEAL/SUPERFICIAL PERONEAL/SURAL/SAPHENOUS/TIBIAL:    intact  ----------  EDEMA:  no  ERYTHEMA:    " no  WOUNDS/INCISIONS:   no  _____________________________________________________________________  _____________________________________________________________________    RADIOGRAPHIC FINDINGS:   Indication: Right hip and knee pain    Comparison: Todays xrays were compared to previous xrays from 10/29/2024 and 12/28/2021    Knee films: moderate tricompartmental arthritis with genu varum alignment, periarticular osteophytes visualized in all compartments and No significant changes compared to prior radiographs.    AP pelvis, right hip: mild joint space narrowing, there are marginal osteophytes visualized at the femoral head-neck junction and acetabular margins and No significant changes compared to prior radiographs..  Left hip: Demonstrate a well positioned total hip without evidence of wear, loosening, fracture or osteolysis, femoral head is concentrically reduced within the acetabulum and No significant changes compared to prior radiographs.    Assessment/Plan:   Diagnosis Plan   1. Primary osteoarthritis of right knee  XR Knee 4+ View Right      2. Right hip pain  XR Hip With or Without Pelvis 2 - 3 View Right      3. Lumbar spondylosis        4. Trochanteric bursitis of right hip        5. Primary osteoarthritis of right hip          Patient suffering from trochanteric bursitis as well as lumbar spondylosis.  She does have mild arthritis of the hip that is asymptomatic on exam today with well-preserved and pain-free range of motion.  I discussed treatment options for her trochanteric bursa pain.  She is agreeable to trochanteric bursa cortisone injection today.  She will follow-up as needed.    In regards to her right knee, the patient has failed conservative treatment measures and is a candidate for joint arthroplasty.  I discussed the joint arthroplasty surgical process as well as the recovery and rehabilitation time frame.  The patient asked several questions regarding the joint arthroplasty surgery, which  were answered accordingly.  Ultimately, the patient declines surgical intervention at this time and wishes to continue with conservative treatment measures.  Alternative conservative treatment measures were discussed including bracing, therapy, topical/oral anti-inflammatories, activity modification, and weight loss.  The patient considered these treatment options and wishes to proceed with corticosteroid injection(s) today.  Therefore we will proceed with corticosteroid injection(s) today.  Follow-up in 3 months for repeat assessment.    Procedure Note:  I discussed with the patient the potential benefits of performing a therapeutic injection of the right knee as well as potential risks including but not limited to infection, swelling, pain, bleeding, bruising, nerve/vessel damage, skin color changes, transient elevation in blood glucose levels, and fat atrophy. After informed consent and verifying correct patient, procedure site, and type of procedure, the area was prepped with alcohol, ethyl chloride was used to numb the skin. Via the superolateral approach, 3 cc of 1% lidocaine, 3 cc of 0.25% Marcaine and 2 cc of 40mg/ml of Kenalog were injected into the right knee. The patient tolerated the procedure well. There were no complications. A sterile dressing was placed over the injection site.        Allen Madera MD  06/17/25  12:05 EDT

## 2025-07-07 RX ORDER — IBUPROFEN 200 MG
600 CAPSULE ORAL DAILY
Qty: 90 TABLET | Refills: 1 | Status: SHIPPED | OUTPATIENT
Start: 2025-07-07

## 2025-07-07 NOTE — TELEPHONE ENCOUNTER
Caller: Alisa Ames    Relationship: [facility]    Best call back number: [phone]     Requested Prescriptions:   Requested Prescriptions     Pending Prescriptions Disp Refills    calcium carbonate (Calcium 600) 600 MG tablet 90 tablet 1     Sig: Take 1 tablet by mouth Daily.        Pharmacy where request should be sent: [facility] - [place], [place] - [address] - [phone]  - [phone] FX     Last office visit with prescribing clinician: 4/15/2025   Last telemedicine visit with prescribing clinician: Visit date not found   Next office visit with prescribing clinician: 7/21/2025     Additional details provided by patient:     Does the patient have less than a 3 day supply:  [x] Yes  [] No    Would you like a call back once the refill request has been completed: [] Yes [x] No    If the office needs to give you a call back, can they leave a voicemail: [] Yes [x] No    Ahmet Marina   07/07/25 12:23 EDT

## 2025-07-14 DIAGNOSIS — G47.09 OTHER INSOMNIA: ICD-10-CM

## 2025-07-15 RX ORDER — TRAZODONE HYDROCHLORIDE 50 MG/1
TABLET ORAL
Qty: 45 TABLET | Refills: 2 | OUTPATIENT
Start: 2025-07-15

## 2025-07-29 ENCOUNTER — OFFICE VISIT (OUTPATIENT)
Dept: INTERNAL MEDICINE | Age: 65
End: 2025-07-29
Payer: MEDICARE

## 2025-07-29 ENCOUNTER — LAB (OUTPATIENT)
Dept: INTERNAL MEDICINE | Age: 65
End: 2025-07-29
Payer: MEDICARE

## 2025-07-29 VITALS
BODY MASS INDEX: 19.22 KG/M2 | OXYGEN SATURATION: 96 % | DIASTOLIC BLOOD PRESSURE: 76 MMHG | TEMPERATURE: 97.2 F | HEIGHT: 66 IN | SYSTOLIC BLOOD PRESSURE: 110 MMHG | WEIGHT: 119.6 LBS | HEART RATE: 78 BPM

## 2025-07-29 DIAGNOSIS — E03.9 HYPOTHYROIDISM (ACQUIRED): ICD-10-CM

## 2025-07-29 DIAGNOSIS — E11.65 TYPE 2 DIABETES MELLITUS WITH HYPERGLYCEMIA, UNSPECIFIED WHETHER LONG TERM INSULIN USE: Primary | ICD-10-CM

## 2025-07-29 DIAGNOSIS — D50.9 IRON DEFICIENCY ANEMIA, UNSPECIFIED IRON DEFICIENCY ANEMIA TYPE: ICD-10-CM

## 2025-07-29 DIAGNOSIS — Z00.00 HEALTHCARE MAINTENANCE: Primary | ICD-10-CM

## 2025-07-29 DIAGNOSIS — F51.01 PRIMARY INSOMNIA: ICD-10-CM

## 2025-07-29 DIAGNOSIS — E78.00 PURE HYPERCHOLESTEROLEMIA: ICD-10-CM

## 2025-07-29 LAB
ALBUMIN SERPL-MCNC: 4.1 G/DL (ref 3.5–5.2)
ALBUMIN/GLOB SERPL: 1.4 G/DL
ALP SERPL-CCNC: 69 U/L (ref 39–117)
ALT SERPL W P-5'-P-CCNC: 20 U/L (ref 1–33)
ANION GAP SERPL CALCULATED.3IONS-SCNC: 10 MMOL/L (ref 5–15)
AST SERPL-CCNC: 21 U/L (ref 1–32)
BILIRUB SERPL-MCNC: 0.2 MG/DL (ref 0–1.2)
BUN SERPL-MCNC: 14 MG/DL (ref 8–23)
BUN/CREAT SERPL: 13.1 (ref 7–25)
CALCIUM SPEC-SCNC: 9.1 MG/DL (ref 8.6–10.5)
CHLORIDE SERPL-SCNC: 100 MMOL/L (ref 98–107)
CHOLEST SERPL-MCNC: 185 MG/DL (ref 0–200)
CO2 SERPL-SCNC: 27 MMOL/L (ref 22–29)
CREAT SERPL-MCNC: 1.07 MG/DL (ref 0.57–1)
EGFRCR SERPLBLD CKD-EPI 2021: 57 ML/MIN/1.73
EXPIRATION DATE: NORMAL
GLOBULIN UR ELPH-MCNC: 2.9 GM/DL
GLUCOSE SERPL-MCNC: 82 MG/DL (ref 65–99)
HBA1C MFR BLD: 5.5 % (ref 4.5–5.7)
HDLC SERPL-MCNC: 74 MG/DL (ref 40–60)
IRON 24H UR-MRATE: 23 MCG/DL (ref 37–145)
IRON SATN MFR SERPL: 4 % (ref 20–50)
LDLC SERPL CALC-MCNC: 99 MG/DL (ref 0–100)
LDLC/HDLC SERPL: 1.32 {RATIO}
Lab: NORMAL
POTASSIUM SERPL-SCNC: 4.5 MMOL/L (ref 3.5–5.2)
PROT SERPL-MCNC: 7 G/DL (ref 6–8.5)
SODIUM SERPL-SCNC: 137 MMOL/L (ref 136–145)
TIBC SERPL-MCNC: 542 MCG/DL (ref 298–536)
TRANSFERRIN SERPL-MCNC: 364 MG/DL (ref 200–360)
TRIGL SERPL-MCNC: 66 MG/DL (ref 0–150)
TSH SERPL DL<=0.05 MIU/L-ACNC: 2.64 UIU/ML (ref 0.27–4.2)
VLDLC SERPL-MCNC: 12 MG/DL (ref 5–40)

## 2025-07-29 PROCEDURE — 80053 COMPREHEN METABOLIC PANEL: CPT | Performed by: NURSE PRACTITIONER

## 2025-07-29 PROCEDURE — 36415 COLL VENOUS BLD VENIPUNCTURE: CPT | Performed by: NURSE PRACTITIONER

## 2025-07-29 PROCEDURE — 84466 ASSAY OF TRANSFERRIN: CPT | Performed by: NURSE PRACTITIONER

## 2025-07-29 PROCEDURE — 84443 ASSAY THYROID STIM HORMONE: CPT | Performed by: NURSE PRACTITIONER

## 2025-07-29 PROCEDURE — 83540 ASSAY OF IRON: CPT | Performed by: NURSE PRACTITIONER

## 2025-07-29 PROCEDURE — 85025 COMPLETE CBC W/AUTO DIFF WBC: CPT | Performed by: NURSE PRACTITIONER

## 2025-07-29 PROCEDURE — 80061 LIPID PANEL: CPT | Performed by: NURSE PRACTITIONER

## 2025-07-29 RX ORDER — FERROUS SULFATE 325(65) MG
325 TABLET ORAL
Qty: 90 TABLET | Refills: 1 | Status: SHIPPED | OUTPATIENT
Start: 2025-07-29

## 2025-07-29 RX ORDER — MIRTAZAPINE 7.5 MG/1
7.5 TABLET, FILM COATED ORAL NIGHTLY
Qty: 90 TABLET | Refills: 1 | Status: SHIPPED | OUTPATIENT
Start: 2025-07-29

## 2025-07-30 LAB
BASOPHILS # BLD AUTO: 0.08 10*3/MM3 (ref 0–0.2)
BASOPHILS NFR BLD AUTO: 1.2 % (ref 0–1.5)
DEPRECATED RDW RBC AUTO: 51.6 FL (ref 37–54)
EOSINOPHIL # BLD AUTO: 0.02 10*3/MM3 (ref 0–0.4)
EOSINOPHIL NFR BLD AUTO: 0.3 % (ref 0.3–6.2)
ERYTHROCYTE [DISTWIDTH] IN BLOOD BY AUTOMATED COUNT: 18.2 % (ref 12.3–15.4)
HCT VFR BLD AUTO: 34.2 % (ref 34–46.6)
HGB BLD-MCNC: 10.3 G/DL (ref 12–15.9)
IMM GRANULOCYTES # BLD AUTO: 0.02 10*3/MM3 (ref 0–0.05)
IMM GRANULOCYTES NFR BLD AUTO: 0.3 % (ref 0–0.5)
LYMPHOCYTES # BLD AUTO: 1.63 10*3/MM3 (ref 0.7–3.1)
LYMPHOCYTES NFR BLD AUTO: 24.9 % (ref 19.6–45.3)
MCH RBC QN AUTO: 24.1 PG (ref 26.6–33)
MCHC RBC AUTO-ENTMCNC: 30.1 G/DL (ref 31.5–35.7)
MCV RBC AUTO: 80.1 FL (ref 79–97)
MONOCYTES # BLD AUTO: 0.57 10*3/MM3 (ref 0.1–0.9)
MONOCYTES NFR BLD AUTO: 8.7 % (ref 5–12)
NEUTROPHILS NFR BLD AUTO: 4.22 10*3/MM3 (ref 1.7–7)
NEUTROPHILS NFR BLD AUTO: 64.6 % (ref 42.7–76)
NRBC BLD AUTO-RTO: 0 /100 WBC (ref 0–0.2)
PLATELET # BLD AUTO: 368 10*3/MM3 (ref 140–450)
PMV BLD AUTO: 9.4 FL (ref 6–12)
RBC # BLD AUTO: 4.27 10*6/MM3 (ref 3.77–5.28)
WBC NRBC COR # BLD AUTO: 6.54 10*3/MM3 (ref 3.4–10.8)

## 2025-08-13 DIAGNOSIS — R39.9 UTI SYMPTOMS: Primary | ICD-10-CM

## 2025-08-29 ENCOUNTER — TELEPHONE (OUTPATIENT)
Dept: INTERNAL MEDICINE | Age: 65
End: 2025-08-29
Payer: MEDICARE

## 2025-08-29 RX ORDER — FLUTICASONE PROPIONATE 50 MCG
2 SPRAY, SUSPENSION (ML) NASAL DAILY
Qty: 16 G | Refills: 2 | Status: SHIPPED | OUTPATIENT
Start: 2025-08-29

## (undated) DEVICE — ENCORE® LATEX MICRO SIZE 8.5, STERILE LATEX POWDER-FREE SURGICAL GLOVE: Brand: ENCORE

## (undated) DEVICE — STERILE PVP: Brand: MEDLINE INDUSTRIES, INC.

## (undated) DEVICE — DRAPE,REIN 53X77,STERILE: Brand: MEDLINE

## (undated) DEVICE — GLV SURG SIGNATURE TOUCH PF LTX 8 STRL BX/50

## (undated) DEVICE — CVR HNDL LT SURG ACCSSRY BLU STRL

## (undated) DEVICE — SUT MONOCRYL PLS ANTIB UND 3/0  PS1 27IN

## (undated) DEVICE — PK HIP TOTL UNIV 10

## (undated) DEVICE — NDL HYPO ECLPS SFTY 18G 1 1/2IN

## (undated) DEVICE — SST TWIST DRILL, STANDARD, 2.4MM DIA. X 127MM: Brand: MICROAIRE®

## (undated) DEVICE — SYR LUERLOK 30CC

## (undated) DEVICE — ANTIBACTERIAL UNDYED BRAIDED (POLYGLACTIN 910), SYNTHETIC ABSORBABLE SUTURE: Brand: COATED VICRYL

## (undated) DEVICE — NDL SPINE 22G 31/2IN BLK

## (undated) DEVICE — SKIN AFFIX SURG ADHESIVE 72/CS 0.55ML: Brand: MEDLINE

## (undated) DEVICE — TB SXN FRAZIER 12F STRL

## (undated) DEVICE — HEWSON SUTURE RETRIEVER: Brand: HEWSON SUTURE RETRIEVER

## (undated) DEVICE — COATED BRAIDED POLYESTER: Brand: TI-CRON

## (undated) DEVICE — SUT VIC 1 CTX 36IN OBGYN VCP977H

## (undated) DEVICE — HDRST POSTIN FM CRDL TRACH SLOT 9X8X4IN

## (undated) DEVICE — SPNG GZ WOVN 4X4IN 12PLY 10/BX STRL

## (undated) DEVICE — DRSNG SURG AQUACEL AG 9X25CM

## (undated) DEVICE — STRYKER PERFORMANCE SERIES SAGITTAL BLADE: Brand: STRYKER PERFORMANCE SERIES

## (undated) DEVICE — GLV SURG TRIUMPH ORTHO W/ALOE PF LTX 8 STRL